# Patient Record
Sex: FEMALE | Race: WHITE | NOT HISPANIC OR LATINO | Employment: OTHER | URBAN - METROPOLITAN AREA
[De-identification: names, ages, dates, MRNs, and addresses within clinical notes are randomized per-mention and may not be internally consistent; named-entity substitution may affect disease eponyms.]

---

## 2017-01-25 ENCOUNTER — ALLSCRIPTS OFFICE VISIT (OUTPATIENT)
Dept: OTHER | Facility: OTHER | Age: 62
End: 2017-01-25

## 2017-01-25 DIAGNOSIS — R74.8 ABNORMAL LEVELS OF OTHER SERUM ENZYMES: ICD-10-CM

## 2017-01-25 DIAGNOSIS — Z12.31 ENCOUNTER FOR SCREENING MAMMOGRAM FOR MALIGNANT NEOPLASM OF BREAST: ICD-10-CM

## 2017-02-08 ENCOUNTER — HOSPITAL ENCOUNTER (OUTPATIENT)
Dept: ULTRASOUND IMAGING | Facility: CLINIC | Age: 62
Discharge: HOME | End: 2017-02-08
Payer: COMMERCIAL

## 2017-02-08 DIAGNOSIS — R74.8 ABNORMAL LEVELS OF OTHER SERUM ENZYMES: ICD-10-CM

## 2017-02-08 PROCEDURE — 76705 ECHO EXAM OF ABDOMEN: CPT

## 2017-02-09 ENCOUNTER — GENERIC CONVERSION - ENCOUNTER (OUTPATIENT)
Dept: OTHER | Facility: OTHER | Age: 62
End: 2017-02-09

## 2017-02-22 ENCOUNTER — ALLSCRIPTS OFFICE VISIT (OUTPATIENT)
Dept: OTHER | Facility: OTHER | Age: 62
End: 2017-02-22

## 2017-03-24 ENCOUNTER — HOSPITAL ENCOUNTER (EMERGENCY)
Facility: HOSPITAL | Age: 62
Discharge: HOME | End: 2017-03-24
Attending: EMERGENCY MEDICINE | Admitting: EMERGENCY MEDICINE
Payer: COMMERCIAL

## 2017-03-24 VITALS
HEART RATE: 99 BPM | SYSTOLIC BLOOD PRESSURE: 143 MMHG | OXYGEN SATURATION: 93 % | TEMPERATURE: 98.1 F | WEIGHT: 193 LBS | RESPIRATION RATE: 18 BRPM | DIASTOLIC BLOOD PRESSURE: 82 MMHG

## 2017-03-24 DIAGNOSIS — N39.0 URINARY TRACT INFECTION: Primary | ICD-10-CM

## 2017-03-24 LAB
BACTERIA UR QL AUTO: ABNORMAL /HPF
BILIRUB UR QL STRIP: ABNORMAL
CLARITY UR: ABNORMAL
COLOR UR: ABNORMAL
GLUCOSE UR STRIP-MCNC: ABNORMAL MG/DL
HGB UR QL STRIP.AUTO: NEGATIVE
KETONES UR STRIP-MCNC: ABNORMAL MG/DL
LEUKOCYTE ESTERASE UR QL STRIP: ABNORMAL
NITRITE UR QL STRIP: POSITIVE
NON-SQ EPI CELLS URNS QL MICRO: ABNORMAL /HPF
PH UR STRIP.AUTO: 5 [PH] (ref 4.5–8)
PROT UR STRIP-MCNC: ABNORMAL MG/DL
RBC #/AREA URNS AUTO: ABNORMAL /HPF
SP GR UR STRIP.AUTO: 1.02 (ref 1–1.03)
UROBILINOGEN UR QL STRIP.AUTO: >=8 E.U./DL
WBC #/AREA URNS AUTO: ABNORMAL /HPF

## 2017-03-24 PROCEDURE — 99283 EMERGENCY DEPT VISIT LOW MDM: CPT

## 2017-03-24 PROCEDURE — 87077 CULTURE AEROBIC IDENTIFY: CPT | Performed by: EMERGENCY MEDICINE

## 2017-03-24 PROCEDURE — 87186 SC STD MICRODIL/AGAR DIL: CPT | Performed by: EMERGENCY MEDICINE

## 2017-03-24 PROCEDURE — 81001 URINALYSIS AUTO W/SCOPE: CPT | Performed by: EMERGENCY MEDICINE

## 2017-03-24 PROCEDURE — 87086 URINE CULTURE/COLONY COUNT: CPT | Performed by: EMERGENCY MEDICINE

## 2017-03-24 PROCEDURE — A9270 NON-COVERED ITEM OR SERVICE: HCPCS | Performed by: EMERGENCY MEDICINE

## 2017-03-24 RX ORDER — SIMVASTATIN 5 MG
5 TABLET ORAL
COMMUNITY
End: 2017-07-10

## 2017-03-24 RX ORDER — SULFAMETHOXAZOLE AND TRIMETHOPRIM 800; 160 MG/1; MG/1
1 TABLET ORAL 2 TIMES DAILY
Qty: 14 TABLET | Refills: 0 | Status: SHIPPED | OUTPATIENT
Start: 2017-03-24 | End: 2017-03-31

## 2017-03-24 RX ORDER — OXYCODONE HYDROCHLORIDE 30 MG/1
30 TABLET, FILM COATED, EXTENDED RELEASE ORAL 3 TIMES DAILY PRN
COMMUNITY
End: 2018-09-11 | Stop reason: HOSPADM

## 2017-03-24 RX ORDER — BACLOFEN 10 MG/1
10 TABLET ORAL 3 TIMES DAILY
COMMUNITY
End: 2018-09-11 | Stop reason: HOSPADM

## 2017-03-24 RX ORDER — GABAPENTIN 800 MG/1
800 TABLET ORAL 3 TIMES DAILY
COMMUNITY

## 2017-03-24 RX ORDER — SULFAMETHOXAZOLE AND TRIMETHOPRIM 800; 160 MG/1; MG/1
1 TABLET ORAL ONCE
Status: COMPLETED | OUTPATIENT
Start: 2017-03-24 | End: 2017-03-24

## 2017-03-24 RX ADMIN — SULFAMETHOXAZOLE AND TRIMETHOPRIM 1 TABLET: 800; 160 TABLET ORAL at 18:24

## 2017-03-26 LAB — BACTERIA UR CULT: NORMAL

## 2017-04-25 ENCOUNTER — TRANSCRIBE ORDERS (OUTPATIENT)
Dept: MRI IMAGING | Facility: CLINIC | Age: 62
End: 2017-04-25

## 2017-04-25 ENCOUNTER — HOSPITAL ENCOUNTER (OUTPATIENT)
Dept: RADIOLOGY | Facility: CLINIC | Age: 62
Discharge: HOME | End: 2017-04-25
Payer: COMMERCIAL

## 2017-04-25 ENCOUNTER — TRANSCRIBE ORDERS (OUTPATIENT)
Dept: ADMINISTRATIVE | Facility: HOSPITAL | Age: 62
End: 2017-04-25

## 2017-04-25 DIAGNOSIS — I10 ESSENTIAL (PRIMARY) HYPERTENSION: ICD-10-CM

## 2017-04-25 DIAGNOSIS — M54.12 BRACHIAL NEURITIS OR RADICULITIS NOS: ICD-10-CM

## 2017-04-25 DIAGNOSIS — J45.20 MILD INTERMITTENT ASTHMA, UNCOMPLICATED: ICD-10-CM

## 2017-04-25 DIAGNOSIS — E78.1 PURE HYPERGLYCERIDEMIA: ICD-10-CM

## 2017-04-25 DIAGNOSIS — J45.909 INTRINSIC ASTHMA WITHOUT STATUS ASTHMATICUS, UNSPECIFIED ASTHMA SEVERITY, UNCOMPLICATED: ICD-10-CM

## 2017-04-25 DIAGNOSIS — M54.12 BRACHIAL NEURITIS OR RADICULITIS NOS: Primary | ICD-10-CM

## 2017-04-25 DIAGNOSIS — Z12.31 SCREENING MAMMOGRAM, ENCOUNTER FOR: Primary | ICD-10-CM

## 2017-04-25 PROCEDURE — 72050 X-RAY EXAM NECK SPINE 4/5VWS: CPT

## 2017-05-19 ENCOUNTER — HOSPITAL ENCOUNTER (OUTPATIENT)
Dept: MAMMOGRAPHY | Facility: CLINIC | Age: 62
Discharge: HOME | End: 2017-05-19
Payer: COMMERCIAL

## 2017-05-19 DIAGNOSIS — Z12.31 ENCOUNTER FOR SCREENING MAMMOGRAM FOR MALIGNANT NEOPLASM OF BREAST: ICD-10-CM

## 2017-05-19 DIAGNOSIS — Z12.31 SCREENING MAMMOGRAM, ENCOUNTER FOR: ICD-10-CM

## 2017-05-19 PROCEDURE — 77063 BREAST TOMOSYNTHESIS BI: CPT

## 2017-05-19 PROCEDURE — G0202 SCR MAMMO BI INCL CAD: HCPCS

## 2017-05-26 ENCOUNTER — GENERIC CONVERSION - ENCOUNTER (OUTPATIENT)
Dept: OTHER | Facility: OTHER | Age: 62
End: 2017-05-26

## 2017-05-26 ENCOUNTER — HOSPITAL ENCOUNTER (OUTPATIENT)
Dept: PULMONOLOGY | Facility: HOSPITAL | Age: 62
Discharge: HOME | End: 2017-05-26
Payer: COMMERCIAL

## 2017-05-26 DIAGNOSIS — J45.909 INTRINSIC ASTHMA WITHOUT STATUS ASTHMATICUS, UNSPECIFIED ASTHMA SEVERITY, UNCOMPLICATED: ICD-10-CM

## 2017-05-26 PROCEDURE — 94760 N-INVAS EAR/PLS OXIMETRY 1: CPT

## 2017-05-26 PROCEDURE — 94729 DIFFUSING CAPACITY: CPT

## 2017-05-26 PROCEDURE — 94060 EVALUATION OF WHEEZING: CPT

## 2017-05-26 PROCEDURE — 94727 GAS DIL/WSHOT DETER LNG VOL: CPT

## 2017-05-26 RX ORDER — ALBUTEROL SULFATE 2.5 MG/3ML
2.5 SOLUTION RESPIRATORY (INHALATION) ONCE
Status: DISCONTINUED | OUTPATIENT
Start: 2017-05-26 | End: 2017-05-30 | Stop reason: HOSPADM

## 2017-05-27 ENCOUNTER — APPOINTMENT (OUTPATIENT)
Dept: LAB | Facility: CLINIC | Age: 62
End: 2017-05-27
Payer: COMMERCIAL

## 2017-05-27 DIAGNOSIS — I10 ESSENTIAL (PRIMARY) HYPERTENSION: ICD-10-CM

## 2017-05-27 DIAGNOSIS — J45.20 MILD INTERMITTENT ASTHMA, UNCOMPLICATED: ICD-10-CM

## 2017-05-27 DIAGNOSIS — E78.1 PURE HYPERGLYCERIDEMIA: ICD-10-CM

## 2017-05-27 LAB
ALBUMIN SERPL BCP-MCNC: 3.5 G/DL (ref 3.5–5)
ALP SERPL-CCNC: 62 U/L (ref 46–116)
ALT SERPL W P-5'-P-CCNC: 104 U/L (ref 12–78)
ANION GAP SERPL CALCULATED.3IONS-SCNC: 4 MMOL/L (ref 4–13)
AST SERPL W P-5'-P-CCNC: 92 U/L (ref 5–45)
BASOPHILS # BLD AUTO: 0.03 THOUSANDS/ΜL (ref 0–0.1)
BASOPHILS NFR BLD AUTO: 1 % (ref 0–1)
BILIRUB SERPL-MCNC: 0.45 MG/DL (ref 0.2–1)
BUN SERPL-MCNC: 14 MG/DL (ref 5–25)
CALCIUM SERPL-MCNC: 9.1 MG/DL (ref 8.3–10.1)
CHLORIDE SERPL-SCNC: 106 MMOL/L (ref 100–108)
CHOLEST SERPL-MCNC: 214 MG/DL (ref 50–200)
CO2 SERPL-SCNC: 31 MMOL/L (ref 21–32)
CREAT SERPL-MCNC: 0.74 MG/DL (ref 0.6–1.3)
EOSINOPHIL # BLD AUTO: 0.17 THOUSAND/ΜL (ref 0–0.61)
EOSINOPHIL NFR BLD AUTO: 3 % (ref 0–6)
ERYTHROCYTE [DISTWIDTH] IN BLOOD BY AUTOMATED COUNT: 14 % (ref 11.6–15.1)
GFR SERPL CREATININE-BSD FRML MDRD: >60 ML/MIN/1.73SQ M
GLUCOSE P FAST SERPL-MCNC: 91 MG/DL (ref 65–99)
HCT VFR BLD AUTO: 43 % (ref 34.8–46.1)
HDLC SERPL-MCNC: 48 MG/DL (ref 40–60)
HGB BLD-MCNC: 13.7 G/DL (ref 11.5–15.4)
LDLC SERPL CALC-MCNC: 104 MG/DL (ref 0–100)
LYMPHOCYTES # BLD AUTO: 2.11 THOUSANDS/ΜL (ref 0.6–4.47)
LYMPHOCYTES NFR BLD AUTO: 41 % (ref 14–44)
MCH RBC QN AUTO: 29.7 PG (ref 26.8–34.3)
MCHC RBC AUTO-ENTMCNC: 31.9 G/DL (ref 31.4–37.4)
MCV RBC AUTO: 93 FL (ref 82–98)
MONOCYTES # BLD AUTO: 0.4 THOUSAND/ΜL (ref 0.17–1.22)
MONOCYTES NFR BLD AUTO: 8 % (ref 4–12)
NEUTROPHILS # BLD AUTO: 2.38 THOUSANDS/ΜL (ref 1.85–7.62)
NEUTS SEG NFR BLD AUTO: 47 % (ref 43–75)
NRBC BLD AUTO-RTO: 0 /100 WBCS
PLATELET # BLD AUTO: 293 THOUSANDS/UL (ref 149–390)
PMV BLD AUTO: 11 FL (ref 8.9–12.7)
POTASSIUM SERPL-SCNC: 4.4 MMOL/L (ref 3.5–5.3)
PROT SERPL-MCNC: 7.2 G/DL (ref 6.4–8.2)
RBC # BLD AUTO: 4.61 MILLION/UL (ref 3.81–5.12)
SODIUM SERPL-SCNC: 141 MMOL/L (ref 136–145)
T4 FREE SERPL-MCNC: 0.97 NG/DL (ref 0.76–1.46)
TRIGL SERPL-MCNC: 311 MG/DL
TSH SERPL DL<=0.05 MIU/L-ACNC: 2.13 UIU/ML (ref 0.36–3.74)
WBC # BLD AUTO: 5.11 THOUSAND/UL (ref 4.31–10.16)

## 2017-05-27 PROCEDURE — 80061 LIPID PANEL: CPT

## 2017-05-27 PROCEDURE — 84443 ASSAY THYROID STIM HORMONE: CPT

## 2017-05-27 PROCEDURE — 85025 COMPLETE CBC W/AUTO DIFF WBC: CPT

## 2017-05-27 PROCEDURE — 84439 ASSAY OF FREE THYROXINE: CPT

## 2017-05-27 PROCEDURE — 80053 COMPREHEN METABOLIC PANEL: CPT

## 2017-05-27 PROCEDURE — 36415 COLL VENOUS BLD VENIPUNCTURE: CPT

## 2017-05-29 ENCOUNTER — GENERIC CONVERSION - ENCOUNTER (OUTPATIENT)
Dept: OTHER | Facility: OTHER | Age: 62
End: 2017-05-29

## 2017-05-30 ENCOUNTER — ALLSCRIPTS OFFICE VISIT (OUTPATIENT)
Dept: OTHER | Facility: OTHER | Age: 62
End: 2017-05-30

## 2017-06-27 ENCOUNTER — ALLSCRIPTS OFFICE VISIT (OUTPATIENT)
Dept: OTHER | Facility: OTHER | Age: 62
End: 2017-06-27

## 2017-07-03 ENCOUNTER — ALLSCRIPTS OFFICE VISIT (OUTPATIENT)
Dept: OTHER | Facility: OTHER | Age: 62
End: 2017-07-03

## 2017-07-10 ENCOUNTER — APPOINTMENT (EMERGENCY)
Dept: RADIOLOGY | Facility: HOSPITAL | Age: 62
End: 2017-07-10
Payer: COMMERCIAL

## 2017-07-10 ENCOUNTER — HOSPITAL ENCOUNTER (OUTPATIENT)
Facility: HOSPITAL | Age: 62
Setting detail: OBSERVATION
Discharge: HOME | End: 2017-07-11
Attending: EMERGENCY MEDICINE | Admitting: FAMILY MEDICINE
Payer: COMMERCIAL

## 2017-07-10 DIAGNOSIS — R07.9 CHEST PAIN: Primary | ICD-10-CM

## 2017-07-10 PROBLEM — M54.9 CHRONIC BACK PAIN: Status: ACTIVE | Noted: 2017-07-10

## 2017-07-10 PROBLEM — R74.01 TRANSAMINITIS: Status: ACTIVE | Noted: 2017-07-10

## 2017-07-10 PROBLEM — F11.20 CONTINUOUS OPIOID DEPENDENCE (HCC): Status: ACTIVE | Noted: 2017-07-10

## 2017-07-10 PROBLEM — G89.29 CHRONIC BACK PAIN: Status: ACTIVE | Noted: 2017-07-10

## 2017-07-10 LAB
ALBUMIN SERPL BCP-MCNC: 3.7 G/DL (ref 3.5–5)
ALP SERPL-CCNC: 68 U/L (ref 46–116)
ALT SERPL W P-5'-P-CCNC: 157 U/L (ref 12–78)
ANION GAP SERPL CALCULATED.3IONS-SCNC: 8 MMOL/L (ref 4–13)
AST SERPL W P-5'-P-CCNC: 125 U/L (ref 5–45)
ATRIAL RATE: 85 BPM
ATRIAL RATE: 93 BPM
BASOPHILS # BLD AUTO: 0.04 THOUSANDS/ΜL (ref 0–0.1)
BASOPHILS NFR BLD AUTO: 1 % (ref 0–1)
BILIRUB SERPL-MCNC: 0.2 MG/DL (ref 0.2–1)
BUN SERPL-MCNC: 15 MG/DL (ref 5–25)
CALCIUM SERPL-MCNC: 9.2 MG/DL (ref 8.3–10.1)
CHLORIDE SERPL-SCNC: 104 MMOL/L (ref 100–108)
CO2 SERPL-SCNC: 32 MMOL/L (ref 21–32)
CREAT SERPL-MCNC: 0.84 MG/DL (ref 0.6–1.3)
EOSINOPHIL # BLD AUTO: 0.04 THOUSAND/ΜL (ref 0–0.61)
EOSINOPHIL NFR BLD AUTO: 1 % (ref 0–6)
ERYTHROCYTE [DISTWIDTH] IN BLOOD BY AUTOMATED COUNT: 13.2 % (ref 11.6–15.1)
GFR SERPL CREATININE-BSD FRML MDRD: >60 ML/MIN/1.73SQ M
GLUCOSE SERPL-MCNC: 91 MG/DL (ref 65–140)
HCT VFR BLD AUTO: 44.4 % (ref 34.8–46.1)
HGB BLD-MCNC: 14.2 G/DL (ref 11.5–15.4)
LYMPHOCYTES # BLD AUTO: 2.14 THOUSANDS/ΜL (ref 0.6–4.47)
LYMPHOCYTES NFR BLD AUTO: 38 % (ref 14–44)
MAGNESIUM SERPL-MCNC: 2.1 MG/DL (ref 1.6–2.6)
MCH RBC QN AUTO: 29.5 PG (ref 26.8–34.3)
MCHC RBC AUTO-ENTMCNC: 32 G/DL (ref 31.4–37.4)
MCV RBC AUTO: 92 FL (ref 82–98)
MONOCYTES # BLD AUTO: 0.45 THOUSAND/ΜL (ref 0.17–1.22)
MONOCYTES NFR BLD AUTO: 8 % (ref 4–12)
NEUTROPHILS # BLD AUTO: 2.93 THOUSANDS/ΜL (ref 1.85–7.62)
NEUTS SEG NFR BLD AUTO: 52 % (ref 43–75)
NRBC BLD AUTO-RTO: 0 /100 WBCS
P AXIS: 41 DEGREES
P AXIS: 46 DEGREES
PLATELET # BLD AUTO: 292 THOUSANDS/UL (ref 149–390)
PMV BLD AUTO: 10.7 FL (ref 8.9–12.7)
POTASSIUM SERPL-SCNC: 3.9 MMOL/L (ref 3.5–5.3)
PR INTERVAL: 164 MS
PR INTERVAL: 168 MS
PROT SERPL-MCNC: 7.9 G/DL (ref 6.4–8.2)
QRS AXIS: 1 DEGREES
QRS AXIS: 5 DEGREES
QRSD INTERVAL: 84 MS
QRSD INTERVAL: 86 MS
QT INTERVAL: 346 MS
QT INTERVAL: 372 MS
QTC INTERVAL: 430 MS
QTC INTERVAL: 442 MS
RBC # BLD AUTO: 4.81 MILLION/UL (ref 3.81–5.12)
SODIUM SERPL-SCNC: 144 MMOL/L (ref 136–145)
SPECIMEN SOURCE: NORMAL
SPECIMEN SOURCE: NORMAL
T WAVE AXIS: 14 DEGREES
T WAVE AXIS: 26 DEGREES
TROPONIN I BLD-MCNC: 0 NG/ML (ref 0–0.08)
TROPONIN I BLD-MCNC: 0.03 NG/ML (ref 0–0.08)
TROPONIN I SERPL-MCNC: <0.02 NG/ML
TROPONIN I SERPL-MCNC: <0.02 NG/ML
VENTRICULAR RATE: 85 BPM
VENTRICULAR RATE: 93 BPM
WBC # BLD AUTO: 5.62 THOUSAND/UL (ref 4.31–10.16)

## 2017-07-10 PROCEDURE — 84484 ASSAY OF TROPONIN QUANT: CPT

## 2017-07-10 PROCEDURE — 84484 ASSAY OF TROPONIN QUANT: CPT | Performed by: FAMILY MEDICINE

## 2017-07-10 PROCEDURE — 85025 COMPLETE CBC W/AUTO DIFF WBC: CPT | Performed by: EMERGENCY MEDICINE

## 2017-07-10 PROCEDURE — 71020 HB CHEST X-RAY 2VW FRONTAL&LATL: CPT

## 2017-07-10 PROCEDURE — 83735 ASSAY OF MAGNESIUM: CPT | Performed by: EMERGENCY MEDICINE

## 2017-07-10 PROCEDURE — 99285 EMERGENCY DEPT VISIT HI MDM: CPT

## 2017-07-10 PROCEDURE — 96361 HYDRATE IV INFUSION ADD-ON: CPT

## 2017-07-10 PROCEDURE — 93005 ELECTROCARDIOGRAM TRACING: CPT | Performed by: EMERGENCY MEDICINE

## 2017-07-10 PROCEDURE — 96374 THER/PROPH/DIAG INJ IV PUSH: CPT

## 2017-07-10 PROCEDURE — 36415 COLL VENOUS BLD VENIPUNCTURE: CPT | Performed by: EMERGENCY MEDICINE

## 2017-07-10 PROCEDURE — 80053 COMPREHEN METABOLIC PANEL: CPT | Performed by: EMERGENCY MEDICINE

## 2017-07-10 RX ORDER — ASPIRIN 81 MG/1
324 TABLET, CHEWABLE ORAL ONCE
Status: COMPLETED | OUTPATIENT
Start: 2017-07-10 | End: 2017-07-10

## 2017-07-10 RX ORDER — BACLOFEN 10 MG/1
10 TABLET ORAL 3 TIMES DAILY
Status: DISCONTINUED | OUTPATIENT
Start: 2017-07-10 | End: 2017-07-11 | Stop reason: HOSPADM

## 2017-07-10 RX ORDER — ONDANSETRON 2 MG/ML
4 INJECTION INTRAMUSCULAR; INTRAVENOUS ONCE
Status: COMPLETED | OUTPATIENT
Start: 2017-07-10 | End: 2017-07-10

## 2017-07-10 RX ORDER — ZOLPIDEM TARTRATE 5 MG/1
5 TABLET ORAL
COMMUNITY
End: 2019-12-18 | Stop reason: ALTCHOICE

## 2017-07-10 RX ORDER — PANTOPRAZOLE SODIUM 20 MG/1
20 TABLET, DELAYED RELEASE ORAL
Status: DISCONTINUED | OUTPATIENT
Start: 2017-07-11 | End: 2017-07-11 | Stop reason: HOSPADM

## 2017-07-10 RX ORDER — ZOLPIDEM TARTRATE 5 MG/1
5 TABLET ORAL
Status: DISCONTINUED | OUTPATIENT
Start: 2017-07-10 | End: 2017-07-11 | Stop reason: HOSPADM

## 2017-07-10 RX ORDER — VENLAFAXINE HYDROCHLORIDE 37.5 MG/1
75 CAPSULE, EXTENDED RELEASE ORAL DAILY
Status: DISCONTINUED | OUTPATIENT
Start: 2017-07-11 | End: 2017-07-11 | Stop reason: HOSPADM

## 2017-07-10 RX ORDER — OMEPRAZOLE 20 MG/1
20 CAPSULE, DELAYED RELEASE ORAL 2 TIMES DAILY
COMMUNITY
End: 2018-03-15 | Stop reason: ALTCHOICE

## 2017-07-10 RX ORDER — ONDANSETRON 2 MG/ML
4 INJECTION INTRAMUSCULAR; INTRAVENOUS EVERY 6 HOURS PRN
Status: DISCONTINUED | OUTPATIENT
Start: 2017-07-10 | End: 2017-07-11 | Stop reason: HOSPADM

## 2017-07-10 RX ORDER — NITROGLYCERIN 0.4 MG/1
0.4 TABLET SUBLINGUAL
Status: DISCONTINUED | OUTPATIENT
Start: 2017-07-10 | End: 2017-07-11 | Stop reason: HOSPADM

## 2017-07-10 RX ORDER — GABAPENTIN 400 MG/1
800 CAPSULE ORAL 3 TIMES DAILY
Status: DISCONTINUED | OUTPATIENT
Start: 2017-07-10 | End: 2017-07-11 | Stop reason: HOSPADM

## 2017-07-10 RX ORDER — ACETAMINOPHEN 325 MG/1
650 TABLET ORAL EVERY 4 HOURS PRN
Status: DISCONTINUED | OUTPATIENT
Start: 2017-07-10 | End: 2017-07-11 | Stop reason: HOSPADM

## 2017-07-10 RX ORDER — ALBUTEROL SULFATE 90 UG/1
2 AEROSOL, METERED RESPIRATORY (INHALATION) 2 TIMES DAILY PRN
COMMUNITY
End: 2018-03-15 | Stop reason: SDUPTHER

## 2017-07-10 RX ORDER — ASPIRIN 81 MG/1
81 TABLET, CHEWABLE ORAL DAILY
Status: DISCONTINUED | OUTPATIENT
Start: 2017-07-11 | End: 2017-07-11 | Stop reason: HOSPADM

## 2017-07-10 RX ORDER — VENLAFAXINE 75 MG/1
37.5 TABLET ORAL 2 TIMES DAILY
COMMUNITY
End: 2018-03-15 | Stop reason: CLARIF

## 2017-07-10 RX ORDER — ALBUTEROL SULFATE 90 UG/1
2 AEROSOL, METERED RESPIRATORY (INHALATION) EVERY 6 HOURS PRN
Status: DISCONTINUED | OUTPATIENT
Start: 2017-07-10 | End: 2017-07-11 | Stop reason: HOSPADM

## 2017-07-10 RX ADMIN — ZOLPIDEM TARTRATE 5 MG: 5 TABLET, COATED ORAL at 22:18

## 2017-07-10 RX ADMIN — GABAPENTIN 800 MG: 400 CAPSULE ORAL at 21:44

## 2017-07-10 RX ADMIN — OXYCODONE HYDROCHLORIDE 30 MG: 20 TABLET, FILM COATED, EXTENDED RELEASE ORAL at 21:45

## 2017-07-10 RX ADMIN — SODIUM CHLORIDE 1000 ML: 0.9 INJECTION, SOLUTION INTRAVENOUS at 10:35

## 2017-07-10 RX ADMIN — ENOXAPARIN SODIUM 40 MG: 40 INJECTION SUBCUTANEOUS at 16:03

## 2017-07-10 RX ADMIN — OXYCODONE HYDROCHLORIDE 30 MG: 20 TABLET, FILM COATED, EXTENDED RELEASE ORAL at 16:03

## 2017-07-10 RX ADMIN — BACLOFEN 10 MG: 10 TABLET ORAL at 16:56

## 2017-07-10 RX ADMIN — ASPIRIN 81 MG 324 MG: 81 TABLET ORAL at 10:18

## 2017-07-10 RX ADMIN — BACLOFEN 10 MG: 10 TABLET ORAL at 21:44

## 2017-07-10 RX ADMIN — ONDANSETRON 4 MG: 2 INJECTION INTRAMUSCULAR; INTRAVENOUS at 10:34

## 2017-07-10 RX ADMIN — GABAPENTIN 800 MG: 400 CAPSULE ORAL at 16:56

## 2017-07-11 ENCOUNTER — APPOINTMENT (OUTPATIENT)
Dept: NUCLEAR MEDICINE | Facility: HOSPITAL | Age: 62
End: 2017-07-11
Payer: COMMERCIAL

## 2017-07-11 ENCOUNTER — APPOINTMENT (OUTPATIENT)
Dept: NON INVASIVE DIAGNOSTICS | Facility: HOSPITAL | Age: 62
End: 2017-07-11
Payer: COMMERCIAL

## 2017-07-11 VITALS
HEIGHT: 61 IN | WEIGHT: 205.69 LBS | TEMPERATURE: 98.2 F | HEART RATE: 89 BPM | SYSTOLIC BLOOD PRESSURE: 168 MMHG | RESPIRATION RATE: 18 BRPM | BODY MASS INDEX: 38.83 KG/M2 | OXYGEN SATURATION: 92 % | DIASTOLIC BLOOD PRESSURE: 93 MMHG

## 2017-07-11 PROBLEM — R07.9 CHEST PAIN: Status: RESOLVED | Noted: 2017-07-10 | Resolved: 2017-07-11

## 2017-07-11 LAB
ALBUMIN SERPL BCP-MCNC: 3.2 G/DL (ref 3.5–5)
ALP SERPL-CCNC: 50 U/L (ref 46–116)
ALT SERPL W P-5'-P-CCNC: 117 U/L (ref 12–78)
ANION GAP SERPL CALCULATED.3IONS-SCNC: 8 MMOL/L (ref 4–13)
AST SERPL W P-5'-P-CCNC: 92 U/L (ref 5–45)
BILIRUB SERPL-MCNC: 0.4 MG/DL (ref 0.2–1)
BUN SERPL-MCNC: 16 MG/DL (ref 5–25)
CALCIUM SERPL-MCNC: 9 MG/DL (ref 8.3–10.1)
CHLORIDE SERPL-SCNC: 104 MMOL/L (ref 100–108)
CHOLEST SERPL-MCNC: 197 MG/DL (ref 50–200)
CO2 SERPL-SCNC: 25 MMOL/L (ref 21–32)
CREAT SERPL-MCNC: 0.63 MG/DL (ref 0.6–1.3)
GFR SERPL CREATININE-BSD FRML MDRD: >60 ML/MIN/1.73SQ M
GLUCOSE P FAST SERPL-MCNC: 99 MG/DL (ref 65–99)
GLUCOSE SERPL-MCNC: 99 MG/DL (ref 65–140)
HDLC SERPL-MCNC: 66 MG/DL (ref 40–60)
LDLC SERPL CALC-MCNC: 89 MG/DL (ref 0–100)
MAX DIASTOLIC BP: 84 MMHG
MAX HEART RATE: 113 BPM
MAX PREDICTED HEART RATE: 156 BPM
MAX. SYSTOLIC BP: 168 MMHG
PLATELET # BLD AUTO: 270 THOUSANDS/UL (ref 149–390)
PMV BLD AUTO: 11 FL (ref 8.9–12.7)
POTASSIUM SERPL-SCNC: 4.3 MMOL/L (ref 3.5–5.3)
PROT SERPL-MCNC: 7.1 G/DL (ref 6.4–8.2)
PROTOCOL NAME: NORMAL
REASON FOR TERMINATION: NORMAL
SODIUM SERPL-SCNC: 137 MMOL/L (ref 136–145)
TARGET HR FORMULA: NORMAL
TEST INDICATION: NORMAL
TIME IN EXERCISE PHASE: 180 S
TRIGL SERPL-MCNC: 208 MG/DL
TROPONIN I SERPL-MCNC: <0.02 NG/ML

## 2017-07-11 PROCEDURE — A9502 TC99M TETROFOSMIN: HCPCS

## 2017-07-11 PROCEDURE — 85049 AUTOMATED PLATELET COUNT: CPT | Performed by: FAMILY MEDICINE

## 2017-07-11 PROCEDURE — 78452 HT MUSCLE IMAGE SPECT MULT: CPT

## 2017-07-11 PROCEDURE — 80053 COMPREHEN METABOLIC PANEL: CPT | Performed by: FAMILY MEDICINE

## 2017-07-11 PROCEDURE — 80061 LIPID PANEL: CPT | Performed by: FAMILY MEDICINE

## 2017-07-11 PROCEDURE — 84484 ASSAY OF TROPONIN QUANT: CPT | Performed by: FAMILY MEDICINE

## 2017-07-11 PROCEDURE — 93017 CV STRESS TEST TRACING ONLY: CPT

## 2017-07-11 RX ORDER — ASPIRIN 81 MG/1
81 TABLET, CHEWABLE ORAL DAILY
Qty: 30 TABLET | Refills: 0
Start: 2017-07-11 | End: 2018-03-15 | Stop reason: ALTCHOICE

## 2017-07-11 RX ADMIN — ACETAMINOPHEN 650 MG: 325 TABLET ORAL at 05:32

## 2017-07-11 RX ADMIN — OXYCODONE HYDROCHLORIDE 30 MG: 20 TABLET, FILM COATED, EXTENDED RELEASE ORAL at 08:16

## 2017-07-11 RX ADMIN — PANTOPRAZOLE SODIUM 20 MG: 20 TABLET, DELAYED RELEASE ORAL at 05:30

## 2017-07-11 RX ADMIN — REGADENOSON 0.4 MG: 0.08 INJECTION, SOLUTION INTRAVENOUS at 10:38

## 2017-07-11 RX ADMIN — BACLOFEN 10 MG: 10 TABLET ORAL at 08:12

## 2017-07-11 RX ADMIN — ASPIRIN 81 MG 81 MG: 81 TABLET ORAL at 08:12

## 2017-07-11 RX ADMIN — VENLAFAXINE HYDROCHLORIDE 75 MG: 75 CAPSULE, EXTENDED RELEASE ORAL at 08:13

## 2017-07-11 RX ADMIN — GABAPENTIN 800 MG: 400 CAPSULE ORAL at 08:12

## 2017-07-11 RX ADMIN — ENOXAPARIN SODIUM 40 MG: 40 INJECTION SUBCUTANEOUS at 08:12

## 2017-07-31 ENCOUNTER — ALLSCRIPTS OFFICE VISIT (OUTPATIENT)
Dept: OTHER | Facility: OTHER | Age: 62
End: 2017-07-31

## 2017-07-31 DIAGNOSIS — R25.2 CRAMP AND SPASM: ICD-10-CM

## 2017-08-08 ENCOUNTER — APPOINTMENT (EMERGENCY)
Dept: RADIOLOGY | Facility: HOSPITAL | Age: 62
End: 2017-08-08
Payer: COMMERCIAL

## 2017-08-08 ENCOUNTER — HOSPITAL ENCOUNTER (EMERGENCY)
Facility: HOSPITAL | Age: 62
Discharge: HOME | End: 2017-08-08
Attending: EMERGENCY MEDICINE | Admitting: EMERGENCY MEDICINE
Payer: COMMERCIAL

## 2017-08-08 ENCOUNTER — APPOINTMENT (EMERGENCY)
Dept: CT IMAGING | Facility: HOSPITAL | Age: 62
End: 2017-08-08
Payer: COMMERCIAL

## 2017-08-08 VITALS
OXYGEN SATURATION: 94 % | HEART RATE: 91 BPM | DIASTOLIC BLOOD PRESSURE: 85 MMHG | RESPIRATION RATE: 18 BRPM | TEMPERATURE: 97.5 F | WEIGHT: 206.79 LBS | SYSTOLIC BLOOD PRESSURE: 185 MMHG | BODY MASS INDEX: 39.07 KG/M2

## 2017-08-08 DIAGNOSIS — T50.905A MEDICATION REACTION, INITIAL ENCOUNTER: Primary | ICD-10-CM

## 2017-08-08 LAB
ALBUMIN SERPL BCP-MCNC: 4.1 G/DL (ref 3.5–5)
ALP SERPL-CCNC: 64 U/L (ref 46–116)
ALT SERPL W P-5'-P-CCNC: 173 U/L (ref 12–78)
AMPHETAMINES SERPL QL SCN: NEGATIVE
ANION GAP SERPL CALCULATED.3IONS-SCNC: 7 MMOL/L (ref 4–13)
APAP SERPL-MCNC: <2 UG/ML (ref 10–30)
AST SERPL W P-5'-P-CCNC: 148 U/L (ref 5–45)
BARBITURATES UR QL: NEGATIVE
BASOPHILS # BLD AUTO: 0.04 THOUSANDS/ΜL (ref 0–0.1)
BASOPHILS NFR BLD AUTO: 1 % (ref 0–1)
BENZODIAZ UR QL: NEGATIVE
BILIRUB DIRECT SERPL-MCNC: 0.12 MG/DL (ref 0–0.2)
BILIRUB SERPL-MCNC: 0.3 MG/DL (ref 0.2–1)
BUN SERPL-MCNC: 17 MG/DL (ref 5–25)
CALCIUM SERPL-MCNC: 10 MG/DL (ref 8.3–10.1)
CHLORIDE SERPL-SCNC: 104 MMOL/L (ref 100–108)
CO2 SERPL-SCNC: 31 MMOL/L (ref 21–32)
COCAINE UR QL: NEGATIVE
CREAT SERPL-MCNC: 0.93 MG/DL (ref 0.6–1.3)
EOSINOPHIL # BLD AUTO: 0.07 THOUSAND/ΜL (ref 0–0.61)
EOSINOPHIL NFR BLD AUTO: 1 % (ref 0–6)
ERYTHROCYTE [DISTWIDTH] IN BLOOD BY AUTOMATED COUNT: 13.1 % (ref 11.6–15.1)
ETHANOL SERPL-MCNC: <3 MG/DL (ref 0–3)
GFR SERPL CREATININE-BSD FRML MDRD: 65 ML/MIN/1.73SQ M
GLUCOSE SERPL-MCNC: 117 MG/DL (ref 65–140)
HCT VFR BLD AUTO: 44.9 % (ref 34.8–46.1)
HGB BLD-MCNC: 14.6 G/DL (ref 11.5–15.4)
INR PPP: 0.95 (ref 0.86–1.16)
LYMPHOCYTES # BLD AUTO: 1.85 THOUSANDS/ΜL (ref 0.6–4.47)
LYMPHOCYTES NFR BLD AUTO: 23 % (ref 14–44)
MCH RBC QN AUTO: 29.9 PG (ref 26.8–34.3)
MCHC RBC AUTO-ENTMCNC: 32.5 G/DL (ref 31.4–37.4)
MCV RBC AUTO: 92 FL (ref 82–98)
METHADONE UR QL: NEGATIVE
MONOCYTES # BLD AUTO: 0.46 THOUSAND/ΜL (ref 0.17–1.22)
MONOCYTES NFR BLD AUTO: 6 % (ref 4–12)
NEUTROPHILS # BLD AUTO: 5.72 THOUSANDS/ΜL (ref 1.85–7.62)
NEUTS SEG NFR BLD AUTO: 70 % (ref 43–75)
NRBC BLD AUTO-RTO: 0 /100 WBCS
OPIATES UR QL SCN: POSITIVE
PCP UR QL: NEGATIVE
PLATELET # BLD AUTO: 287 THOUSANDS/UL (ref 149–390)
PMV BLD AUTO: 10.6 FL (ref 8.9–12.7)
POTASSIUM SERPL-SCNC: 4.1 MMOL/L (ref 3.5–5.3)
PROT SERPL-MCNC: 7.9 G/DL (ref 6.4–8.2)
PROTHROMBIN TIME: 12.9 SECONDS (ref 12.1–14.4)
RBC # BLD AUTO: 4.88 MILLION/UL (ref 3.81–5.12)
SALICYLATES SERPL-MCNC: <3 MG/DL (ref 3–20)
SODIUM SERPL-SCNC: 142 MMOL/L (ref 136–145)
THC UR QL: NEGATIVE
TROPONIN I SERPL-MCNC: <0.02 NG/ML
WBC # BLD AUTO: 8.18 THOUSAND/UL (ref 4.31–10.16)

## 2017-08-08 PROCEDURE — 93005 ELECTROCARDIOGRAM TRACING: CPT | Performed by: EMERGENCY MEDICINE

## 2017-08-08 PROCEDURE — 85610 PROTHROMBIN TIME: CPT | Performed by: EMERGENCY MEDICINE

## 2017-08-08 PROCEDURE — 99285 EMERGENCY DEPT VISIT HI MDM: CPT

## 2017-08-08 PROCEDURE — 80307 DRUG TEST PRSMV CHEM ANLYZR: CPT | Performed by: EMERGENCY MEDICINE

## 2017-08-08 PROCEDURE — 70450 CT HEAD/BRAIN W/O DYE: CPT

## 2017-08-08 PROCEDURE — G0480 DRUG TEST DEF 1-7 CLASSES: HCPCS | Performed by: EMERGENCY MEDICINE

## 2017-08-08 PROCEDURE — 96374 THER/PROPH/DIAG INJ IV PUSH: CPT

## 2017-08-08 PROCEDURE — 80320 DRUG SCREEN QUANTALCOHOLS: CPT | Performed by: EMERGENCY MEDICINE

## 2017-08-08 PROCEDURE — 36415 COLL VENOUS BLD VENIPUNCTURE: CPT | Performed by: EMERGENCY MEDICINE

## 2017-08-08 PROCEDURE — 71020 HB CHEST X-RAY 2VW FRONTAL&LATL: CPT

## 2017-08-08 PROCEDURE — 80048 BASIC METABOLIC PNL TOTAL CA: CPT | Performed by: EMERGENCY MEDICINE

## 2017-08-08 PROCEDURE — 80329 ANALGESICS NON-OPIOID 1 OR 2: CPT | Performed by: EMERGENCY MEDICINE

## 2017-08-08 PROCEDURE — 85025 COMPLETE CBC W/AUTO DIFF WBC: CPT | Performed by: EMERGENCY MEDICINE

## 2017-08-08 PROCEDURE — 84484 ASSAY OF TROPONIN QUANT: CPT | Performed by: EMERGENCY MEDICINE

## 2017-08-08 PROCEDURE — 80076 HEPATIC FUNCTION PANEL: CPT | Performed by: EMERGENCY MEDICINE

## 2017-08-08 RX ORDER — NALOXONE HYDROCHLORIDE 0.4 MG/ML
INJECTION, SOLUTION INTRAMUSCULAR; INTRAVENOUS; SUBCUTANEOUS
Status: COMPLETED
Start: 2017-08-08 | End: 2017-08-08

## 2017-08-08 RX ORDER — ONDANSETRON 2 MG/ML
4 INJECTION INTRAMUSCULAR; INTRAVENOUS ONCE
Status: COMPLETED | OUTPATIENT
Start: 2017-08-08 | End: 2017-08-08

## 2017-08-08 RX ADMIN — ONDANSETRON 4 MG: 2 INJECTION INTRAMUSCULAR; INTRAVENOUS at 22:43

## 2017-08-08 RX ADMIN — NALOXONE HYDROCHLORIDE 0.4 MG: 0.4 INJECTION, SOLUTION INTRAMUSCULAR; INTRAVENOUS; SUBCUTANEOUS at 20:57

## 2017-08-09 LAB
ATRIAL RATE: 82 BPM
P AXIS: 36 DEGREES
PR INTERVAL: 166 MS
QRS AXIS: -5 DEGREES
QRSD INTERVAL: 94 MS
QT INTERVAL: 394 MS
QTC INTERVAL: 460 MS
T WAVE AXIS: 27 DEGREES
VENTRICULAR RATE: 82 BPM

## 2017-08-10 ENCOUNTER — ALLSCRIPTS OFFICE VISIT (OUTPATIENT)
Dept: OTHER | Facility: OTHER | Age: 62
End: 2017-08-10

## 2017-08-21 RX ORDER — OMEGA-3-ACID ETHYL ESTERS 1 G/1
1 CAPSULE, LIQUID FILLED ORAL 2 TIMES DAILY
COMMUNITY
End: 2018-04-05 | Stop reason: SDUPTHER

## 2017-08-21 RX ORDER — FENOFIBRATE 160 MG/1
160 TABLET ORAL DAILY
COMMUNITY
End: 2018-03-07 | Stop reason: SDUPTHER

## 2017-08-21 RX ORDER — ONDANSETRON 4 MG/1
4 TABLET, FILM COATED ORAL
COMMUNITY
End: 2018-03-15 | Stop reason: ALTCHOICE

## 2017-08-21 RX ORDER — FUROSEMIDE 40 MG/1
40 TABLET ORAL DAILY
COMMUNITY
End: 2018-07-10

## 2017-08-21 RX ORDER — PROCHLORPERAZINE MALEATE 10 MG
10 TABLET ORAL 2 TIMES DAILY
COMMUNITY
End: 2018-03-15 | Stop reason: SDUPTHER

## 2017-08-21 RX ORDER — PRAVASTATIN SODIUM 20 MG
20 TABLET ORAL DAILY
COMMUNITY
End: 2018-06-02 | Stop reason: SDUPTHER

## 2017-08-21 RX ORDER — VENLAFAXINE 75 MG/1
75 TABLET ORAL 2 TIMES DAILY
COMMUNITY
End: 2018-08-01 | Stop reason: SDUPTHER

## 2017-08-21 RX ORDER — PANTOPRAZOLE SODIUM 40 MG/1
40 TABLET, DELAYED RELEASE ORAL DAILY
COMMUNITY
End: 2018-05-02 | Stop reason: SDUPTHER

## 2017-08-21 RX ORDER — ERGOCALCIFEROL 1.25 MG/1
50000 CAPSULE ORAL WEEKLY
COMMUNITY
End: 2018-03-04 | Stop reason: SDUPTHER

## 2017-08-22 ENCOUNTER — ANESTHESIA EVENT (OUTPATIENT)
Dept: PERIOP | Facility: HOSPITAL | Age: 62
End: 2017-08-22
Payer: COMMERCIAL

## 2017-08-23 ENCOUNTER — HOSPITAL ENCOUNTER (OUTPATIENT)
Facility: HOSPITAL | Age: 62
Setting detail: OUTPATIENT SURGERY
Discharge: HOME | End: 2017-08-23
Attending: INTERNAL MEDICINE | Admitting: INTERNAL MEDICINE
Payer: COMMERCIAL

## 2017-08-23 ENCOUNTER — ANESTHESIA (OUTPATIENT)
Dept: PERIOP | Facility: HOSPITAL | Age: 62
End: 2017-08-23
Payer: COMMERCIAL

## 2017-08-23 ENCOUNTER — GENERIC CONVERSION - ENCOUNTER (OUTPATIENT)
Dept: OTHER | Facility: OTHER | Age: 62
End: 2017-08-23

## 2017-08-23 VITALS
DIASTOLIC BLOOD PRESSURE: 81 MMHG | OXYGEN SATURATION: 94 % | HEIGHT: 62 IN | WEIGHT: 194.89 LBS | HEART RATE: 72 BPM | RESPIRATION RATE: 14 BRPM | BODY MASS INDEX: 35.86 KG/M2 | SYSTOLIC BLOOD PRESSURE: 150 MMHG | TEMPERATURE: 98 F

## 2017-08-23 DIAGNOSIS — K21.9 GASTRO-ESOPHAGEAL REFLUX DISEASE WITHOUT ESOPHAGITIS: ICD-10-CM

## 2017-08-23 DIAGNOSIS — R10.9 ABDOMINAL PAIN: ICD-10-CM

## 2017-08-23 DIAGNOSIS — K76.0 NAFL (NONALCOHOLIC FATTY LIVER): ICD-10-CM

## 2017-08-23 PROCEDURE — 88342 IMHCHEM/IMCYTCHM 1ST ANTB: CPT | Performed by: INTERNAL MEDICINE

## 2017-08-23 PROCEDURE — 88305 TISSUE EXAM BY PATHOLOGIST: CPT | Performed by: INTERNAL MEDICINE

## 2017-08-23 RX ORDER — ONDANSETRON 2 MG/ML
INJECTION INTRAMUSCULAR; INTRAVENOUS AS NEEDED
Status: DISCONTINUED | OUTPATIENT
Start: 2017-08-23 | End: 2017-08-23 | Stop reason: SURG

## 2017-08-23 RX ORDER — SODIUM CHLORIDE, SODIUM LACTATE, POTASSIUM CHLORIDE, CALCIUM CHLORIDE 600; 310; 30; 20 MG/100ML; MG/100ML; MG/100ML; MG/100ML
125 INJECTION, SOLUTION INTRAVENOUS CONTINUOUS
Status: DISCONTINUED | OUTPATIENT
Start: 2017-08-23 | End: 2017-08-28 | Stop reason: HOSPADM

## 2017-08-23 RX ORDER — LABETALOL HYDROCHLORIDE 5 MG/ML
INJECTION, SOLUTION INTRAVENOUS AS NEEDED
Status: DISCONTINUED | OUTPATIENT
Start: 2017-08-23 | End: 2017-08-23 | Stop reason: SURG

## 2017-08-23 RX ORDER — PROPOFOL 10 MG/ML
INJECTION, EMULSION INTRAVENOUS AS NEEDED
Status: DISCONTINUED | OUTPATIENT
Start: 2017-08-23 | End: 2017-08-23 | Stop reason: SURG

## 2017-08-23 RX ADMIN — LABETALOL HYDROCHLORIDE 5 MG: 5 INJECTION, SOLUTION INTRAVENOUS at 09:08

## 2017-08-23 RX ADMIN — LABETALOL HYDROCHLORIDE 5 MG: 5 INJECTION, SOLUTION INTRAVENOUS at 09:18

## 2017-08-23 RX ADMIN — PROPOFOL 50 MG: 10 INJECTION, EMULSION INTRAVENOUS at 09:13

## 2017-08-23 RX ADMIN — ONDANSETRON 4 MG: 2 INJECTION INTRAMUSCULAR; INTRAVENOUS at 09:13

## 2017-08-23 RX ADMIN — PROPOFOL 50 MG: 10 INJECTION, EMULSION INTRAVENOUS at 09:14

## 2017-08-23 RX ADMIN — SODIUM CHLORIDE, SODIUM LACTATE, POTASSIUM CHLORIDE, AND CALCIUM CHLORIDE: .6; .31; .03; .02 INJECTION, SOLUTION INTRAVENOUS at 08:50

## 2017-08-23 RX ADMIN — PROPOFOL 50 MG: 10 INJECTION, EMULSION INTRAVENOUS at 09:16

## 2017-08-23 RX ADMIN — LIDOCAINE HYDROCHLORIDE 50 MG: 20 INJECTION, SOLUTION INTRAVENOUS at 09:14

## 2017-09-29 ENCOUNTER — HOSPITAL ENCOUNTER (EMERGENCY)
Facility: HOSPITAL | Age: 62
Discharge: HOME | End: 2017-09-29
Attending: EMERGENCY MEDICINE | Admitting: EMERGENCY MEDICINE
Payer: COMMERCIAL

## 2017-09-29 VITALS
RESPIRATION RATE: 18 BRPM | WEIGHT: 190 LBS | DIASTOLIC BLOOD PRESSURE: 83 MMHG | BODY MASS INDEX: 35.87 KG/M2 | HEART RATE: 103 BPM | OXYGEN SATURATION: 95 % | SYSTOLIC BLOOD PRESSURE: 130 MMHG | TEMPERATURE: 98 F | HEIGHT: 61 IN

## 2017-09-29 DIAGNOSIS — J02.9 SORE THROAT: ICD-10-CM

## 2017-09-29 DIAGNOSIS — J06.9 VIRAL URI: Primary | ICD-10-CM

## 2017-09-29 DIAGNOSIS — R09.81 HEAD CONGESTION: ICD-10-CM

## 2017-09-29 PROCEDURE — 99282 EMERGENCY DEPT VISIT SF MDM: CPT

## 2017-09-29 RX ORDER — ACETAMINOPHEN 325 MG/1
650 TABLET ORAL ONCE
Status: COMPLETED | OUTPATIENT
Start: 2017-09-29 | End: 2017-09-29

## 2017-09-29 RX ORDER — IBUPROFEN 600 MG/1
600 TABLET ORAL ONCE
Status: DISCONTINUED | OUTPATIENT
Start: 2017-09-29 | End: 2017-09-29

## 2017-09-29 RX ORDER — FLUTICASONE PROPIONATE 50 MCG
2 SPRAY, SUSPENSION (ML) NASAL DAILY
Qty: 16 G | Refills: 0 | Status: SHIPPED | OUTPATIENT
Start: 2017-09-29 | End: 2018-11-29 | Stop reason: SDUPTHER

## 2017-09-29 RX ADMIN — ACETAMINOPHEN 650 MG: 325 TABLET ORAL at 10:01

## 2017-09-29 RX ADMIN — DEXAMETHASONE SODIUM PHOSPHATE 10 MG: 10 INJECTION, SOLUTION INTRAMUSCULAR; INTRAVENOUS at 09:57

## 2017-09-29 NOTE — DISCHARGE INSTRUCTIONS
Upper Respiratory Infection   WHAT YOU NEED TO KNOW:   An upper respiratory infection is also called the common cold  It is an infection that can affect your nose, throat, ears, and sinuses  For healthy people, the common cold is usually not serious and does not need special treatment  Cold symptoms are usually worst for the first 3 to 5 days  Most people get better in 7 to 14 days  You may continue to cough for 2 to 3 weeks  Colds are caused by viruses and do not get better with antibiotics  DISCHARGE INSTRUCTIONS:   Seek care immediately if:   · You have chest pain or trouble breathing  Contact your healthcare provider if:   · You have a fever over 102ºF (39°C)  · Your sore throat gets worse or you see white or yellow spots in your throat  · Your symptoms get worse after 3 to 5 days or your cold is not better in 14 days  · You have a rash anywhere on your skin  · You have large, tender lumps in your neck  · You have thick, green, or yellow drainage from your nose  · You cough up thick yellow, green, or bloody mucus  · You are vomiting for more than 24 hours and cannot keep fluids down  · You have a bad earache  · You have questions or concerns about your condition or care  Medicines: You may need any of the following:  · Decongestants  help reduce nasal congestion and help you breathe more easily  If you take decongestant pills, they may make you feel restless or cause problems with your sleep  Do not use decongestant sprays for more than a few days  · Cough suppressants  help reduce coughing  Ask your healthcare provider which type of cough medicine is best for you  · NSAIDs , such as ibuprofen, help decrease swelling, pain, and fever  NSAIDs can cause stomach bleeding or kidney problems in certain people  If you take blood thinner medicine, always ask your healthcare provider if NSAIDs are safe for you   Always read the medicine label and follow directions  · Acetaminophen  decreases pain and fever  It is available without a doctor's order  Ask how much to take and how often to take it  Follow directions  Read the labels of all other medicines you are using to see if they also contain acetaminophen, or ask your doctor or pharmacist  Acetaminophen can cause liver damage if not taken correctly  Do not use more than 4 grams (4,000 milligrams) total of acetaminophen in one day  · Take your medicine as directed  Contact your healthcare provider if you think your medicine is not helping or if you have side effects  Tell him or her if you are allergic to any medicine  Keep a list of the medicines, vitamins, and herbs you take  Include the amounts, and when and why you take them  Bring the list or the pill bottles to follow-up visits  Carry your medicine list with you in case of an emergency  Follow up with your healthcare provider as directed:  Write down your questions so you remember to ask them during your visits  Self-care:   · Rest as much as possible  Slowly start to do more each day  · Drink more liquids as directed  Liquids will help thin and loosen mucus so you can cough it up  Liquids will also help prevent dehydration  Liquids that help prevent dehydration include water, fruit juice, and broth  Do not drink liquids that contain caffeine  Caffeine can increase your risk for dehydration  Ask your healthcare provider how much liquid to drink each day  · Soothe a sore throat  Gargle with warm salt water  This helps your sore throat feel better  Make salt water by dissolving ¼ teaspoon salt in 1 cup warm water  You may also suck on hard candy or throat lozenges  You may use a sore throat spray  · Use a humidifier or vaporizer  Use a cool mist humidifier or a vaporizer to increase air moisture in your home  This may make it easier for you to breathe and help decrease your cough  · Use saline nasal drops as directed    These help relieve congestion  · Apply petroleum-based jelly around the outside of your nostrils  This can decrease irritation from blowing your nose  · Do not smoke  Nicotine and other chemicals in cigarettes and cigars can make your symptoms worse  They can also cause infections such as bronchitis or pneumonia  Ask your healthcare provider for information if you currently smoke and need help to quit  E-cigarettes or smokeless tobacco still contain nicotine  Talk to your healthcare provider before you use these products  Prevent spreading your cold to others:   · Try to stay away from other people during the first 2 to 3 days of your cold when it is more easily spread  · Do not share food or drinks  · Do not share hand towels with household members  · Wash your hands often, especially after you blow your nose  Turn away from other people and cover your mouth and nose with a tissue when you sneeze or cough  © 2017 [facility][note 3] Information is for End User's use only and may not be sold, redistributed or otherwise used for commercial purposes  All illustrations and images included in CareNotes® are the copyrighted property of A D [employer]  or Parish Wong  The above information is an  only  It is not intended as medical advice for individual conditions or treatments  Talk to your doctor, nurse or pharmacist before following any medical regimen to see if it is safe and effective for you  Pharyngitis   WHAT YOU NEED TO KNOW:   Pharyngitis, or sore throat, is inflammation of the tissues and structures in your pharynx (throat)  Pharyngitis is most often caused by bacteria  It may also be caused by a cold or flu virus  Other causes include smoking, allergies, or acid reflux  DISCHARGE INSTRUCTIONS:   Call 911 for any of the following:   · You have trouble breathing or swallowing because your throat is swollen or sore      Return to the emergency department if:   · You are drooling because it hurts too much to swallow  · Your fever is higher than 102? F (39?C) or lasts longer than 3 days  · You are confused  · You taste blood in your throat  Contact your healthcare provider if:   · Your throat pain gets worse  · You have a painful lump in your throat that does not go away after 5 days  · Your symptoms do not improve after 5 days  · You have questions or concerns about your condition or care  Medicines:  Viral pharyngitis will go away on its own without treatment  Your sore throat should start to feel better in 3 to 5 days for both viral and bacterial infections  You may need any of the following:  · Antibiotics  treat a bacterial infection  · NSAIDs , such as ibuprofen, help decrease swelling, pain, and fever  NSAIDs can cause stomach bleeding or kidney problems in certain people  If you take blood thinner medicine, always ask your healthcare provider if NSAIDs are safe for you  Always read the medicine label and follow directions  · Acetaminophen  decreases pain and fever  It is available without a doctor's order  Ask how much to take and how often to take it  Follow directions  Acetaminophen can cause liver damage if not taken correctly  · Take your medicine as directed  Contact your healthcare provider if you think your medicine is not helping or if you have side effects  Tell him or her if you are allergic to any medicine  Keep a list of the medicines, vitamins, and herbs you take  Include the amounts, and when and why you take them  Bring the list or the pill bottles to follow-up visits  Carry your medicine list with you in case of an emergency  Manage your symptoms:   · Gargle salt water  Mix ¼ teaspoon salt in an 8 ounce glass of warm water and gargle  This may help decrease swelling in your throat  · Drink liquids as directed    You may need to drink more liquids than usual  Liquids may help soothe your throat and prevent dehydration  Ask how much liquid to drink each day and which liquids are best for you  · Use a cool-steam humidifier  to help moisten the air in your room and calm your cough  · Soothe your throat  with cough drops, ice, soft foods, or popsicles  Prevent the spread of pharyngitis:  Cover your mouth and nose when you cough or sneeze  Do not share food or drinks  Wash your hands often  Use soap and water  If soap and water are unavailable, use an alcohol based hand   Follow up with your healthcare provider as directed:  Write down your questions so you remember to ask them during your visits  © 2017 [facility] Information is for End User's use only and may not be sold, redistributed or otherwise used for commercial purposes  All illustrations and images included in CareNotes® are the copyrighted property of A D [employer]  or Parish Wong  The above information is an  only  It is not intended as medical advice for individual conditions or treatments  Talk to your doctor, nurse or pharmacist before following any medical regimen to see if it is safe and effective for you

## 2017-09-30 DIAGNOSIS — E78.1 PURE HYPERGLYCERIDEMIA: ICD-10-CM

## 2017-09-30 DIAGNOSIS — E55.9 VITAMIN D DEFICIENCY: ICD-10-CM

## 2017-10-03 ENCOUNTER — ALLSCRIPTS OFFICE VISIT (OUTPATIENT)
Dept: OTHER | Facility: OTHER | Age: 62
End: 2017-10-03

## 2017-10-03 LAB — S PYO AG THROAT QL: POSITIVE

## 2017-10-04 NOTE — PROGRESS NOTES
Assessment  1  Strep throat (034 0) (J02 0)    Plan  Muscle cramps    · (1) COMPREHENSIVE METABOLIC PANEL; Status:Canceled;    · (1) MAGNESIUM; Status:Canceled;   Strep throat    · Sulfamethoxazole-Trimethoprim 800-160 MG Oral Tablet; TAKE 1 TABLET TWICE DAILY UNTIL  FINISHED    Discussion/Summary  Discussion Summary:   Start and finish the antibiotic  Salt water gargles 3-4 times a day  Tylenol for fever  Follow up if not improving  Counseling Documentation With Imm: The patient, patient's family was counseled regarding diagnostic results,-instructions for management,-risk factor reductions,-impressions,-risks and benefits of treatment options,-importance of compliance with treatment  Medication SE Review and Pt Understands Tx: Possible side effects of new medications were reviewed with the patient/guardian today  The treatment plan was reviewed with the patient/guardian  The patient/guardian understands and agrees with the treatment plan   Self Referrals:   Self Referrals: No      Chief Complaint  Chief Complaint Free Text Note Form: Follow up to the ER  Patient did not get the blood work done      History of Present Illness  HPI: Acute visitsister and sore throat  Patient had been seen in the emergency room on September 29 due to the sore throat  Note has been reviewed  No culture or swabs were done  She was treated with a dose of Decadron  Despite this patient is not feeling better since throat is still very sore, she feels feverish and it hurts to swallow  She did remark to me that she had dental work done the day before the sore throat started  Patient also complains that her glands in her neck are swollen   now also has sore throat  did not have lab work done  She will do so and then we will review  Review of Systems  Complete-Female:   Constitutional: feeling poorly,-chills-and-feeling tired, but-no fever  ENT: sore throat, but-no earache-and-no nasal discharge     Cardiovascular: No complaints of slow heart rate, no fast heart rate, no chest pain, no palpitations, no leg claudication, no lower extremity edema  Respiratory: No complaints of shortness of breath, no wheezing, no cough, no SOB on exertion, no orthopnea, no PND  Gastrointestinal: no abdominal pain,-no nausea,-no vomiting-and-no diarrhea  Genitourinary: no dysuria  Musculoskeletal: No complaints of arthralgias, no myalgias, no joint swelling or stiffness, no limb pain or swelling  Integumentary: no rashes  Neurological: headache, but-no dizziness-and-no fainting  Hematologic/Lymphatic: swollen glands in the neck  Active Problems  1  Abdominal pain (789 00) (R10 9)   2  Atrophic vulvovaginitis (627 3) (N95 2)   3  Cataract, bilateral (366 9) (H26 9)   4  Cervical radiculopathy, chronic (723 4) (M54 12)   5  Delayed gastric emptying (536 8) (K30)   6  Dense breasts (793 82) (R92 2)   7  Elevated liver enzymes (790 5) (R74 8)   8  Encounter for gynecological examination with abnormal finding (V72 31) (Z01 411)   9  Encounter for screening mammogram for breast cancer (V76 12) (Z12 31)   10  Essential (primary) hypertension (401 9) (I10)   11  Facet arthropathy, cervical (721 0) (M12 88)   12  Fatty liver disease, nonalcoholic (065 5) (S47 5)   13  GERD (gastroesophageal reflux disease) (530 81) (K21 9)   14  Hot flashes, menopausal (627 2) (N95 1)   15  Hypertriglyceridemia (272 1) (E78 1)   16  Interstitial cystitis (595 1) (N30 10)   17  Lumbar post-laminectomy syndrome (722 83) (M96 1)   18  Lumbosacral spondylosis without myelopathy (721 3) (M47 817)   19  Mild intermittent asthma without complication (888 47) (S62 96)   20  Muscle cramps (729 82) (R25 2)   21  Obesity due to excess calories, unspecified obesity severity (278 00) (E66 09)   22  Radiculopathy, lumbar region (724 4) (M54 16)   23  Spondylosis of lumbar region without myelopathy or radiculopathy (721 3) (I25 173)   24   Thoracic and lumbosacral neuritis (724 4) (M54 14,M54 17)   25  Vitamin D deficiency (268 9) (E55 9)    Past Medical History  1  History of pneumonia (V12 61) (Z87 01)   2  History of Muscle cramps (729 82) (R25 2)   3  Nausea (787 02) (R11 0)  Active Problems And Past Medical History Reviewed: The active problems and past medical history were reviewed and updated today  Surgical History  1  History of Appendectomy   2  History of Arthrodesis Lumbar   3  History of  Section   4  History of Cholecystectomy   5  History of Electronic Bladder Stimulator Implantation   6  History of Extensor Tendon Repair (Mallet Finger)   7  History of Hernia Repair   8  History of Hysterectomy   9  History of Intestinal Stricturoplasty Open   10  History of Salpingo-oophorectomy Bilateral   11  History of Spinal Stereotaxis Stimulation Of Cord  Surgical History Reviewed: The surgical history was reviewed and updated today  Family History  Mother    1  Family history of diabetes mellitus (V18 0) (Z83 3)   2  Family history of Throat cancer  Father    3  Family history of chronic obstructive pulmonary disease (V17 6) (Z82 5)  Sister    4  Family history of Bipolar disorder   5  Family history of Drug abuse   6  Family history of alcoholism (V17 0) (Z81 1)  Brother    7  Family history of coronary artery disease (V17 3) (Z82 49)  Family History Reviewed: The family history was reviewed and updated today  Social History   · Brushes teeth twice a day   · Dental care, regularly   · Exercise: Walking   ·    · Never smoker   · No alcohol use   · No illicit drug use   · Three children  Social History Reviewed: The social history was reviewed and updated today  The social history was reviewed and is unchanged  Current Meds   1  Albuterol Sulfate 2 MG/5ML Oral Syrup; TAKE 1 TEASPOONFUL BY MOUTH EVERY 4 HOURS AS   NEEDED AS DIRECTED;    Therapy: 2017 to ([facility])  Requested for: 67Nxv2346; Last Rx:91Hrw9680 Ordered   2  Baclofen 10 MG Oral Tablet; TAKE 1 TABLET 3 TIMES DAILY; Therapy: (Melva Dixon) to Recorded   3  Elmiron 100 MG Oral Capsule; TAKE 2 CAPSULES BY MOUTH 3 TIMES A DAY; Therapy: 87YWI2746 to (21 )  Requested for: 87Iyj3785; Last Rx:00Vqv0243   Ordered   4  Fenofibrate 160 MG Oral Tablet; TAKE 1 TABLET BY MOUTH EVERY DAY WITH A MEAL; Therapy: 64DGS3065 to (Evaluate:2017)  Requested for: 78ZRV6577; Last SD:15NVP4514   Ordered   5  Furosemide 40 MG Oral Tablet; TAKE 1 TABLET (40 MG) BY MOUTH DAILY; Therapy: 05YQC1222 to (Evaluate:2017)  Requested for: 16GFD9651; Last Rx:66Ajb6140   Ordered   6  Gabapentin 800 MG Oral Tablet; TAKE 1 TABLET 3 TIMES DAILY; Therapy: (Melva Dixon) to Recorded   7  Hydrocodone-Acetaminophen  MG Oral Tablet; Therapy: (Melva Dixon) to Recorded   8  Metoclopramide HCl - 5 MG Oral Tablet; TAKE 1 TABLET 3 TIMES DAILY; Therapy: 28NGJ2861 to ([address])  Requested for: 2017; Last Rx:2017   Ordered   9  Omega 3 1000 MG Oral Capsule; TAKE 2 CAPSULE Twice daily  Requested for: 2017; Last   Rx:2017 Ordered   10  Omega-3-acid Ethyl Esters 1 GM Oral Capsule; TAKE 2 CAPSULE Twice daily; Therapy: 42Prf8365 to (Evaluate:49Bwv9593)  Requested for: 96Kvb9696; Last Rx:88Peq4382    Ordered   11  Ondansetron HCl - 4 MG Oral Tablet; TAKE 1 TABLET Every 8 hours PRN; Therapy: 29Sfm8012 to (Evaluate:49Wpg0416)  Requested for: 60Utb0416; Last Rx:49Qqt0233    Ordered   12  Pantoprazole Sodium 40 MG Oral Tablet Delayed Release; Therapy: (Melva Dixon) to Recorded   13  Pravastatin Sodium 20 MG Oral Tablet; take 1 tablet by mouth every day; Therapy: 64PDB9587 to (Evaluate:77Gak6725)  Requested for: 98BPD8926; Last Q49SMA4026    Ordered   14  Prochlorperazine Maleate 10 MG Oral Tablet; TAKE 1 TABLET TWICE DAILY; Therapy: (Melva Dixon) to Recorded   15   Venlafaxine HCl - 37 5 MG Oral Tablet; TAKE 1 TABLET TWICE DAILY  Requested for: 20Mar2017;    Last Rx:20Mar2017 Ordered   16  Venlafaxine HCl - 75 MG Oral Tablet; TAKE 1 TABLET TWICE DAILY WITH MEALS; Therapy: 96PNF3009 to (Evaluate:94Zaf7808)  Requested for: 14JAC2836; Last Rx:13Eqk9686    Ordered   17  Ventolin 90 MCG/ACT AERS; INHALE 1 TO 2 PUFFS EVERY 4 TO 6 HOURS AS NEEDED; Therapy: (Bobby Cano) to Recorded   18  Ventolin  (90 Base) MCG/ACT Inhalation Aerosol Solution; INHALE 2 PUFFS 3 TIMES A DAY    AS NEEDED; Therapy: 07NOG1709 to (Evaluate:52Dkv0219)  Requested for: 11JLO1293; Last Rx:87Mtl3678    Ordered   19  Vitamin D (Ergocalciferol) 38215 UNIT Oral Capsule; take 1 capsule weekly; Therapy: 46ZRW5807 to (96 258451)  Requested for: 25EVY7787; Last Rx:02Jun2017    Ordered   20  Zolpidem Tartrate 5 MG Oral Tablet; Therapy: (Recorded:25Jan2017) to Recorded  Medication List Reviewed: The medication list was reviewed and updated today  Allergies  1  Penicillin V Potassium TABS   2  Adhesive Bandages MISC   3  Betadine Skin Cleanser SOLN   4  Clindamycin HCl CAPS   5  erythromycin   6  Morphine Sulfate (PF) SOLN  7  Latex    Vitals  Vital Signs    Recorded: 94LLZ8134 02:45PM   Heart Rate 76   Systolic 172   Diastolic 80   Height 5 ft 1 in   Weight 187 lb    BMI Calculated 35 33   BSA Calculated 1 84   O2 Saturation 99     Physical Exam    Constitutional   General appearance: No acute distress, well appearing and well nourished  Eyes   Conjunctiva and lids: No swelling, erythema or discharge  Pupils and irises: Equal, round and reactive to light  Ears, Nose, Mouth, and Throat   External inspection of ears and nose: Normal     Otoscopic examination: Tympanic membranes translucent with normal light reflex  Canals patent without erythema  Nasal mucosa, septum, and turbinates: Normal without edema or erythema      Oropharynx: Abnormal  -Marked erythema of the tonsils and posterior pharynx with enlargement of right tonsils, no definite exudate seen, but heavy mucoid  Pulmonary   Respiratory effort: No increased work of breathing or signs of respiratory distress  Auscultation of lungs: Clear to auscultation  Cardiovascular   Auscultation of heart: Normal rate and rhythm, normal S1 and S2, without murmurs  Examination of extremities for edema and/or varicosities: Normal     Carotid pulses: Normal     Abdomen   Abdomen: Non-tender, no masses  Liver and spleen: No hepatomegaly or splenomegaly  Lymphatic   Palpation of lymph nodes in neck: Abnormal  -Enlarged tender anterior cervical nodes bilaterally left greater than right  Musculoskeletal   Gait and station: Normal     Skin   Skin and subcutaneous tissue: Normal without rashes or lesions      Psychiatric   Mood and affect: Normal          Signatures   Electronically signed by : Raf Whipple, [facility]; Oct  3 2017  3:15PM EST                       (Author)    Electronically signed by : Delbert Marin MD; Oct  3 2017  3:34PM EST

## 2017-10-18 ENCOUNTER — GENERIC CONVERSION - ENCOUNTER (OUTPATIENT)
Dept: OTHER | Facility: OTHER | Age: 62
End: 2017-10-18

## 2018-01-12 VITALS
SYSTOLIC BLOOD PRESSURE: 124 MMHG | BODY MASS INDEX: 36.49 KG/M2 | HEART RATE: 72 BPM | WEIGHT: 193.25 LBS | DIASTOLIC BLOOD PRESSURE: 86 MMHG | HEIGHT: 61 IN

## 2018-01-12 VITALS
SYSTOLIC BLOOD PRESSURE: 132 MMHG | WEIGHT: 197 LBS | BODY MASS INDEX: 37.19 KG/M2 | HEART RATE: 80 BPM | DIASTOLIC BLOOD PRESSURE: 90 MMHG | HEIGHT: 61 IN

## 2018-01-12 VITALS
HEIGHT: 61 IN | OXYGEN SATURATION: 96 % | SYSTOLIC BLOOD PRESSURE: 114 MMHG | BODY MASS INDEX: 36.82 KG/M2 | HEART RATE: 104 BPM | WEIGHT: 195 LBS | DIASTOLIC BLOOD PRESSURE: 80 MMHG

## 2018-01-13 VITALS
HEIGHT: 61 IN | WEIGHT: 197 LBS | HEART RATE: 70 BPM | BODY MASS INDEX: 37.19 KG/M2 | DIASTOLIC BLOOD PRESSURE: 88 MMHG | OXYGEN SATURATION: 99 % | SYSTOLIC BLOOD PRESSURE: 140 MMHG

## 2018-01-14 VITALS
HEIGHT: 61 IN | HEART RATE: 104 BPM | DIASTOLIC BLOOD PRESSURE: 94 MMHG | SYSTOLIC BLOOD PRESSURE: 130 MMHG | OXYGEN SATURATION: 98 % | WEIGHT: 197.25 LBS | BODY MASS INDEX: 37.24 KG/M2

## 2018-01-14 VITALS
DIASTOLIC BLOOD PRESSURE: 76 MMHG | HEIGHT: 61 IN | WEIGHT: 196 LBS | BODY MASS INDEX: 37 KG/M2 | SYSTOLIC BLOOD PRESSURE: 124 MMHG

## 2018-01-14 VITALS
OXYGEN SATURATION: 96 % | BODY MASS INDEX: 38.14 KG/M2 | DIASTOLIC BLOOD PRESSURE: 78 MMHG | HEART RATE: 88 BPM | WEIGHT: 202 LBS | SYSTOLIC BLOOD PRESSURE: 130 MMHG | HEIGHT: 61 IN

## 2018-01-14 VITALS
OXYGEN SATURATION: 99 % | BODY MASS INDEX: 35.3 KG/M2 | DIASTOLIC BLOOD PRESSURE: 80 MMHG | SYSTOLIC BLOOD PRESSURE: 154 MMHG | HEIGHT: 61 IN | WEIGHT: 187 LBS | HEART RATE: 76 BPM

## 2018-01-15 NOTE — RESULT NOTES
Verified Results  (1) COMPREHENSIVE METABOLIC PANEL [id number] 85:94UI Becky Washington Order Number: [id number]     Test Name Result Flag Reference   SODIUM 141 mmol/L  136-145   POTASSIUM 4 4 mmol/L  3 5-5 3   CHLORIDE 106 mmol/L  100-108   CARBON DIOXIDE 31 mmol/L  21-32   ANION GAP (CALC) 4 mmol/L  4-13   BLOOD UREA NITROGEN 14 mg/dL  5-25   CREATININE 0 74 mg/dL  0 60-1 30   Standardized to IDMS reference method   CALCIUM 9 1 mg/dL  8 3-10 1   BILI, TOTAL 0 45 mg/dL  0 20-1 00   ALK PHOSPHATAS 62 U/L     ALT (SGPT) 104 U/L H 12-78   AST(SGOT) 92 U/L H 5-45   ALBUMIN 3 5 g/dL  3 5-5 0   TOTAL PROTEIN 7 2 g/dL  6 4-8 2   eGFR Non-African American      >60 0 ml/min/1 73sq [facility] Disease Education Program recommendations are as follows:  GFR calculation is accurate only with a steady state creatinine  Chronic Kidney disease less than 60 ml/min/1 73 sq  meters  Kidney failure less than 15 ml/min/1 73 sq  meters  GLUCOSE FASTING 91 mg/dL  65-99     (1) LIPID PANEL, FASTING [id number] 07:18AM Kiara Grande    Order Number: [id number]     Test Name Result Flag Reference   CHOLESTEROL 214 mg/dL H    HDL,DIRECT 48 mg/dL  40-60   Specimen collection should occur prior to Metamizole administration due to the potential for falsely depressed results  LDL CHOLESTEROL CALCULATED 104 mg/dL H 0-100   - Patient Instructions:  This is a fasting blood test  Water,black tea or black  coffee only after 9:00pm the night before test   Drink 2 glasses of water the morning of test       Triglyceride:         Normal              <150 mg/dl       Borderline High    150-199 mg/dl       High               200-499 mg/dl       Very High          >499 mg/dl  Cholesterol:         Desirable        <200 mg/dl      Borderline High  200-239 mg/dl      High             >239 mg/dl  HDL Cholesterol:        High    >59 mg/dL      Low     <41 mg/dL  LDL CALCULATED:    This screening LDL is a calculated result  It does not have the accuracy of the Direct Measured LDL in the monitoring of patients with hyperlipidemia and/or statin therapy  Direct Measure LDL (BXR573) must be ordered separately in these patients  TRIGLYCERIDES 311 mg/dL H <=150   Specimen collection should occur prior to N-Acetylcysteine or Metamizole administration due to the potential for falsely depressed results  (1) TSH 53UYL2000 07:18AM [facility] Order Number: [id number]     Test Name Result Flag Reference   TSH 2 130 uIU/mL  0 358-3 740   - Patient Instructions: This bloodwork is non-fasting  Please drink two glasses of water morning of bloodwork  Patients undergoing fluorescein dye angiography may retain small amounts of fluorescein in the body for 48-72 hours post procedure  Samples containing fluorescein can produce falsely depressed TSH values  If the patient had this procedure,a specimen should be resubmitted post fluorescein clearance  The recommended reference ranges for TSH during pregnancy are as follows:  First trimester 0 1 to 2 5 uIU/mL  Second trimester  0 2 to 3 0 uIU/mL  Third trimester 0 3 to 3 0 uIU/m     (1) T4, FREE [id number] 07:18AM [facility] Order Number: [id number]     Test Name Result Flag Reference   T4,FREE 0 97 ng/dL  0 76-1 46     (1) CBC/PLT/DIFF 32TKL2807 07:18AM Allison Torres Order Number: [id number]     Test Name Result Flag Reference   WBC COUNT 5 11 Thousand/uL  4 31-10 16   RBC COUNT 4 61 Million/uL  3 81-5 12   HEMOGLOBIN 13 7 g/dL  11 5-15 4   HEMATOCRIT 43 0 %  34 8-46  1   MCV 93 fL  82-98   MCH 29 7 pg  26 8-34 3   MCHC 31 9 g/dL  31 4-37 4   RDW 14 0 %  11 6-15 1   MPV 11 0 fL  8 9-12 7   PLATELET COUNT 089 Thousands/uL  149-390   nRBC AUTOMATED 0 /100 WBCs     NEUTROPHILS RELATIVE PERCENT 47 %  43-75   LYMPHOCYTES RELATIVE PERCENT 41 %  14-44   MONOCYTES RELATIVE PERCENT 8 %  4-12   EOSINOPHILS RELATIVE PERCENT 3 %  0-6 BASOPHILS RELATIVE PERCENT 1 %  0-1   NEUTROPHILS ABSOLUTE COUNT 2 38 Thousands/? ??L  1 85-7 62   LYMPHOCYTES ABSOLUTE COUNT 2 11 Thousands/? ??L  0 60-4 47   MONOCYTES ABSOLUTE COUNT 0 40 Thousand/? ??L  0 17-1 22   EOSINOPHILS ABSOLUTE COUNT 0 17 Thousand/? ??L  0 00-0 61   BASOPHILS ABSOLUTE COUNT 0 03 Thousands/? ??L  0 00-0 10   - Patient Instructions: This bloodwork is non-fasting  Please drink two glasses of water morning of bloodwork

## 2018-01-15 NOTE — RESULT NOTES
Message   Liver ultrasound shows some mild fatty infiltration which is probably the cause of elevated liver enzymes, otherwise it was normal      Verified Results  US LIVER [id number] 01:24PM Zack Aguilar Order Number: [id number]    - Patient Instructions: To schedule this appointment, please contact Central Scheduling at [phone]  Test Name Result Flag Reference   US LIVER (Report)     RIGHT UPPER QUADRANT ULTRASOUND     INDICATION: Elevated liver enzymes     COMPARISON: None  TECHNIQUE:  Real-time ultrasound of the right upper quadrant was performed with a curvilinear transducer with both volumetric sweeps and still imaging techniques  FINDINGS:     PANCREAS: Visualized portions of the pancreas are within normal limits  AORTA AND IVC: Visualized portions are normal for patient age  LIVER:   Size: Enlarged  The liver measures 21 9 cm in the midclavicular line  Contour: Surface contour is smooth  Parenchyma: Increased echotexture is nonspecific but may be associated with fat infiltration  No evidence of suspicious mass  Limited imaging of the main portal vein shows it to be patent and hepatopedal       BILIARY:   Cholecystectomy  No intrahepatic biliary dilatation  CBD measures 10 mm  CBD appears prominent, likely related to prior cholecystectomy  No choledocholithiasis  KIDNEY:    Right kidney measures 10 3 x 4 2 cm  Within normal limits  ASCITES:  None  IMPRESSION:   1  Enlarged echogenic liver is nonspecific but likely from fat infiltration  2  Otherwise no acute findings         Workstation performed: [id number]     Signed by:   Manuela Vera MD   2/9/17

## 2018-01-16 NOTE — MISCELLANEOUS
Message   Recorded as Task   Date: 10/19/2017 11:05 AM, Created By: Ashlyn Bui   Task Name: Med Renewal Request   Assigned To: Drake Constantino   Regarding Patient: Manpreet Wright, Status: Active   CommentLissa Adrienne - 19 Oct 2017 11:05 AM     TASK CREATED  Caller: Self; [phone] (Home)  pt would like to continue with estrace told pt will send rx on friday        Active Problems   1  Abdominal pain (789 00) (R10 9)  2  Atrophic vulvovaginitis (627 3) (N95 2)  3  Cataract, bilateral (366 9) (H26 9)  4  Cervical radiculopathy, chronic (723 4) (M54 12)  5  Delayed gastric emptying (536 8) (K30)  6  Dense breasts (793 82) (R92 2)  7  Elevated liver enzymes (790 5) (R74 8)  8  Encounter for gynecological examination with abnormal finding (V72 31) (Z01 411)  9  Encounter for screening mammogram for breast cancer (V76 12) (Z12 31)  10  Essential (primary) hypertension (401 9) (I10)  11  Facet arthropathy, cervical (721 0) (M12 88)  12  Fatty liver disease, nonalcoholic (280 6) (G37 0)  13  GERD (gastroesophageal reflux disease) (530 81) (K21 9)  14  Hot flashes, menopausal (627 2) (N95 1)  15  Hypertriglyceridemia (272 1) (E78 1)  16  Interstitial cystitis (595 1) (N30 10)  17  Lumbar post-laminectomy syndrome (722 83) (M96 1)  18  Lumbosacral spondylosis without myelopathy (721 3) (M47 817)  19  Mild intermittent asthma without complication (547 20) (P25 40)  20  Muscle cramps (729 82) (R25 2)  21  Obesity due to excess calories, unspecified obesity severity (278 00) (E66 09)  22  Radiculopathy, lumbar region (724 4) (M54 16)  23  Spondylosis of lumbar region without myelopathy or radiculopathy (721 3) (M47 816)  24  Thoracic and lumbosacral neuritis (724 4) (M54 14,M54 17)  25  Vitamin D deficiency (268 9) (E55 9)    Current Meds  1  Albuterol Sulfate 2 MG/5ML Oral Syrup; TAKE 1 TEASPOONFUL BY MOUTH EVERY 4   HOURS AS NEEDED AS DIRECTED;    Therapy: 24Apr2017 to (Arnaldo Garrett)  Requested for: 81WGI8878; Last   Rx:40Gga8851 Ordered  2  Baclofen 10 MG Oral Tablet; TAKE 1 TABLET 3 TIMES DAILY; Therapy: (Khushbu López) to Recorded  3  Elmiron 100 MG Oral Capsule; TAKE 2 CAPSULES BY MOUTH 3 TIMES A DAY; Therapy: 51RPT4437 to ([phone] [phone])  Requested for: 31Lxu9318; Last   Rx:61Cou4082 Ordered  4  Fenofibrate 160 MG Oral Tablet; TAKE 1 TABLET BY MOUTH EVERY DAY WITH A MEAL; Therapy: 73MKZ3423 to (Evaluate:17Mar2018)  Requested for: 55TDR8412; Last   Rx:18Oct2017 Ordered  5  Furosemide 40 MG Oral Tablet; TAKE 1 TABLET (40 MG) BY MOUTH DAILY; Therapy: 60XIE9069 to (Evaluate:04Oct2018)  Requested for: 11TCQ4523; Last   Rx:09Oct2017 Ordered  6  Gabapentin 800 MG Oral Tablet; TAKE 1 TABLET 3 TIMES DAILY; Therapy: (Khushbu López) to Recorded  7  Hydrocodone-Acetaminophen  MG Oral Tablet; Therapy: (Khushbu López) to Recorded  8  Omega 3 1000 MG Oral Capsule; TAKE 2 CAPSULE Twice daily  Requested for:   20Mar2017; Last Rx:20Mar2017 Ordered  9  Omega-3-acid Ethyl Esters 1 GM Oral Capsule; TAKE 2 CAPSULE Twice daily; Therapy: 86Lne6182 to (Evaluate:84Ats3720)  Requested for: 99Fti2061; Last   Rx:19Lkv9681 Ordered  10  Ondansetron HCl - 4 MG Oral Tablet; TAKE 1 TABLET Every 8 hours PRN; Therapy: 28Iuo9671 to (Evaluate:16Pgt2963)  Requested for: 99Ysw4586; Last    Rx:85Tto5049 Ordered  11  Pantoprazole Sodium 40 MG Oral Tablet Delayed Release; Therapy: (Khushbu Hivuna) to Recorded  12  Pravastatin Sodium 20 MG Oral Tablet; take 1 tablet by mouth every day; Therapy: 24CZH4516 to (Evaluate:16Vmd0252)  Requested for: 72MVH6785; Last    CS:07VMP6771 Ordered  13  Prochlorperazine Maleate 10 MG Oral Tablet; TAKE 1 TABLET TWICE DAILY; Therapy: (Khushbu Hives) to Recorded  14  Venlafaxine HCl - 37 5 MG Oral Tablet; TAKE 1 TABLET TWICE DAILY  Requested for:    62JVK0018; Last Rx:20Mar2017 Ordered  15   Venlafaxine HCl - 75 MG Oral Tablet; TAKE 1 TABLET TWICE DAILY WITH MEALS; Therapy: 96MGK8384 to (Evaluate:10Pys1717)  Requested for: 04JDQ1001; Last    Rx:71Eyi1626 Ordered  16  Ventolin 90 MCG/ACT AERS (Albuterol); INHALE 1 TO 2 PUFFS EVERY 4 TO 6 HOURS    AS NEEDED; Therapy: (Reinhold Kanner) to Recorded  17  Ventolin  (90 Base) MCG/ACT Inhalation Aerosol Solution; INHALE 2 PUFFS 3    TIMES A DAY AS NEEDED; Therapy: 30LLO1598 to (Evaluate:96Bnh7894)  Requested for: 52QJP2998; Last    Rx:68Jjr9631 Ordered  18  Vitamin D (Ergocalciferol) 29599 UNIT Oral Capsule; take 1 capsule weekly; Therapy: 93FME9207 to (Miri Long)  Requested for: 68HLA2100; Last    Rx:02Jun2017 Ordered  19  Zolpidem Tartrate 5 MG Oral Tablet; Therapy: (Recorded:25Jan2017) to Recorded    Allergies   1  Penicillin V Potassium TABS  2  Adhesive Bandages MISC  3  Betadine Skin Cleanser SOLN  4  Clindamycin HCl CAPS  5  erythromycin  6  Morphine Sulfate (PF) SOLN   7   Latex    Signatures   Electronically signed by : Inder Pandya, ; Oct 19 2017 11:07AM EST                       (Author)

## 2018-01-25 ENCOUNTER — TRANSCRIBE ORDERS (OUTPATIENT)
Dept: RADIOLOGY | Facility: CLINIC | Age: 63
End: 2018-01-25

## 2018-01-25 ENCOUNTER — APPOINTMENT (OUTPATIENT)
Dept: RADIOLOGY | Facility: CLINIC | Age: 63
End: 2018-01-25
Payer: COMMERCIAL

## 2018-01-25 DIAGNOSIS — M46.1 SACROILIITIS, NOT ELSEWHERE CLASSIFIED (HCC): Primary | ICD-10-CM

## 2018-01-25 DIAGNOSIS — M46.1 SACROILIITIS, NOT ELSEWHERE CLASSIFIED (HCC): ICD-10-CM

## 2018-01-25 PROCEDURE — 73502 X-RAY EXAM HIP UNI 2-3 VIEWS: CPT

## 2018-02-21 ENCOUNTER — TRANSCRIBE ORDERS (OUTPATIENT)
Dept: LAB | Facility: CLINIC | Age: 63
End: 2018-02-21

## 2018-02-21 ENCOUNTER — APPOINTMENT (OUTPATIENT)
Dept: LAB | Facility: CLINIC | Age: 63
End: 2018-02-21
Payer: MEDICARE

## 2018-02-21 DIAGNOSIS — R25.2 CRAMP OF LIMB: ICD-10-CM

## 2018-02-21 DIAGNOSIS — E78.1 PURE HYPERGLYCERIDEMIA: ICD-10-CM

## 2018-02-21 DIAGNOSIS — R25.2 CRAMP OF LIMB: Primary | ICD-10-CM

## 2018-02-21 DIAGNOSIS — E55.9 VITAMIN D DEFICIENCY: ICD-10-CM

## 2018-02-21 LAB
25(OH)D3 SERPL-MCNC: 28.2 NG/ML (ref 30–100)
ALBUMIN SERPL BCP-MCNC: 3.8 G/DL (ref 3.5–5)
ALP SERPL-CCNC: 44 U/L (ref 46–116)
ALT SERPL W P-5'-P-CCNC: 66 U/L (ref 12–78)
ANION GAP SERPL CALCULATED.3IONS-SCNC: 5 MMOL/L (ref 4–13)
AST SERPL W P-5'-P-CCNC: 50 U/L (ref 5–45)
BILIRUB SERPL-MCNC: 0.39 MG/DL (ref 0.2–1)
BUN SERPL-MCNC: 16 MG/DL (ref 5–25)
CALCIUM SERPL-MCNC: 9.1 MG/DL (ref 8.3–10.1)
CHLORIDE SERPL-SCNC: 103 MMOL/L (ref 100–108)
CHOLEST SERPL-MCNC: 217 MG/DL (ref 50–200)
CO2 SERPL-SCNC: 33 MMOL/L (ref 21–32)
CREAT SERPL-MCNC: 0.85 MG/DL (ref 0.6–1.3)
GFR SERPL CREATININE-BSD FRML MDRD: 72 ML/MIN/1.73SQ M
GLUCOSE P FAST SERPL-MCNC: 92 MG/DL (ref 65–99)
HDLC SERPL-MCNC: 68 MG/DL (ref 40–60)
LDLC SERPL CALC-MCNC: 109 MG/DL (ref 0–100)
MAGNESIUM SERPL-MCNC: 2.1 MG/DL (ref 1.6–2.6)
POTASSIUM SERPL-SCNC: 4.5 MMOL/L (ref 3.5–5.3)
PROT SERPL-MCNC: 7.8 G/DL (ref 6.4–8.2)
SODIUM SERPL-SCNC: 141 MMOL/L (ref 136–145)
TRIGL SERPL-MCNC: 200 MG/DL

## 2018-02-21 PROCEDURE — 82306 VITAMIN D 25 HYDROXY: CPT

## 2018-02-21 PROCEDURE — 80061 LIPID PANEL: CPT

## 2018-02-21 PROCEDURE — 36415 COLL VENOUS BLD VENIPUNCTURE: CPT

## 2018-02-21 PROCEDURE — 80053 COMPREHEN METABOLIC PANEL: CPT

## 2018-02-21 PROCEDURE — 83735 ASSAY OF MAGNESIUM: CPT

## 2018-03-04 DIAGNOSIS — E55.9 VITAMIN D DEFICIENCY: Primary | ICD-10-CM

## 2018-03-05 PROBLEM — E55.9 VITAMIN D DEFICIENCY: Status: ACTIVE | Noted: 2017-01-25

## 2018-03-05 RX ORDER — ERGOCALCIFEROL 1.25 MG/1
CAPSULE ORAL
Qty: 4 CAPSULE | Refills: 0 | Status: SHIPPED | OUTPATIENT
Start: 2018-03-05 | End: 2018-04-04 | Stop reason: SDUPTHER

## 2018-03-07 DIAGNOSIS — E78.1 HYPERTRIGLYCERIDEMIA: Primary | ICD-10-CM

## 2018-03-07 RX ORDER — FENOFIBRATE 160 MG/1
TABLET ORAL
Qty: 30 TABLET | Refills: 4 | Status: SHIPPED | OUTPATIENT
Start: 2018-03-07 | End: 2018-08-25 | Stop reason: SDUPTHER

## 2018-03-15 ENCOUNTER — OFFICE VISIT (OUTPATIENT)
Dept: INTERNAL MEDICINE CLINIC | Facility: CLINIC | Age: 63
End: 2018-03-15
Payer: COMMERCIAL

## 2018-03-15 VITALS
HEIGHT: 61 IN | BODY MASS INDEX: 34.36 KG/M2 | HEART RATE: 82 BPM | SYSTOLIC BLOOD PRESSURE: 140 MMHG | OXYGEN SATURATION: 95 % | TEMPERATURE: 98.6 F | WEIGHT: 182 LBS | DIASTOLIC BLOOD PRESSURE: 78 MMHG

## 2018-03-15 DIAGNOSIS — J45.20 MILD INTERMITTENT ASTHMA WITHOUT COMPLICATION: Primary | ICD-10-CM

## 2018-03-15 DIAGNOSIS — K30 DELAYED GASTRIC EMPTYING: ICD-10-CM

## 2018-03-15 DIAGNOSIS — E55.9 VITAMIN D DEFICIENCY: ICD-10-CM

## 2018-03-15 DIAGNOSIS — E78.5 HYPERLIPIDEMIA, UNSPECIFIED HYPERLIPIDEMIA TYPE: ICD-10-CM

## 2018-03-15 DIAGNOSIS — I10 ESSENTIAL (PRIMARY) HYPERTENSION: Primary | ICD-10-CM

## 2018-03-15 DIAGNOSIS — K21.9 GASTROESOPHAGEAL REFLUX DISEASE, ESOPHAGITIS PRESENCE NOT SPECIFIED: ICD-10-CM

## 2018-03-15 DIAGNOSIS — R11.0 NAUSEA: ICD-10-CM

## 2018-03-15 PROBLEM — H26.9 CATARACT, BILATERAL: Status: ACTIVE | Noted: 2017-01-25

## 2018-03-15 PROBLEM — N95.1 HOT FLASHES, MENOPAUSAL: Status: ACTIVE | Noted: 2017-07-03

## 2018-03-15 PROBLEM — M96.1 LUMBAR POST-LAMINECTOMY SYNDROME: Status: ACTIVE | Noted: 2017-01-25

## 2018-03-15 PROBLEM — M54.16 RADICULOPATHY, LUMBAR REGION: Status: ACTIVE | Noted: 2017-01-25

## 2018-03-15 PROBLEM — M54.14 THORACIC AND LUMBOSACRAL NEURITIS: Status: ACTIVE | Noted: 2017-01-25

## 2018-03-15 PROBLEM — N30.10 INTERSTITIAL CYSTITIS: Status: ACTIVE | Noted: 2017-01-25

## 2018-03-15 PROBLEM — M47.817 LUMBOSACRAL SPONDYLOSIS WITHOUT MYELOPATHY: Status: ACTIVE | Noted: 2017-01-25

## 2018-03-15 PROBLEM — R25.2 MUSCLE CRAMPS: Status: ACTIVE | Noted: 2017-07-31

## 2018-03-15 PROBLEM — K76.0 FATTY LIVER DISEASE, NONALCOHOLIC: Status: ACTIVE | Noted: 2017-02-22

## 2018-03-15 PROBLEM — M54.17 THORACIC AND LUMBOSACRAL NEURITIS: Status: ACTIVE | Noted: 2017-01-25

## 2018-03-15 PROBLEM — M54.12 CERVICAL RADICULOPATHY, CHRONIC: Status: ACTIVE | Noted: 2017-01-25

## 2018-03-15 PROCEDURE — 99214 OFFICE O/P EST MOD 30 MIN: CPT | Performed by: PHYSICIAN ASSISTANT

## 2018-03-15 RX ORDER — METOCLOPRAMIDE HYDROCHLORIDE 5 MG/5ML
5 SOLUTION ORAL
COMMUNITY
End: 2018-10-04

## 2018-03-15 RX ORDER — PROCHLORPERAZINE MALEATE 10 MG
10 TABLET ORAL 2 TIMES DAILY
Qty: 30 TABLET | Refills: 3 | Status: SHIPPED | OUTPATIENT
Start: 2018-03-15 | End: 2018-11-08 | Stop reason: SDUPTHER

## 2018-03-15 RX ORDER — FUROSEMIDE 40 MG/1
40 TABLET ORAL AS NEEDED
COMMUNITY
Start: 2017-06-06 | End: 2018-10-25 | Stop reason: SDUPTHER

## 2018-03-15 NOTE — PATIENT INSTRUCTIONS
Continue current medical managament  Reduce intake of ice cream   Follow up in 6 months with repeat labs

## 2018-03-15 NOTE — PROGRESS NOTES
Assessment/Plan:  Patient Instructions   Continue current medical managament  Reduce intake of ice cream   Follow up in 6 months with repeat labs  Encourage exercise, and weight reduction  Followup scheduled per orders  Diagnoses and all orders for this visit:    Essential (primary) hypertension  -     Comprehensive metabolic panel; Future    Nausea  -     prochlorperazine (COMPAZINE) 10 mg tablet; Take 1 tablet (10 mg total) by mouth 2 (two) times a day    Vitamin D deficiency    Gastroesophageal reflux disease, esophagitis presence not specified  -     CBC and differential; Future    Delayed gastric emptying    Hyperlipidemia, unspecified hyperlipidemia type  -     Lipid panel; Future    Other orders  -     metoclopramide (REGLAN) 5 mg/5 mL oral solution; Take 5 mg by mouth 4 (four) times a day (before meals and at bedtime)  -     pentosan polysulfate (ELMIRON) 100 mg capsule; Take by mouth every 8 (eight) hours  -     furosemide (LASIX) 40 mg tablet; Take by mouth          Subjective:      Patient ID: Kwaku Clay is a 70 y o  female  Follow-up    Hypertension:  Patient reports she has not been taking her furosemide for few days  Her blood pressure is elevated at 140/80  Lakesha cp, palp, sob, edema, HA, dizziness, diaphoresis, syncope, visual disturbance  Labs reviewed with patient  Vitamin-D level improving and now almost near therapeutic level  Muscle cramping has improved  Liver enzymes also have improved since last time this is with the pravastatin being held  Lipids have gone up slightly but not significantly  Delayed gastric emptying with intermittent nausea  Patient in need of refill of her prochlorperazine  Patient did report that with this condition she has been eating significant amount of ice cream due to the fact it it seems to digest well and go through without any difficulty  This is probably the reason for the elevation of the lipids      Reflux stable on her current medications  ALLERGIES:  Allergies   Allergen Reactions    Clindamycin Hives    Erythromycin Hives    Latex Hives    Penicillins Hives and Shortness Of Breath    Morphine GI Intolerance and Vomiting     Category: Adverse Reaction;        CURRENT MEDICATIONS:    Current Outpatient Prescriptions:     albuterol 2 mg/5 mL syrup, Take 2 mg by mouth 4 (four) times a day 1 teaspoon po every 4 hours as needed  , Disp: , Rfl:     baclofen 10 mg tablet, Take 10 mg by mouth 3 (three) times a day, Disp: , Rfl:     ergocalciferol (VITAMIN D2) 50,000 units, TAKE ONE CAPSULE BY MOUTH WEEKLY, Disp: 4 capsule, Rfl: 0    fenofibrate (TRIGLIDE) 160 MG tablet, TAKE 1 TABLET BY MOUTH EVERY DAY WITH A MEAL, Disp: 30 tablet, Rfl: 4    fluticasone (FLONASE) 50 mcg/act nasal spray, 2 sprays into each nostril daily, Disp: 16 g, Rfl: 0    furosemide (LASIX) 40 mg tablet, Take 40 mg by mouth daily, Disp: , Rfl:     furosemide (LASIX) 40 mg tablet, Take by mouth, Disp: , Rfl:     gabapentin (NEURONTIN) 800 mg tablet, Take 800 mg by mouth 3 (three) times a day, Disp: , Rfl:     HYDROcodone-acetaminophen (VICODIN) 7 5-500 MG per tablet, Take 1 tablet by mouth every 6 (six) hours as needed for moderate pain, Disp: , Rfl:     metoclopramide (REGLAN) 5 mg/5 mL oral solution, Take 5 mg by mouth 4 (four) times a day (before meals and at bedtime), Disp: , Rfl:     omega-3-acid ethyl esters (LOVAZA) 1 g capsule, Take 1 g by mouth 2 (two) times a day, Disp: , Rfl:     OxyCODONE HCl ER (OxyCONTIN) 30 MG T12A, Take 30 mg by mouth 3 (three) times a day as needed for moderate pain, Disp: , Rfl:     pantoprazole (PROTONIX) 40 mg tablet, Take 40 mg by mouth daily, Disp: , Rfl:     pentosan polysulfate (ELMIRON) 100 mg capsule, Take 100 mg by mouth 3 (three) times a day before meals 2 capsules po  3 time a day, Disp: , Rfl:     pentosan polysulfate (ELMIRON) 100 mg capsule, Take by mouth every 8 (eight) hours, Disp: , Rfl:   prochlorperazine (COMPAZINE) 10 mg tablet, Take 1 tablet (10 mg total) by mouth 2 (two) times a day, Disp: 30 tablet, Rfl: 3    venlafaxine (EFFEXOR) 75 mg tablet, Take 75 mg by mouth 2 (two) times a day, Disp: , Rfl:     VENTOLIN  (90 Base) MCG/ACT inhaler, INHALE 2 PUFFS 3 TIMES A DAY AS NEEDED, Disp: 18 Inhaler, Rfl: 3    zolpidem (AMBIEN) 5 mg tablet, Take 5 mg by mouth daily at bedtime as needed for sleep, Disp: , Rfl:     pravastatin (PRAVACHOL) 20 mg tablet, Take 20 mg by mouth daily, Disp: , Rfl:     ACTIVE PROBLEM LIST:  Patient Active Problem List   Diagnosis    Transaminitis    Chronic back pain    Continuous opioid dependence (HCC)    Vitamin D deficiency    Cataract, bilateral    Cervical radiculopathy, chronic    Delayed gastric emptying    Essential (primary) hypertension    Fatty liver disease, nonalcoholic    GERD (gastroesophageal reflux disease)    Hot flashes, menopausal    Interstitial cystitis    Lumbar post-laminectomy syndrome    Mild intermittent asthma without complication    Lumbosacral spondylosis without myelopathy    Muscle cramps    Radiculopathy, lumbar region    Thoracic and lumbosacral neuritis    Nausea    Hyperlipidemia       PAST MEDICAL HISTORY:  Past Medical History:   Diagnosis Date    Asthma     Cataract     Chronic back pain     Chronic pain disorder     Back    GERD (gastroesophageal reflux disease)     HTN (hypertension)     Hyperlipidemia     Interstitial cystitis     Liver disease     Muscle cramps     Obesity     Pneumonia     Radiculopathy, lumbar region     Spondylosis, cervical, with myelopathy     Vulvovaginitis        PAST SURGICAL HISTORY:  Past Surgical History:   Procedure Laterality Date    APPENDECTOMY      BACK SURGERY      Arthrodeiss lumbar    BACK SURGERY      Spinal stereotaxis of cord    BLADDER SURGERY      Electronic bladder stimulator implantation     SECTION      x3    CHOLECYSTECTOMY  COLON SURGERY      Intestinal stricturoplasty     FINGER SURGERY      Extensor tendon repair (mallet finger)    HERNIA REPAIR      x3    HYSTERECTOMY      NH ESOPHAGOGASTRODUODENOSCOPY TRANSORAL DIAGNOSTIC N/A 8/23/2017    Procedure: ESOPHAGOGASTRODUODENOSCOPY (EGD); Surgeon: Radha Garcia MD;  Location: [facility]; Service: Gastroenterology    SALPINGOOPHORECTOMY      B/L       FAMILY HISTORY:  Family History   Problem Relation Age of Onset    Coronary artery disease Brother      Patient has 3 brothers with coronary artery disease    Diabetes Mother     Throat cancer Mother     COPD Father     Bipolar disorder Sister     Drug abuse Sister     Alcohol abuse Sister        SOCIAL HISTORY:  Social History     Social History    Marital status: /Civil Union     Spouse name: N/A    Number of children: 3    Years of education: N/A     Occupational History    Retired      Social History Main Topics    Smoking status: Never Smoker    Smokeless tobacco: Never Used    Alcohol use No    Drug use: No    Sexual activity: Not on file     Other Topics Concern    Not on file     Social History Narrative    Brushes teeth 2x day    Regular dental care    Exercise: walking       Review of Systems   Constitutional: Negative for activity change, chills, fatigue and fever  HENT: Negative for congestion  Eyes: Negative for discharge  Respiratory: Negative for cough, chest tightness and shortness of breath  Cardiovascular: Negative for chest pain, palpitations and leg swelling  Gastrointestinal: Positive for nausea  Negative for abdominal pain  Genitourinary: Negative for difficulty urinating  Musculoskeletal: Negative for arthralgias and myalgias  Skin: Negative for rash  Allergic/Immunologic: Negative for immunocompromised state  Neurological: Negative for dizziness, syncope, weakness, light-headedness and headaches  Hematological: Negative for adenopathy   Does not bruise/bleed easily  Psychiatric/Behavioral: Negative for dysphoric mood  The patient is not nervous/anxious  Objective:  Vitals:    03/15/18 1426 03/15/18 1459   BP: 158/100 140/78   BP Location:  Right arm   Patient Position:  Sitting   Pulse: 82    Temp: 98 6 °F (37 °C)    SpO2: 95%    Weight: 82 6 kg (182 lb)    Height: 5' 1" (1 549 m)         Physical Exam   Constitutional: She is oriented to person, place, and time  She appears well-developed and well-nourished  No distress  Obese   Cardiovascular: Normal rate, regular rhythm and normal heart sounds  Pulmonary/Chest: Effort normal and breath sounds normal    Abdominal: Soft  There is no tenderness  Musculoskeletal: She exhibits no edema  Neurological: She is alert and oriented to person, place, and time  Skin: Skin is warm and dry  No rash noted  Psychiatric: She has a normal mood and affect  Nursing note and vitals reviewed          RESULTS:    Recent Results (from the past 1008 hour(s))   Lipid panel    Collection Time: 02/21/18  6:42 AM   Result Value Ref Range    Cholesterol 217 (H) 50 - 200 mg/dL    Triglycerides 200 (H) <=150 mg/dL    HDL, Direct 68 (H) 40 - 60 mg/dL    LDL Calculated 109 (H) 0 - 100 mg/dL   Comprehensive metabolic panel    Collection Time: 02/21/18  6:42 AM   Result Value Ref Range    Sodium 141 136 - 145 mmol/L    Potassium 4 5 3 5 - 5 3 mmol/L    Chloride 103 100 - 108 mmol/L    CO2 33 (H) 21 - 32 mmol/L    Anion Gap 5 4 - 13 mmol/L    BUN 16 5 - 25 mg/dL    Creatinine 0 85 0 60 - 1 30 mg/dL    Glucose, Fasting 92 65 - 99 mg/dL    Calcium 9 1 8 3 - 10 1 mg/dL    AST 50 (H) 5 - 45 U/L    ALT 66 12 - 78 U/L    Alkaline Phosphatase 44 (L) 46 - 116 U/L    Total Protein 7 8 6 4 - 8 2 g/dL    Albumin 3 8 3 5 - 5 0 g/dL    Total Bilirubin 0 39 0 20 - 1 00 mg/dL    eGFR 72 ml/min/1 73sq m   Vitamin D 25 hydroxy    Collection Time: 02/21/18  6:42 AM   Result Value Ref Range    Vit D, 25-Hydroxy 28 2 (L) 30 0 - 100 0 ng/mL   Magnesium    Collection Time: 02/21/18  6:42 AM   Result Value Ref Range    Magnesium 2 1 1 6 - 2 6 mg/dL

## 2018-03-28 DIAGNOSIS — J45.20 MILD INTERMITTENT ASTHMA WITHOUT COMPLICATION: Primary | ICD-10-CM

## 2018-03-28 DIAGNOSIS — N30.10 INTERSTITIAL CYSTITIS: Primary | ICD-10-CM

## 2018-03-29 RX ORDER — PENTOSAN POLYSULFATE SODIUM 100 MG/1
CAPSULE, GELATIN COATED ORAL
Qty: 180 CAPSULE | Refills: 5 | Status: SHIPPED | OUTPATIENT
Start: 2018-03-29 | End: 2018-08-24 | Stop reason: SDUPTHER

## 2018-03-30 ENCOUNTER — TELEPHONE (OUTPATIENT)
Dept: OBGYN CLINIC | Facility: CLINIC | Age: 63
End: 2018-03-30

## 2018-03-30 DIAGNOSIS — N95.2 ATROPHIC VULVOVAGINITIS: Primary | ICD-10-CM

## 2018-03-30 RX ORDER — ESTRADIOL 0.1 MG/G
1 CREAM VAGINAL 2 TIMES WEEKLY
Qty: 42.5 G | Refills: 3 | Status: SHIPPED | OUTPATIENT
Start: 2018-04-02

## 2018-03-30 RX ORDER — ESTRADIOL 0.1 MG/G
1 CREAM VAGINAL 2 TIMES WEEKLY
COMMUNITY
Start: 2017-10-19 | End: 2018-03-30 | Stop reason: SDUPTHER

## 2018-03-30 NOTE — TELEPHONE ENCOUNTER
Jennifer Larkin from [place] [address] called stating that the pt needs a rx refill of Estradiol sent over

## 2018-04-04 DIAGNOSIS — E55.9 VITAMIN D DEFICIENCY: ICD-10-CM

## 2018-04-04 RX ORDER — ERGOCALCIFEROL 1.25 MG/1
CAPSULE ORAL
Qty: 4 CAPSULE | Refills: 3 | Status: SHIPPED | OUTPATIENT
Start: 2018-04-04 | End: 2018-07-18 | Stop reason: SDUPTHER

## 2018-04-05 DIAGNOSIS — E78.1 HYPERTRIGLYCERIDEMIA: Primary | ICD-10-CM

## 2018-04-05 RX ORDER — OMEGA-3-ACID ETHYL ESTERS 1 G/1
CAPSULE, LIQUID FILLED ORAL
Qty: 120 CAPSULE | Refills: 3 | Status: SHIPPED | OUTPATIENT
Start: 2018-04-05 | End: 2018-07-29 | Stop reason: SDUPTHER

## 2018-05-02 DIAGNOSIS — K21.9 GASTROESOPHAGEAL REFLUX DISEASE, ESOPHAGITIS PRESENCE NOT SPECIFIED: Primary | ICD-10-CM

## 2018-05-02 RX ORDER — PANTOPRAZOLE SODIUM 40 MG/1
TABLET, DELAYED RELEASE ORAL
Qty: 180 TABLET | Refills: 1 | Status: SHIPPED | OUTPATIENT
Start: 2018-05-02 | End: 2018-10-24 | Stop reason: SDUPTHER

## 2018-05-19 DIAGNOSIS — Z12.31 ENCOUNTER FOR SCREENING MAMMOGRAM FOR MALIGNANT NEOPLASM OF BREAST: ICD-10-CM

## 2018-05-19 DIAGNOSIS — R92.2 INCONCLUSIVE MAMMOGRAM: ICD-10-CM

## 2018-06-02 DIAGNOSIS — E78.2 MIXED HYPERLIPIDEMIA: Primary | ICD-10-CM

## 2018-06-03 RX ORDER — PRAVASTATIN SODIUM 20 MG
TABLET ORAL
Qty: 30 TABLET | Refills: 5 | Status: SHIPPED | OUTPATIENT
Start: 2018-06-03 | End: 2018-10-04

## 2018-06-06 ENCOUNTER — TELEPHONE (OUTPATIENT)
Dept: GASTROENTEROLOGY | Facility: CLINIC | Age: 63
End: 2018-06-06

## 2018-06-06 DIAGNOSIS — I49.9 CARDIAC ARRHYTHMIA, UNSPECIFIED CARDIAC ARRHYTHMIA TYPE: Primary | ICD-10-CM

## 2018-06-06 NOTE — TELEPHONE ENCOUNTER
Yes- 10mg TID  Get EKG first  She has to be reminded this is not approved by FDA and we have to write paper script  And info is in my office- ask Barbie Jean

## 2018-06-06 NOTE — TELEPHONE ENCOUNTER
Spoke with patient  She is aware EKG needs to be done  EKG ordered  She will call us once EKG is done, and we can submit for this medication  Thank you

## 2018-06-06 NOTE — TELEPHONE ENCOUNTER
Spoke with patient  H/O GERD, abdominal pain, fatty liver    Patient c/o restless leg symptom previous discussed with you d/t her relgan  Patient states she can no longer deal with this symptom and would like something else  I see you spoke with her about domperidone trial     Is this something you would like patient to try?  Last OV 8/10/2017

## 2018-06-12 NOTE — TELEPHONE ENCOUNTER
Spoke with patients son  Ed he will talk to his mother, and see if she had her EKG done  He doesn't believe she has but he will follow-up and let us know

## 2018-06-16 DIAGNOSIS — J45.20 MILD INTERMITTENT ASTHMA WITHOUT COMPLICATION: ICD-10-CM

## 2018-06-18 DIAGNOSIS — M47.817 LUMBOSACRAL SPONDYLOSIS WITHOUT MYELOPATHY: ICD-10-CM

## 2018-06-18 DIAGNOSIS — M54.16 RADICULOPATHY, LUMBAR REGION: ICD-10-CM

## 2018-06-18 DIAGNOSIS — S39.012A STRAIN OF LUMBAR REGION, INITIAL ENCOUNTER: Primary | ICD-10-CM

## 2018-06-18 RX ORDER — METHYLPREDNISOLONE 4 MG/1
TABLET ORAL
Qty: 21 TABLET | Refills: 0 | Status: SHIPPED | OUTPATIENT
Start: 2018-06-18 | End: 2018-08-24 | Stop reason: ALTCHOICE

## 2018-06-18 NOTE — TELEPHONE ENCOUNTER
VELIAI: Spoke with patient  wenceslao  He says patient hurt her back and is unable to get EKG done  He will call use back once she is able to have this done  We need EKG before we send for domperidone

## 2018-07-10 ENCOUNTER — HOSPITAL ENCOUNTER (EMERGENCY)
Facility: HOSPITAL | Age: 63
Discharge: HOME | End: 2018-07-10
Attending: EMERGENCY MEDICINE | Admitting: EMERGENCY MEDICINE
Payer: MEDICARE

## 2018-07-10 ENCOUNTER — APPOINTMENT (EMERGENCY)
Dept: CT IMAGING | Facility: HOSPITAL | Age: 63
End: 2018-07-10
Payer: MEDICARE

## 2018-07-10 VITALS
HEIGHT: 62 IN | TEMPERATURE: 99.1 F | WEIGHT: 183 LBS | SYSTOLIC BLOOD PRESSURE: 186 MMHG | OXYGEN SATURATION: 97 % | RESPIRATION RATE: 16 BRPM | DIASTOLIC BLOOD PRESSURE: 86 MMHG | HEART RATE: 70 BPM | BODY MASS INDEX: 33.68 KG/M2

## 2018-07-10 DIAGNOSIS — R10.31 RIGHT GROIN PAIN: Primary | ICD-10-CM

## 2018-07-10 DIAGNOSIS — G57.11 MERALGIA PARAESTHETICA, RIGHT: ICD-10-CM

## 2018-07-10 LAB
ANION GAP SERPL CALCULATED.3IONS-SCNC: 9 MMOL/L (ref 4–13)
BACTERIA UR QL AUTO: ABNORMAL /HPF
BASOPHILS # BLD AUTO: 0.06 THOUSANDS/ΜL (ref 0–0.1)
BASOPHILS NFR BLD AUTO: 1 % (ref 0–1)
BILIRUB UR QL STRIP: NEGATIVE
BUN SERPL-MCNC: 18 MG/DL (ref 5–25)
CALCIUM SERPL-MCNC: 9.4 MG/DL (ref 8.3–10.1)
CHLORIDE SERPL-SCNC: 101 MMOL/L (ref 100–108)
CLARITY UR: ABNORMAL
CO2 SERPL-SCNC: 30 MMOL/L (ref 21–32)
COLOR UR: YELLOW
CREAT SERPL-MCNC: 0.83 MG/DL (ref 0.6–1.3)
EOSINOPHIL # BLD AUTO: 0.23 THOUSAND/ΜL (ref 0–0.61)
EOSINOPHIL NFR BLD AUTO: 3 % (ref 0–6)
ERYTHROCYTE [DISTWIDTH] IN BLOOD BY AUTOMATED COUNT: 13.2 % (ref 11.6–15.1)
GFR SERPL CREATININE-BSD FRML MDRD: 74 ML/MIN/1.73SQ M
GLUCOSE SERPL-MCNC: 92 MG/DL (ref 65–140)
GLUCOSE UR STRIP-MCNC: NEGATIVE MG/DL
HCT VFR BLD AUTO: 44.4 % (ref 34.8–46.1)
HGB BLD-MCNC: 14.6 G/DL (ref 11.5–15.4)
HGB UR QL STRIP.AUTO: NEGATIVE
IMM GRANULOCYTES # BLD AUTO: 0.05 THOUSAND/UL (ref 0–0.2)
IMM GRANULOCYTES NFR BLD AUTO: 1 % (ref 0–2)
KETONES UR STRIP-MCNC: NEGATIVE MG/DL
LEUKOCYTE ESTERASE UR QL STRIP: ABNORMAL
LYMPHOCYTES # BLD AUTO: 3.46 THOUSANDS/ΜL (ref 0.6–4.47)
LYMPHOCYTES NFR BLD AUTO: 39 % (ref 14–44)
MCH RBC QN AUTO: 29.8 PG (ref 26.8–34.3)
MCHC RBC AUTO-ENTMCNC: 32.9 G/DL (ref 31.4–37.4)
MCV RBC AUTO: 91 FL (ref 82–98)
MONOCYTES # BLD AUTO: 0.57 THOUSAND/ΜL (ref 0.17–1.22)
MONOCYTES NFR BLD AUTO: 6 % (ref 4–12)
NEUTROPHILS # BLD AUTO: 4.54 THOUSANDS/ΜL (ref 1.85–7.62)
NEUTS SEG NFR BLD AUTO: 50 % (ref 43–75)
NITRITE UR QL STRIP: NEGATIVE
NON-SQ EPI CELLS URNS QL MICRO: ABNORMAL /HPF
NRBC BLD AUTO-RTO: 0 /100 WBCS
PH UR STRIP.AUTO: 7.5 [PH] (ref 4.5–8)
PLATELET # BLD AUTO: 390 THOUSANDS/UL (ref 149–390)
PMV BLD AUTO: 10.2 FL (ref 8.9–12.7)
POTASSIUM SERPL-SCNC: 4.3 MMOL/L (ref 3.5–5.3)
PROT UR STRIP-MCNC: NEGATIVE MG/DL
RBC # BLD AUTO: 4.9 MILLION/UL (ref 3.81–5.12)
RBC #/AREA URNS AUTO: ABNORMAL /HPF
SODIUM SERPL-SCNC: 140 MMOL/L (ref 136–145)
SP GR UR STRIP.AUTO: 1.02 (ref 1–1.03)
UROBILINOGEN UR QL STRIP.AUTO: 0.2 E.U./DL
WBC # BLD AUTO: 8.91 THOUSAND/UL (ref 4.31–10.16)
WBC #/AREA URNS AUTO: ABNORMAL /HPF

## 2018-07-10 PROCEDURE — 81001 URINALYSIS AUTO W/SCOPE: CPT | Performed by: EMERGENCY MEDICINE

## 2018-07-10 PROCEDURE — 99284 EMERGENCY DEPT VISIT MOD MDM: CPT

## 2018-07-10 PROCEDURE — 85025 COMPLETE CBC W/AUTO DIFF WBC: CPT | Performed by: EMERGENCY MEDICINE

## 2018-07-10 PROCEDURE — 80048 BASIC METABOLIC PNL TOTAL CA: CPT | Performed by: EMERGENCY MEDICINE

## 2018-07-10 PROCEDURE — 96374 THER/PROPH/DIAG INJ IV PUSH: CPT

## 2018-07-10 PROCEDURE — 36415 COLL VENOUS BLD VENIPUNCTURE: CPT | Performed by: EMERGENCY MEDICINE

## 2018-07-10 PROCEDURE — 96375 TX/PRO/DX INJ NEW DRUG ADDON: CPT

## 2018-07-10 PROCEDURE — 74177 CT ABD & PELVIS W/CONTRAST: CPT

## 2018-07-10 RX ORDER — FENTANYL CITRATE 50 UG/ML
50 INJECTION, SOLUTION INTRAMUSCULAR; INTRAVENOUS ONCE
Status: COMPLETED | OUTPATIENT
Start: 2018-07-10 | End: 2018-07-10

## 2018-07-10 RX ORDER — LABETALOL HYDROCHLORIDE 5 MG/ML
10 INJECTION, SOLUTION INTRAVENOUS ONCE
Status: COMPLETED | OUTPATIENT
Start: 2018-07-10 | End: 2018-07-10

## 2018-07-10 RX ADMIN — IOHEXOL 100 ML: 350 INJECTION, SOLUTION INTRAVENOUS at 17:41

## 2018-07-10 RX ADMIN — LABETALOL 20 MG/4 ML (5 MG/ML) INTRAVENOUS SYRINGE 10 MG: at 18:31

## 2018-07-10 RX ADMIN — FENTANYL CITRATE 50 MCG: 50 INJECTION, SOLUTION INTRAMUSCULAR; INTRAVENOUS at 18:31

## 2018-07-10 NOTE — ED PROVIDER NOTES
History  Chief Complaint   Patient presents with    Groin Pain     right side radiates down leg     HPI  77-year-old pleasant female with history of asthma, chronic back pain with lumbar radiculopathy, GERD, hypertension, hyperlipidemia, interstitial cystitis, obesity, restless leg syndrome, and status post multiple surgeries presents to the emergency department with right groin and thigh pain  Patient states that over the past month, she has had pain in her right groin that has felt like a pressure with radiation down the anterior aspect of her thigh and associated paresthesias  Initially intermittent, While pain and paresthesias for  While pain and paresthesias were initially intermittent and resolved without intervention, over the past week or so, symptoms have been more constant  Pain has been worse with walking and without any known alleviating factors  Patient was seen outpatient with this complaint and given both a course of steroids and a lumbar injection, though she has not noted any improvement  She denies having had similar symptoms prior to this past month  Review of systems, she denies any recent fevers, chills, chest pain, shortness of breath, abdominal pain, nausea, vomiting, change in urinary/bowel function, weakness, numbness, paresthesias, or change in chronic back pain  Prior to Admission Medications   Prescriptions Last Dose Informant Patient Reported? Taking?    ELMIRON 100 MG capsule   No No   Sig: TAKE 2 CAPSULES BY MOUTH 3 TIMES A DAY   HYDROcodone-acetaminophen (VICODIN) 7 5-500 MG per tablet   Yes No   Sig: Take 1 tablet by mouth every 6 (six) hours as needed for moderate pain   Methylprednisolone 4 MG TBPK   No No   Sig: Use as directed on package   OxyCODONE HCl ER (OxyCONTIN) 30 MG T12A   Yes No   Sig: Take 30 mg by mouth 3 (three) times a day as needed for moderate pain   VENTOLIN  (90 Base) MCG/ACT inhaler   No No   Sig: INHALE 2 PUFFS 3 TIMES A DAY AS NEEDED   albuterol 2 mg/5 mL syrup   No No   Sig: TAKE 1 TEASPOONFUL BY MOUTH EVERY 4 HOURS AS NEEDED AS DIRECTED   baclofen 10 mg tablet   Yes No   Sig: Take 10 mg by mouth 3 (three) times a day   ergocalciferol (VITAMIN D2) 50,000 units   No No   Sig: TAKE ONE CAPSULE BY MOUTH WEEKLY   estradiol (ESTRACE) 0 1 mg/g vaginal cream   No No   Sig: Insert 1 g into the vagina 2 (two) times a week   fenofibrate (TRIGLIDE) 160 MG tablet   No No   Sig: TAKE 1 TABLET BY MOUTH EVERY DAY WITH A MEAL   fluticasone (FLONASE) 50 mcg/act nasal spray   No No   Si sprays into each nostril daily   furosemide (LASIX) 40 mg tablet   Yes No   Sig: Take by mouth   gabapentin (NEURONTIN) 800 mg tablet   Yes No   Sig: Take 800 mg by mouth 3 (three) times a day   metoclopramide (REGLAN) 5 mg/5 mL oral solution  Self Yes No   Sig: Take 5 mg by mouth 4 (four) times a day (before meals and at bedtime)   omega-3-acid ethyl esters (LOVAZA) 1 g capsule   No No   Sig: TAKE 2 CAPSULE TWICE DAILY   pantoprazole (PROTONIX) 40 mg tablet   No No   Sig: TAKE 1 TABLET BY MOUTH TWICE A DAY 30 MINUTES BEFORE BREAKFAST AND DINNER   pentosan polysulfate (ELMIRON) 100 mg capsule   Yes No   Sig: Take 100 mg by mouth 3 (three) times a day before meals 2 capsules po  3 time a day   pentosan polysulfate (ELMIRON) 100 mg capsule   Yes No   Sig: Take by mouth every 8 (eight) hours   pravastatin (PRAVACHOL) 20 mg tablet   No No   Sig: TAKE 1 TABLET BY MOUTH EVERY DAY   prochlorperazine (COMPAZINE) 10 mg tablet   No No   Sig: Take 1 tablet (10 mg total) by mouth 2 (two) times a day   venlafaxine (EFFEXOR) 75 mg tablet   Yes No   Sig: Take 75 mg by mouth 2 (two) times a day   zolpidem (AMBIEN) 5 mg tablet   Yes No   Sig: Take 5 mg by mouth daily at bedtime as needed for sleep      Facility-Administered Medications: None       Past Medical History:   Diagnosis Date    Asthma     Cataract     Chronic back pain     Chronic pain disorder     Back    GERD (gastroesophageal reflux disease)     HTN (hypertension)     Hyperlipidemia     Interstitial cystitis     Liver disease     Muscle cramps     Obesity     Pneumonia     Radiculopathy, lumbar region     Spondylosis, cervical, with myelopathy     Vulvovaginitis        Past Surgical History:   Procedure Laterality Date    APPENDECTOMY      BACK SURGERY      Arthrodeiss lumbar    BACK SURGERY      Spinal stereotaxis of cord    BLADDER SURGERY      Electronic bladder stimulator implantation     SECTION      x3    CHOLECYSTECTOMY      COLON SURGERY      Intestinal stricturoplasty     FINGER SURGERY      Extensor tendon repair (mallet finger)    HERNIA REPAIR      x3    HYSTERECTOMY      NC ESOPHAGOGASTRODUODENOSCOPY TRANSORAL DIAGNOSTIC N/A 2017    Procedure: ESOPHAGOGASTRODUODENOSCOPY (EGD); Surgeon: James Spear MD;  Location: [facility]; Service: Gastroenterology    SALPINGOOPHORECTOMY      B/L       Family History   Problem Relation Age of Onset    Coronary artery disease Brother         Patient has 3 brothers with coronary artery disease    Diabetes Mother     Throat cancer Mother     COPD Father     Bipolar disorder Sister     Drug abuse Sister     Alcohol abuse Sister      I have reviewed and agree with the history as documented  Social History   Substance Use Topics    Smoking status: Never Smoker    Smokeless tobacco: Never Used    Alcohol use No        Review of Systems   Constitutional: Negative for chills and fever  Respiratory: Negative for shortness of breath  Gastrointestinal: Negative for abdominal pain, nausea and vomiting  Genitourinary: Negative for difficulty urinating  Musculoskeletal: Negative for arthralgias and joint swelling  Skin: Negative for rash and wound  Allergic/Immunologic: Negative for immunocompromised state  Neurological: Negative for headaches  Psychiatric/Behavioral: The patient is not nervous/anxious          Physical Exam  Physical Exam   Constitutional: She is oriented to person, place, and time  She appears well-nourished  No distress  HENT:   Head: Normocephalic and atraumatic  Eyes: EOM are normal    Neck: Normal range of motion  Neck supple  Cardiovascular: Normal rate and regular rhythm  Pulmonary/Chest: Effort normal and breath sounds normal  No respiratory distress  Abdominal: Soft  She exhibits no distension  There is no tenderness  Obese abdomen  Mild tenderness in right groin/right lower pannus  No obvious skin changes, no obvious hernia  Musculoskeletal: Normal range of motion  Neurological: She is alert and oriented to person, place, and time  Skin: Skin is warm and dry  She is not diaphoretic  Psychiatric: She has a normal mood and affect  Her behavior is normal    Nursing note and vitals reviewed        Vital Signs  ED Triage Vitals   Temperature Pulse Respirations Blood Pressure SpO2   07/10/18 1611 07/10/18 1611 07/10/18 1611 07/10/18 1614 07/10/18 1611   99 1 °F (37 3 °C) 89 18 (!) 221/108 97 %      Temp src Heart Rate Source Patient Position - Orthostatic VS BP Location FiO2 (%)   -- -- 07/10/18 1702 07/10/18 1702 --     Lying Right arm       Pain Score       07/10/18 1611       8           Vitals:    07/10/18 1630 07/10/18 1702 07/10/18 1845 07/10/18 1902   BP: (!) 215/102 (!) 203/96 (!) 210/93 (!) 186/86   Pulse: 86 71 73 70   Patient Position - Orthostatic VS:  Lying  Lying       Visual Acuity      ED Medications  Medications   iohexol (OMNIPAQUE) 350 MG/ML injection (MULTI-DOSE) 100 mL (100 mL Intravenous Given 7/10/18 1741)   fentanyl citrate (PF) 100 MCG/2ML 50 mcg (50 mcg Intravenous Given 7/10/18 1831)   labetalol (NORMODYNE) injection 10 mg (10 mg Intravenous Given 7/10/18 1831)       Diagnostic Studies  Results Reviewed     Procedure Component Value Units Date/Time    Urinalysis with microscopic [[id number]]  (Abnormal) Collected:  07/10/18 1647    Lab Status:  Final result Specimen:  Urine from Urine, Clean Catch Updated:  07/10/18 1725     Clarity, UA Slightly Cloudy     Color, UA Yellow     Specific Gravity, UA 1 020     pH, UA 7 5     Glucose, UA Negative mg/dl      Ketones, UA Negative mg/dl      Blood, UA Negative     Protein, UA Negative mg/dl      Nitrite, UA Negative     Bilirubin, UA Negative     Urobilinogen, UA 0 2 E U /dl      Leukocytes, UA Moderate (A)     WBC, UA 4-10 (A) /hpf      RBC, UA 0-1 (A) /hpf      Bacteria, UA Occasional /hpf      Epithelial Cells Occasional /hpf     Narrative: Moderate starch granules observed    Basic metabolic panel [[id number]] Collected:  07/10/18 1700    Lab Status:  Final result Specimen:  Blood from Arm, Left Updated:  07/10/18 1717     Sodium 140 mmol/L      Potassium 4 3 mmol/L      Chloride 101 mmol/L      CO2 30 mmol/L      Anion Gap 9 mmol/L      BUN 18 mg/dL      Creatinine 0 83 mg/dL      Glucose 92 mg/dL      Calcium 9 4 mg/dL      eGFR 74 ml/min/1 73sq m     Narrative:         National Kidney Disease Education Program recommendations are as follows:  GFR calculation is accurate only with a steady state creatinine  Chronic Kidney disease less than 60 ml/min/1 73 sq  meters  Kidney failure less than 15 ml/min/1 73 sq  meters      CBC and differential [[id number]] Collected:  07/10/18 1700    Lab Status:  Final result Specimen:  Blood from Arm, Left Updated:  07/10/18 1710     WBC 8 91 Thousand/uL      RBC 4 90 Million/uL      Hemoglobin 14 6 g/dL      Hematocrit 44 4 %      MCV 91 fL      MCH 29 8 pg      MCHC 32 9 g/dL      RDW 13 2 %      MPV 10 2 fL      Platelets 595 Thousands/uL      nRBC 0 /100 WBCs      Neutrophils Relative 50 %      Immat GRANS % 1 %      Lymphocytes Relative 39 %      Monocytes Relative 6 %      Eosinophils Relative 3 %      Basophils Relative 1 %      Neutrophils Absolute 4 54 Thousands/µL      Immature Grans Absolute 0 05 Thousand/uL      Lymphocytes Absolute 3 46 Thousands/µL      Monocytes Absolute 0 57 Thousand/µL Eosinophils Absolute 0 23 Thousand/µL      Basophils Absolute 0 06 Thousands/µL                  CT abdomen pelvis with contrast   Final Result by Bravo Ruiz MD (07/10 1808)         1  Diverticulosis without diverticulitis or colitis  No evidence of bowel obstruction or hernia  2   No pyelonephritis or obstructive uropathy  Workstation performed: [id number]                    Procedures  Procedures       Phone Contacts  ED Phone Contact    ED Course  ED Course as of Jul 12 1914   Tue Jul 10, 2018   1730 Leukocytes, UA: (!) Moderate   1912 Patient feeling better, aware of results  Will follow up with PCP/pain management as needed  MDM  Number of Diagnoses or Management Options  Meralgia paraesthetica, right:   Right groin pain:   Diagnosis management comments: 41-year-old female with right groin/thigh pain and paresthesias x 1 month  Symptoms likely due to obesity, meralgia paresthetica, but will obtain belly labs, UA, CT abdomen pelvis to rule out other pathology  Will provide analgesia and reassess  Disposition pending  CritCare Time    Disposition  Final diagnoses:   Right groin pain   Meralgia paraesthetica, right     Time reflects when diagnosis was documented in both MDM as applicable and the Disposition within this note     Time User Action Codes Description Comment    7/10/2018  7:13 PM Princess Rowe Add [R10 31] Right groin pain     7/10/2018  7:13 PM Noemi Omer Add [G57 11] Meralgia paraesthetica, right       ED Disposition     ED Disposition Condition Comment    [facility]      Condition at discharge: Good        Follow-up Information     Follow up With Specialties Details Why Contact Info Additional Information    Jose Luis Muñoz MD Internal Medicine  As needed [address]  [address] [facility]  [address] Emergency Department Emergency Medicine  If symptoms worsen [address] [facility] [place] [address]  [phone] [place] [facility], [address] [facility], [place], [place], [address]          Discharge Medication List as of 7/10/2018  7:13 PM      CONTINUE these medications which have NOT CHANGED    Details   albuterol 2 mg/5 mL syrup TAKE 1 TEASPOONFUL BY MOUTH EVERY 4 HOURS AS NEEDED AS DIRECTED, Normal      baclofen 10 mg tablet Take 10 mg by mouth 3 (three) times a day, Until Discontinued, Historical Med      !! ELMIRON 100 MG capsule TAKE 2 CAPSULES BY MOUTH 3 TIMES A DAY, Normal      ergocalciferol (VITAMIN D2) 50,000 units TAKE ONE CAPSULE BY MOUTH WEEKLY, Normal      estradiol (ESTRACE) 0 1 mg/g vaginal cream Insert 1 g into the vagina 2 (two) times a week, Starting Mon 4/2/2018, Normal      fenofibrate (TRIGLIDE) 160 MG tablet TAKE 1 TABLET BY MOUTH EVERY DAY WITH A MEAL, Normal      fluticasone (FLONASE) 50 mcg/act nasal spray 2 sprays into each nostril daily, Starting Fri 9/29/2017, Print      furosemide (LASIX) 40 mg tablet Take by mouth, Starting Tue 6/6/2017, Historical Med      gabapentin (NEURONTIN) 800 mg tablet Take 800 mg by mouth 3 (three) times a day, Until Discontinued, Historical Med      HYDROcodone-acetaminophen (VICODIN) 7 5-500 MG per tablet Take 1 tablet by mouth every 6 (six) hours as needed for moderate pain, Until Discontinued, Historical Med      Methylprednisolone 4 MG TBPK Use as directed on package, Normal      metoclopramide (REGLAN) 5 mg/5 mL oral solution Take 5 mg by mouth 4 (four) times a day (before meals and at bedtime), Historical Med      omega-3-acid ethyl esters (LOVAZA) 1 g capsule TAKE 2 CAPSULE TWICE DAILY, Normal      OxyCODONE HCl ER (OxyCONTIN) 30 MG T12A Take 30 mg by mouth 3 (three) times a day as needed for moderate pain, Until Discontinued, Historical Med      pantoprazole (PROTONIX) 40 mg tablet TAKE 1 TABLET BY MOUTH TWICE A DAY 30 MINUTES BEFORE BREAKFAST AND DINNER, Normal      !! pentosan polysulfate (ELMIRON) 100 mg capsule Take 100 mg by mouth 3 (three) times a day before meals 2 capsules po  3 time a day, Historical Med      !! pentosan polysulfate (ELMIRON) 100 mg capsule Take by mouth every 8 (eight) hours, Starting Mon 4/3/2017, Historical Med      pravastatin (PRAVACHOL) 20 mg tablet TAKE 1 TABLET BY MOUTH EVERY DAY, Normal      prochlorperazine (COMPAZINE) 10 mg tablet Take 1 tablet (10 mg total) by mouth 2 (two) times a day, Starting Thu 3/15/2018, Normal      venlafaxine (EFFEXOR) 75 mg tablet Take 75 mg by mouth 2 (two) times a day, Historical Med      VENTOLIN  (90 Base) MCG/ACT inhaler INHALE 2 PUFFS 3 TIMES A DAY AS NEEDED, Normal      zolpidem (AMBIEN) 5 mg tablet Take 5 mg by mouth daily at bedtime as needed for sleep, Historical Med       !! - Potential duplicate medications found  Please discuss with provider  No discharge procedures on file      ED Provider  Electronically Signed by           Grazyna Montoya MD  07/12/18 6726

## 2018-07-10 NOTE — DISCHARGE INSTRUCTIONS
Meralgia Paresthetica   WHAT YOU NEED TO KNOW:   Meralgia paresthetica (MP) is a condition that causes numbness, tingling, and burning pain in your outer thigh  MP occurs when the nerve that provides feeling to the area is pinched  DISCHARGE INSTRUCTIONS:   Medicines:   · Medicines  may be given to relieve pain or decrease inflammation  · Take your medicine as directed  Contact your healthcare provider if you think your medicine is not helping or if you have side effects  Tell him of her if you are allergic to any medicine  Keep a list of the medicines, vitamins, and herbs you take  Include the amounts, and when and why you take them  Bring the list or the pill bottles to follow-up visits  Carry your medicine list with you in case of an emergency  Follow up with your healthcare provider as directed:  Write down your questions so you remember to ask them during your visits  Self-care:   · Maintain a healthy weight  This will decrease pressure on your nerve  Ask your healthcare provider how much you should weigh  Ask him to help you create a weight loss plan if you are overweight  · Decrease pressure on your nerve  Wear loose clothing  Do not wear tight pants, belts, or other tight clothes  Do not walk or stand for long periods of time  · Go to physical therapy  A physical therapist teaches you exercises to help improve movement and strength, and to decrease pain  Contact your healthcare provider if:   · Your symptoms do not improve with treatment  · You have questions or concerns about your condition or care  Return to the emergency department if:   · You have severe leg pain  · You cannot feel or move your legs  © 2017 [facility] Information is for End User's use only and may not be sold, redistributed or otherwise used for commercial purposes   All illustrations and images included in CareNotes® are the copyrighted property of A D [employer] M [employer]  or [facility] [employer]  The above information is an  only  It is not intended as medical advice for individual conditions or treatments  Talk to your doctor, nurse or pharmacist before following any medical regimen to see if it is safe and effective for you

## 2018-07-18 DIAGNOSIS — E55.9 VITAMIN D DEFICIENCY: ICD-10-CM

## 2018-07-18 RX ORDER — ERGOCALCIFEROL 1.25 MG/1
CAPSULE ORAL
Qty: 4 CAPSULE | Refills: 3 | Status: SHIPPED | OUTPATIENT
Start: 2018-07-18 | End: 2018-11-03 | Stop reason: SDUPTHER

## 2018-07-26 ENCOUNTER — APPOINTMENT (EMERGENCY)
Dept: CT IMAGING | Facility: HOSPITAL | Age: 63
End: 2018-07-26
Payer: MEDICARE

## 2018-07-26 ENCOUNTER — APPOINTMENT (EMERGENCY)
Dept: RADIOLOGY | Facility: HOSPITAL | Age: 63
End: 2018-07-26
Payer: MEDICARE

## 2018-07-26 ENCOUNTER — HOSPITAL ENCOUNTER (EMERGENCY)
Facility: HOSPITAL | Age: 63
Discharge: HOME | End: 2018-07-26
Attending: EMERGENCY MEDICINE
Payer: MEDICARE

## 2018-07-26 VITALS
WEIGHT: 182.98 LBS | HEIGHT: 61 IN | TEMPERATURE: 97.5 F | SYSTOLIC BLOOD PRESSURE: 187 MMHG | HEART RATE: 90 BPM | OXYGEN SATURATION: 96 % | RESPIRATION RATE: 20 BRPM | BODY MASS INDEX: 34.55 KG/M2 | DIASTOLIC BLOOD PRESSURE: 98 MMHG
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
## 2018-07-26 DIAGNOSIS — R51.9 FACIAL PAIN: ICD-10-CM

## 2018-07-26 DIAGNOSIS — M25.562 LEFT KNEE PAIN: ICD-10-CM

## 2018-07-26 LAB
ANION GAP SERPL CALCULATED.3IONS-SCNC: 5 MMOL/L (ref 4–13)
BASOPHILS # BLD AUTO: 0.03 THOUSANDS/ΜL (ref 0–0.1)
BASOPHILS NFR BLD AUTO: 1 % (ref 0–1)
BUN SERPL-MCNC: 16 MG/DL (ref 5–25)
CALCIUM SERPL-MCNC: 9.2 MG/DL (ref 8.3–10.1)
CHLORIDE SERPL-SCNC: 104 MMOL/L (ref 100–108)
CO2 SERPL-SCNC: 32 MMOL/L (ref 21–32)
CREAT SERPL-MCNC: 0.9 MG/DL (ref 0.6–1.3)
EOSINOPHIL # BLD AUTO: 0.08 THOUSAND/ΜL (ref 0–0.61)
EOSINOPHIL NFR BLD AUTO: 2 % (ref 0–6)
ERYTHROCYTE [DISTWIDTH] IN BLOOD BY AUTOMATED COUNT: 13.2 % (ref 11.6–15.1)
GFR SERPL CREATININE-BSD FRML MDRD: 67 ML/MIN/1.73SQ M
GLUCOSE SERPL-MCNC: 99 MG/DL (ref 65–140)
HCT VFR BLD AUTO: 43.5 % (ref 34.8–46.1)
HGB BLD-MCNC: 13.9 G/DL (ref 11.5–15.4)
IMM GRANULOCYTES # BLD AUTO: 0.02 THOUSAND/UL (ref 0–0.2)
IMM GRANULOCYTES NFR BLD AUTO: 0 % (ref 0–2)
LYMPHOCYTES # BLD AUTO: 1.94 THOUSANDS/ΜL (ref 0.6–4.47)
LYMPHOCYTES NFR BLD AUTO: 38 % (ref 14–44)
MCH RBC QN AUTO: 29.6 PG (ref 26.8–34.3)
MCHC RBC AUTO-ENTMCNC: 32 G/DL (ref 31.4–37.4)
MCV RBC AUTO: 93 FL (ref 82–98)
MONOCYTES # BLD AUTO: 0.4 THOUSAND/ΜL (ref 0.17–1.22)
MONOCYTES NFR BLD AUTO: 8 % (ref 4–12)
NEUTROPHILS # BLD AUTO: 2.66 THOUSANDS/ΜL (ref 1.85–7.62)
NEUTS SEG NFR BLD AUTO: 51 % (ref 43–75)
NRBC BLD AUTO-RTO: 0 /100 WBCS
PLATELET # BLD AUTO: 337 THOUSANDS/UL (ref 149–390)
PMV BLD AUTO: 10.4 FL (ref 8.9–12.7)
POTASSIUM SERPL-SCNC: 4.6 MMOL/L (ref 3.5–5.3)
RBC # BLD AUTO: 4.7 MILLION/UL (ref 3.81–5.12)
SODIUM SERPL-SCNC: 141 MMOL/L (ref 136–145)
WBC # BLD AUTO: 5.13 THOUSAND/UL (ref 4.31–10.16)

## 2018-07-26 PROCEDURE — 85025 COMPLETE CBC W/AUTO DIFF WBC: CPT | Performed by: EMERGENCY MEDICINE

## 2018-07-26 PROCEDURE — 70486 CT MAXILLOFACIAL W/O DYE: CPT

## 2018-07-26 PROCEDURE — 96361 HYDRATE IV INFUSION ADD-ON: CPT

## 2018-07-26 PROCEDURE — 99284 EMERGENCY DEPT VISIT MOD MDM: CPT

## 2018-07-26 PROCEDURE — 93005 ELECTROCARDIOGRAM TRACING: CPT

## 2018-07-26 PROCEDURE — 36415 COLL VENOUS BLD VENIPUNCTURE: CPT | Performed by: EMERGENCY MEDICINE

## 2018-07-26 PROCEDURE — 70450 CT HEAD/BRAIN W/O DYE: CPT

## 2018-07-26 PROCEDURE — 96374 THER/PROPH/DIAG INJ IV PUSH: CPT

## 2018-07-26 PROCEDURE — 73562 X-RAY EXAM OF KNEE 3: CPT

## 2018-07-26 PROCEDURE — 80048 BASIC METABOLIC PNL TOTAL CA: CPT | Performed by: EMERGENCY MEDICINE

## 2018-07-26 PROCEDURE — 72125 CT NECK SPINE W/O DYE: CPT

## 2018-07-26 RX ORDER — ACETAMINOPHEN 325 MG/1
650 TABLET ORAL ONCE
Status: DISCONTINUED | OUTPATIENT
Start: 2018-07-26 | End: 2018-07-26

## 2018-07-26 RX ORDER — OXYCODONE HYDROCHLORIDE 5 MG/1
5 TABLET ORAL ONCE
Status: COMPLETED | OUTPATIENT
Start: 2018-07-26 | End: 2018-07-26

## 2018-07-26 RX ORDER — KETOROLAC TROMETHAMINE 30 MG/ML
15 INJECTION, SOLUTION INTRAMUSCULAR; INTRAVENOUS ONCE
Status: COMPLETED | OUTPATIENT
Start: 2018-07-26 | End: 2018-07-26

## 2018-07-26 RX ADMIN — OXYCODONE HYDROCHLORIDE 5 MG: 5 TABLET ORAL at 16:55

## 2018-07-26 RX ADMIN — KETOROLAC TROMETHAMINE 15 MG: 30 INJECTION, SOLUTION INTRAMUSCULAR at 17:34

## 2018-07-26 RX ADMIN — SODIUM CHLORIDE 1000 ML: 0.9 INJECTION, SOLUTION INTRAVENOUS at 16:54

## 2018-07-26 NOTE — ED PROVIDER NOTES
History  Chief Complaint   Patient presents with    Fall     Patient states she fell at home around 3pm today  Patient denies any LOC  Patient states she hit her head and her left knee  Patient states she does take bloodthinning medication  HPI  58-year-old female past history chronic pain, continuous opioid dependence, interstitial cystitis, chronic back pain status post spinal stimulator placement presents after a fall with complaints of diffuse headache, facial pain, left knee pain  Patient says she believes her right leg gave out and she fell forward onto her face  Patient says she was at home and fell on her floor at home  Per daughter at bedside witness the fall, patient was unresponsive for several seconds and then came to  Patient initially had epistaxis in both her nares without has since resolved  Patient has a past history of interstitial cystitis and is on pentosan, which upon review says that it does have low molecular weight heparin like activity, otherwise no other blood thinners  Patient denies any weakness, numbness, tingling  She said she took her prescribed OxyContin as well as Vicodin shortly after the fall with no relief of pain  Patient says after falling on her face her left knee impacted the floor and she is having pain there as well localized does not radiate down her foot  Twelve systems reviewed otherwise negative except as stated in HPI  Pt does not use cane/walker at baseline  Impression and plan 58-year-old female presents status post fall  Says she believes her right leg gave out however she was unresponsive for several seconds concern for syncope  She does have ecchymoses, abrasions across the bridge of her nose otherwise no signs of trauma  Obtain CT head, C-spine, facial bones get plain films of left knee, check ecg, basic labs, reassess  Prior to Admission Medications   Prescriptions Last Dose Informant Patient Reported? Taking?    ELMIRON 100 MG capsule No No   Sig: TAKE 2 CAPSULES BY MOUTH 3 TIMES A DAY   HYDROcodone-acetaminophen (VICODIN) 7 5-500 MG per tablet   Yes No   Sig: Take 1 tablet by mouth every 6 (six) hours as needed for moderate pain   Methylprednisolone 4 MG TBPK   No No   Sig: Use as directed on package   OxyCODONE HCl ER (OxyCONTIN) 30 MG T12A   Yes No   Sig: Take 30 mg by mouth 3 (three) times a day as needed for moderate pain   VENTOLIN  (90 Base) MCG/ACT inhaler   No No   Sig: INHALE 2 PUFFS 3 TIMES A DAY AS NEEDED   albuterol 2 mg/5 mL syrup   No No   Sig: TAKE 1 TEASPOONFUL BY MOUTH EVERY 4 HOURS AS NEEDED AS DIRECTED   baclofen 10 mg tablet   Yes No   Sig: Take 10 mg by mouth 3 (three) times a day   ergocalciferol (VITAMIN D2) 50,000 units   No No   Sig: TAKE ONE CAPSULE BY MOUTH WEEKLY   estradiol (ESTRACE) 0 1 mg/g vaginal cream   No No   Sig: Insert 1 g into the vagina 2 (two) times a week   fenofibrate (TRIGLIDE) 160 MG tablet   No No   Sig: TAKE 1 TABLET BY MOUTH EVERY DAY WITH A MEAL   fluticasone (FLONASE) 50 mcg/act nasal spray   No No   Si sprays into each nostril daily   furosemide (LASIX) 40 mg tablet   Yes No   Sig: Take by mouth   gabapentin (NEURONTIN) 800 mg tablet   Yes No   Sig: Take 800 mg by mouth 3 (three) times a day   metoclopramide (REGLAN) 5 mg/5 mL oral solution  Self Yes No   Sig: Take 5 mg by mouth 4 (four) times a day (before meals and at bedtime)   omega-3-acid ethyl esters (LOVAZA) 1 g capsule   No No   Sig: TAKE 2 CAPSULE TWICE DAILY   pantoprazole (PROTONIX) 40 mg tablet   No No   Sig: TAKE 1 TABLET BY MOUTH TWICE A DAY 30 MINUTES BEFORE BREAKFAST AND DINNER   pentosan polysulfate (ELMIRON) 100 mg capsule   Yes No   Sig: Take 100 mg by mouth 3 (three) times a day before meals 2 capsules po  3 time a day   pentosan polysulfate (ELMIRON) 100 mg capsule   Yes No   Sig: Take by mouth every 8 (eight) hours   pravastatin (PRAVACHOL) 20 mg tablet   No No   Sig: TAKE 1 TABLET BY MOUTH EVERY DAY prochlorperazine (COMPAZINE) 10 mg tablet   No No   Sig: Take 1 tablet (10 mg total) by mouth 2 (two) times a day   venlafaxine (EFFEXOR) 75 mg tablet   Yes No   Sig: Take 75 mg by mouth 2 (two) times a day   zolpidem (AMBIEN) 5 mg tablet   Yes No   Sig: Take 5 mg by mouth daily at bedtime as needed for sleep      Facility-Administered Medications: None       Past Medical History:   Diagnosis Date    Asthma     Cataract     Chronic back pain     Chronic pain disorder     Back    GERD (gastroesophageal reflux disease)     HTN (hypertension)     Hyperlipidemia     Interstitial cystitis     Liver disease     Muscle cramps     Obesity     Pneumonia     Radiculopathy, lumbar region     Spondylosis, cervical, with myelopathy     Vulvovaginitis        Past Surgical History:   Procedure Laterality Date    APPENDECTOMY      BACK SURGERY      Arthrodeiss lumbar    BACK SURGERY      Spinal stereotaxis of cord    BLADDER SURGERY      Electronic bladder stimulator implantation     SECTION      x3    CHOLECYSTECTOMY      COLON SURGERY      Intestinal stricturoplasty     FINGER SURGERY      Extensor tendon repair (mallet finger)    HERNIA REPAIR      x3    HYSTERECTOMY      NM ESOPHAGOGASTRODUODENOSCOPY TRANSORAL DIAGNOSTIC N/A 2017    Procedure: ESOPHAGOGASTRODUODENOSCOPY (EGD); Surgeon: Lamberto Burns MD;  Location: [facility]; Service: Gastroenterology    SALPINGOOPHORECTOMY      B/L       Family History   Problem Relation Age of Onset    Coronary artery disease Brother         Patient has 3 brothers with coronary artery disease    Diabetes Mother     Throat cancer Mother     COPD Father     Bipolar disorder Sister     Drug abuse Sister     Alcohol abuse Sister      I have reviewed and agree with the history as documented      Social History   Substance Use Topics    Smoking status: Never Smoker    Smokeless tobacco: Never Used    Alcohol use No        Review of Systems Constitutional: Negative for activity change, appetite change, chills and fever  HENT: Negative for sore throat, trouble swallowing and voice change  Eyes: Negative for photophobia and visual disturbance  Respiratory: Negative for cough, shortness of breath, wheezing and stridor  Cardiovascular: Negative for chest pain, palpitations and leg swelling  Gastrointestinal: Negative for abdominal distention, abdominal pain, diarrhea, nausea and vomiting  Endocrine: Negative for polyphagia and polyuria  Genitourinary: Negative for dysuria, vaginal discharge and vaginal pain  Musculoskeletal: Positive for gait problem  Negative for arthralgias, back pain, joint swelling, myalgias, neck pain and neck stiffness  Skin: Negative for rash and wound  Allergic/Immunologic: Negative  Neurological: Positive for headaches  Negative for dizziness, tremors, seizures, syncope, facial asymmetry, speech difficulty, weakness, light-headedness and numbness  Hematological: Negative for adenopathy  Does not bruise/bleed easily  Psychiatric/Behavioral: Negative for agitation  Physical Exam  Physical Exam   Constitutional: She is oriented to person, place, and time  She appears well-developed and well-nourished  No distress  HENT:   Head: Normocephalic and atraumatic  Right Ear: No hemotympanum  Left Ear: No hemotympanum  Nose: Nose normal  No nasal septal hematoma  Mouth/Throat: Uvula is midline, oropharynx is clear and moist and mucous membranes are normal  She does not have dentures  No oropharyngeal exudate  Nares patent  No signs of bleeding  Clear oropharynx  Normal phonation   Eyes: Conjunctivae and EOM are normal  Pupils are equal, round, and reactive to light  Right eye exhibits no discharge  Left eye exhibits no discharge  No scleral icterus  Right eye exhibits no nystagmus  Left eye exhibits no nystagmus     Neck: Trachea normal, normal range of motion, full passive range of motion without pain and phonation normal  Neck supple  No JVD present  No tracheal tenderness present  No tracheal deviation present  No thyromegaly present  No c-spine tenderness  Normal neck rom   Cardiovascular: Normal rate, regular rhythm, normal heart sounds and intact distal pulses  Exam reveals no gallop and no friction rub  No murmur heard  Pulmonary/Chest: Effort normal and breath sounds normal  No stridor  No respiratory distress  She has no wheezes  She has no rales  She exhibits no tenderness  Abdominal: Soft  Bowel sounds are normal  She exhibits no distension and no mass  There is no tenderness  There is no rebound and no guarding  No hernia  Musculoskeletal: Normal range of motion  She exhibits no edema, tenderness or deformity  No t, l spine tenderness  No stepoff/deformities   Lymphadenopathy:     She has no cervical adenopathy  Neurological: She is alert and oriented to person, place, and time  She has normal strength  No cranial nerve deficit or sensory deficit  GCS eye subscore is 4  GCS verbal subscore is 5  GCS motor subscore is 6  Reflex Scores:       Patellar reflexes are 2+ on the right side and 2+ on the left side  Achilles reflexes are 2+ on the right side and 2+ on the left side  nonfocal  Normal speech  Normal heel to shin, finger to nose   Skin: Skin is warm and dry  Capillary refill takes less than 2 seconds  No rash noted  She is not diaphoretic  No erythema  Psychiatric: She has a normal mood and affect  Nursing note and vitals reviewed        Vital Signs  ED Triage Vitals   Temperature Pulse Respirations Blood Pressure SpO2   07/26/18 1736 07/26/18 1602 07/26/18 1602 07/26/18 1602 07/26/18 1602   97 5 °F (36 4 °C) 94 20 (!) 207/110 96 %      Temp Source Heart Rate Source Patient Position - Orthostatic VS BP Location FiO2 (%)   07/26/18 1736 07/26/18 1602 07/26/18 1602 07/26/18 1602 --   Oral Monitor Sitting Left arm       Pain Score       07/26/18 1602 Worst Possible Pain           Vitals:    07/26/18 1602 07/26/18 1736   BP: (!) 207/110 (!) 187/98   Pulse: 94 90   Patient Position - Orthostatic VS: Sitting Sitting       Visual Acuity  Visual Acuity      Most Recent Value   L Pupil Size (mm)  3   R Pupil Size (mm)  3          ED Medications  Medications   oxyCODONE (ROXICODONE) IR tablet 5 mg (5 mg Oral Given 7/26/18 1655)   sodium chloride 0 9 % bolus 1,000 mL (0 mL Intravenous Stopped 7/26/18 1754)   ketorolac (TORADOL) injection 15 mg (15 mg Intravenous Given 7/26/18 1734)       Diagnostic Studies  Results Reviewed     Procedure Component Value Units Date/Time    Basic metabolic panel [[id number]] Collected:  07/26/18 1654    Lab Status:  Final result Specimen:  Blood from Arm, Left Updated:  07/26/18 1719     Sodium 141 mmol/L      Potassium 4 6 mmol/L      Chloride 104 mmol/L      CO2 32 mmol/L      Anion Gap 5 mmol/L      BUN 16 mg/dL      Creatinine 0 90 mg/dL      Glucose 99 mg/dL      Calcium 9 2 mg/dL      eGFR 67 ml/min/1 73sq m     Narrative:         National Kidney Disease Education Program recommendations are as follows:  GFR calculation is accurate only with a steady state creatinine  Chronic Kidney disease less than 60 ml/min/1 73 sq  meters  Kidney failure less than 15 ml/min/1 73 sq  meters      CBC and differential [[id number]] Collected:  07/26/18 1654    Lab Status:  Final result Specimen:  Blood from Arm, Left Updated:  07/26/18 1701     WBC 5 13 Thousand/uL      RBC 4 70 Million/uL      Hemoglobin 13 9 g/dL      Hematocrit 43 5 %      MCV 93 fL      MCH 29 6 pg      MCHC 32 0 g/dL      RDW 13 2 %      MPV 10 4 fL      Platelets 953 Thousands/uL      nRBC 0 /100 WBCs      Neutrophils Relative 51 %      Immat GRANS % 0 %      Lymphocytes Relative 38 %      Monocytes Relative 8 %      Eosinophils Relative 2 %      Basophils Relative 1 %      Neutrophils Absolute 2 66 Thousands/µL      Immature Grans Absolute 0 02 Thousand/uL      Lymphocytes Absolute 1 94 Thousands/µL      Monocytes Absolute 0 40 Thousand/µL      Eosinophils Absolute 0 08 Thousand/µL      Basophils Absolute 0 03 Thousands/µL                  XR knee 3 views left non injury   Final Result by Cirilo Zhu DO (07/26 1731)   No fracture or dislocation  Mild degenerative changes  Small joint effusion  Workstation performed: [id number]         CT cervical spine without contrast   Final Result by Pedrito Murdock MD (07/26 1710)      No cervical spine fracture or traumatic malalignment  Workstation performed: [id number]         CT facial bones without contrast   Final Result by Pedrito Murdock MD (07/26 1706)      Normal noncontrast CT of the facial bones  Workstation performed: [id number]         CT head without contrast   Final Result by Pedrito Murdock MD (07/26 1702)      No acute intracranial abnormality  Workstation performed: [id number]                    Procedures  ECG 12 Lead Documentation  Date/Time: 7/26/2018 4:51 PM  Performed by: Randeen Ahumada by: Keena Lawson     ECG reviewed by me, the ED Provider: yes    Patient location:  ED  Previous ECG:     Previous ECG:  Compared to current    Comparison ECG info:  August 2017    Similarity:  No change  Interpretation:     Interpretation: normal    Rate:     ECG rate:  86    ECG rate assessment: normal    Rhythm:     Rhythm: sinus rhythm    Ectopy:     Ectopy: none    QRS:     QRS axis:  Normal  Conduction:     Conduction: normal    ST segments:     ST segments:  Normal  T waves:     T waves: normal             Phone Contacts  ED Phone Contact    ED Course  ED Course as of Jul 26 1838   Thu Jul 26, 2018   1733 Pt able to ambulate with walker  I will refer to physical therapy  Strict return precautions discussed       1736 Pt instructed to f/u with pcp regarding elevated bp                                MDM  CritCare Time    Disposition  Final diagnoses:   Fall   Left knee pain   Facial pain     Time reflects when diagnosis was documented in both MDM as applicable and the Disposition within this note     Time User Action Codes Description Comment    7/26/2018  5:37 PM Matt Smith Add Michaelse Aquinoy  XXXA] Fall     7/26/2018  5:37 PM Matt Smith Add [U41 472] Left knee pain     7/26/2018  5:37 PM Jerral Mia T Add [R51] Facial pain     7/26/2018  5:38 PM Matt Smith Add [R26 2] Ambulatory dysfunction     7/26/2018  5:38 PM David Poster [R26 2] Ambulatory dysfunction       ED Disposition     ED Disposition Condition Comment    Discharge  [facility] discharge to home/self care      Condition at discharge: Good        Follow-up Information     Follow up With Specialties Details Why Contact Info Additional Information    Physical Therapy at [facility] [address] In 2 days  [facility] [address] [facility] [address]  [phone] AN  [facility] [address], [place], [address], MD Internal Medicine In 2 days  [facility] [address]       [address] [facility] Emergency Department Emergency Medicine  If symptoms worsen [address] [facility] [address]  [phone] [facility], [address] [place], [place], [address]          Discharge Medication List as of 7/26/2018  5:39 PM      CONTINUE these medications which have NOT CHANGED    Details   albuterol 2 mg/5 mL syrup TAKE 1 TEASPOONFUL BY MOUTH EVERY 4 HOURS AS NEEDED AS DIRECTED, Normal      baclofen 10 mg tablet Take 10 mg by mouth 3 (three) times a day, Until Discontinued, Historical Med      !! ELMIRON 100 MG capsule TAKE 2 CAPSULES BY MOUTH 3 TIMES A DAY, Normal      ergocalciferol (VITAMIN D2) 50,000 units TAKE ONE CAPSULE BY MOUTH WEEKLY, Normal      estradiol (ESTRACE) 0 1 mg/g vaginal cream Insert 1 g into the vagina 2 (two) times a week, Starting Mon 4/2/2018, Normal      fenofibrate (TRIGLIDE) 160 MG tablet TAKE 1 TABLET BY MOUTH EVERY DAY WITH A MEAL, Normal      fluticasone (FLONASE) 50 mcg/act nasal spray 2 sprays into each nostril daily, Starting Fri 9/29/2017, Print      furosemide (LASIX) 40 mg tablet Take by mouth, Starting Tue 6/6/2017, Historical Med      gabapentin (NEURONTIN) 800 mg tablet Take 800 mg by mouth 3 (three) times a day, Until Discontinued, Historical Med      HYDROcodone-acetaminophen (VICODIN) 7 5-500 MG per tablet Take 1 tablet by mouth every 6 (six) hours as needed for moderate pain, Until Discontinued, Historical Med      Methylprednisolone 4 MG TBPK Use as directed on package, Normal      metoclopramide (REGLAN) 5 mg/5 mL oral solution Take 5 mg by mouth 4 (four) times a day (before meals and at bedtime), Historical Med      omega-3-acid ethyl esters (LOVAZA) 1 g capsule TAKE 2 CAPSULE TWICE DAILY, Normal      OxyCODONE HCl ER (OxyCONTIN) 30 MG T12A Take 30 mg by mouth 3 (three) times a day as needed for moderate pain, Until Discontinued, Historical Med      pantoprazole (PROTONIX) 40 mg tablet TAKE 1 TABLET BY MOUTH TWICE A DAY 30 MINUTES BEFORE BREAKFAST AND DINNER, Normal      !!  pentosan polysulfate (ELMIRON) 100 mg capsule Take 100 mg by mouth 3 (three) times a day before meals 2 capsules po  3 time a day, Historical Med      !! pentosan polysulfate (ELMIRON) 100 mg capsule Take by mouth every 8 (eight) hours, Starting Mon 4/3/2017, Historical Med      pravastatin (PRAVACHOL) 20 mg tablet TAKE 1 TABLET BY MOUTH EVERY DAY, Normal      prochlorperazine (COMPAZINE) 10 mg tablet Take 1 tablet (10 mg total) by mouth 2 (two) times a day, Starting Thu 3/15/2018, Normal      venlafaxine (EFFEXOR) 75 mg tablet Take 75 mg by mouth 2 (two) times a day, Historical Med      VENTOLIN  (90 Base) MCG/ACT inhaler INHALE 2 PUFFS 3 TIMES A DAY AS NEEDED, Normal      zolpidem (AMBIEN) 5 mg tablet Take 5 mg by mouth daily at bedtime as needed for sleep, Historical Med       !! - Potential duplicate medications found  Please discuss with provider  Outpatient Discharge Orders  Ambulatory referral to Physical Therapy   Standing Status: Future  Standing Exp   Date: 01/26/19         ED Provider  Electronically Signed by           Tao Barrios MD  07/26/18 6489

## 2018-07-26 NOTE — DISCHARGE INSTRUCTIONS
Fall Prevention   WHAT YOU NEED TO KNOW:   Fall prevention includes ways to make your home and other areas safer  It also includes ways you can move more carefully to prevent a fall  Health conditions that cause changes in your blood pressure, vision, or muscle strength and coordination may increase your risk for falls  Medicines may also increase your risk for falls if they make you dizzy, weak, or sleepy  DISCHARGE INSTRUCTIONS:   Call 911 or have someone else call if:   · You have fallen and are unconscious  · You have fallen and cannot move part of your body  Contact your healthcare provider if:   · You have fallen and have pain or a headache  · You have questions or concerns about your condition or care  Fall prevention tips:   · Stand or sit up slowly  This may help you keep your balance and prevent falls  · Use assistive devices as directed  Your healthcare provider may suggest that you use a cane or walker to help you keep your balance  You may need to have grab bars put in your bathroom near the toilet or in the shower  · Wear shoes that fit well and have soles that   Wear shoes both inside and outside  Use slippers with good   Do not wear shoes with high heels  · Wear a personal alarm  This is a device that allows you to call 911 if you fall and need help  Ask your healthcare provider for more information  · Stay active  Exercise can help strengthen your muscles and improve your balance  Your healthcare provider may recommend water aerobics or walking  He or she may also recommend physical therapy to improve your coordination  Never start an exercise program without talking to your healthcare provider first      · Manage your medical conditions  Keep all appointments with your healthcare providers  Visit your eye doctor as directed  Home safety tips:   · Add items to prevent falls in the bathroom    Put nonslip strips on your bath or shower floor to prevent you from slipping  Use a bath mat if you do not have carpet in the bathroom  This will prevent you from falling when you step out of the bath or shower  Use a shower seat so you do not need to stand while you shower  Sit on the toilet or a chair in your bathroom to dry yourself and put on clothing  This will prevent you from losing your balance from drying or dressing yourself while you are standing  · Keep paths clear  Remove books, shoes, and other objects from walkways and stairs  Place cords for telephones and lamps out of the way so that you do not need to walk over them  Tape them down if you cannot move them  Remove small rugs  If you cannot remove a rug, secure it with double-sided tape  This will prevent you from tripping  · Install bright lights in your home  Use night lights to help light paths to the bathroom or kitchen  Always turn on the light before you start walking  · Keep items you use often on shelves within reach  Do not use a step stool to help you reach an item  · Paint or place reflective tape on the edges of your stairs  This will help you see the stairs better  Follow up with your healthcare provider as directed:  Write down your questions so you remember to ask them during your visits  © 2017 [address] Information is for End User's use only and may not be sold, redistributed or otherwise used for commercial purposes  All illustrations and images included in CareNotes® are the copyrighted property of A [employer]  or [employer]  The above information is an  only  It is not intended as medical advice for individual conditions or treatments  Talk to your doctor, nurse or pharmacist before following any medical regimen to see if it is safe and effective for you

## 2018-07-26 NOTE — ED NOTES
Pt was able to ambulate without assistance  However, she experiences pain when ambulating  She states that she has more pain if she try to walk upright so instead she hunches over when she ambulates  Despite hunching over, she still experiences pain but at a lower severity that radiates down her legs  Provider informed         Mango [place]  07/26/18 8786

## 2018-07-27 LAB
ATRIAL RATE: 86 BPM
P AXIS: 59 DEGREES
PR INTERVAL: 152 MS
QRS AXIS: 13 DEGREES
QRSD INTERVAL: 84 MS
QT INTERVAL: 366 MS
QTC INTERVAL: 437 MS
T WAVE AXIS: 31 DEGREES
VENTRICULAR RATE: 86 BPM

## 2018-07-27 PROCEDURE — 93010 ELECTROCARDIOGRAM REPORT: CPT | Performed by: INTERNAL MEDICINE

## 2018-07-29 DIAGNOSIS — E78.1 HYPERTRIGLYCERIDEMIA: ICD-10-CM

## 2018-07-29 RX ORDER — OMEGA-3-ACID ETHYL ESTERS 1 G/1
CAPSULE, LIQUID FILLED ORAL
Qty: 120 CAPSULE | Refills: 3 | Status: SHIPPED | OUTPATIENT
Start: 2018-07-29 | End: 2018-11-26 | Stop reason: SDUPTHER

## 2018-07-31 ENCOUNTER — OFFICE VISIT (OUTPATIENT)
Dept: INTERNAL MEDICINE CLINIC | Facility: CLINIC | Age: 63
End: 2018-07-31
Payer: MEDICARE

## 2018-07-31 VITALS
RESPIRATION RATE: 18 BRPM | HEIGHT: 61 IN | BODY MASS INDEX: 34.36 KG/M2 | DIASTOLIC BLOOD PRESSURE: 122 MMHG | SYSTOLIC BLOOD PRESSURE: 198 MMHG | WEIGHT: 182 LBS | HEART RATE: 88 BPM

## 2018-07-31 DIAGNOSIS — I10 ESSENTIAL (PRIMARY) HYPERTENSION: Primary | ICD-10-CM

## 2018-07-31 PROCEDURE — 99213 OFFICE O/P EST LOW 20 MIN: CPT | Performed by: PHYSICIAN ASSISTANT

## 2018-07-31 RX ORDER — AMLODIPINE BESYLATE 5 MG/1
5 TABLET ORAL DAILY
Qty: 30 TABLET | Refills: 5 | Status: SHIPPED | OUTPATIENT
Start: 2018-07-31 | End: 2018-08-08 | Stop reason: SINTOL

## 2018-07-31 NOTE — PROGRESS NOTES
Assessment/Plan:   Patient Instructions   Will add Amlodipine for bp control  Continue other medications for now  Follow up iin 2 weeks  Return in about 2 weeks (around 8/14/2018) for Recheck  Diagnoses and all orders for this visit:    Essential (primary) hypertension  -     amLODIPine (NORVASC) 5 mg tablet; Take 1 tablet (5 mg total) by mouth daily          Subjective:      Patient ID: David Purcell is a 70 y o  female  ER follow-up    Patient was seen at [facility] on 07/26/2018 after she sustained a fall  According the patient she fell because of involuntary muscle cramps of her right leg  According to the patient also she believes the muscle cramps are being caused by the battery of her bladder stimulator being too high  She believes this is causing stimulation of the pelvic girdle and the muscles of her thigh  This is causing pain which is very uncomfortable to her, and she focuses on this more than anything  However in the emergency room her blood pressure was significantly elevated  The ER providers did nothing about it other than refer her back to primary care  Basic lab an EKG were unremarkable  Patient does have records of recent nuclear stress test within the past year  This was reported as normal         ALLERGIES:  Allergies   Allergen Reactions    Clindamycin Hives     Category: Allergy;     Erythromycin Hives and Rash     Category: Allergy;     Latex Hives    Penicillins Hives and Shortness Of Breath    Morphine GI Intolerance and Vomiting     Category: Adverse Reaction;     Erythromycin Base Other (See Comments)     vomitting    Other     Penicillin V Seizures     Category: Allergy;     Povidone Iodine Rash     Category: Adverse Reaction;     Wound Dressings Rash     Category:  Adverse Reaction;        CURRENT MEDICATIONS:    Current Outpatient Prescriptions:     albuterol 2 mg/5 mL syrup, TAKE 1 TEASPOONFUL BY MOUTH EVERY 4 HOURS AS NEEDED AS DIRECTED, Disp: 473 mL, Rfl: 3    baclofen 10 mg tablet, Take 10 mg by mouth 3 (three) times a day, Disp: , Rfl:     ergocalciferol (VITAMIN D2) 50,000 units, TAKE ONE CAPSULE BY MOUTH WEEKLY, Disp: 4 capsule, Rfl: 3    estradiol (ESTRACE) 0 1 mg/g vaginal cream, Insert 1 g into the vagina 2 (two) times a week, Disp: 42 5 g, Rfl: 3    fenofibrate (TRIGLIDE) 160 MG tablet, TAKE 1 TABLET BY MOUTH EVERY DAY WITH A MEAL, Disp: 30 tablet, Rfl: 4    fluticasone (FLONASE) 50 mcg/act nasal spray, 2 sprays into each nostril daily, Disp: 16 g, Rfl: 0    furosemide (LASIX) 40 mg tablet, Take by mouth, Disp: , Rfl:     gabapentin (NEURONTIN) 800 mg tablet, Take 800 mg by mouth 3 (three) times a day, Disp: , Rfl:     HYDROcodone-acetaminophen (VICODIN) 7 5-500 MG per tablet, Take 1 tablet by mouth every 6 (six) hours as needed for moderate pain, Disp: , Rfl:     metoclopramide (REGLAN) 5 mg/5 mL oral solution, Take 5 mg by mouth 4 (four) times a day (before meals and at bedtime), Disp: , Rfl:     omega-3-acid ethyl esters (LOVAZA) 1 g capsule, TAKE 2 CAPSULE TWICE DAILY, Disp: 120 capsule, Rfl: 3    OxyCODONE HCl ER (OxyCONTIN) 30 MG T12A, Take 30 mg by mouth 3 (three) times a day as needed for moderate pain, Disp: , Rfl:     pantoprazole (PROTONIX) 40 mg tablet, TAKE 1 TABLET BY MOUTH TWICE A DAY 30 MINUTES BEFORE BREAKFAST AND DINNER, Disp: 180 tablet, Rfl: 1    pentosan polysulfate (ELMIRON) 100 mg capsule, Take 100 mg by mouth 3 (three) times a day before meals 2 capsules po  3 time a day, Disp: , Rfl:     pravastatin (PRAVACHOL) 20 mg tablet, TAKE 1 TABLET BY MOUTH EVERY DAY, Disp: 30 tablet, Rfl: 5    prochlorperazine (COMPAZINE) 10 mg tablet, Take 1 tablet (10 mg total) by mouth 2 (two) times a day, Disp: 30 tablet, Rfl: 3    VENTOLIN  (90 Base) MCG/ACT inhaler, INHALE 2 PUFFS 3 TIMES A DAY AS NEEDED, Disp: 18 Inhaler, Rfl: 3    zolpidem (AMBIEN) 5 mg tablet, Take 5 mg by mouth daily at bedtime as needed for sleep, Disp: , Rfl:     amLODIPine (NORVASC) 5 mg tablet, Take 1 tablet (5 mg total) by mouth daily, Disp: 30 tablet, Rfl: 5    ELMIRON 100 MG capsule, TAKE 2 CAPSULES BY MOUTH 3 TIMES A DAY, Disp: 180 capsule, Rfl: 5    Methylprednisolone 4 MG TBPK, Use as directed on package, Disp: 21 tablet, Rfl: 0    venlafaxine (EFFEXOR) 75 mg tablet, Take 75 mg by mouth 2 (two) times a day, Disp: , Rfl:     ACTIVE PROBLEM LIST:  Patient Active Problem List   Diagnosis    Transaminitis    Chronic back pain    Continuous opioid dependence ([facility] [address] )    Vitamin D deficiency    Cataract, bilateral    Cervical radiculopathy, chronic    Delayed gastric emptying    Essential (primary) hypertension    Fatty liver disease, nonalcoholic    GERD (gastroesophageal reflux disease)    Hot flashes, menopausal    Interstitial cystitis    Lumbar post-laminectomy syndrome    Mild intermittent asthma without complication    Lumbosacral spondylosis without myelopathy    Muscle cramps    Radiculopathy, lumbar region    Thoracic and lumbosacral neuritis    Nausea    Hyperlipidemia    Strain of lumbar region       PAST MEDICAL HISTORY:  Past Medical History:   Diagnosis Date    Asthma     Cataract     Chronic back pain     Chronic pain disorder     Back    GERD (gastroesophageal reflux disease)     HTN (hypertension)     Hyperlipidemia     Interstitial cystitis     Liver disease     Muscle cramps     Obesity     Pneumonia     Radiculopathy, lumbar region     Spondylosis, cervical, with myelopathy     Vulvovaginitis        PAST SURGICAL HISTORY:  Past Surgical History:   Procedure Laterality Date    APPENDECTOMY      BACK SURGERY      Arthrodeiss lumbar    BACK SURGERY      Spinal stereotaxis of cord    BLADDER SURGERY      Electronic bladder stimulator implantation     SECTION      x3    CHOLECYSTECTOMY      COLON SURGERY      Intestinal stricturoplasty     FINGER SURGERY      Extensor tendon repair (mallet finger)    HERNIA REPAIR      x3    HYSTERECTOMY      [place] ESOPHAGOGASTRODUODENOSCOPY TRANSORAL DIAGNOSTIC N/A 8/23/2017    Procedure: ESOPHAGOGASTRODUODENOSCOPY (EGD); Surgeon: Donna Galeana MD;  Location: [facility]; Service: Gastroenterology    SALPINGOOPHORECTOMY      B/L       FAMILY HISTORY:  Family History   Problem Relation Age of Onset    Coronary artery disease Brother         Patient has 3 brothers with coronary artery disease    Diabetes Mother     Throat cancer Mother     COPD Father     Bipolar disorder Sister     Drug abuse Sister     Alcohol abuse Sister        SOCIAL HISTORY:  Social History     Social History    Marital status: /Civil Union     Spouse name: N/A    Number of children: 3    Years of education: N/A     Occupational History    Retired      Social History Main Topics    Smoking status: Never Smoker    Smokeless tobacco: Never Used    Alcohol use No    Drug use: No    Sexual activity: Not on file     Other Topics Concern    Not on file     Social History Narrative    Brushes teeth 2x day    Regular dental care    Exercise: walking       Review of Systems   Constitutional: Negative for activity change, chills, fatigue and fever  HENT: Negative for congestion  Eyes: Negative for discharge  Respiratory: Negative for cough, chest tightness and shortness of breath  Cardiovascular: Negative for chest pain, palpitations and leg swelling  Gastrointestinal: Negative for abdominal pain  Genitourinary: Negative for difficulty urinating  Musculoskeletal: Negative for arthralgias and myalgias  Skin: Negative for rash  Allergic/Immunologic: Negative for immunocompromised state  Neurological: Negative for dizziness, syncope, weakness, light-headedness and headaches  Hematological: Negative for adenopathy  Does not bruise/bleed easily  Psychiatric/Behavioral: Negative for dysphoric mood   The patient is not nervous/anxious  Objective:  Vitals:    07/31/18 1517   BP: (!) 198/122   BP Location: Left arm   Patient Position: Sitting   Cuff Size: Large   Pulse: 88   Resp: 18   Weight: 82 6 kg (182 lb)   Height: 5' 1" (1 549 m)        Physical Exam   Constitutional: She is oriented to person, place, and time  She appears well-developed and well-nourished  No distress  Patient sitting in a wheelchair, unwilling to stand to get on to the examining table  HENT:   Contusion of nose present   Neck: Neck supple  Carotid bruit is not present  Cardiovascular: Normal rate, regular rhythm and normal heart sounds  Pulmonary/Chest: Effort normal and breath sounds normal    Abdominal: Soft  There is no tenderness  Musculoskeletal: She exhibits no edema  Ecchymosis of left thigh secondary to the fall   Lymphadenopathy:     She has no cervical adenopathy  Neurological: She is alert and oriented to person, place, and time  Skin: Skin is warm and dry  No rash noted  Psychiatric: She has a normal mood and affect  Her behavior is normal    Nursing note and vitals reviewed          RESULTS:    Recent Results (from the past 1008 hour(s))   Urinalysis with microscopic    Collection Time: 07/10/18  4:47 PM   Result Value Ref Range    Clarity, UA Slightly Cloudy     Color, UA Yellow     Specific [place], UA 1 020 1 003 - 1 030    pH, UA 7 5 4 5 - 8 0    Glucose, UA Negative Negative mg/dl    Ketones, UA Negative Negative mg/dl    Blood, UA Negative Negative    Protein, UA Negative Negative mg/dl    Nitrite, UA Negative Negative    Bilirubin, UA Negative Negative    Urobilinogen, UA 0 2 0 2, 1 0 E U /dl E U /dl    Leukocytes, UA Moderate (A) Negative    WBC, UA 4-10 (A) None Seen, 0-5, 5-55, 5-65 /hpf    RBC, UA 0-1 (A) None Seen, 0-5 /hpf    Bacteria, UA Occasional None Seen, Occasional /hpf    Epithelial Cells Occasional None Seen, Occasional /hpf   CBC and differential    Collection Time: 07/10/18  5:00 PM   Result Value Ref Range    WBC 8 91 4 31 - 10 16 Thousand/uL    RBC 4 90 3 81 - 5 12 Million/uL    Hemoglobin 14 6 11 5 - 15 4 g/dL    Hematocrit 44 4 34 8 - 46 1 %    MCV 91 82 - 98 fL    MCH 29 8 26 8 - 34 3 pg    MCHC 32 9 31 4 - 37 4 g/dL    RDW 13 2 11 6 - 15 1 %    MPV 10 2 8 9 - 12 7 fL    Platelets 750 054 - 966 Thousands/uL    nRBC 0 /100 WBCs    Neutrophils Relative 50 43 - 75 %    Immat GRANS % 1 0 - 2 %    Lymphocytes Relative 39 14 - 44 %    Monocytes Relative 6 4 - 12 %    Eosinophils Relative 3 0 - 6 %    Basophils Relative 1 0 - 1 %    Neutrophils Absolute 4 54 1 85 - 7 62 Thousands/µL    Immature Grans Absolute 0 05 0 00 - 0 20 Thousand/uL    Lymphocytes Absolute 3 46 0 60 - 4 47 Thousands/µL    Monocytes Absolute 0 57 0 17 - 1 22 Thousand/µL    Eosinophils Absolute 0 23 0 00 - 0 61 Thousand/µL    Basophils Absolute 0 06 0 00 - 0 10 Thousands/µL   Basic metabolic panel    Collection Time: 07/10/18  5:00 PM   Result Value Ref Range    Sodium 140 136 - 145 mmol/L    Potassium 4 3 3 5 - 5 3 mmol/L    Chloride 101 100 - 108 mmol/L    CO2 30 21 - 32 mmol/L    Anion Gap 9 4 - 13 mmol/L    BUN 18 5 - 25 mg/dL    Creatinine 0 83 0 60 - 1 30 mg/dL    Glucose 92 65 - 140 mg/dL    Calcium 9 4 8 3 - 10 1 mg/dL    eGFR 74 ml/min/1 73sq m   ECG 12 lead    Collection Time: 07/26/18  4:46 PM   Result Value Ref Range    Ventricular Rate 86 BPM    Atrial Rate 86 BPM    ID Interval 152 ms    QRSD Interval 84 ms    QT Interval 366 ms    QTC Interval 437 ms    P [place] 59 degrees    QRS Axis 13 degrees    T Wave Axis 31 degrees   CBC and differential    Collection Time: 07/26/18  4:54 PM   Result Value Ref Range    WBC 5 13 4 31 - 10 16 Thousand/uL    RBC 4 70 3 81 - 5 12 Million/uL    Hemoglobin 13 9 11 5 - 15 4 g/dL    Hematocrit 43 5 34 8 - 46 1 %    MCV 93 82 - 98 fL    MCH 29 6 26 8 - 34 3 pg    MCHC 32 0 31 4 - 37 4 g/dL    RDW 13 2 11 6 - 15 1 %    MPV 10 4 8 9 - 12 7 fL    Platelets 358 540 - 902 Thousands/uL    nRBC 0 /100 WBCs    Neutrophils Relative 51 43 - 75 %    Immat GRANS % 0 0 - 2 %    Lymphocytes Relative 38 14 - 44 %    Monocytes Relative 8 4 - 12 %    Eosinophils Relative 2 0 - 6 %    Basophils Relative 1 0 - 1 %    Neutrophils Absolute 2 66 1 85 - 7 62 Thousands/µL    Immature Grans Absolute 0 02 0 00 - 0 20 Thousand/uL    Lymphocytes Absolute 1 94 0 60 - 4 47 Thousands/µL    Monocytes Absolute 0 40 0 17 - 1 22 Thousand/µL    Eosinophils Absolute 0 08 0 00 - 0 61 Thousand/µL    Basophils Absolute 0 03 0 00 - 0 10 Thousands/µL   Basic metabolic panel    Collection Time: 07/26/18  4:54 PM   Result Value Ref Range    Sodium 141 136 - 145 mmol/L    Potassium 4 6 3 5 - 5 3 mmol/L    Chloride 104 100 - 108 mmol/L    CO2 32 21 - 32 mmol/L    Anion Gap 5 4 - 13 mmol/L    BUN 16 5 - 25 mg/dL    Creatinine 0 90 0 60 - 1 30 mg/dL    Glucose 99 65 - 140 mg/dL    Calcium 9 2 8 3 - 10 1 mg/dL    eGFR 67 ml/min/1 73sq m       This note was created with voice recognition software  Phonic, grammatical and spelling errors may be present within the note as a result

## 2018-08-01 DIAGNOSIS — N95.1 HOT FLASHES, MENOPAUSAL: Primary | ICD-10-CM

## 2018-08-01 RX ORDER — VENLAFAXINE 75 MG/1
75 TABLET ORAL 2 TIMES DAILY WITH MEALS
Qty: 180 TABLET | Refills: 1 | Status: SHIPPED | OUTPATIENT
Start: 2018-08-01 | End: 2018-10-04

## 2018-08-08 DIAGNOSIS — I10 ESSENTIAL (PRIMARY) HYPERTENSION: Primary | ICD-10-CM

## 2018-08-08 RX ORDER — LISINOPRIL 10 MG/1
10 TABLET ORAL DAILY
Qty: 30 TABLET | Refills: 5 | Status: SHIPPED | OUTPATIENT
Start: 2018-08-08 | End: 2018-11-26 | Stop reason: SDUPTHER

## 2018-08-08 NOTE — PROGRESS NOTES
Patient's  called, stating that his wife is intolerant to the amlodipine as it is causing headache  We will stop the amlodipine and try another antihypertensive medication

## 2018-08-24 ENCOUNTER — OFFICE VISIT (OUTPATIENT)
Dept: INTERNAL MEDICINE CLINIC | Facility: CLINIC | Age: 63
End: 2018-08-24
Payer: MEDICARE

## 2018-08-24 VITALS
OXYGEN SATURATION: 93 % | SYSTOLIC BLOOD PRESSURE: 120 MMHG | HEART RATE: 90 BPM | HEIGHT: 61 IN | DIASTOLIC BLOOD PRESSURE: 74 MMHG

## 2018-08-24 DIAGNOSIS — M54.16 RADICULOPATHY, LUMBAR REGION: ICD-10-CM

## 2018-08-24 DIAGNOSIS — G89.29 CHRONIC BILATERAL LOW BACK PAIN WITH BILATERAL SCIATICA: ICD-10-CM

## 2018-08-24 DIAGNOSIS — M54.42 CHRONIC BILATERAL LOW BACK PAIN WITH BILATERAL SCIATICA: ICD-10-CM

## 2018-08-24 DIAGNOSIS — M54.41 CHRONIC BILATERAL LOW BACK PAIN WITH BILATERAL SCIATICA: ICD-10-CM

## 2018-08-24 DIAGNOSIS — I10 ESSENTIAL (PRIMARY) HYPERTENSION: Primary | ICD-10-CM

## 2018-08-24 PROCEDURE — 99213 OFFICE O/P EST LOW 20 MIN: CPT | Performed by: PHYSICIAN ASSISTANT

## 2018-08-24 NOTE — PROGRESS NOTES
Assessment/Plan:      Patient Instructions   Will give a trial in tapering off the venlafaxine  Patient to start half tablet daily for 10 days, then 1/2 tablet every other day for 10 days then can stop  If symptoms return of crying and feeling depressed patient will restart medication  I have asked patient and her  to discuss with pain specialist about doing another CT of the lumbar spine due to ongoing symptoms  Otherwise follow up here 3 weeks  Patient is having a good response to the lisinopril for her blood pressure  Will also put in referral to Neurology to evaluate ongoing back issue and making recommendations  Return for Next scheduled follow up  Diagnoses and all orders for this visit:    Essential (primary) hypertension    Chronic bilateral low back pain with bilateral sciatica  -     Ambulatory referral to Neurology; Future    Radiculopathy, lumbar region  -     Ambulatory referral to Neurology; Future          Subjective:      Patient ID: Alex Mancera is a 70 y o  female  Follow-up    Patient was to return for follow-up of her hypertension  Blood pressure much better controlled on lisinopril 10 mg daily  She denies having any headaches, no dizziness, no complaint of cough at this time  Blood pressure is well controlled  Lakesha cp, palp, sob, edema, HA, dizziness, diaphoresis, syncope, visual disturbance  Patient having ongoing problems with her bladder  She has been seen on several occasions by the urogynecologist   Initially her back stimulator was turned off, however she developed urinary obstruction, had to go to the emergency room and have a Turcios catheter inserted  She then returned to Urogynecology, had the catheter removed and at this time is voiding without difficulty  She was also found have a UTI and was started on Bactrim, is to repeat a urine culture when finished the antibiotic      Ongoing low back pain with bilateral leg weakness and bilateral leg pain she states on any movement of the legs  She states this occurred approximately 3 weeks ago when she was lying in bed trying to do leg raising exercises and felt a sudden pop  Since then she has had pain in the low back into both legs and weakness of the legs with difficulty walking  Her  has to help her in and out of the shower, into and out of bed, and help her walk even with use of a walker  She has a long history of low back disease for which she sees pain management, she has ex stops inserted in her low back over 10 years ago, and since then has fallen several times  She states she has been told that she fractured wires of her stimulator  Patient and her  was present note they are very frustrated over care of the patient  Hypertension   This is a chronic problem  The current episode started more than 1 month ago  The problem has been gradually improving since onset  The problem is controlled  Associated symptoms include anxiety, malaise/fatigue, neck pain and peripheral edema  Pertinent negatives include no blurred vision, chest pain, headaches, orthopnea, palpitations, PND or shortness of breath  Agents associated with hypertension include estrogens  Risk factors for coronary artery disease include dyslipidemia, obesity, sedentary lifestyle and stress  Compliance problems include exercise  ALLERGIES:  Allergies   Allergen Reactions    Clindamycin Hives     Category: Allergy;     Erythromycin Hives and Rash     Category: Allergy;     Latex Hives    Penicillins Hives and Shortness Of Breath    Morphine GI Intolerance and Vomiting     Category: Adverse Reaction;     Erythromycin Base Other (See Comments)     vomitting    Other     Penicillin V Seizures     Category: Allergy;     Povidone Iodine Rash     Category: Adverse Reaction;     Wound Dressings Rash     Category:  Adverse Reaction;        CURRENT MEDICATIONS:    Current Outpatient Prescriptions:    albuterol 2 mg/5 mL syrup, TAKE 1 TEASPOONFUL BY MOUTH EVERY 4 HOURS AS NEEDED AS DIRECTED, Disp: 473 mL, Rfl: 3    baclofen 10 mg tablet, Take 10 mg by mouth 3 (three) times a day, Disp: , Rfl:     ergocalciferol (VITAMIN D2) 50,000 units, TAKE ONE CAPSULE BY MOUTH WEEKLY, Disp: 4 capsule, Rfl: 3    estradiol (ESTRACE) 0 1 mg/g vaginal cream, Insert 1 g into the vagina 2 (two) times a week, Disp: 42 5 g, Rfl: 3    fenofibrate (TRIGLIDE) 160 MG tablet, TAKE 1 TABLET BY MOUTH EVERY DAY WITH A MEAL, Disp: 30 tablet, Rfl: 4    fluticasone (FLONASE) 50 mcg/act nasal spray, 2 sprays into each nostril daily, Disp: 16 g, Rfl: 0    furosemide (LASIX) 40 mg tablet, Take by mouth, Disp: , Rfl:     gabapentin (NEURONTIN) 800 mg tablet, Take 800 mg by mouth 3 (three) times a day, Disp: , Rfl:     HYDROcodone-acetaminophen (VICODIN) 7 5-500 MG per tablet, Take 1 tablet by mouth every 6 (six) hours as needed for moderate pain, Disp: , Rfl:     lisinopril (ZESTRIL) 10 mg tablet, Take 1 tablet (10 mg total) by mouth daily, Disp: 30 tablet, Rfl: 5    metoclopramide (REGLAN) 5 mg/5 mL oral solution, Take 5 mg by mouth 4 (four) times a day (before meals and at bedtime), Disp: , Rfl:     omega-3-acid ethyl esters (LOVAZA) 1 g capsule, TAKE 2 CAPSULE TWICE DAILY, Disp: 120 capsule, Rfl: 3    OxyCODONE HCl ER (OxyCONTIN) 30 MG T12A, Take 30 mg by mouth 3 (three) times a day as needed for moderate pain, Disp: , Rfl:     pantoprazole (PROTONIX) 40 mg tablet, TAKE 1 TABLET BY MOUTH TWICE A DAY 30 MINUTES BEFORE BREAKFAST AND DINNER, Disp: 180 tablet, Rfl: 1    pentosan polysulfate (ELMIRON) 100 mg capsule, Take 100 mg by mouth 3 (three) times a day before meals 2 capsules po  3 time a day, Disp: , Rfl:     pravastatin (PRAVACHOL) 20 mg tablet, TAKE 1 TABLET BY MOUTH EVERY DAY, Disp: 30 tablet, Rfl: 5    prochlorperazine (COMPAZINE) 10 mg tablet, Take 1 tablet (10 mg total) by mouth 2 (two) times a day, Disp: 30 tablet, Rfl: 3    venlafaxine (EFFEXOR) 75 mg tablet, Take 1 tablet (75 mg total) by mouth 2 (two) times a day with meals for 180 days, Disp: 180 tablet, Rfl: 1    VENTOLIN  (90 Base) MCG/ACT inhaler, INHALE 2 PUFFS 3 TIMES A DAY AS NEEDED, Disp: 18 Inhaler, Rfl: 3    zolpidem (AMBIEN) 5 mg tablet, Take 5 mg by mouth daily at bedtime as needed for sleep, Disp: , Rfl:     ACTIVE PROBLEM LIST:  Patient Active Problem List   Diagnosis    Transaminitis    Chronic back pain    Continuous opioid dependence ([facility] [address] )    Vitamin D deficiency    Cataract, bilateral    Cervical radiculopathy, chronic    Delayed gastric emptying    Essential (primary) hypertension    Fatty liver disease, nonalcoholic    GERD (gastroesophageal reflux disease)    Hot flashes, menopausal    Interstitial cystitis    Lumbar post-laminectomy syndrome    Mild intermittent asthma without complication    Lumbosacral spondylosis without myelopathy    Muscle cramps    Radiculopathy, lumbar region    Thoracic and lumbosacral neuritis    Nausea    Hyperlipidemia    Strain of lumbar region       PAST MEDICAL HISTORY:  Past Medical History:   Diagnosis Date    Asthma     Cataract     Chronic back pain     Chronic pain disorder     Back    GERD (gastroesophageal reflux disease)     HTN (hypertension)     Hyperlipidemia     Interstitial cystitis     Liver disease     Muscle cramps     Obesity     Pneumonia     Radiculopathy, lumbar region     Spondylosis, cervical, with myelopathy     Vulvovaginitis        PAST SURGICAL HISTORY:  Past Surgical History:   Procedure Laterality Date    APPENDECTOMY      BACK SURGERY      Arthrodeiss lumbar    BACK SURGERY      Spinal stereotaxis of cord    BLADDER SURGERY      Electronic bladder stimulator implantation     SECTION      x3    CHOLECYSTECTOMY      COLON SURGERY      Intestinal stricturoplasty     FINGER SURGERY      Extensor tendon repair (mallet finger)    HERNIA REPAIR      x3    HYSTERECTOMY      WA ESOPHAGOGASTRODUODENOSCOPY TRANSORAL DIAGNOSTIC N/A 8/23/2017    Procedure: ESOPHAGOGASTRODUODENOSCOPY (EGD); Surgeon: Manan John MD;  Location: [facility]; Service: Gastroenterology    SALPINGOOPHORECTOMY      B/L       FAMILY HISTORY:  Family History   Problem Relation Age of Onset    Coronary artery disease Brother         Patient has 3 brothers with coronary artery disease    Diabetes Mother     Throat cancer Mother     COPD Father     Bipolar disorder Sister     Drug abuse Sister     Alcohol abuse Sister        SOCIAL HISTORY:  Social History     Social History    Marital status: /Civil Union     Spouse name: N/A    Number of children: 3    Years of education: N/A     Occupational History    Retired      Social History Main Topics    Smoking status: Never Smoker    Smokeless tobacco: Never Used    Alcohol use No    Drug use: No    Sexual activity: Not on file     Other Topics Concern    Not on file     Social History Narrative    Brushes teeth 2x day    Regular dental care    Exercise: walking       Review of Systems   Constitutional: Positive for malaise/fatigue  Negative for activity change, chills, fatigue and fever  HENT: Negative for congestion  Eyes: Negative for blurred vision and discharge  Respiratory: Negative for cough, chest tightness and shortness of breath  Cardiovascular: Negative for chest pain, palpitations, orthopnea, leg swelling and PND  Gastrointestinal: Negative for abdominal pain  Genitourinary: Positive for difficulty urinating  Negative for dysuria, frequency, hematuria and urgency  Musculoskeletal: Positive for arthralgias, back pain, gait problem, neck pain and neck stiffness  Negative for myalgias  Skin: Negative for rash  Allergic/Immunologic: Negative for immunocompromised state  Neurological: Negative for dizziness, syncope, weakness, light-headedness and headaches     Hematological: Negative for adenopathy  Does not bruise/bleed easily  Psychiatric/Behavioral: Negative for dysphoric mood  The patient is not nervous/anxious  Objective:  Vitals:    08/24/18 1422   BP: 120/74   BP Location: Left arm   Patient Position: Sitting   Pulse: 90   SpO2: 93%   Height: 5' 1" (1 549 m)        Physical Exam   Constitutional: She is oriented to person, place, and time  She appears well-developed and well-nourished  She appears distressed  Patient sitting in a wheelchair, needs the help of another to even stand  Neck: Neck supple  No JVD present  No thyromegaly present  Cardiovascular: Normal rate, regular rhythm and normal heart sounds  Pulmonary/Chest: Effort normal and breath sounds normal    Musculoskeletal: She exhibits edema (1+ edema of lower extremities)  Patient complained of pain on any movement of her legs  When attempting to test strength she complained of pain but the strength appeared equal at this time   Lymphadenopathy:     She has no cervical adenopathy  Neurological: She is alert and oriented to person, place, and time  She displays abnormal reflex (Decreased patella and Achilles reflexes)  No cranial nerve deficit  She exhibits normal muscle tone  Skin: Skin is warm and dry  No rash noted  Psychiatric: She has a normal mood and affect  Her behavior is normal    Nursing note and vitals reviewed          RESULTS:    Recent Results (from the past 1008 hour(s))   ECG 12 lead    Collection Time: 07/26/18  4:46 PM   Result Value Ref Range    Ventricular Rate 86 BPM    Atrial Rate 86 BPM    MN Interval 152 ms    QRSD Interval 84 ms    QT Interval 366 ms    QTC Interval 437 ms    P [place] 59 degrees    QRS Axis 13 degrees    T Wave Axis 31 degrees   CBC and differential    Collection Time: 07/26/18  4:54 PM   Result Value Ref Range    WBC 5 13 4 31 - 10 16 Thousand/uL    RBC 4 70 3 81 - 5 12 Million/uL    Hemoglobin 13 9 11 5 - 15 4 g/dL    Hematocrit 43 5 34 8 - 46 1 % MCV 93 82 - 98 fL    MCH 29 6 26 8 - 34 3 pg    MCHC 32 0 31 4 - 37 4 g/dL    RDW 13 2 11 6 - 15 1 %    MPV 10 4 8 9 - 12 7 fL    Platelets 084 242 - 685 Thousands/uL    nRBC 0 /100 WBCs    Neutrophils Relative 51 43 - 75 %    Immat GRANS % 0 0 - 2 %    Lymphocytes Relative 38 14 - 44 %    Monocytes Relative 8 4 - 12 %    Eosinophils Relative 2 0 - 6 %    Basophils Relative 1 0 - 1 %    Neutrophils Absolute 2 66 1 85 - 7 62 Thousands/µL    Immature Grans Absolute 0 02 0 00 - 0 20 Thousand/uL    Lymphocytes Absolute 1 94 0 60 - 4 47 Thousands/µL    Monocytes Absolute 0 40 0 17 - 1 22 Thousand/µL    Eosinophils Absolute 0 08 0 00 - 0 61 Thousand/µL    Basophils Absolute 0 03 0 00 - 0 10 Thousands/µL   Basic metabolic panel    Collection Time: 07/26/18  4:54 PM   Result Value Ref Range    Sodium 141 136 - 145 mmol/L    Potassium 4 6 3 5 - 5 3 mmol/L    Chloride 104 100 - 108 mmol/L    CO2 32 21 - 32 mmol/L    Anion Gap 5 4 - 13 mmol/L    BUN 16 5 - 25 mg/dL    Creatinine 0 90 0 60 - 1 30 mg/dL    Glucose 99 65 - 140 mg/dL    Calcium 9 2 8 3 - 10 1 mg/dL    eGFR 67 ml/min/1 73sq m       This note was created with voice recognition software  Phonic, grammatical and spelling errors may be present within the note as a result

## 2018-08-24 NOTE — PATIENT INSTRUCTIONS
Will give a trial in tapering off the venlafaxine  Patient to start half tablet daily for 10 days, then 1/2 tablet every other day for 10 days then can stop  If symptoms return of crying and feeling depressed patient will restart medication  I have asked patient and her  to discuss with pain specialist about doing another CT of the lumbar spine due to ongoing symptoms  Otherwise follow up here 3 weeks  Patient is having a good response to the lisinopril for her blood pressure  Will also put in referral to Neurology to evaluate ongoing back issue and making recommendations

## 2018-08-25 DIAGNOSIS — E78.1 HYPERTRIGLYCERIDEMIA: ICD-10-CM

## 2018-08-27 RX ORDER — FENOFIBRATE 160 MG/1
TABLET ORAL
Qty: 30 TABLET | Refills: 4 | Status: SHIPPED | OUTPATIENT
Start: 2018-08-27 | End: 2018-11-26 | Stop reason: SDUPTHER

## 2018-09-05 ENCOUNTER — APPOINTMENT (EMERGENCY)
Dept: CT IMAGING | Facility: HOSPITAL | Age: 63
End: 2018-09-05
Payer: MEDICARE

## 2018-09-05 ENCOUNTER — HOSPITAL ENCOUNTER (EMERGENCY)
Facility: HOSPITAL | Age: 63
End: 2018-09-06
Attending: EMERGENCY MEDICINE
Payer: MEDICARE

## 2018-09-05 DIAGNOSIS — M48.061 LUMBAR STENOSIS: Primary | ICD-10-CM

## 2018-09-05 DIAGNOSIS — M51.26 L4-L5 DISC BULGE: ICD-10-CM

## 2018-09-05 LAB
ALBUMIN SERPL BCP-MCNC: 4 G/DL (ref 3.5–5)
ALP SERPL-CCNC: 33 U/L (ref 46–116)
ALT SERPL W P-5'-P-CCNC: 22 U/L (ref 12–78)
ANION GAP SERPL CALCULATED.3IONS-SCNC: 8 MMOL/L (ref 4–13)
AST SERPL W P-5'-P-CCNC: 17 U/L (ref 5–45)
BASOPHILS # BLD AUTO: 0.02 THOUSANDS/ΜL (ref 0–0.1)
BASOPHILS NFR BLD AUTO: 0 % (ref 0–1)
BILIRUB SERPL-MCNC: 0.7 MG/DL (ref 0.2–1)
BUN SERPL-MCNC: 25 MG/DL (ref 5–25)
CALCIUM SERPL-MCNC: 9.5 MG/DL (ref 8.3–10.1)
CHLORIDE SERPL-SCNC: 103 MMOL/L (ref 100–108)
CO2 SERPL-SCNC: 27 MMOL/L (ref 21–32)
CREAT SERPL-MCNC: 1.17 MG/DL (ref 0.6–1.3)
EOSINOPHIL # BLD AUTO: 0 THOUSAND/ΜL (ref 0–0.61)
EOSINOPHIL NFR BLD AUTO: 0 % (ref 0–6)
ERYTHROCYTE [DISTWIDTH] IN BLOOD BY AUTOMATED COUNT: 14.3 % (ref 11.6–15.1)
GFR SERPL CREATININE-BSD FRML MDRD: 49 ML/MIN/1.73SQ M
GLUCOSE SERPL-MCNC: 104 MG/DL (ref 65–140)
HCT VFR BLD AUTO: 36.7 % (ref 34.8–46.1)
HGB BLD-MCNC: 11.9 G/DL (ref 11.5–15.4)
IMM GRANULOCYTES # BLD AUTO: 0.04 THOUSAND/UL (ref 0–0.2)
IMM GRANULOCYTES NFR BLD AUTO: 1 % (ref 0–2)
LYMPHOCYTES # BLD AUTO: 2.57 THOUSANDS/ΜL (ref 0.6–4.47)
LYMPHOCYTES NFR BLD AUTO: 33 % (ref 14–44)
MCH RBC QN AUTO: 30.6 PG (ref 26.8–34.3)
MCHC RBC AUTO-ENTMCNC: 32.4 G/DL (ref 31.4–37.4)
MCV RBC AUTO: 94 FL (ref 82–98)
MONOCYTES # BLD AUTO: 0.36 THOUSAND/ΜL (ref 0.17–1.22)
MONOCYTES NFR BLD AUTO: 5 % (ref 4–12)
NEUTROPHILS # BLD AUTO: 4.76 THOUSANDS/ΜL (ref 1.85–7.62)
NEUTS SEG NFR BLD AUTO: 61 % (ref 43–75)
NRBC BLD AUTO-RTO: 0 /100 WBCS
PLATELET # BLD AUTO: 445 THOUSANDS/UL (ref 149–390)
PMV BLD AUTO: 9.7 FL (ref 8.9–12.7)
POTASSIUM SERPL-SCNC: 4.4 MMOL/L (ref 3.5–5.3)
PROT SERPL-MCNC: 7.6 G/DL (ref 6.4–8.2)
RBC # BLD AUTO: 3.89 MILLION/UL (ref 3.81–5.12)
SODIUM SERPL-SCNC: 138 MMOL/L (ref 136–145)
WBC # BLD AUTO: 7.75 THOUSAND/UL (ref 4.31–10.16)

## 2018-09-05 PROCEDURE — 72131 CT LUMBAR SPINE W/O DYE: CPT

## 2018-09-05 PROCEDURE — 96375 TX/PRO/DX INJ NEW DRUG ADDON: CPT

## 2018-09-05 PROCEDURE — 96374 THER/PROPH/DIAG INJ IV PUSH: CPT

## 2018-09-05 PROCEDURE — 96372 THER/PROPH/DIAG INJ SC/IM: CPT

## 2018-09-05 PROCEDURE — 36415 COLL VENOUS BLD VENIPUNCTURE: CPT | Performed by: EMERGENCY MEDICINE

## 2018-09-05 PROCEDURE — 80053 COMPREHEN METABOLIC PANEL: CPT | Performed by: EMERGENCY MEDICINE

## 2018-09-05 PROCEDURE — 85025 COMPLETE CBC W/AUTO DIFF WBC: CPT | Performed by: EMERGENCY MEDICINE

## 2018-09-05 PROCEDURE — 96376 TX/PRO/DX INJ SAME DRUG ADON: CPT

## 2018-09-05 RX ORDER — ONDANSETRON 2 MG/ML
4 INJECTION INTRAMUSCULAR; INTRAVENOUS ONCE
Status: COMPLETED | OUTPATIENT
Start: 2018-09-05 | End: 2018-09-05

## 2018-09-05 RX ORDER — OXYCODONE HYDROCHLORIDE 10 MG/1
10 TABLET ORAL ONCE
Status: COMPLETED | OUTPATIENT
Start: 2018-09-05 | End: 2018-09-05

## 2018-09-05 RX ORDER — LABETALOL HYDROCHLORIDE 5 MG/ML
10 INJECTION, SOLUTION INTRAVENOUS ONCE
Status: COMPLETED | OUTPATIENT
Start: 2018-09-05 | End: 2018-09-05

## 2018-09-05 RX ORDER — HYDROMORPHONE HCL 110MG/55ML
2 PATIENT CONTROLLED ANALGESIA SYRINGE INTRAVENOUS ONCE
Status: COMPLETED | OUTPATIENT
Start: 2018-09-05 | End: 2018-09-05

## 2018-09-05 RX ORDER — HYDROCODONE BITARTRATE AND ACETAMINOPHEN 5; 325 MG/1; MG/1
2 TABLET ORAL ONCE
Status: COMPLETED | OUTPATIENT
Start: 2018-09-05 | End: 2018-09-05

## 2018-09-05 RX ORDER — LISINOPRIL 10 MG/1
10 TABLET ORAL ONCE
Status: COMPLETED | OUTPATIENT
Start: 2018-09-05 | End: 2018-09-05

## 2018-09-05 RX ADMIN — LISINOPRIL 10 MG: 10 TABLET ORAL at 16:26

## 2018-09-05 RX ADMIN — ONDANSETRON 4 MG: 2 INJECTION INTRAMUSCULAR; INTRAVENOUS at 22:46

## 2018-09-05 RX ADMIN — HYDROMORPHONE HYDROCHLORIDE 1 MG: 1 INJECTION, SOLUTION INTRAMUSCULAR; INTRAVENOUS; SUBCUTANEOUS at 19:37

## 2018-09-05 RX ADMIN — HYDROMORPHONE HYDROCHLORIDE 2 MG: 2 INJECTION INTRAMUSCULAR; INTRAVENOUS; SUBCUTANEOUS at 17:28

## 2018-09-05 RX ADMIN — LABETALOL 20 MG/4 ML (5 MG/ML) INTRAVENOUS SYRINGE 10 MG: at 20:54

## 2018-09-05 RX ADMIN — HYDROMORPHONE HYDROCHLORIDE 1 MG: 1 INJECTION, SOLUTION INTRAMUSCULAR; INTRAVENOUS; SUBCUTANEOUS at 16:03

## 2018-09-05 RX ADMIN — HYDROMORPHONE HYDROCHLORIDE 2 MG: 2 INJECTION, SOLUTION INTRAMUSCULAR; INTRAVENOUS; SUBCUTANEOUS at 20:54

## 2018-09-05 RX ADMIN — OXYCODONE HYDROCHLORIDE 10 MG: 10 TABLET ORAL at 21:24

## 2018-09-05 RX ADMIN — HYDROMORPHONE HYDROCHLORIDE 2 MG: 2 INJECTION INTRAMUSCULAR; INTRAVENOUS; SUBCUTANEOUS at 12:37

## 2018-09-05 RX ADMIN — HYDROCODONE BITARTRATE AND ACETAMINOPHEN 2 TABLET: 5; 325 TABLET ORAL at 22:45

## 2018-09-05 NOTE — ED PROVIDER NOTES
History  Chief Complaint   Patient presents with    Back Pain     Pt reports severe lower back pain, chronic, for which she sees pain managment for, but reports her medication is not helping and was instructed to come here for evaluation  C/o severe lower back pain , worsening over the past week or so  Her meds are not helping  No new injury  No weakness in legs  No loss of bowel or bladder function  Pt  States that she is unable to ambulate  Prior to Admission Medications   Prescriptions Last Dose Informant Patient Reported? Taking?    HYDROcodone-acetaminophen (VICODIN) 7 5-500 MG per tablet   Yes No   Sig: Take 1 tablet by mouth every 6 (six) hours as needed for moderate pain   VENTOLIN  (90 Base) MCG/ACT inhaler   No No   Sig: INHALE 2 PUFFS 3 TIMES A DAY AS NEEDED   albuterol 2 mg/5 mL syrup   No No   Sig: TAKE 1 TEASPOONFUL BY MOUTH EVERY 4 HOURS AS NEEDED AS DIRECTED   ergocalciferol (VITAMIN D2) 50,000 units   No No   Sig: TAKE ONE CAPSULE BY MOUTH WEEKLY   estradiol (ESTRACE) 0 1 mg/g vaginal cream   No No   Sig: Insert 1 g into the vagina 2 (two) times a week   fenofibrate (TRIGLIDE) 160 MG tablet   No No   Sig: TAKE 1 TABLET BY MOUTH EVERY DAY WITH A MEAL   fluticasone (FLONASE) 50 mcg/act nasal spray   No No   Si sprays into each nostril daily   furosemide (LASIX) 40 mg tablet   Yes No   Sig: Take by mouth   gabapentin (NEURONTIN) 800 mg tablet   Yes No   Sig: Take 800 mg by mouth 3 (three) times a day   lisinopril (ZESTRIL) 10 mg tablet   No No   Sig: Take 1 tablet (10 mg total) by mouth daily   metoclopramide (REGLAN) 5 mg/5 mL oral solution  Self Yes No   Sig: Take 5 mg by mouth 4 (four) times a day (before meals and at bedtime)   omega-3-acid ethyl esters (LOVAZA) 1 g capsule   No No   Sig: TAKE 2 CAPSULE TWICE DAILY   pantoprazole (PROTONIX) 40 mg tablet   No No   Sig: TAKE 1 TABLET BY MOUTH TWICE A DAY 30 MINUTES BEFORE BREAKFAST AND DINNER   pentosan polysulfate (ELMIRON) 100 mg capsule   Yes No   Sig: Take 100 mg by mouth 3 (three) times a day before meals 2 capsules po  3 time a day   pravastatin (PRAVACHOL) 20 mg tablet   No No   Sig: TAKE 1 TABLET BY MOUTH EVERY DAY   prochlorperazine (COMPAZINE) 10 mg tablet   No No   Sig: Take 1 tablet (10 mg total) by mouth 2 (two) times a day   venlafaxine (EFFEXOR) 75 mg tablet   No No   Sig: Take 1 tablet (75 mg total) by mouth 2 (two) times a day with meals for 180 days   zolpidem (AMBIEN) 5 mg tablet   Yes No   Sig: Take 5 mg by mouth daily at bedtime as needed for sleep      Facility-Administered Medications: None       Past Medical History:   Diagnosis Date    Asthma     Cataract     Chronic back pain     Chronic pain disorder     Back    GERD (gastroesophageal reflux disease)     HTN (hypertension)     Hyperlipidemia     Interstitial cystitis     Liver disease     Muscle cramps     Obesity     Pneumonia     Radiculopathy, lumbar region     Spondylosis, cervical, with myelopathy     Vulvovaginitis        Past Surgical History:   Procedure Laterality Date    APPENDECTOMY      BACK SURGERY      Arthrodeiss lumbar    BACK SURGERY      Spinal stereotaxis of cord    BLADDER SURGERY      Electronic bladder stimulator implantation     SECTION      x3    CHOLECYSTECTOMY      COLON SURGERY      Intestinal stricturoplasty     DECOMPRESSION SPINE LUMBAR POSTERIOR Bilateral 2018    Procedure: DECOMPRESSION SPINE LUMBAR POSTERIOR L1-4, L1-2 DISKECTOMY, POSTERIORLATERAL FUSION L3-4, REMOVAL OF SPINAL CORD STIMULATOR SYSTEM, REMOVAL OF INTERSPINOUS DEVICES;  Surgeon: Fara Silverman MD;  [facility]: [facility];  Service: Neurosurgery    FINGER SURGERY      Extensor tendon repair (mallet finger)    FL MYELOGRAM LUMBAR  2018    HERNIA REPAIR      x3    HYSTERECTOMY      NV ESOPHAGOGASTRODUODENOSCOPY TRANSORAL DIAGNOSTIC N/A 2017    Procedure: ESOPHAGOGASTRODUODENOSCOPY (EGD);   Surgeon: Branden Rapp Manuela Paul MD;  Location: [facility]; Service: Gastroenterology    SALPINGOOPHORECTOMY      B/L       Family History   Problem Relation Age of Onset    Coronary artery disease Brother         Patient has 3 brothers with coronary artery disease    Diabetes Mother     Throat cancer Mother     COPD Father     Cancer Father     Bipolar disorder Sister     Drug abuse Sister     Alcohol abuse Sister      I have reviewed and agree with the history as documented  Social History   Substance Use Topics    Smoking status: Never Smoker    Smokeless tobacco: Never Used    Alcohol use No        Review of Systems   Constitutional: Negative for appetite change, fatigue and fever  HENT: Negative for rhinorrhea and sore throat  Respiratory: Negative for cough, shortness of breath and wheezing  Cardiovascular: Negative for chest pain and leg swelling  Gastrointestinal: Negative for abdominal pain, diarrhea and vomiting  Genitourinary: Negative for dysuria and flank pain  Musculoskeletal: Positive for back pain  Negative for neck pain  Skin: Negative for rash  Neurological: Negative for syncope and headaches  Psychiatric/Behavioral:        Mood normal       Physical Exam  Physical Exam   Constitutional: She is oriented to person, place, and time  She appears well-developed and well-nourished  HENT:   Head: Normocephalic and atraumatic  Neck: Normal range of motion  Neck supple  Cardiovascular: Normal rate and regular rhythm  Pulmonary/Chest: Effort normal and breath sounds normal    Abdominal: Soft  There is no tenderness  Musculoskeletal:   Lower lumbar tenderness over L spine and paraspinal musculature, +n/v intact, sensation/motor intact   Neurological: She is alert and oriented to person, place, and time  Skin: Skin is warm and dry  Nursing note and vitals reviewed        Vital Signs  ED Triage Vitals [09/05/18 1045]   Temperature Pulse Respirations Blood Pressure SpO2   98 6 °F (37 °C) 96 19 (!) 204/94 94 %      Temp Source Heart Rate Source Patient Position - Orthostatic VS BP Location FiO2 (%)   Oral Monitor Sitting Left arm --      Pain Score       Worst Possible Pain           Vitals:    09/06/18 0230 09/06/18 0300 09/06/18 0551 09/06/18 0600   BP: (!) 196/86 (!) 228/97 (!) 179/89 (!) 179/89   Pulse: 78 77 87    Patient Position - Orthostatic VS:  Lying Lying        Visual Acuity      ED Medications  Medications   HYDROmorphone (DILAUDID) injection 2 mg (2 mg Intramuscular Given 9/5/18 1237)   HYDROmorphone (DILAUDID) injection 1 mg (1 mg Intravenous Given 9/5/18 1603)   lisinopril (ZESTRIL) tablet 10 mg (10 mg Oral Given 9/5/18 1626)   HYDROmorphone (DILAUDID) injection 2 mg (2 mg Intravenous Given 9/5/18 1728)   HYDROmorphone (DILAUDID) injection 1 mg (1 mg Intravenous Given 9/5/18 1937)   HYDROmorphone (DILAUDID) injection 2 mg (2 mg Intravenous Given 9/5/18 2054)   labetalol (NORMODYNE) injection 10 mg (10 mg Intravenous Given 9/5/18 2054)   oxyCODONE (ROXICODONE) immediate release tablet 10 mg (10 mg Oral Given 9/5/18 2124)   HYDROcodone-acetaminophen (NORCO) 5-325 mg per tablet 2 tablet (2 tablets Oral Given 9/5/18 2245)   ondansetron (ZOFRAN) injection 4 mg (4 mg Intravenous Given 9/5/18 2246)   HYDROmorphone (DILAUDID) injection 1 mg (1 mg Intravenous Given 9/6/18 0005)   oxyCODONE (OxyCONTIN) 12 hr tablet 30 mg (30 mg Oral Given 9/6/18 0007)   ondansetron (ZOFRAN) injection 4 mg (4 mg Intravenous Given 9/6/18 0626)   HYDROmorphone (DILAUDID) injection 2 mg (2 mg Intravenous Given 9/6/18 0725)       Diagnostic Studies  Results Reviewed     Procedure Component Value Units Date/Time    Comprehensive metabolic panel [[id number]]  (Abnormal) Collected:  09/05/18 1602    Lab Status:  Final result Specimen:  Blood from Arm, Left Updated:  09/05/18 1627     Sodium 138 mmol/L      Potassium 4 4 mmol/L      Chloride 103 mmol/L      CO2 27 mmol/L      ANION GAP 8 mmol/L      BUN 25 mg/dL Creatinine 1 17 mg/dL      Glucose 104 mg/dL      Calcium 9 5 mg/dL      AST 17 U/L      ALT 22 U/L      Alkaline Phosphatase 33 (L) U/L      Total Protein 7 6 g/dL      Albumin 4 0 g/dL      Total Bilirubin 0 70 mg/dL      eGFR 49 ml/min/1 73sq m     Narrative:         National Kidney Disease Education Program recommendations are as follows:  GFR calculation is accurate only with a steady state creatinine  Chronic Kidney disease less than 60 ml/min/1 73 sq  meters  Kidney failure less than 15 ml/min/1 73 sq  meters  CBC and differential [[id number]]  (Abnormal) Collected:  09/05/18 1602    Lab Status:  Final result Specimen:  Blood from Arm, Left Updated:  09/05/18 1612     WBC 7 75 Thousand/uL      RBC 3 89 Million/uL      Hemoglobin 11 9 g/dL      Hematocrit 36 7 %      MCV 94 fL      MCH 30 6 pg      MCHC 32 4 g/dL      RDW 14 3 %      MPV 9 7 fL      Platelets 769 (H) Thousands/uL      nRBC 0 /100 WBCs      Neutrophils Relative 61 %      Immat GRANS % 1 %      Lymphocytes Relative 33 %      Monocytes Relative 5 %      Eosinophils Relative 0 %      Basophils Relative 0 %      Neutrophils Absolute 4 76 Thousands/µL      Immature Grans Absolute 0 04 Thousand/uL      Lymphocytes Absolute 2 57 Thousands/µL      Monocytes Absolute 0 36 Thousand/µL      Eosinophils Absolute 0 00 Thousand/µL      Basophils Absolute 0 02 Thousands/µL                  CT lumbar spine without contrast   Final Result by Roxana Ornelas DO (09/05 0424)   Degenerative and postoperative changes of the lumbar spine as described above  Severe central stenosis L1-L2 secondary to large central disc herniation  Severe central stenosis L3-L4 secondary to disc bulge and facet hypertrophic changes               I personally discussed this study with CORAL Hammer on 9/5/2018 at 1:34 PM                Workstation performed: [id number]                    Procedures  Procedures       Phone Contacts  ED Phone Contact    ED Course MDM  Number of Diagnoses or Management Options  L4-L5 disc bulge:   Lumbar stenosis:      Amount and/or Complexity of Data Reviewed  Clinical lab tests: ordered and reviewed  Tests in the radiology section of CPT®: ordered and reviewed    Risk of Complications, Morbidity, and/or Mortality  Presenting problems: moderate  General comments: Myelogram was recommended which is only done in [facility]  Pt is unable to ambulate  Transfer to [facility] was arranged  Neurosurgery will be consulted         CritCare Time    Disposition  Final diagnoses:   Lumbar stenosis   L4-L5 disc bulge     Time reflects when diagnosis was documented in both MDM as applicable and the Disposition within this note     Time User Action Codes Description Comment    9/5/2018  4:28 PM Mary Ann Caicedo Add [B24 802] Lumbar stenosis     9/5/2018  4:28 PM Viviana Penny Add [M51 26] Bulge of lumbar disc without myelopathy     9/5/2018  4:28 PM Viviana Penny Remove [M51 26] Bulge of lumbar disc without myelopathy     9/5/2018  4:29 PM Viviana Penny Add [M51 26] L4-L5 disc bulge       ED Disposition     ED Disposition Condition Comment    Transfer to Another [address] [place] [address] Av should be transferred out to [facility]      MD Documentation      Most Recent Value   Patient Condition  The patient has been stabilized such that within reasonable medical probability, no material deterioration of the patient condition or the condition of the unborn child(douglas) is likely to result from the transfer   Reason for Transfer  Level of Care needed not available at this facility   Benefits of Transfer  Specialized equipment and/or services available at the receiving facility (Include comment)________________________   Risks of Transfer  Potential for delay in receiving treatment   Accepting Physician  -- [Dr Kathy Wade service and Dr Barbara García, neurosurgery]   Accepting Facility Name, [place] [address]   -- [[facility] [place]   Sending MD  -- [Dr Manning Severe   Provider Certification  General risk, such as traffic hazards, adverse weather conditions, rough terrain or turbulence, possible failure of equipment (including vehicle or aircraft), or consequences of actions of persons outside the control of the transport personnel      RN Documentation      [address] [facility]    -- [[facility] [place]]   Level of Care  Basic life support      Follow-up Information    None         Discharge Medication List as of 9/6/2018  7:35 AM      CONTINUE these medications which have NOT CHANGED    Details   albuterol 2 mg/5 mL syrup TAKE 1 TEASPOONFUL BY MOUTH EVERY 4 HOURS AS NEEDED AS DIRECTED, Normal      ergocalciferol (VITAMIN D2) 50,000 units TAKE ONE CAPSULE BY MOUTH WEEKLY, Normal      estradiol (ESTRACE) 0 1 mg/g vaginal cream Insert 1 g into the vagina 2 (two) times a week, Starting Mon 4/2/2018, Normal      fenofibrate (TRIGLIDE) 160 MG tablet TAKE 1 TABLET BY MOUTH EVERY DAY WITH A MEAL, Normal      fluticasone (FLONASE) 50 mcg/act nasal spray 2 sprays into each nostril daily, Starting Fri 9/29/2017, Print      furosemide (LASIX) 40 mg tablet Take by mouth, Starting Tue 6/6/2017, Historical Med      gabapentin (NEURONTIN) 800 mg tablet Take 800 mg by mouth 3 (three) times a day, Until Discontinued, Historical Med      HYDROcodone-acetaminophen (VICODIN) 7 5-500 MG per tablet Take 1 tablet by mouth every 6 (six) hours as needed for moderate pain, Until Discontinued, Historical Med      lisinopril (ZESTRIL) 10 mg tablet Take 1 tablet (10 mg total) by mouth daily, Starting Wed 8/8/2018, Normal      metoclopramide (REGLAN) 5 mg/5 mL oral solution Take 5 mg by mouth 4 (four) times a day (before meals and at bedtime), Historical Med      omega-3-acid ethyl esters (LOVAZA) 1 g capsule TAKE 2 CAPSULE TWICE DAILY, Normal      pantoprazole (PROTONIX) 40 mg tablet TAKE 1 TABLET BY MOUTH TWICE A DAY 30 MINUTES BEFORE BREAKFAST AND DINNER, Normal      pentosan polysulfate (ELMIRON) 100 mg capsule Take 100 mg by mouth 3 (three) times a day before meals 2 capsules po  3 time a day, Historical Med      pravastatin (PRAVACHOL) 20 mg tablet TAKE 1 TABLET BY MOUTH EVERY DAY, Normal      prochlorperazine (COMPAZINE) 10 mg tablet Take 1 tablet (10 mg total) by mouth 2 (two) times a day, Starting Thu 3/15/2018, Normal      venlafaxine (EFFEXOR) 75 mg tablet Take 1 tablet (75 mg total) by mouth 2 (two) times a day with meals for 180 days, Starting Wed 8/1/2018, Until Mon 1/28/2019, Normal      VENTOLIN  (90 Base) MCG/ACT inhaler INHALE 2 PUFFS 3 TIMES A DAY AS NEEDED, Normal      zolpidem (AMBIEN) 5 mg tablet Take 5 mg by mouth daily at bedtime as needed for sleep, Historical Med      baclofen 10 mg tablet Take 10 mg by mouth 3 (three) times a day, Until Discontinued, Historical Med      OxyCODONE HCl ER (OxyCONTIN) 30 MG T12A Take 30 mg by mouth 3 (three) times a day as needed for moderate pain, Until Discontinued, Historical Med           No discharge procedures on file      ED Provider  Electronically Signed by           Bernice Woodson MD  09/13/18 0342

## 2018-09-05 NOTE — EMTALA/ACUTE CARE TRANSFER
[address] [address]  Dept: [phone]      EMTALA TRANSFER CONSENT    NAME Penelope Sumner                                         1955                              MRN [id number]    I have been informed of my rights regarding examination, treatment, and transfer   by Dr Libertad Myers: Specialized equipment and/or services available at the receiving facility (Include comment)________________________    Risks: Potential for delay in receiving treatment      Consent for Transfer:  I acknowledge that my medical condition has been evaluated and explained to me by the emergency department physician or other qualified medical person and/or my attending physician, who has recommended that I be transferred to the service of  Accepting Physician:  (Dr Cayla Núñez, [address] [place] [address] service and Dr Thomas Hickey, neurosurgery) at [facility] Name, [address] :  ([facility])  The above potential benefits of such transfer, the potential risks associated with such transfer, and the probable risks of not being transferred have been explained to me, and I fully understand them  The doctor has explained that, in my case, the benefits of transfer outweigh the risks  I agree to be transferred  I authorize the performance of emergency medical procedures and treatments upon me in both transit and upon arrival at the receiving facility  Additionally, I authorize the release of any and all medical records to the receiving facility and request they be transported with me, if possible  I understand that the safest mode of transportation during a medical emergency is an ambulance and that the Hospital advocates the use of this mode of transport   Risks of traveling to the receiving facility by car, including absence of medical control, life sustaining equipment, such as oxygen, and medical personnel has been explained to me and I fully understand them  (TWILA CORRECT BOX BELOW)  [  ]  I consent to the stated transfer and to be transported by ambulance/helicopter  [  ]  I consent to the stated transfer, but refuse transportation by ambulance and accept full responsibility for my transportation by car  I understand the risks of non-ambulance transfers and I exonerate the Hospital and its staff from any deterioration in my condition that results from this refusal     X___________________________________________    DATE  18  TIME________  Signature of patient or legally responsible individual signing on patient behalf           RELATIONSHIP TO PATIENT_________________________          Provider Certification    NAME Rose Luna                                         1955                              MRN [id number]    A medical screening exam was performed on the above named patient  Based on the examination:    Condition Necessitating Transfer The primary encounter diagnosis was Lumbar stenosis  A diagnosis of L4-L5 disc bulge was also pertinent to this visit      Patient Condition: The patient has been stabilized such that within reasonable medical probability, no material deterioration of the patient condition or the condition of the unborn child(douglas) is likely to result from the transfer    Reason for Transfer: Level of Care needed not available at this facility    Transfer Requirements: Facility  ([facility])   · Space available and qualified personnel available for treatment as acknowledged by    · Agreed to accept transfer and to provide appropriate medical treatment as acknowledged by        (Dr May Bates, [place] service and Dr Hays, neurosurgery)  · Appropriate medical records of the examination and treatment of the patient are provided at the time of transfer   [address] _______  · Transfer will be performed by qualified personnel from    and appropriate transfer equipment as required, including the use of necessary and appropriate life support measures  Provider Certification: I have examined the patient and explained the following risks and benefits of being transferred/refusing transfer to the patient/family:  General risk, such as traffic hazards, adverse weather conditions, rough terrain or turbulence, possible failure of equipment (including vehicle or aircraft), or consequences of actions of persons outside the control of the transport personnel      Based on these reasonable risks and benefits to the patient and/or the unborn child(douglas), and based upon the information available at the time of the patients examination, I certify that the medical benefits reasonably to be expected from the provision of appropriate medical treatments at another medical facility outweigh the increasing risks, if any, to the individuals medical condition, and in the case of labor to the unborn child, from effecting the transfer      X____________________________________________ DATE 09/05/18        TIME_______      ORIGINAL - SEND TO MEDICAL RECORDS   COPY - SEND WITH PATIENT DURING TRANSFER

## 2018-09-06 ENCOUNTER — APPOINTMENT (INPATIENT)
Dept: RADIOLOGY | Facility: HOSPITAL | Age: 63
DRG: 460 | End: 2018-09-06
Payer: MEDICARE

## 2018-09-06 ENCOUNTER — HOSPITAL ENCOUNTER (INPATIENT)
Facility: HOSPITAL | Age: 63
LOS: 5 days | Discharge: HOME HEALTH | DRG: 460 | End: 2018-09-11
Attending: INTERNAL MEDICINE | Admitting: INTERNAL MEDICINE
Payer: MEDICARE

## 2018-09-06 VITALS
TEMPERATURE: 98.6 F | OXYGEN SATURATION: 98 % | WEIGHT: 190 LBS | RESPIRATION RATE: 16 BRPM | DIASTOLIC BLOOD PRESSURE: 89 MMHG | HEIGHT: 61 IN | BODY MASS INDEX: 35.87 KG/M2 | SYSTOLIC BLOOD PRESSURE: 179 MMHG | HEART RATE: 87 BPM

## 2018-09-06 DIAGNOSIS — R29.6 MULTIPLE FALLS: ICD-10-CM

## 2018-09-06 DIAGNOSIS — M51.26 LUMBAR DISC HERNIATION: Primary | ICD-10-CM

## 2018-09-06 DIAGNOSIS — Z98.890 POSTOPERATIVE STATE: ICD-10-CM

## 2018-09-06 DIAGNOSIS — M54.42 ACUTE BILATERAL LOW BACK PAIN WITH BILATERAL SCIATICA: ICD-10-CM

## 2018-09-06 DIAGNOSIS — M51.26 LUMBAR DISC HERNIATION: ICD-10-CM

## 2018-09-06 DIAGNOSIS — M54.41 ACUTE BILATERAL LOW BACK PAIN WITH BILATERAL SCIATICA: ICD-10-CM

## 2018-09-06 DIAGNOSIS — M48.062 SPINAL STENOSIS OF LUMBAR REGION WITH NEUROGENIC CLAUDICATION: ICD-10-CM

## 2018-09-06 PROBLEM — R74.01 TRANSAMINITIS: Status: RESOLVED | Noted: 2017-07-10 | Resolved: 2018-09-06

## 2018-09-06 PROBLEM — S39.012A STRAIN OF LUMBAR REGION: Status: RESOLVED | Noted: 2018-06-18 | Resolved: 2018-09-06

## 2018-09-06 PROBLEM — M48.061 LUMBAR CANAL STENOSIS: Status: ACTIVE | Noted: 2018-09-06

## 2018-09-06 PROBLEM — R25.2 MUSCLE CRAMPS: Status: RESOLVED | Noted: 2017-07-31 | Resolved: 2018-09-06

## 2018-09-06 PROBLEM — R11.0 NAUSEA: Status: RESOLVED | Noted: 2018-03-15 | Resolved: 2018-09-06

## 2018-09-06 PROBLEM — R52 INTRACTABLE PAIN: Status: ACTIVE | Noted: 2018-09-06

## 2018-09-06 LAB
BACTERIA UR QL AUTO: ABNORMAL /HPF
BILIRUB UR QL STRIP: NEGATIVE
CLARITY UR: CLEAR
COLOR UR: YELLOW
GLUCOSE UR STRIP-MCNC: NEGATIVE MG/DL
HGB UR QL STRIP.AUTO: NEGATIVE
HYALINE CASTS #/AREA URNS LPF: ABNORMAL /LPF
KETONES UR STRIP-MCNC: NEGATIVE MG/DL
LEUKOCYTE ESTERASE UR QL STRIP: ABNORMAL
NITRITE UR QL STRIP: NEGATIVE
NON-SQ EPI CELLS URNS QL MICRO: ABNORMAL /HPF
PH UR STRIP.AUTO: 6.5 [PH] (ref 4.5–8)
PROT UR STRIP-MCNC: NEGATIVE MG/DL
RBC #/AREA URNS AUTO: ABNORMAL /HPF
SP GR UR STRIP.AUTO: 1.01 (ref 1–1.03)
UROBILINOGEN UR QL STRIP.AUTO: 0.2 E.U./DL
WBC #/AREA URNS AUTO: ABNORMAL /HPF

## 2018-09-06 PROCEDURE — 99223 1ST HOSP IP/OBS HIGH 75: CPT | Performed by: INTERNAL MEDICINE

## 2018-09-06 PROCEDURE — 87081 CULTURE SCREEN ONLY: CPT | Performed by: PHYSICIAN ASSISTANT

## 2018-09-06 PROCEDURE — 81001 URINALYSIS AUTO W/SCOPE: CPT | Performed by: PHYSICIAN ASSISTANT

## 2018-09-06 PROCEDURE — 72100 X-RAY EXAM L-S SPINE 2/3 VWS: CPT

## 2018-09-06 PROCEDURE — 96376 TX/PRO/DX INJ SAME DRUG ADON: CPT

## 2018-09-06 PROCEDURE — 99285 EMERGENCY DEPT VISIT HI MDM: CPT

## 2018-09-06 PROCEDURE — 99223 1ST HOSP IP/OBS HIGH 75: CPT | Performed by: PHYSICIAN ASSISTANT

## 2018-09-06 RX ORDER — VENLAFAXINE 37.5 MG/1
75 TABLET ORAL 2 TIMES DAILY WITH MEALS
Status: DISCONTINUED | OUTPATIENT
Start: 2018-09-06 | End: 2018-09-11 | Stop reason: HOSPADM

## 2018-09-06 RX ORDER — FENOFIBRATE 48 MG/1
160 TABLET, COATED ORAL DAILY
Status: CANCELLED | OUTPATIENT
Start: 2018-09-06

## 2018-09-06 RX ORDER — ZOLPIDEM TARTRATE 5 MG/1
5 TABLET ORAL
Status: CANCELLED | OUTPATIENT
Start: 2018-09-06

## 2018-09-06 RX ORDER — HYDRALAZINE HYDROCHLORIDE 20 MG/ML
5 INJECTION INTRAMUSCULAR; INTRAVENOUS EVERY 6 HOURS PRN
Status: DISCONTINUED | OUTPATIENT
Start: 2018-09-06 | End: 2018-09-11 | Stop reason: HOSPADM

## 2018-09-06 RX ORDER — PANTOPRAZOLE SODIUM 40 MG/1
40 TABLET, DELAYED RELEASE ORAL
Status: CANCELLED | OUTPATIENT
Start: 2018-09-06

## 2018-09-06 RX ORDER — HYDROMORPHONE HCL 110MG/55ML
2 PATIENT CONTROLLED ANALGESIA SYRINGE INTRAVENOUS ONCE AS NEEDED
Status: COMPLETED | OUTPATIENT
Start: 2018-09-06 | End: 2018-09-06

## 2018-09-06 RX ORDER — ONDANSETRON 2 MG/ML
4 INJECTION INTRAMUSCULAR; INTRAVENOUS ONCE
Status: COMPLETED | OUTPATIENT
Start: 2018-09-06 | End: 2018-09-06

## 2018-09-06 RX ORDER — VENLAFAXINE 37.5 MG/1
75 TABLET ORAL 2 TIMES DAILY WITH MEALS
Status: CANCELLED | OUTPATIENT
Start: 2018-09-06

## 2018-09-06 RX ORDER — BACLOFEN 10 MG/1
10 TABLET ORAL 3 TIMES DAILY
Status: DISCONTINUED | OUTPATIENT
Start: 2018-09-06 | End: 2018-09-11 | Stop reason: HOSPADM

## 2018-09-06 RX ORDER — LISINOPRIL 10 MG/1
10 TABLET ORAL DAILY
Status: DISCONTINUED | OUTPATIENT
Start: 2018-09-06 | End: 2018-09-11 | Stop reason: HOSPADM

## 2018-09-06 RX ORDER — POLYETHYLENE GLYCOL 3350 17 G/17G
17 POWDER, FOR SOLUTION ORAL DAILY
Status: DISCONTINUED | OUTPATIENT
Start: 2018-09-07 | End: 2018-09-11 | Stop reason: HOSPADM

## 2018-09-06 RX ORDER — FLUTICASONE PROPIONATE 50 MCG
2 SPRAY, SUSPENSION (ML) NASAL DAILY
Status: CANCELLED | OUTPATIENT
Start: 2018-09-06

## 2018-09-06 RX ORDER — FUROSEMIDE 40 MG/1
40 TABLET ORAL ONCE
Status: CANCELLED | OUTPATIENT
Start: 2018-09-06

## 2018-09-06 RX ORDER — ZOLPIDEM TARTRATE 5 MG/1
5 TABLET ORAL
Status: DISCONTINUED | OUTPATIENT
Start: 2018-09-06 | End: 2018-09-11 | Stop reason: HOSPADM

## 2018-09-06 RX ORDER — ACETAMINOPHEN 325 MG/1
650 TABLET ORAL EVERY 6 HOURS PRN
Status: DISCONTINUED | OUTPATIENT
Start: 2018-09-06 | End: 2018-09-06

## 2018-09-06 RX ORDER — PROCHLORPERAZINE MALEATE 10 MG
10 TABLET ORAL 2 TIMES DAILY
Status: CANCELLED | OUTPATIENT
Start: 2018-09-06

## 2018-09-06 RX ORDER — GABAPENTIN 400 MG/1
800 CAPSULE ORAL 3 TIMES DAILY
Status: DISCONTINUED | OUTPATIENT
Start: 2018-09-06 | End: 2018-09-11 | Stop reason: HOSPADM

## 2018-09-06 RX ORDER — FLUTICASONE PROPIONATE 50 MCG
2 SPRAY, SUSPENSION (ML) NASAL DAILY
Status: DISCONTINUED | OUTPATIENT
Start: 2018-09-06 | End: 2018-09-11 | Stop reason: HOSPADM

## 2018-09-06 RX ORDER — PRAVASTATIN SODIUM 20 MG
20 TABLET ORAL DAILY
Status: CANCELLED | OUTPATIENT
Start: 2018-09-06

## 2018-09-06 RX ORDER — DOCUSATE SODIUM 100 MG/1
100 CAPSULE, LIQUID FILLED ORAL 2 TIMES DAILY
Status: DISCONTINUED | OUTPATIENT
Start: 2018-09-06 | End: 2018-09-11 | Stop reason: HOSPADM

## 2018-09-06 RX ORDER — LISINOPRIL 10 MG/1
10 TABLET ORAL DAILY
Status: CANCELLED | OUTPATIENT
Start: 2018-09-06

## 2018-09-06 RX ORDER — BACLOFEN 10 MG/1
10 TABLET ORAL 3 TIMES DAILY
Status: CANCELLED | OUTPATIENT
Start: 2018-09-06

## 2018-09-06 RX ORDER — FENOFIBRATE 48 MG/1
168 TABLET, COATED ORAL DAILY
Status: DISCONTINUED | OUTPATIENT
Start: 2018-09-06 | End: 2018-09-11 | Stop reason: HOSPADM

## 2018-09-06 RX ORDER — PANTOPRAZOLE SODIUM 40 MG/1
40 TABLET, DELAYED RELEASE ORAL
Status: DISCONTINUED | OUTPATIENT
Start: 2018-09-06 | End: 2018-09-11 | Stop reason: HOSPADM

## 2018-09-06 RX ORDER — CHLORAL HYDRATE 500 MG
1000 CAPSULE ORAL DAILY
Status: DISCONTINUED | OUTPATIENT
Start: 2018-09-06 | End: 2018-09-11 | Stop reason: HOSPADM

## 2018-09-06 RX ORDER — ONDANSETRON 2 MG/ML
4 INJECTION INTRAMUSCULAR; INTRAVENOUS EVERY 6 HOURS PRN
Status: DISCONTINUED | OUTPATIENT
Start: 2018-09-06 | End: 2018-09-11 | Stop reason: HOSPADM

## 2018-09-06 RX ORDER — ALBUTEROL SULFATE 90 UG/1
2 AEROSOL, METERED RESPIRATORY (INHALATION) EVERY 4 HOURS PRN
Status: DISCONTINUED | OUTPATIENT
Start: 2018-09-06 | End: 2018-09-11 | Stop reason: HOSPADM

## 2018-09-06 RX ORDER — FUROSEMIDE 40 MG/1
40 TABLET ORAL DAILY
Status: DISCONTINUED | OUTPATIENT
Start: 2018-09-06 | End: 2018-09-11 | Stop reason: HOSPADM

## 2018-09-06 RX ORDER — HYDROCODONE BITARTRATE AND ACETAMINOPHEN 5; 325 MG/1; MG/1
2 TABLET ORAL EVERY 4 HOURS PRN
Status: DISCONTINUED | OUTPATIENT
Start: 2018-09-06 | End: 2018-09-11 | Stop reason: HOSPADM

## 2018-09-06 RX ORDER — ALBUTEROL SULFATE 90 UG/1
2 AEROSOL, METERED RESPIRATORY (INHALATION) 3 TIMES DAILY PRN
Status: CANCELLED | OUTPATIENT
Start: 2018-09-06

## 2018-09-06 RX ORDER — LIDOCAINE 50 MG/G
1 PATCH TOPICAL DAILY
Status: DISCONTINUED | OUTPATIENT
Start: 2018-09-06 | End: 2018-09-11 | Stop reason: HOSPADM

## 2018-09-06 RX ORDER — HYDROCODONE BITARTRATE AND ACETAMINOPHEN 5; 325 MG/1; MG/1
1 TABLET ORAL EVERY 6 HOURS PRN
Status: DISCONTINUED | OUTPATIENT
Start: 2018-09-06 | End: 2018-09-06

## 2018-09-06 RX ORDER — GABAPENTIN 800 MG/1
800 TABLET ORAL 3 TIMES DAILY
Status: CANCELLED | OUTPATIENT
Start: 2018-09-06

## 2018-09-06 RX ORDER — PRAVASTATIN SODIUM 20 MG
20 TABLET ORAL
Status: DISCONTINUED | OUTPATIENT
Start: 2018-09-06 | End: 2018-09-11 | Stop reason: HOSPADM

## 2018-09-06 RX ADMIN — HYDROCODONE BITARTRATE AND ACETAMINOPHEN 2 TABLET: 5; 325 TABLET ORAL at 14:33

## 2018-09-06 RX ADMIN — VENLAFAXINE 75 MG: 37.5 TABLET ORAL at 16:09

## 2018-09-06 RX ADMIN — GABAPENTIN 800 MG: 400 CAPSULE ORAL at 16:08

## 2018-09-06 RX ADMIN — HYDRALAZINE HYDROCHLORIDE 5 MG: 20 INJECTION INTRAMUSCULAR; INTRAVENOUS at 16:12

## 2018-09-06 RX ADMIN — LISINOPRIL 10 MG: 10 TABLET ORAL at 13:06

## 2018-09-06 RX ADMIN — BACLOFEN 10 MG: 10 TABLET ORAL at 10:23

## 2018-09-06 RX ADMIN — OXYCODONE HYDROCHLORIDE 30 MG: 20 TABLET, FILM COATED, EXTENDED RELEASE ORAL at 21:32

## 2018-09-06 RX ADMIN — OXYCODONE HYDROCHLORIDE 30 MG: 20 TABLET, FILM COATED, EXTENDED RELEASE ORAL at 14:00

## 2018-09-06 RX ADMIN — HYDROCODONE BITARTRATE AND ACETAMINOPHEN 2 TABLET: 5; 325 TABLET ORAL at 20:41

## 2018-09-06 RX ADMIN — HYDROMORPHONE HYDROCHLORIDE 2 MG: 2 INJECTION INTRAMUSCULAR; INTRAVENOUS; SUBCUTANEOUS at 07:25

## 2018-09-06 RX ADMIN — HYDROMORPHONE HYDROCHLORIDE 1 MG: 1 INJECTION, SOLUTION INTRAMUSCULAR; INTRAVENOUS; SUBCUTANEOUS at 00:05

## 2018-09-06 RX ADMIN — HYDROMORPHONE HYDROCHLORIDE 1 MG: 1 INJECTION, SOLUTION INTRAMUSCULAR; INTRAVENOUS; SUBCUTANEOUS at 10:22

## 2018-09-06 RX ADMIN — Medication 1000 MG: at 13:06

## 2018-09-06 RX ADMIN — LIDOCAINE 1 PATCH: 50 PATCH CUTANEOUS at 13:59

## 2018-09-06 RX ADMIN — FENOFIBRATE 168 MG: 48 TABLET ORAL at 13:07

## 2018-09-06 RX ADMIN — HYDROMORPHONE HYDROCHLORIDE 0.5 MG: 1 INJECTION, SOLUTION INTRAMUSCULAR; INTRAVENOUS; SUBCUTANEOUS at 16:08

## 2018-09-06 RX ADMIN — ONDANSETRON 4 MG: 2 INJECTION INTRAMUSCULAR; INTRAVENOUS at 14:00

## 2018-09-06 RX ADMIN — BACLOFEN 10 MG: 10 TABLET ORAL at 16:08

## 2018-09-06 RX ADMIN — PANTOPRAZOLE SODIUM 40 MG: 40 TABLET, DELAYED RELEASE ORAL at 16:08

## 2018-09-06 RX ADMIN — FLUTICASONE PROPIONATE 2 SPRAY: 50 SPRAY, METERED NASAL at 13:10

## 2018-09-06 RX ADMIN — GABAPENTIN 800 MG: 400 CAPSULE ORAL at 10:23

## 2018-09-06 RX ADMIN — PENTOSAN POLYSULFATE SODIUM 100 MG: 100 CAPSULE, GELATIN COATED ORAL at 16:09

## 2018-09-06 RX ADMIN — OXYCODONE HYDROCHLORIDE 30 MG: 20 TABLET, FILM COATED, EXTENDED RELEASE ORAL at 00:07

## 2018-09-06 RX ADMIN — GABAPENTIN 800 MG: 400 CAPSULE ORAL at 21:32

## 2018-09-06 RX ADMIN — ZOLPIDEM TARTRATE 5 MG: 5 TABLET ORAL at 18:45

## 2018-09-06 RX ADMIN — ONDANSETRON 4 MG: 2 INJECTION INTRAMUSCULAR; INTRAVENOUS at 06:26

## 2018-09-06 RX ADMIN — ENOXAPARIN SODIUM 40 MG: 40 INJECTION SUBCUTANEOUS at 13:07

## 2018-09-06 RX ADMIN — HYDROMORPHONE HYDROCHLORIDE 1 MG: 1 INJECTION, SOLUTION INTRAMUSCULAR; INTRAVENOUS; SUBCUTANEOUS at 13:13

## 2018-09-06 RX ADMIN — PENTOSAN POLYSULFATE SODIUM 100 MG: 100 CAPSULE, GELATIN COATED ORAL at 13:07

## 2018-09-06 RX ADMIN — BACLOFEN 10 MG: 10 TABLET ORAL at 21:32

## 2018-09-06 RX ADMIN — PRAVASTATIN SODIUM 20 MG: 20 TABLET ORAL at 16:08

## 2018-09-06 NOTE — ED NOTES
Spoke with PACS  Will conference physicians to discuss transport and pts bed placement        Magdalena Ruiz RN  09/05/18 5702

## 2018-09-06 NOTE — SOCIAL WORK
Met with pt at bedside to explain CM role  Pt states she resides with her hsb, dtr, and 2 grandchildren at home age 13 and 15  Pt states she has 2 VERONICA and bed and bath on 2nd floor  Pt states she is able to walk as long as her pain is under control  Pt states she now has a roller walker at home but has not used yet  Pt states she never had home care and has never been to rehab  Pt states her hsb works in the back of their home as a   Pt also states her daughter and grandchildren can assist as needed  Pt offered choice and states she is agreeable to [facility] if needed  Pt denies mental health or substance abuse issues  Pt states her hsb Vic Hickman is her [phone]  Pt states her RX is [facility] in [place]  Pt states her PCP is LEATHA Villareal  Patient/caregiver received discharge checklist  Content reviewed  Patient/caregiver encouraged to participate in discharge plan of care prior to discharge home

## 2018-09-06 NOTE — MEDICAL STUDENT
History and Physical - [facility] Internal Medicine    Patient Information: Kevon uGzman 70 y o  female MRN: [id number]  Unit/Bed#: [facility] 929-01 Encounter: [id number]  Admitting Physician: Fran Gray RN  PCP: Melodie Wiseman MD  Date of Admission:  09/06/18    Assessment/Plan:    Hospital Problem List:     Principal Problem:    Lumbar canal stenosis with diffuse disc bulge  Active Problems:    Acute bilateral back pain, intractable    Multiple falls    Continuous opioid dependence ([address] )    Essential (primary) hypertension    Mild intermittent asthma without complication    Radiculopathy, lumbar region    Hyperlipidemia      Plan for the Primary Problem(s):  · Lumbar canal stenosis with diffuse disc bulge  · Consult pain management   · Consult neurosurgery  · Pain control regimen     Plan for Additional Problems:   · Multiple Falls  · PT/OT pending neurosurgical evaluation and recommendation  · Continuous opioid dependence  · Pain management consult and recommendations  · Asthma   · Continue home medications -stable   · Hyperlipidemia   · Continue home medications- stable     VTE Prophylaxis: Enoxaparin (Lovenox)  / sequential compression device   Code Status: full code  POLST: POLST form is not discussed and not completed at this time  Anticipated Length of Stay:  Patient will be admitted on an Inpatient basis with an anticipated length of stay of  Greater than 2 midnights  Justification for Hospital Stay: neurosurgery workup for intractable pain of L2-L5  Total Time for Visit, including Counseling / Coordination of Care: 30 minutes  Greater than 50% of this total time spent on direct patient counseling and coordination of care      Chief Complaint:   "My low back is killing me "    History of Present Illness:  Kevon Guzman is a 70 y o  female who presents to [facility] after being transferred from [facility] with intractable back pain in the lumbar region, patient states this has been an ongoing issue for the past three months  Dr Joyce Arreola, her pain management specialist has been prescribing her a variety of oxycodone, OxyContin, and Vicodin as PRN doses and she has been taking these around the clock to to manage her intractable pain  Dr Joyce Arreola has also been administering steroid injections into the lumbar region, which "worked for some time" but are no longer effective, according to the patient, which is what prompted the patient to come to the ER last night, 9/5/2018  The patient states that when her pain is not controlled she is unable to wipe herself, walk, or sometimes even eat as the pain is so bad  Patient states she has also been falling as of recently, as evidenced by a bruise on her left shin  The pain radiates b/l from her hips down to her feet  She has numbness, tingling, "feels like someone is stabbing her with nails" in her feet, radiculopathy, that comes and goes from knees to her feet, but it consistently from her hips to her knees  She has does not have loss of sensation when touching her legs, but the pain is severe enough that she did not allow her right thigh to be touched  Review of Systems:    Review of Systems   Constitutional: Positive for activity change  HENT: Negative  Eyes: Negative  Respiratory: Negative  Cardiovascular: Negative  Gastrointestinal: Positive for diarrhea  Endocrine: Negative  Genitourinary: Negative  Musculoskeletal: Positive for back pain and gait problem  Allergic/Immunologic: Negative  Neurological: Positive for numbness  B/l legs extending into feet occassionally   Hematological: Negative  Psychiatric/Behavioral: Negative          Past Medical and Surgical History:     Past Medical History:   Diagnosis Date    Asthma     Cataract     Chronic back pain     Chronic pain disorder     Back    GERD (gastroesophageal reflux disease)     HTN (hypertension)     Hyperlipidemia     Interstitial cystitis     Liver disease     Muscle cramps     Obesity     Pneumonia     Radiculopathy, lumbar region     Spondylosis, cervical, with myelopathy     Vulvovaginitis        Past Surgical History:   Procedure Laterality Date    APPENDECTOMY      BACK SURGERY      Arthrodeiss lumbar    BACK SURGERY      Spinal stereotaxis of cord    BLADDER SURGERY      Electronic bladder stimulator implantation     SECTION      x3    CHOLECYSTECTOMY      COLON SURGERY      Intestinal stricturoplasty     FINGER SURGERY      Extensor tendon repair (mallet finger)    HERNIA REPAIR      x3    HYSTERECTOMY      AL ESOPHAGOGASTRODUODENOSCOPY TRANSORAL DIAGNOSTIC N/A 2017    Procedure: ESOPHAGOGASTRODUODENOSCOPY (EGD); Surgeon: Jennifer Hillman MD;  Location: [facility]; Service: Gastroenterology    SALPINGOOPHORECTOMY      B/L       Meds/Allergies:    Prior to Admission medications    Medication Sig Start Date End Date Taking?  Authorizing Provider   albuterol 2 mg/5 mL syrup TAKE 1 TEASPOONFUL BY MOUTH EVERY 4 HOURS AS NEEDED AS DIRECTED 18  Yes Lisa Cowan PA-C   baclofen 10 mg tablet Take 10 mg by mouth 3 (three) times a day   Yes Historical Provider, MD   ergocalciferol (VITAMIN D2) 50,000 units TAKE ONE CAPSULE BY MOUTH WEEKLY 18  Yes Lisa Cowan PA-C   estradiol (ESTRACE) 0 1 mg/g vaginal cream Insert 1 g into the vagina 2 (two) times a week 18  Yes Keily Garcia MD   fenofibrate (TRIGLIDE) 160 MG tablet TAKE 1 TABLET BY MOUTH EVERY DAY WITH A MEAL 18  Yes Lisa Cowan PA-C   fluticasone [facility]) 50 mcg/act nasal spray 2 sprays into each nostril daily 17  Yes Akosua Wang DO   furosemide (LASIX) 40 mg tablet Take by mouth 17  Yes Historical Provider, MD   gabapentin (NEURONTIN) 800 mg tablet Take 800 mg by mouth 3 (three) times a day   Yes Historical Provider, MD   HYDROcodone-acetaminophen (VICODIN) 7 5-500 MG per tablet Take 1 tablet by mouth every 6 (six) hours as needed for moderate pain   Yes Historical Provider, MD   lisinopril (ZESTRIL) 10 mg tablet Take 1 tablet (10 mg total) by mouth daily 8/8/18  Yes Cass Sheppard PA-C   metoclopramide (REGLAN) 5 mg/5 mL oral solution Take 5 mg by mouth 4 (four) times a day (before meals and at bedtime)   Yes Historical Provider, MD   omega-3-acid ethyl esters (LOVAZA) 1 g capsule TAKE 2 CAPSULE TWICE DAILY 7/29/18  Yes Cass Sheppard PA-C   OxyCODONE HCl ER (OxyCONTIN) 30 MG T12A Take 30 mg by mouth 3 (three) times a day as needed for moderate pain   Yes Historical Provider, MD   pantoprazole (PROTONIX) 40 mg tablet TAKE 1 TABLET BY MOUTH TWICE A DAY 30 MINUTES BEFORE BREAKFAST AND DINNER 5/2/18  Yes Cass Sheppard PA-C   pentosan polysulfate (ELMIRON) 100 mg capsule Take 100 mg by mouth 3 (three) times a day before meals 2 capsules po  3 time a day   Yes Historical Provider, MD   pravastatin (PRAVACHOL) 20 mg tablet TAKE 1 TABLET BY MOUTH EVERY DAY 6/3/18  Yes Cass Sheppard PA-C   prochlorperazine (COMPAZINE) 10 mg tablet Take 1 tablet (10 mg total) by mouth 2 (two) times a day 3/15/18  Yes Cass Sheppard PA-C   venlafaxine [facility]) 75 mg tablet Take 1 tablet (75 mg total) by mouth 2 (two) times a day with meals for 180 days 8/1/18 1/28/19 Yes Miracle Vicente MD   VENTOLIN  (70 Base) MCG/ACT inhaler INHALE 2 PUFFS 3 TIMES A DAY AS NEEDED 3/15/18  Yes Cass Sheppard PA-C   zolpidem (AMBIEN) 5 mg tablet Take 5 mg by mouth daily at bedtime as needed for sleep   Yes Historical Provider, MD     I have reviewed home medications with patient personally  Allergies: Allergies   Allergen Reactions    Clindamycin Hives     Category: Allergy;     Erythromycin Hives and Rash     Category: Allergy;     Latex Hives    Penicillins Hives and Shortness Of Breath    Morphine GI Intolerance and Vomiting     Category: Adverse Reaction;     Erythromycin Base Other (See Comments)     vomitting    Other     Penicillin V Seizures     Category:  Allergy;  Povidone Iodine Rash     Category: Adverse Reaction;     Wound Dressings Rash     Category: Adverse Reaction;        Social History:     Marital Status: /Civil Union      Patient Pre-hospital Living Situation: lives at home with , daughter, 2 grandchild  Patient Pre-hospital Level of Mobility: able to walk if properly medicated  Patient Pre-hospital Diet Restrictions: none  Substance Use History:   History   Alcohol Use No     History   Smoking Status    Never Smoker   Smokeless Tobacco    Never Used     History   Drug Use No       Family History:    non-contributory    Physical Exam:     Vitals:   Blood Pressure: (!) 189/98 (09/06/18 1022)  Pulse: 89 (09/06/18 1022)  Temperature: 97 9 °F (36 6 °C) (09/06/18 1022)  Temp Source: Oral (09/06/18 1022)  Respirations: 18 (09/06/18 1022)  Height: 5' 1" (154 9 cm) (09/06/18 1022)  Weight - Scale: 86 2 kg (190 lb) (09/06/18 1022)  SpO2: 98 % (09/06/18 1022)    Physical Exam   Constitutional: She is oriented to person, place, and time  She appears well-developed and well-nourished  Slightly diaphoretic as taking history   HENT:   Head: Normocephalic  Eyes: Pupils are equal, round, and reactive to light  Neck: Normal range of motion  Neck supple  Cardiovascular: Normal rate and regular rhythm  Pulmonary/Chest: Effort normal and breath sounds normal    Abdominal: Soft  Bowel sounds are normal  She exhibits no distension  There is no tenderness  Musculoskeletal: She exhibits no edema  Neurological: She is alert and oriented to person, place, and time  Skin: Skin is warm  Bruising noted  She is diaphoretic  Psychiatric: Her mood appears anxious  Patient stated that she was very anxious about getting the pain medication situation taken care of and was discussing this with the nurse prior to H&P           Additional Data:     Lab Results: I have personally reviewed pertinent reports     and I have personally reviewed pertinent films in PACS      Results from last 7 days  Lab Units 09/05/18  1602   WBC Thousand/uL 7 75   HEMOGLOBIN g/dL 11 9   HEMATOCRIT % 36 7   PLATELETS Thousands/uL 445*   NEUTROS PCT % 61   LYMPHS PCT % 33   MONOS PCT % 5   EOS PCT % 0       Results from last 7 days  Lab Units 09/05/18  1602   SODIUM mmol/L 138   POTASSIUM mmol/L 4 4   CHLORIDE mmol/L 103   CO2 mmol/L 27   BUN mg/dL 25   CREATININE mg/dL 1 17   CALCIUM mg/dL 9 5   ALK PHOS U/L 33*   ALT U/L 22   AST U/L 17           Imaging: I have personally reviewed pertinent reports  Ct Lumbar Spine Without Contrast    Result Date: 9/5/2018  Narrative: CT LUMBAR SPINE INDICATION:   pain  Chronic low back pain  COMPARISON: CT lumbar myelogram March 13, 2008 TECHNIQUE:  Contiguous axial images through the lumbar spine were obtained  Sagittal and coronal reconstructions were performed  Radiation dose length product (DLP) for this visit:  934 mGy-cm   This examination, like all CT scans performed in the [facility], was performed utilizing techniques to minimize radiation dose exposure, including the use of iterative reconstruction and automated exposure control  IMAGE QUALITY:  Suboptimal due to beam hardening artifact from spinal stimulator leads, X-Stop spacers and body habitus  FINDINGS: ALIGNMENT:  Grade 1 anterolisthesis L3 on L4  VERTEBRAL BODIES:  Vertebral body heights maintained  No acute fracture or osseous destructive lesion  Transition vertebra at the lumbosacral junction with incomplete sacralization of L5  Postoperative changes F4-Z9 and O0-V3 metallic X-Stop spacer is present between the spinous processes of L2-L3 and L3-L4  This results in severe beam hardening artifact  Bilateral spinal stimulating leads posterior to the T11-L1 vertebral bodies  This results in severe beam hardening artifact  There is a stimulating lead in the left hemipelvis  DEGENERATIVE CHANGES: Lower Thoracic spine:  T11-T12: Disc space narrowing    Beam hardening artifact  T12-L1: Mild disc space narrowing  Mild disc space narrowing  Mild facet hypertrophic changes bilaterally  L1-2:  Moderate disc space narrowing  Vacuum disc  Moderate diffuse disc bulge with superimposed large central disc herniation  There is complete effacement of the thecal sac  Facet hypertrophic changes bilaterally  Severe central stenosis, severe bilateral neural foraminal narrowing and severe bilateral lateral recess narrowing  L2-3:  Mild disc space narrowing  Diffuse disc bulge  Facet hypertrophic changes bilaterally as well as ligamentum flavum hypertrophy  Mild bilateral neural foraminal narrowing  L3-4:  Mild disc space narrowing  Grade 1 anterolisthesis L4 on L5  Diffuse disc bulge  Facet hypertrophic changes bilaterally, right side greater than left  Ligamentum flavum hypertrophy and calcification  Severe central stenosis, severe bilateral neural foraminal narrowing, left side greater than right and severe bilateral lateral recess narrowing  L4-5:  Severe disc space narrowing  Vacuum disc  Diffuse disc bulge  Facet hypertrophic changes bilaterally as well as ligamentum flavum hypertrophy  Moderate central stenosis, moderate bilateral neural foraminal narrowing and moderate bilateral lateral recess narrowing  L5-S1:  Mild disc bulge  Facet hypertrophic changes bilaterally  PARASPINAL SOFT TISSUES:   Normal      Impression: Degenerative and postoperative changes of the lumbar spine as described above  Severe central stenosis L1-L2 secondary to large central disc herniation  Severe central stenosis L3-L4 secondary to disc bulge and facet hypertrophic changes    I personally discussed this study with CORAL Galan on 9/5/2018 at 1:34 PM  Workstation performed: [id number]       EKG, Pathology, and Other Studies Reviewed on Admission:   · none    Allscripts / Epic Records Reviewed: Yes     ** Please Note: This note has been constructed using a voice recognition system   **

## 2018-09-06 NOTE — CONSULTS
Consultation - Neurosurgery   Xiomy Rico 70 y o  female MRN: [id number]  Unit/Bed#: [facility] 929-01 Encounter: [id number]      Inpatient consult to Neurosurgery  Consult performed by: Ruddy Byrd ordered by: Phoenix Griggs          Assessment/Plan     Assessment:  1  Intractable bilateral lumbar radiculopathy   2  Acute on chronic back pain   3  Lumbar spinal stenosis [place] L1/2 and L3/4  4  History of spinal cord stimulator  5  Chronic opioid dependence  6  Obesity    Plan:  · Exam reveals diminished sensation left greater than right lateral thigh  Mild decreased hip flexor strength 4/5  · Images reviewed personally by attending  Final results below  · CT lumbar spine September 5, 2018:  Multilevel degenerative changes including ligamentum flavum hypertrophy and facet joint arthropathy  Grade 1 anterolisthesis L3 on L4 resulting in severe central canal stenosis  Assise will L1-2 disc resulting in severe central canal stenosis along with bilateral foraminal stenosis  X stop spacers noted at L L2-3 and L3-4  Spinal cord stimulator in place  · Ordered CT myelogram  · Lovenox discontinued  Repeat labs ordered for a nadya See Spoke with Brian Braxton radiology nurse who will review labs 1st thing in the morning  Anticipate testing to be done tomorrow morning  Definitive time to be determined  · Given failure of conservative management including medications and injections along with ongoing intractable pain, discussed multilevel decompression and fixation fusion  Surgery would also include the removal of X stop devices and spinal cord stimulator which the patient has not been using for some time now  · Tentative plan for surgery tomorrow afternoon if able to obtain CT myelo and OR availability  · Pain control  Appreciate acute pain service input  · Mobilize  Patient may mobilize with assistance as tolerated  · DVT prophylaxis:  Bilateral SCDs    Defer Lovenox for CT myelogram and likely surgical intervention tomorrow  · Preop labs ordered and pending  Patient made NPO after midnight  No sutures protocol ordered  · Enhanced recovery after anesthesia flag applied  · We will continue to follow  Call if any questions or concerns  History of Present Illness     HPI: Alex Mancera is a 70 y o   female with PMH including hypertension, hyperlipidemia, chronic pain syndrome with opioid dependence, and obesity who presents with complaint of severe bilateral leg pain  Patient admits to long standing history of back problems  She had a a spinal cord stimulator placed by Dr Kaiser Lewis approximately 10 years ago  Unfortunately following the procedure, patient sustained a fall breaking the wires require revision of surgery  She then head experiencing shocking feeling and has had stimulator off  She denies regularly use of for spinal cord stimulator  Patient is also had a history of Ex-stop procedure without improvement of lumbar radiculopathies  Of recent, patient states she was doing exercises in bed approximately three months ago when she awoke with significant exacerbation of her back pain  She also sustained a fall approximately two weeks ago which again exacerbated her back pain further  Patient complains of a electrical shocks going down her bilateral legs most significant in the lateral posterior region right greater than left  Patient complains of sharp shooting pain coming across her low back into her hips and bilateral groin, consistent with L1 radiculopathy  Overall, back and leg pain both worsen with increased activity  She does note some mild buttock numbness but denies any bowel/bladder dysfunction  Patient has been following with Pain Management, Dr Godfrey Walton, who has been adjusting her outpatient pain regimen  Regardless of this, she has been continued to have exacerbation of her pain and states that her legs have given out on her leading to two falls over the last month    She has had injections approximately 1-2 weeks ago without any improvement  Given her ongoing severe pain, the pain management physician directed her to the emergency department  CT imaging was completed revealing a large L1-2 disc herniation leading to severe canal stenosis and bilateral foraminal stenosis along with a grade 1 spondylolisthesis at L3-L4 with ligamentum hypertrophy and facet hypertrophy with central canal stenosis  For this reason, patient was transferred to [facility] neurosurgical evaluation was requested  Review of Systems   Constitutional: Positive for activity change  Negative for fatigue and fever  HENT: Negative for hearing loss, trouble swallowing and voice change  Eyes: Negative for photophobia and visual disturbance  Respiratory: Negative for cough and shortness of breath  Cardiovascular: Negative for chest pain and leg swelling  Gastrointestinal: Negative for abdominal pain, nausea and vomiting  Genitourinary: Negative for decreased urine volume and difficulty urinating  Musculoskeletal: Positive for back pain and gait problem  Negative for neck pain  Falls, Leg pain   Skin: Negative for pallor, rash and wound  Neurological: Positive for numbness  Negative for dizziness, tremors, seizures, speech difficulty, weakness, light-headedness and headaches  Psychiatric/Behavioral: Negative for agitation and confusion         Historical Information   Past Medical History:   Diagnosis Date    Asthma     Cataract     Chronic back pain     Chronic pain disorder     Back    GERD (gastroesophageal reflux disease)     HTN (hypertension)     Hyperlipidemia     Interstitial cystitis     Liver disease     Muscle cramps     Obesity     Pneumonia     Radiculopathy, lumbar region     Spondylosis, cervical, with myelopathy     Vulvovaginitis      Past Surgical History:   Procedure Laterality Date    APPENDECTOMY      BACK SURGERY      Arthrodeiss lumbar    BACK SURGERY      Spinal stereotaxis of cord    BLADDER SURGERY      Electronic bladder stimulator implantation     SECTION      x3    CHOLECYSTECTOMY      COLON SURGERY      Intestinal stricturoplasty     FINGER SURGERY      Extensor tendon repair (mallet finger)    HERNIA REPAIR      x3    HYSTERECTOMY      IL ESOPHAGOGASTRODUODENOSCOPY TRANSORAL DIAGNOSTIC N/A 2017    Procedure: ESOPHAGOGASTRODUODENOSCOPY (EGD); Surgeon: Augustin Cuello MD;  Location: [facility];   Service: Gastroenterology    SALPINGOOPHORECTOMY      B/L     History   Alcohol Use No     History   Drug Use No     History   Smoking Status    Never Smoker   Smokeless Tobacco    Never Used     Family History   Problem Relation Age of Onset    Coronary artery disease Brother         Patient has 3 brothers with coronary artery disease    Diabetes Mother     Throat cancer Mother     COPD Father     Cancer Father     Bipolar disorder Sister     Drug abuse Sister     Alcohol abuse Sister        Meds/Allergies   all current active meds have been reviewed, current meds:   Current Facility-Administered Medications   Medication Dose Route Frequency    albuterol (PROVENTIL HFA,VENTOLIN HFA) inhaler 2 puff  2 puff Inhalation Q4H PRN    baclofen tablet 10 mg  10 mg Oral TID    fenofibrate (TRICOR) tablet 168 mg  168 mg Oral Daily    fish oil capsule 1,000 mg  1,000 mg Oral Daily    fluticasone (FLONASE) 50 mcg/act nasal spray 2 spray  2 spray Nasal Daily    furosemide (LASIX) tablet 40 mg  40 mg Oral Daily    gabapentin (NEURONTIN) capsule 800 mg  800 mg Oral TID    hydrALAZINE (APRESOLINE) injection 5 mg  5 mg Intravenous Q6H PRN    HYDROcodone-acetaminophen (NORCO) 5-325 mg per tablet 2 tablet  2 tablet Oral Q4H PRN    HYDROmorphone (DILAUDID) injection 0 5 mg  0 5 mg Intravenous Q4H PRN    lidocaine (LIDODERM) 5 % patch 1 patch  1 patch Transdermal Daily    lisinopril (ZESTRIL) tablet 10 mg  10 mg Oral Daily    ondansetron (ZOFRAN) injection 4 mg  4 mg Intravenous Q6H PRN    oxyCODONE (OxyCONTIN) 12 hr tablet 30 mg  30 mg Oral Q8H [facility]    pantoprazole (PROTONIX) EC tablet 40 mg  40 mg Oral BID AC    pentosan polysulfate (ELMIRON) capsule 100 mg  100 mg Oral TID AC    pravastatin (PRAVACHOL) tablet 20 mg  20 mg Oral Daily With Dinner    venlafaxine [facility]) tablet 75 mg  75 mg Oral BID With Meals    zolpidem (AMBIEN) tablet 5 mg  5 mg Oral HS PRN    and PTA meds:   Prior to Admission Medications   Prescriptions Last Dose Informant Patient Reported? Taking?    HYDROcodone-acetaminophen (VICODIN) 7 5-500 MG per tablet 2018 at Unknown time  Yes Yes   Sig: Take 1 tablet by mouth every 6 (six) hours as needed for moderate pain   OxyCODONE HCl ER (OxyCONTIN) 30 MG T12A 2018  Yes Yes   Sig: Take 30 mg by mouth 3 (three) times a day as needed for moderate pain   VENTOLIN  (90 Base) MCG/ACT inhaler 2018  No Yes   Sig: INHALE 2 PUFFS 3 TIMES A DAY AS NEEDED   albuterol 2 mg/5 mL syrup 2018 at Unknown time  No Yes   Sig: TAKE 1 TEASPOONFUL BY MOUTH EVERY 4 HOURS AS NEEDED AS DIRECTED   baclofen 10 mg tablet 2018 at Unknown time  Yes Yes   Sig: Take 10 mg by mouth 3 (three) times a day   ergocalciferol (VITAMIN D2) 50,000 units 2018 at Unknown time  No Yes   Sig: TAKE ONE CAPSULE BY MOUTH WEEKLY   estradiol (ESTRACE) 0 1 mg/g vaginal cream 2018 at Unknown time  No Yes   Sig: Insert 1 g into the vagina 2 (two) times a week   fenofibrate (TRIGLIDE) 160 MG tablet 2018 at Unknown time  No Yes   Sig: TAKE 1 TABLET BY MOUTH EVERY DAY WITH A MEAL   fluticasone (FLONASE) 50 mcg/act nasal spray 2018 at Unknown time  No Yes   Si sprays into each nostril daily   furosemide (LASIX) 40 mg tablet 2018 at Unknown time  Yes Yes   Sig: Take by mouth   gabapentin (NEURONTIN) 800 mg tablet 2018 at Unknown time  Yes Yes   Sig: Take 800 mg by mouth 3 (three) times a day   lisinopril (ZESTRIL) 10 mg tablet 9/5/2018 at Unknown time  No Yes   Sig: Take 1 tablet (10 mg total) by mouth daily   metoclopramide (REGLAN) 5 mg/5 mL oral solution 9/5/2018 at Unknown time Self Yes Yes   Sig: Take 5 mg by mouth 4 (four) times a day (before meals and at bedtime)   omega-3-acid ethyl esters (LOVAZA) 1 g capsule 9/5/2018 at Unknown time  No Yes   Sig: TAKE 2 CAPSULE TWICE DAILY   pantoprazole (PROTONIX) 40 mg tablet 9/6/2018  No Yes   Sig: TAKE 1 TABLET BY MOUTH TWICE A DAY 30 MINUTES BEFORE BREAKFAST AND DINNER   pentosan polysulfate (ELMIRON) 100 mg capsule 9/6/2018  Yes Yes   Sig: Take 100 mg by mouth 3 (three) times a day before meals 2 capsules po  3 time a day   pravastatin (PRAVACHOL) 20 mg tablet 9/6/2018  No Yes   Sig: TAKE 1 TABLET BY MOUTH EVERY DAY   prochlorperazine (COMPAZINE) 10 mg tablet 9/6/2018  No Yes   Sig: Take 1 tablet (10 mg total) by mouth 2 (two) times a day   venlafaxine (EFFEXOR) 75 mg tablet 9/6/2018  No Yes   Sig: Take 1 tablet (75 mg total) by mouth 2 (two) times a day with meals for 180 days   zolpidem (AMBIEN) 5 mg tablet 9/6/2018  Yes Yes   Sig: Take 5 mg by mouth daily at bedtime as needed for sleep      Facility-Administered Medications: None     Allergies   Allergen Reactions    Clindamycin Hives     Category: Allergy;     Erythromycin Hives and Rash     Category: Allergy;     Latex Hives    Penicillins Hives and Shortness Of Breath    Morphine GI Intolerance and Vomiting     Category: Adverse Reaction;     Erythromycin Base Other (See Comments)     vomitting    Other     Penicillin V Seizures     Category: Allergy;     Povidone Iodine Rash     Category: Adverse Reaction;     Wound Dressings Rash     Category: Adverse Reaction;        Objective   I/O       09/04 0701 - 09/05 0700 09/05 0701 - 09/06 0700 09/06 0701 - 09/07 0700    P  O    240    Total Intake(mL/kg)   240 (2 8)    Urine (mL/kg/hr)   300    Total Output     300    Net     -60                 Physical Exam Constitutional: She is oriented to person, place, and time  She appears well-developed and well-nourished  No distress  HENT:   Head: Normocephalic and atraumatic  Eyes: Right eye exhibits no discharge  Left eye exhibits no discharge  No scleral icterus  Neck: Normal range of motion  Neck supple  Cardiovascular: Normal rate  Pulmonary/Chest: Effort normal  No respiratory distress  She has no wheezes  Abdominal: Soft  She exhibits no distension  There is no tenderness  Obese   Musculoskeletal: She exhibits tenderness (lumbar)  Neurological: She is oriented to person, place, and time  Reflex Scores:       Patellar reflexes are 1+ on the right side and 1+ on the left side  Skin: Skin is warm and dry  No rash noted  Ecchymosis left shin   Psychiatric: She has a normal mood and affect  Her speech is normal and behavior is normal  Judgment and thought content normal      Neurologic Exam     Mental Status   Oriented to person, place, and time  Follows 2 step commands  Attention: normal  Concentration: normal    Speech: speech is normal   Level of consciousness: alert  Knowledge: good  Normal comprehension  Cranial Nerves     CN III, IV, VI   Conjugate gaze: present    CN VII   Facial expression full, symmetric  CN VIII   Hearing: intact    CN XI   Right trapezius strength: normal  Left trapezius strength: normal    Motor Exam   Muscle bulk: normal  Overall muscle tone: normal    Strength   Strength 5/5 except as noted     Right iliopsoas: 4/5  Left iliopsoas: 4/5    Sensory Exam   Diminished bilateral lateral thigh     Gait, Coordination, and Reflexes     Tremor   Resting tremor: absent  Intention tremor: absent  Action tremor: absent    Reflexes   Right patellar: 1+  Left patellar: 1+  Right Mcleod: absent  Left Mcleod: absent  Right ankle clonus: absent  Left ankle clonus: absent      Vitals:Blood pressure (!) 184/84, pulse 84, temperature 98 1 °F (36 7 °C), temperature source Oral, resp  rate 18, height 5' 1" (1 549 m), weight 86 2 kg (190 lb), SpO2 97 %, not currently breastfeeding  ,Body mass index is 35 9 kg/m²  Lab Results:     Results from last 7 days  Lab Units 09/05/18  1602   WBC Thousand/uL 7 75   HEMOGLOBIN g/dL 11 9   HEMATOCRIT % 36 7   PLATELETS Thousands/uL 445*   NEUTROS PCT % 61   MONOS PCT % 5       Results from last 7 days  Lab Units 09/05/18  1602   SODIUM mmol/L 138   POTASSIUM mmol/L 4 4   CHLORIDE mmol/L 103   CO2 mmol/L 27   BUN mg/dL 25   CREATININE mg/dL 1 17   CALCIUM mg/dL 9 5   ALK PHOS U/L 33*   ALT U/L 22   AST U/L 17                 No results found for: TROPONINT  ABG:No results found for: PHART, YPV3SIZ, PO2ART, UHO6PNC, B2ZETNBJ, BEART, SOURCE    Imaging Studies: I have personally reviewed pertinent reports  and I have personally reviewed pertinent films in PACS    Ct Lumbar Spine Without Contrast    Result Date: 9/5/2018  Narrative: CT LUMBAR SPINE INDICATION:   pain  Chronic low back pain  COMPARISON: CT lumbar myelogram March 13, 2008 TECHNIQUE:  Contiguous axial images through the lumbar spine were obtained  Sagittal and coronal reconstructions were performed  Radiation dose length product (DLP) for this visit:  934 mGy-cm   This examination, like all CT scans performed in the [facility], was performed utilizing techniques to minimize radiation dose exposure, including the use of iterative reconstruction and automated exposure control  IMAGE QUALITY:  Suboptimal due to beam hardening artifact from spinal stimulator leads, X-Stop spacers and body habitus  FINDINGS: ALIGNMENT:  Grade 1 anterolisthesis L3 on L4  VERTEBRAL BODIES:  Vertebral body heights maintained  No acute fracture or osseous destructive lesion  Transition vertebra at the lumbosacral junction with incomplete sacralization of L5  Postoperative changes T7-E6 and U3-G2 metallic X-Stop spacer is present between the spinous processes of L2-L3 and L3-L4    This results in severe beam hardening artifact  Bilateral spinal stimulating leads posterior to the T11-L1 vertebral bodies  This results in severe beam hardening artifact  There is a stimulating lead in the left hemipelvis  DEGENERATIVE CHANGES: Lower Thoracic spine:  T11-T12: Disc space narrowing  Beam hardening artifact  T12-L1: Mild disc space narrowing  Mild disc space narrowing  Mild facet hypertrophic changes bilaterally  L1-2:  Moderate disc space narrowing  Vacuum disc  Moderate diffuse disc bulge with superimposed large central disc herniation  There is complete effacement of the thecal sac  Facet hypertrophic changes bilaterally  Severe central stenosis, severe bilateral neural foraminal narrowing and severe bilateral lateral recess narrowing  L2-3:  Mild disc space narrowing  Diffuse disc bulge  Facet hypertrophic changes bilaterally as well as ligamentum flavum hypertrophy  Mild bilateral neural foraminal narrowing  L3-4:  Mild disc space narrowing  Grade 1 anterolisthesis L4 on L5  Diffuse disc bulge  Facet hypertrophic changes bilaterally, right side greater than left  Ligamentum flavum hypertrophy and calcification  Severe central stenosis, severe bilateral neural foraminal narrowing, left side greater than right and severe bilateral lateral recess narrowing  L4-5:  Severe disc space narrowing  Vacuum disc  Diffuse disc bulge  Facet hypertrophic changes bilaterally as well as ligamentum flavum hypertrophy  Moderate central stenosis, moderate bilateral neural foraminal narrowing and moderate bilateral lateral recess narrowing  L5-S1:  Mild disc bulge  Facet hypertrophic changes bilaterally  PARASPINAL SOFT TISSUES:   Normal      Impression: Degenerative and postoperative changes of the lumbar spine as described above  Severe central stenosis L1-L2 secondary to large central disc herniation  Severe central stenosis L3-L4 secondary to disc bulge and facet hypertrophic changes    I personally discussed this study with CORAL Romero on 9/5/2018 at 1:34 PM  Workstation performed: [id number]     EKG, Pathology, and Other Studies: I have personally reviewed pertinent reports  VTE Prophylaxis: Sequential compression device (Venodyne)  and Enoxaparin (Lovenox)    Code Status: Level 1 - Full Code  Advance Directive and Living Will:      Power of :    POLST:      Counseling / Coordination of Care  I spent 1 hour with the patient

## 2018-09-06 NOTE — ASSESSMENT & PLAN NOTE
· Review of PMED, patient follows with Dr Aroldo Rosado  · APS following, currently on Ketamine infusion, scheduled oxycontin 30 mg 8 hours, with PRN meds for BTP  · Pain tolerable at rest, will need to reassess once patient is mobile

## 2018-09-06 NOTE — ASSESSMENT & PLAN NOTE
· Patient transferred from [facility] d/t intractable pain   · CT scan shows "Degenerative and postoperative changes of the lumbar spine as described above  Severe central stenosis L1-L2 secondary to large central disc herniation    Severe central stenosis L3-L4 secondary to disc bulge and facet hypertrophic changes "  · Consult neurosurgery  · Consult acute pain service  · Continue patients home doses of gabapentin and baclofen

## 2018-09-06 NOTE — ED NOTES
Report to be called to Anusha Zapata @ [phone]    Pt to go to [facility], [facility]  09/05/18 2044

## 2018-09-06 NOTE — CONSULTS
Inpatient consult to Acute Pain Service  Consult performed by: Liang Cage ordered by: Ralph Diana        Consultation - Anesthesia Acute Pain Management   Wendie Gibbsat 70 y o  female MRN: [id number]  [facility] Encounter: [id number]               Consult Time:  39 minutes    [address] [facility]; Patient aged 72 years & older:  [address] was not discussed  Assessment/Plan   Assessment:   70year old female having acute on chronic lower back pain, in the setting of opioid dependence  Plan: PDMP REVIEWED   1  Acute on chronic pain- patient has severe pain located to her lower back, above her buttocks  Onset of pain was approximately 2 weeks ago after falling at home  States she received an steroid injection from her outpatient pain specialist, but, has gotten no relief  Admits to using more than prescribed of her pain medications, states she told her pain specialist, and was instructed to come to the ER for further evaluation  Pain is described as stabbing and radiates down both of her legs  Right side greater than left  Walking aggravates the pain  Vicodin makes the pain better  Pain is constant  Rates 10/10  After taking Vicodin pain decreases to 8/10  States she was recently switched from hydrocodone to oxycodone, but, feels the hydrocodone works much better  Recommend a multimodal pain regimen see below    Start  · Lidoderm patch, 5%, topical daily  · OxyContin SR 30 mg tablet PO TID -home medication  · Hydrocodone-acetaminophen 5-325 mg one tablet p o  q 4 hours p r n  moderate pain  · Hydrocodone-acetaminophen 5-325 mg two tablets p o  q 4 hours p r n  severe pain  · DILAUDID 0 5 MG IV Q 4 HRS PRN BREAKTHROUGH PAIN     Discontinue  · Tylenol    CONTINUE   · baclofen 10 mg PO TID - home medication  · Gabapentin 800 mg PO TID- home medication     2  BOWEL REGIMEN - start Colace senna  3  Repeat CMP to evaluate CR level and eGFR  3   PLAN OF CARE- the patient primary team to discuss plan  Patient waiting neurosurgery consult    4  PDMP FINDINGS - last filled prescriptions  On 09/04  OXYCODONE HCL 15 MG TABLET  120 0  30    Patient states this switch was not ideal    On 08/15  HYDROCODONE-ACETAMIN  MG  120 0  30     On 08/15  OXYCONTIN 30 MG TABLET  90 0  30        History of Present Illness    Admit Date:  9/6/2018  Hospital Day:  0 days  Primary Service:  General Medicine  Attending Provider:  Devi Parker MD  Physician Requesting Consult: Devi Parker MD  Reason for Consult / Principal Problem: ACUTE ON CHRONIC BACK PAIN     HPI: Elizabeth Barksdale is a 70y o  year old female who presents with acute on chronic back pain  Patient states she has been dealing with chronic back pain for years  States she has a history of falling, however, has been falling more frequently at home  States my legs just give out"  The last fall was approximately 2 weeks ago which aggravated this lower back pain  Pain is located to her lower back, above her buttocks  Rates 10/10  Described as stabbing and electrical shocks that radiate down both of her legs  Right-side greater than left side  States the only thing that is helping is Vicodin  Admits to having to take extra dose of her Vicodin prescription at home  She called her outpatient pain specialist who gave her a steroid injection, approximately 2 weeks ago, however, patient has experienced no relief  Also she recently had her Vicodin switched to p o  Oxycodone  Patient denies pain relief, stating, the Vicodin works much better  I recommended starting her home medications  I recommended using a multimodal pain relief regimen  I reviewed the PDMP and her information correlates  Pain History:  Pain History:  CHRONIC BACK PAIN  Current pain location(s):  LOWER BACK PAIN  Pain Scale:   10/10  AFTER MEDICATIONS 8/10  Severity:  STABBING IN NATURE    COMPLAINS OF ELECTRICAL SHOCKS THAT RADIATE DOWN HER LEGS       Pain Relief Goal:  5  Treatment History:  HYDROCODONE , OXYCONTIN, AMBIEN     Historical Information   Past Medical History:   Diagnosis Date    Asthma     Cataract     Chronic back pain     Chronic pain disorder     Back    GERD (gastroesophageal reflux disease)     HTN (hypertension)     Hyperlipidemia     Interstitial cystitis     Liver disease     Muscle cramps     Obesity     Pneumonia     Radiculopathy, lumbar region     Spondylosis, cervical, with myelopathy     Vulvovaginitis      Past Surgical History:   Procedure Laterality Date    APPENDECTOMY      BACK SURGERY      Arthrodeiss lumbar    BACK SURGERY      Spinal stereotaxis of cord    BLADDER SURGERY      Electronic bladder stimulator implantation     SECTION      x3    CHOLECYSTECTOMY      COLON SURGERY      Intestinal stricturoplasty     FINGER SURGERY      Extensor tendon repair (mallet finger)    HERNIA REPAIR      x3    HYSTERECTOMY      CA ESOPHAGOGASTRODUODENOSCOPY TRANSORAL DIAGNOSTIC N/A 2017    Procedure: ESOPHAGOGASTRODUODENOSCOPY (EGD); Surgeon: Jacoby Trinidad MD;  Location: [facility];   Service: Gastroenterology    SALPINGOOPHORECTOMY      B/L     Social History   History   Alcohol Use No     History   Drug Use No     History   Smoking Status    Never Smoker   Smokeless Tobacco    Never Used     Family History: non-contributory    Meds/Allergies   Current Facility-Administered Medications   Medication Dose Route Frequency    acetaminophen (TYLENOL) tablet 650 mg  650 mg Oral Q6H PRN    albuterol (PROVENTIL HFA,VENTOLIN HFA) inhaler 2 puff  2 puff Inhalation Q4H PRN    baclofen tablet 10 mg  10 mg Oral TID    enoxaparin (LOVENOX) subcutaneous injection 40 mg  40 mg Subcutaneous Daily    fenofibrate (TRICOR) tablet 168 mg  168 mg Oral Daily    fish oil capsule 1,000 mg  1,000 mg Oral Daily    fluticasone (FLONASE) 50 mcg/act nasal spray 2 spray  2 spray Nasal Daily    furosemide (LASIX) tablet 40 mg  40 mg Oral Daily    gabapentin (NEURONTIN) capsule 800 mg  800 mg Oral TID    hydrALAZINE (APRESOLINE) injection 5 mg  5 mg Intravenous Q6H PRN    HYDROcodone-acetaminophen (NORCO) 5-325 mg per tablet 1 tablet  1 tablet Oral Q6H PRN    HYDROmorphone (DILAUDID) injection 1 mg  1 mg Intravenous Q3H PRN    lisinopril (ZESTRIL) tablet 10 mg  10 mg Oral Daily    ondansetron (ZOFRAN) injection 4 mg  4 mg Intravenous Q6H PRN    pantoprazole (PROTONIX) EC tablet 40 mg  40 mg Oral BID AC    pentosan polysulfate (ELMIRON) capsule 100 mg  100 mg Oral TID AC    pravastatin (PRAVACHOL) tablet 20 mg  20 mg Oral Daily With Dinner    venlafaxine [facility]) tablet 75 mg  75 mg Oral BID With Meals    zolpidem (AMBIEN) tablet 5 mg  5 mg Oral HS PRN     Prescriptions Prior to Admission   Medication    albuterol 2 mg/5 mL syrup    baclofen 10 mg tablet    ergocalciferol (VITAMIN D2) 50,000 units    estradiol (ESTRACE) 0 1 mg/g vaginal cream    fenofibrate (TRIGLIDE) 160 MG tablet    fluticasone (FLONASE) 50 mcg/act nasal spray    furosemide (LASIX) 40 mg tablet    gabapentin (NEURONTIN) 800 mg tablet    HYDROcodone-acetaminophen (VICODIN) 7 5-500 MG per tablet    lisinopril (ZESTRIL) 10 mg tablet    metoclopramide (REGLAN) 5 mg/5 mL oral solution    omega-3-acid ethyl esters (LOVAZA) 1 g capsule    OxyCODONE HCl ER (OxyCONTIN) 30 MG T12A    pantoprazole (PROTONIX) 40 mg tablet    pentosan polysulfate (ELMIRON) 100 mg capsule    pravastatin (PRAVACHOL) 20 mg tablet    prochlorperazine (COMPAZINE) 10 mg tablet    venlafaxine (EFFEXOR) 75 mg tablet    VENTOLIN  (90 Base) MCG/ACT inhaler    zolpidem (AMBIEN) 5 mg tablet         Allergies   Allergen Reactions    Clindamycin Hives     Category: Allergy;     Erythromycin Hives and Rash     Category:  Allergy;     Latex Hives    Penicillins Hives and Shortness Of Breath    Morphine GI Intolerance and Vomiting Category: Adverse Reaction;     Erythromycin Base Other (See Comments)     vomitting    Other     Penicillin V Seizures     Category: Allergy;     Povidone Iodine Rash     Category: Adverse Reaction;     Wound Dressings Rash     Category: Adverse Reaction;        Objective   HR:  [74-92] 87  Resp:  [15-22] 16  BP: (141-228)/() 179/89    Intake/Output Summary (Last 24 hours) at 09/06/18 1116  Last data filed at 09/06/18 1001   Gross per 24 hour   Intake              240 ml   Output              300 ml   Net              -60 ml        Results: I have personally reviewed pertinent labs  Imaging Studies: I have personally reviewed pertinent reports  Counseling / Coordination of Care  Total floor / unit time spent today 45 minutes minutes  Greater than 50% of total time was spent with the patient and / or family counseling and / or coordination of care  Please note that the APS provides consultative services regarding pain management only  With the exception of ketamine and epidural infusions and except when indicated, final decisions regarding starting or changing doses of analgesic medications are at the discretion of the consulting service  Off hours consultation and/or medication management is generally not available  PIA Sawant  September 6, 2018  11:16 AM            Review of Systems   Constitutional: Positive for activity change  Negative for chills and fever  Respiratory: Negative for cough, choking, chest tightness and shortness of breath  Gastrointestinal: Negative for abdominal distention  Genitourinary: Negative for difficulty urinating  Musculoskeletal: Positive for arthralgias, back pain, gait problem, joint swelling and myalgias  Neurological: Negative for dizziness, seizures, facial asymmetry, light-headedness, numbness and headaches  Psychiatric/Behavioral: Negative for agitation, behavioral problems, confusion, decreased concentration and dysphoric mood  Physical Exam   Constitutional: She is oriented to person, place, and time  She appears well-developed and well-nourished  No distress  HENT:   Head: Normocephalic  Eyes: Conjunctivae are normal  Pupils are equal, round, and reactive to light  Neck: Normal range of motion  Cardiovascular: Normal rate and regular rhythm  Pulmonary/Chest: Effort normal    Abdominal: She exhibits no distension  There is no tenderness  There is no rebound  Musculoskeletal: She exhibits edema, tenderness and deformity  Neurological: She is alert and oriented to person, place, and time  Skin: Skin is warm and dry  No rash noted  She is not diaphoretic  No erythema  Psychiatric: She has a normal mood and affect   Her behavior is normal  Judgment and thought content normal

## 2018-09-06 NOTE — ED NOTES
Received a call from Tj Whitman at [facility]   Patient transport time is now 7:30AM  Patient top be transported by [facility]  EMS     Marky Hoffman  09/06/18 [id number]

## 2018-09-06 NOTE — H&P
H&P- [facility], [place], operated by [facility] RickeyVirden Isac 1955, 70 y o  female MRN: [id number]    Unit/Bed#: [place] 929-01 Encounter: [id number]    Primary Care Provider: Gina Martinez MD   Date and time admitted to hospital: 9/6/2018  8:36 AM        * Lumbar canal stenosis with diffuse disc bulge   Assessment & Plan    · Patient transferred from [facility] d/t intractable pain   · CT scan shows "Degenerative and postoperative changes of the lumbar spine as described above  Severe central stenosis L1-L2 secondary to large central disc herniation    Severe central stenosis L3-L4 secondary to disc bulge and facet hypertrophic changes "  · Consult neurosurgery  · Consult acute pain service  · Continue patients home doses of gabapentin and baclofen         Multiple falls   Assessment & Plan    · Secondary to radiculopathy   · Await neurosurgery evaluation prior to PT/OT        Hyperlipidemia   Assessment & Plan    · Continue statin        Radiculopathy, lumbar region   Assessment & Plan    · Suspect secondary lumbar stenosis  · Continue gabapentin and baclofen  · Neurosurgery consulted          Mild intermittent asthma without complication   Assessment & Plan    · Without acute exacerbation   · Continue home meds   · Encourage incentive spirometry         Essential (primary) hypertension   Assessment & Plan    · BP elevated suspected secondary pain  · Continue home meds  · Add PRN hydralazine SBP > 180  · Monitor         Continuous opioid dependence (HCC)   Assessment & Plan    · Review of PMED, patient follows with Dr Stephon Ugarte  · Consult acute pain service for assistance with pain control         Acute bilateral back pain, intractable   Assessment & Plan    · Patient with history of severe lumbar stenosis and radiculopathy   · Consult neurosurgery   · Consult pain management  · Pain regimen             VTE Prophylaxis: Enoxaparin (Lovenox)  / sequential compression device   Code Status: full code  POLST: POLST form is not discussed and not completed at this time  Discussion with family:     Anticipated Length of Stay:  Patient will be admitted on an Inpatient basis with an anticipated length of stay of  > 2 midnights  Justification for Hospital Stay: intractable back need for further workup, unable to ambulate due to pain    Total Time for Visit, including Counseling / Coordination of Care: 45 minutes  Greater than 50% of this total time spent on direct patient counseling and coordination of care  Chief Complaint:   "My low back is killing me "    History of Present Illness:    David Purcell is a 70 y o  female who presents with intractable low back pain for the past three months  She has been seeing her pain management doctor, Dr Hiwot Aguirre, who has been prescribing her a cocktail of oxycodone, OxyContin, and vicodin  He has also been giving her steroid injections and she states that this has been working up until two weeks ago  It is until last night that her pain became unbearable, 10/10 pain that Dr Hiwot Aguirre advised her to come to the ER  Patient has been falling because of she has radiculopathy from her hips to her feet, numbness, tingling, and "stabbing like nails" in her feet at times  The radiculopathy is constant from her hips to her knees and is only occasional from her knees to her feet  Patient states that she is unable to walk unless her pain is adequately controlled  Sometimes the pain is so severe she has difficulty moving her upper extremities to eat as well  Review of Systems:    Review of Systems   Constitutional: Positive for activity change  HENT: Negative  Eyes: Negative  Respiratory: Negative  Cardiovascular: Negative  Gastrointestinal: Positive for diarrhea  Endocrine: Negative  Genitourinary: Negative  Musculoskeletal: Positive for back pain and gait problem  Unable to walk unless pain is adequately controlled    Neurological: Positive for weakness and numbness   Negative for dizziness and headaches  Past Medical and Surgical History:     Past Medical History:   Diagnosis Date    Asthma     Cataract     Chronic back pain     Chronic pain disorder     Back    GERD (gastroesophageal reflux disease)     HTN (hypertension)     Hyperlipidemia     Interstitial cystitis     Liver disease     Muscle cramps     Obesity     Pneumonia     Radiculopathy, lumbar region     Spondylosis, cervical, with myelopathy     Vulvovaginitis        Past Surgical History:   Procedure Laterality Date    APPENDECTOMY      BACK SURGERY      Arthrodeiss lumbar    BACK SURGERY      Spinal stereotaxis of cord    BLADDER SURGERY      Electronic bladder stimulator implantation     SECTION      x3    CHOLECYSTECTOMY      COLON SURGERY      Intestinal stricturoplasty     FINGER SURGERY      Extensor tendon repair (mallet finger)    HERNIA REPAIR      x3    HYSTERECTOMY      MO ESOPHAGOGASTRODUODENOSCOPY TRANSORAL DIAGNOSTIC N/A 2017    Procedure: ESOPHAGOGASTRODUODENOSCOPY (EGD); Surgeon: James Spear MD;  Location: [facility]; Service: Gastroenterology    SALPINGOOPHORECTOMY      B/L       Meds/Allergies:    Prior to Admission medications    Medication Sig Start Date End Date Taking?  Authorizing Provider   albuterol 2 mg/5 mL syrup TAKE 1 TEASPOONFUL BY MOUTH EVERY 4 HOURS AS NEEDED AS DIRECTED 18  Yes Gloria Edwards PA-C   baclofen 10 mg tablet Take 10 mg by mouth 3 (three) times a day   Yes Historical Provider, MD   ergocalciferol (VITAMIN D2) 50,000 units TAKE ONE CAPSULE BY MOUTH WEEKLY 18  Yes Gloria Edwards PA-C   estradiol (ESTRACE) 0 1 mg/g vaginal cream Insert 1 g into the vagina 2 (two) times a week 18  Yes Valeria Washington MD   fenofibrate (TRIGLIDE) 160 MG tablet TAKE 1 TABLET BY MOUTH EVERY DAY WITH A MEAL 18  Yes Gloria Edwards PA-C   fluticasone [facility]) 50 mcg/act nasal spray 2 sprays into each nostril daily 17  Yes Jah Evans DO furosemide (LASIX) 40 mg tablet Take by mouth 6/6/17  Yes Historical Provider, MD   gabapentin (NEURONTIN) 800 mg tablet Take 800 mg by mouth 3 (three) times a day   Yes Historical Provider, MD   HYDROcodone-acetaminophen (VICODIN) 7 5-500 MG per tablet Take 1 tablet by mouth every 6 (six) hours as needed for moderate pain   Yes Historical Provider, MD   lisinopril (ZESTRIL) 10 mg tablet Take 1 tablet (10 mg total) by mouth daily 8/8/18  Yes Cristal Roach PA-C   metoclopramide (REGLAN) 5 mg/5 mL oral solution Take 5 mg by mouth 4 (four) times a day (before meals and at bedtime)   Yes Historical Provider, MD   omega-3-acid ethyl esters (LOVAZA) 1 g capsule TAKE 2 CAPSULE TWICE DAILY 7/29/18  Yes Cristal Roach PA-C   OxyCODONE HCl ER (OxyCONTIN) 30 MG T12A Take 30 mg by mouth 3 (three) times a day as needed for moderate pain   Yes Historical Provider, MD   pantoprazole (PROTONIX) 40 mg tablet TAKE 1 TABLET BY MOUTH TWICE A DAY 30 MINUTES BEFORE BREAKFAST AND DINNER 5/2/18  Yes Cristal Roach PA-C   pentosan polysulfate (ELMIRON) 100 mg capsule Take 100 mg by mouth 3 (three) times a day before meals 2 capsules po  3 time a day   Yes Historical Provider, MD   pravastatin (PRAVACHOL) 20 mg tablet TAKE 1 TABLET BY MOUTH EVERY DAY 6/3/18  Yes Cristal Roach PA-C   prochlorperazine (COMPAZINE) 10 mg tablet Take 1 tablet (10 mg total) by mouth 2 (two) times a day 3/15/18  Yes Cristal Roach PA-C   venlafaxine [facility]) 75 mg tablet Take 1 tablet (75 mg total) by mouth 2 (two) times a day with meals for 180 days 8/1/18 1/28/19 Yes Leesa Young MD   VENTOLIN  (22 Base) MCG/ACT inhaler INHALE 2 PUFFS 3 TIMES A DAY AS NEEDED 3/15/18  Yes Cristal Roach PA-C   zolpidem (AMBIEN) 5 mg tablet Take 5 mg by mouth daily at bedtime as needed for sleep   Yes Historical Provider, MD     I have reviewed home medications with patient personally  Allergies: Allergies   Allergen Reactions    Clindamycin Hives     Category:  Allergy;  Erythromycin Hives and Rash     Category: Allergy;     Latex Hives    Penicillins Hives and Shortness Of Breath    Morphine GI Intolerance and Vomiting     Category: Adverse Reaction;     Erythromycin Base Other (See Comments)     vomitting    Other     Penicillin V Seizures     Category: Allergy;     Povidone Iodine Rash     Category: Adverse Reaction;     Wound Dressings Rash     Category: Adverse Reaction;        Social History:     Marital Status: /Civil Union     Patient Pre-hospital Living Situation: Patient lives at home with , daughter, and 2 grandchild  Patient Pre-hospital Level of Mobility: independent if able to be medicated properly, otherwise cannot walk  Patient Pre-hospital Diet Restrictions: none  Substance Use History:   History   Alcohol Use No     History   Smoking Status    Never Smoker   Smokeless Tobacco    Never Used     History   Drug Use No       Family History:    Family History   Problem Relation Age of Onset    Coronary artery disease Brother         Patient has 3 brothers with coronary artery disease    Diabetes Mother     Throat cancer Mother     COPD Father     Cancer Father     Bipolar disorder Sister     Drug abuse Sister     Alcohol abuse Sister        Physical Exam:     Vitals:   Blood Pressure: 150/90 (09/06/18 1644)  Pulse: 88 (09/06/18 1644)  Temperature: 98 1 °F (36 7 °C) (09/06/18 1515)  Temp Source: Oral (09/06/18 1515)  Respirations: 18 (09/06/18 1515)  Height: 5' 1" (154 9 cm) (09/06/18 1022)  Weight - Scale: 86 2 kg (190 lb) (09/06/18 1022)  SpO2: 97 % (09/06/18 1515)    Physical Exam   Constitutional: She is oriented to person, place, and time  She appears well-developed and well-nourished  HENT:   Head: Normocephalic  Eyes: Pupils are equal, round, and reactive to light  Neck: Normal range of motion  Neck supple  Cardiovascular: Normal rate and regular rhythm      Pulmonary/Chest: Effort normal and breath sounds normal  No respiratory distress  She has no wheezes  She has no rales  She exhibits tenderness  Abdominal: Soft  Musculoskeletal: She exhibits tenderness  She exhibits no edema  Would not allow for examination of R thigh, tender to touch, no visible bruises or abrasions   Neurological: She is alert and oriented to person, place, and time  Skin: Skin is warm  She is diaphoretic  There is erythema  Psychiatric: Her mood appears anxious  Anxious regardingt adequate pain management  Reassured about pain management consult       Additional Data:     Lab Results: I have personally reviewed pertinent reports  Results from last 7 days  Lab Units 09/05/18  1602   WBC Thousand/uL 7 75   HEMOGLOBIN g/dL 11 9   HEMATOCRIT % 36 7   PLATELETS Thousands/uL 445*   NEUTROS PCT % 61   LYMPHS PCT % 33   MONOS PCT % 5   EOS PCT % 0       Results from last 7 days  Lab Units 09/05/18  1602   SODIUM mmol/L 138   POTASSIUM mmol/L 4 4   CHLORIDE mmol/L 103   CO2 mmol/L 27   BUN mg/dL 25   CREATININE mg/dL 1 17   CALCIUM mg/dL 9 5   ALK PHOS U/L 33*   ALT U/L 22   AST U/L 17                   Imaging: I have personally reviewed pertinent reports  [place] myelogram lumbar    (Results Pending)       EKG, Pathology, and Other Studies Reviewed on Admission:   · none    Allscripts / Epic Records Reviewed: Yes     ** Please Note: This note has been constructed using a voice recognition system   **

## 2018-09-06 NOTE — ASSESSMENT & PLAN NOTE
· Secondary to severe canal stenosis at the L1-2 level secondary to a large central and right paracentral disc extrusion   · S/p POD#1 posterior lumbar decompression, fusion, removal spinal cord stimulator  · APS following, continue current pain regimen

## 2018-09-06 NOTE — ASSESSMENT & PLAN NOTE
· Patient transferred from [facility] for further workup of severe central canal stenosis L3-L4 secondary disc bulge and facet hypertrophic changes  · Neurosurgery following, consultation appreciated  · S/p lumbar myelogram: Severe canal stenosis at the L1-2 level secondary to a large central and right paracentral disc extrusion resulting in near complete block of contrast   On delayed imaging contrast does extend superiorly into the lower thoracic thecal sac  At L3-4 there is grade 1 anterior spondylolisthesis with diffuse annular bulging and a small left foraminal disc protrusion  Mild posterior element hypertrophic change with moderate canal stenosis and mild to moderate left foraminal narrowing  Mild noncompressive degenerative change at the L4-5 level  · POD#1 "DECOMPRESSION SPINE LUMBAR POSTERIOR L1-4, L1-2 DISKECTOMY, POSTERIORLATERAL FUSION L3-4, REMOVAL OF SPINAL CORD STIMULATOR SYSTEM, REMOVAL OF INTERSPINOUS DEVICES"  · hemovac drain intact  · Continue patients home doses of gabapentin and baclofen    · Pain control per APS service  · PT/OT  · Per NSX ok to start VTE tomorrow  · Remove tomas

## 2018-09-06 NOTE — ED NOTES
Pt is upset for pain not being controlled, states " I want to go home" Transport at bedside  Dr Jennifer Romero at bedside with patient  [facility] leaving        Dez Mcdaniel RN  09/05/18 2049

## 2018-09-06 NOTE — ED NOTES
Report called, Dulce Khan RN requests an update when pt is ready to leave  Priyank Cowart RN made aware and will call an update for the pt        Elizabeth Chung RN  09/05/18 [id number]

## 2018-09-06 NOTE — ASSESSMENT & PLAN NOTE
· Patient with history of severe lumbar stenosis and radiculopathy   · Consult neurosurgery   · Consult pain management  · Pain regimen

## 2018-09-06 NOTE — ASSESSMENT & PLAN NOTE
· BP elevated suspected secondary pain  · Continue home meds  · Add PRN hydralazine SBP > 180  · Monitor

## 2018-09-06 NOTE — ASSESSMENT & PLAN NOTE
· Review of PMED, patient follows with Dr Radha Coulter  · Consult acute pain service for assistance with pain control

## 2018-09-06 NOTE — ED NOTES
Patient requesting pain medications  Spoke with physician and was told will receive them when patient is transported  Explained to patient that she had received 12 hr extended release 3 hrs ago and immediate release pain medications and the physician did not feel it was safe due to the numerous doses of narcotics throughout the visit  Patient started crying and yelling, "if I am not getting any more medications, then I will go home" Physician notified        Maribell Gilman, VIDA  09/06/18 [address], RN  09/06/18 4744

## 2018-09-06 NOTE — ED CARE HANDOFF
Emergency Department Sign Out Note          38-year-old female here for back pain, acute on chronic,  CT scan performed, showed lumbar stenosis disc bulge supposed to go to [place] for myelogram, unable to have MRI, she is neurologically intact  Was asked to seen evaluate the patient now, EMS was approximately an hour and half late to pick the patient up, when they arrive she was hypertensive 214/109 screaming and very upset threatening to leave that we are not treating her pain adequately  She has thus far received 6 mg of Dilaudid she received last 1 mg approximately an hour ago she is extremely upset threatening to leave  Her daughter was able to calm her down she is agreeable to transfer at this time will re-dose with an additional 2 mg of Dilaudid she has a normal gait, she is neurologically intact  Will give 10 labetalol as the EMS crew was not willing to take the patient with elevated blood pressure and subsequently left  Will  Remedicate, patient agreeable to transfer at this time if we make her comfortable, daughter at bedside transfer pending  Concern about dropping blood pressure precipitously, will titrate medication accordingly  10:40 pm- patient is awake alert comfortable she has normal vital signs she appears in no acute distress, she is requesting additional pain medication her daughter has been at bedside the entire time she does not appear sleepy or drowsy or fatigued or somnolent, she is requesting 10 mg of Vicodin  Transportation apparently coming at 12:45 p m  this evening  Will continue to monitor she remains neurologically intact    1120pm- I believe the patient has acute on chronic pain, however I also believe her to exhibit drug-seeking behavior  She is being transferred for urgent evaluation although again she remains neurologically intact after multiple, multiple re-evaluations    When she asked for more pain medicine which she does repeatedly threatening  to leave and she is unhappy, and her pain is not being treated adequately, despite being called to the room approximately every 15 or 20 min  The patient has been accommodated, and reassessed carefully with multiple very large doses of both IV and oral narcotics  Despite this she is now requesting Ambien  I explained to her that while the emergency department where not to mix Ambien and the multiple doses of narcotics she has received  There is limited availability currently for her for urgent transfer down to [place] again it again she remains neurologically intact, she appears comfortable on interviewing and multiple re-evaluations she was informed that the next ride available down there was 3:00 am, she requested to leave at 7 o'clock in the morning because she prefers to rest throughout the night and does not wish to be disturbed, 7:00 a m  would be more convenient for     1155pm- the patient is awake alert, her daughter is remained at bedside this entire time  I reviewed the patient's medication list from her pain management doctor with her at bedside as well as the drug database, with her daughter at bedside, she takes 10 mg of Vicodin Q 6 hr, this was recently changed according to the list to 15 mg of oxycodone Q 6 hr  She received both 10 mg of Vicodin as well as 10 mg of oxycodone on top of this, despite this she is still asking for additional IV narcotic pain medicine she does not wish for any additional oral pain medicine she states that her oral regimen was changed recently and was not updated on this list, this appears was prescribed to her on the drug database though  I have spent approximately an hour and half at this point in the patient's room discussing with the patient and the escalating the patient in trying to make her comfortable with multiple large doses of narcotics    Every time the room is entered she threatens to leave, stating that she is just going to go home and take her home doses of pain medicine  Had a very lengthy discussion again with the patient and her daughter, I understand that she has acute on chronic pain, trying to accommodate make the patient comfortable with all reasonable means, concerned with patient's blood pressure went from 214-120 and her sats were in the low 90s and upper 80s with her last round of pain medicine in she appeared very comfortable  Again will remit acute with IV Dilaudid and reassess, disposition is pending        0200- patient signed out she was re-evaluated by myself immediately before sign out, she is resting comfortably her daughters been at bedside the entire time she has some mild discomfort in both of her legs although again at this time she is neurologically intact, they are requesting a pillow support her legs for comfort, she does not require additional narcotics at this time she has normal vital signs she is neurologically intact  Transportation pending tentatively at 3:00 a m  Sogn Farmer  [facility]  CritCare Time      Disposition  Final diagnoses:   Lumbar stenosis   L4-L5 disc bulge     Time reflects when diagnosis was documented in both MDM as applicable and the Disposition within this note     Time User Action Codes Description Comment    2018  4:28 PM Marah Morfin Add [R20 860] Lumbar stenosis     2018  4:28 PM Lopez-Rodriguez, Ramonia Apgar R Add [M51 26] Bulge of lumbar disc without myelopathy     2018  4:28 PM Lopez-Rodriguez, Ramonia Apgar R Remove [M51 26] Bulge of lumbar disc without myelopathy     2018  4:29 PM Lopez-Rodriguez, Ramonia Apgar R Add [M51 26] L4-L5 disc bulge       ED Disposition     ED Disposition Condition Comment    Transfer to Another [facility] should be transferred out to [facility]      MD Documentation      Most Recent Value   Patient Condition  The patient has been stabilized such that within reasonable medical probability, no material deterioration of the patient condition or the condition of the unborn child(douglas) is likely to result from the transfer   Reason for Transfer  Level of Care needed not available at this facility   Benefits of Transfer  Specialized equipment and/or services available at the receiving facility (Include comment)________________________   Risks of Transfer  Potential for delay in receiving treatment   Accepting Physician  -- [Dr Almaz Meadows service and Dr Harsha Vitale, neurosurgery]   [address] Name, Rene Argueta MD  -- [Dr Lilo Gooden   Provider Certification  General risk, such as traffic hazards, adverse weather conditions, rough terrain or turbulence, possible failure of equipment (including vehicle or aircraft), or consequences of actions of persons outside the control of the transport personnel      RN Documentation      [address] Name, [address]   -- [[facility] [place]]   Level of Care  Basic life support      Follow-up Information    None       Patient's Medications   Discharge Prescriptions    No medications on file     No discharge procedures on file         ED Provider  Electronically Signed by     Peter Timmons DO  09/06/18 0202

## 2018-09-07 ENCOUNTER — APPOINTMENT (INPATIENT)
Dept: RADIOLOGY | Facility: HOSPITAL | Age: 63
DRG: 460 | End: 2018-09-07
Payer: MEDICARE

## 2018-09-07 ENCOUNTER — ANESTHESIA (INPATIENT)
Dept: PERIOP | Facility: HOSPITAL | Age: 63
DRG: 460 | End: 2018-09-07
Payer: MEDICARE

## 2018-09-07 ENCOUNTER — ANESTHESIA EVENT (INPATIENT)
Dept: PERIOP | Facility: HOSPITAL | Age: 63
DRG: 460 | End: 2018-09-07
Payer: MEDICARE

## 2018-09-07 LAB
ABO GROUP BLD: NORMAL
APTT PPP: 29 SECONDS (ref 24–36)
BLD GP AB SCN SERPL QL: NEGATIVE
ERYTHROCYTE [DISTWIDTH] IN BLOOD BY AUTOMATED COUNT: 13.9 % (ref 11.6–15.1)
EST. AVERAGE GLUCOSE BLD GHB EST-MCNC: 108 MG/DL
GLUCOSE SERPL-MCNC: 104 MG/DL (ref 65–140)
HBA1C MFR BLD: 5.4 % (ref 4.2–6.3)
HCT VFR BLD AUTO: 36.9 % (ref 34.8–46.1)
HGB BLD-MCNC: 11.8 G/DL (ref 11.5–15.4)
INR PPP: 1.02 (ref 0.86–1.17)
MCH RBC QN AUTO: 29.7 PG (ref 26.8–34.3)
MCHC RBC AUTO-ENTMCNC: 32 G/DL (ref 31.4–37.4)
MCV RBC AUTO: 93 FL (ref 82–98)
MRSA NOSE QL CULT: NORMAL
PLATELET # BLD AUTO: 385 THOUSANDS/UL (ref 149–390)
PMV BLD AUTO: 9.9 FL (ref 8.9–12.7)
PROTHROMBIN TIME: 13.5 SECONDS (ref 11.8–14.2)
RBC # BLD AUTO: 3.97 MILLION/UL (ref 3.81–5.12)
RH BLD: POSITIVE
SPECIMEN EXPIRATION DATE: NORMAL
WBC # BLD AUTO: 6.15 THOUSAND/UL (ref 4.31–10.16)

## 2018-09-07 PROCEDURE — 20936 SP BONE AGRFT LOCAL ADD-ON: CPT | Performed by: NEUROLOGICAL SURGERY

## 2018-09-07 PROCEDURE — 20930 SP BONE ALGRFT MORSEL ADD-ON: CPT | Performed by: NEUROLOGICAL SURGERY

## 2018-09-07 PROCEDURE — 85027 COMPLETE CBC AUTOMATED: CPT | Performed by: PHYSICIAN ASSISTANT

## 2018-09-07 PROCEDURE — C1713 ANCHOR/SCREW BN/BN,TIS/BN: HCPCS | Performed by: NEUROLOGICAL SURGERY

## 2018-09-07 PROCEDURE — 86850 RBC ANTIBODY SCREEN: CPT | Performed by: PHYSICIAN ASSISTANT

## 2018-09-07 PROCEDURE — 85610 PROTHROMBIN TIME: CPT | Performed by: PHYSICIAN ASSISTANT

## 2018-09-07 PROCEDURE — 88305 TISSUE EXAM BY PATHOLOGIST: CPT | Performed by: PATHOLOGY

## 2018-09-07 PROCEDURE — 85730 THROMBOPLASTIN TIME PARTIAL: CPT | Performed by: PHYSICIAN ASSISTANT

## 2018-09-07 PROCEDURE — 63030 LAMOT DCMPRN NRV RT 1 LMBR: CPT | Performed by: NEUROLOGICAL SURGERY

## 2018-09-07 PROCEDURE — 22612 ARTHRD PST TQ 1NTRSPC LUMBAR: CPT | Performed by: NEUROLOGICAL SURGERY

## 2018-09-07 PROCEDURE — 86901 BLOOD TYPING SEROLOGIC RH(D): CPT | Performed by: PHYSICIAN ASSISTANT

## 2018-09-07 PROCEDURE — 63662 REMOVE SPINE ELTRD PLATE: CPT | Performed by: NEUROLOGICAL SURGERY

## 2018-09-07 PROCEDURE — 63688 REV/RMV IMP SP NPG/R DTCH CN: CPT | Performed by: NEUROLOGICAL SURGERY

## 2018-09-07 PROCEDURE — 00PV0MZ REMOVAL OF NEUROSTIMULATOR LEAD FROM SPINAL CORD, OPEN APPROACH: ICD-10-PCS | Performed by: NEUROLOGICAL SURGERY

## 2018-09-07 PROCEDURE — 22840 INSERT SPINE FIXATION DEVICE: CPT | Performed by: NEUROLOGICAL SURGERY

## 2018-09-07 PROCEDURE — 62304 MYELOGRAPHY LUMBAR INJECTION: CPT

## 2018-09-07 PROCEDURE — 63005 REMOVE SPINE LAMINA 1/2 LMBR: CPT | Performed by: NEUROLOGICAL SURGERY

## 2018-09-07 PROCEDURE — 0SB20ZZ EXCISION OF LUMBAR VERTEBRAL DISC, OPEN APPROACH: ICD-10-PCS | Performed by: NEUROLOGICAL SURGERY

## 2018-09-07 PROCEDURE — 86900 BLOOD TYPING SEROLOGIC ABO: CPT | Performed by: PHYSICIAN ASSISTANT

## 2018-09-07 PROCEDURE — 82948 REAGENT STRIP/BLOOD GLUCOSE: CPT

## 2018-09-07 PROCEDURE — 0SG0071 FUSION OF LUMBAR VERTEBRAL JOINT WITH AUTOLOGOUS TISSUE SUBSTITUTE, POSTERIOR APPROACH, POSTERIOR COLUMN, OPEN APPROACH: ICD-10-PCS | Performed by: NEUROLOGICAL SURGERY

## 2018-09-07 PROCEDURE — 83036 HEMOGLOBIN GLYCOSYLATED A1C: CPT | Performed by: PHYSICIAN ASSISTANT

## 2018-09-07 PROCEDURE — 72131 CT LUMBAR SPINE W/O DYE: CPT

## 2018-09-07 PROCEDURE — 99232 SBSQ HOSP IP/OBS MODERATE 35: CPT | Performed by: NURSE PRACTITIONER

## 2018-09-07 PROCEDURE — 72100 X-RAY EXAM L-S SPINE 2/3 VWS: CPT

## 2018-09-07 PROCEDURE — 00PU0YZ REMOVAL OF OTHER DEVICE FROM SPINAL CANAL, OPEN APPROACH: ICD-10-PCS | Performed by: NEUROLOGICAL SURGERY

## 2018-09-07 DEVICE — SET SCREW 5440030 4.75 TI NS BRK OFF
Type: IMPLANTABLE DEVICE | Site: SPINE LUMBAR | Status: FUNCTIONAL
Brand: CD HORIZON® SPINAL SYSTEM

## 2018-09-07 DEVICE — DBM 006005 PROGENIX PLUS 5CC
Type: IMPLANTABLE DEVICE | Site: SPINE LUMBAR | Status: FUNCTIONAL
Brand: PROGENIX® PUTTY AND PROGENIX® PLUS

## 2018-09-07 RX ORDER — BUPIVACAINE HYDROCHLORIDE 2.5 MG/ML
INJECTION, SOLUTION EPIDURAL; INFILTRATION; INTRACAUDAL AS NEEDED
Status: DISCONTINUED | OUTPATIENT
Start: 2018-09-07 | End: 2018-09-07 | Stop reason: HOSPADM

## 2018-09-07 RX ORDER — FENTANYL CITRATE 50 UG/ML
INJECTION, SOLUTION INTRAMUSCULAR; INTRAVENOUS AS NEEDED
Status: DISCONTINUED | OUTPATIENT
Start: 2018-09-07 | End: 2018-09-07 | Stop reason: SURG

## 2018-09-07 RX ORDER — LIDOCAINE HYDROCHLORIDE 10 MG/ML
INJECTION, SOLUTION INFILTRATION; PERINEURAL AS NEEDED
Status: DISCONTINUED | OUTPATIENT
Start: 2018-09-07 | End: 2018-09-07 | Stop reason: SURG

## 2018-09-07 RX ORDER — CHLORHEXIDINE GLUCONATE 0.12 MG/ML
15 RINSE ORAL
Status: DISCONTINUED | OUTPATIENT
Start: 2018-09-07 | End: 2018-09-11 | Stop reason: HOSPADM

## 2018-09-07 RX ORDER — LIDOCAINE HYDROCHLORIDE 10 MG/ML
10 INJECTION, SOLUTION INFILTRATION; PERINEURAL
Status: COMPLETED | OUTPATIENT
Start: 2018-09-07 | End: 2018-09-07

## 2018-09-07 RX ORDER — PROPOFOL 10 MG/ML
INJECTION, EMULSION INTRAVENOUS CONTINUOUS PRN
Status: DISCONTINUED | OUTPATIENT
Start: 2018-09-07 | End: 2018-09-07 | Stop reason: SURG

## 2018-09-07 RX ORDER — VANCOMYCIN HYDROCHLORIDE 1 G/200ML
1000 INJECTION, SOLUTION INTRAVENOUS ONCE
Status: DISCONTINUED | OUTPATIENT
Start: 2018-09-07 | End: 2018-09-07 | Stop reason: HOSPADM

## 2018-09-07 RX ORDER — ONDANSETRON 2 MG/ML
INJECTION INTRAMUSCULAR; INTRAVENOUS AS NEEDED
Status: DISCONTINUED | OUTPATIENT
Start: 2018-09-07 | End: 2018-09-07 | Stop reason: SURG

## 2018-09-07 RX ORDER — CHLORHEXIDINE GLUCONATE 0.12 MG/ML
15 RINSE ORAL ONCE
Status: DISCONTINUED | OUTPATIENT
Start: 2018-09-07 | End: 2018-09-11 | Stop reason: HOSPADM

## 2018-09-07 RX ORDER — SCOLOPAMINE TRANSDERMAL SYSTEM 1 MG/1
1 PATCH, EXTENDED RELEASE TRANSDERMAL ONCE
Status: DISCONTINUED | OUTPATIENT
Start: 2018-09-07 | End: 2018-09-10

## 2018-09-07 RX ORDER — FENTANYL CITRATE 50 UG/ML
50 INJECTION, SOLUTION INTRAMUSCULAR; INTRAVENOUS ONCE
Status: COMPLETED | OUTPATIENT
Start: 2018-09-07 | End: 2018-09-07

## 2018-09-07 RX ORDER — ROCURONIUM BROMIDE 10 MG/ML
INJECTION, SOLUTION INTRAVENOUS AS NEEDED
Status: DISCONTINUED | OUTPATIENT
Start: 2018-09-07 | End: 2018-09-07 | Stop reason: SURG

## 2018-09-07 RX ORDER — SODIUM CHLORIDE, SODIUM LACTATE, POTASSIUM CHLORIDE, CALCIUM CHLORIDE 600; 310; 30; 20 MG/100ML; MG/100ML; MG/100ML; MG/100ML
75 INJECTION, SOLUTION INTRAVENOUS CONTINUOUS
Status: DISCONTINUED | OUTPATIENT
Start: 2018-09-07 | End: 2018-09-08

## 2018-09-07 RX ORDER — FENTANYL CITRATE/PF 50 MCG/ML
50 SYRINGE (ML) INJECTION
Status: DISCONTINUED | OUTPATIENT
Start: 2018-09-07 | End: 2018-09-07 | Stop reason: HOSPADM

## 2018-09-07 RX ORDER — SODIUM CHLORIDE 9 MG/ML
INJECTION, SOLUTION INTRAVENOUS CONTINUOUS PRN
Status: DISCONTINUED | OUTPATIENT
Start: 2018-09-07 | End: 2018-09-07 | Stop reason: SURG

## 2018-09-07 RX ORDER — SODIUM CHLORIDE, SODIUM LACTATE, POTASSIUM CHLORIDE, CALCIUM CHLORIDE 600; 310; 30; 20 MG/100ML; MG/100ML; MG/100ML; MG/100ML
75 INJECTION, SOLUTION INTRAVENOUS CONTINUOUS
Status: DISCONTINUED | OUTPATIENT
Start: 2018-09-07 | End: 2018-09-11 | Stop reason: HOSPADM

## 2018-09-07 RX ORDER — GABAPENTIN 300 MG/1
300 CAPSULE ORAL ONCE
Status: COMPLETED | OUTPATIENT
Start: 2018-09-07 | End: 2018-09-07

## 2018-09-07 RX ORDER — ALBUMIN, HUMAN INJ 5% 5 %
SOLUTION INTRAVENOUS CONTINUOUS PRN
Status: DISCONTINUED | OUTPATIENT
Start: 2018-09-07 | End: 2018-09-07 | Stop reason: SURG

## 2018-09-07 RX ORDER — VANCOMYCIN HYDROCHLORIDE 1 G/200ML
1000 INJECTION, SOLUTION INTRAVENOUS
Status: DISCONTINUED | OUTPATIENT
Start: 2018-09-07 | End: 2018-09-07 | Stop reason: SDUPTHER

## 2018-09-07 RX ORDER — LORAZEPAM 2 MG/ML
1 INJECTION INTRAMUSCULAR EVERY 6 HOURS PRN
Status: DISCONTINUED | OUTPATIENT
Start: 2018-09-07 | End: 2018-09-10

## 2018-09-07 RX ORDER — SODIUM CHLORIDE, SODIUM LACTATE, POTASSIUM CHLORIDE, CALCIUM CHLORIDE 600; 310; 30; 20 MG/100ML; MG/100ML; MG/100ML; MG/100ML
INJECTION, SOLUTION INTRAVENOUS CONTINUOUS PRN
Status: DISCONTINUED | OUTPATIENT
Start: 2018-09-07 | End: 2018-09-07 | Stop reason: SURG

## 2018-09-07 RX ORDER — METOCLOPRAMIDE HYDROCHLORIDE 5 MG/ML
10 INJECTION INTRAMUSCULAR; INTRAVENOUS ONCE AS NEEDED
Status: DISCONTINUED | OUTPATIENT
Start: 2018-09-07 | End: 2018-09-07 | Stop reason: HOSPADM

## 2018-09-07 RX ORDER — OXYCODONE HYDROCHLORIDE 10 MG/1
10 TABLET ORAL ONCE
Status: COMPLETED | OUTPATIENT
Start: 2018-09-07 | End: 2018-09-07

## 2018-09-07 RX ORDER — METHYLPREDNISOLONE ACETATE 40 MG/ML
INJECTION, SUSPENSION INTRA-ARTICULAR; INTRALESIONAL; INTRAMUSCULAR; SOFT TISSUE AS NEEDED
Status: DISCONTINUED | OUTPATIENT
Start: 2018-09-07 | End: 2018-09-07 | Stop reason: HOSPADM

## 2018-09-07 RX ORDER — KETOROLAC TROMETHAMINE 30 MG/ML
INJECTION, SOLUTION INTRAMUSCULAR; INTRAVENOUS AS NEEDED
Status: DISCONTINUED | OUTPATIENT
Start: 2018-09-07 | End: 2018-09-07 | Stop reason: SURG

## 2018-09-07 RX ORDER — HALOPERIDOL 5 MG/ML
2 INJECTION INTRAMUSCULAR
Status: DISCONTINUED | OUTPATIENT
Start: 2018-09-07 | End: 2018-09-10

## 2018-09-07 RX ORDER — MIDAZOLAM HYDROCHLORIDE 1 MG/ML
INJECTION INTRAMUSCULAR; INTRAVENOUS AS NEEDED
Status: DISCONTINUED | OUTPATIENT
Start: 2018-09-07 | End: 2018-09-07 | Stop reason: SURG

## 2018-09-07 RX ORDER — ONDANSETRON 2 MG/ML
4 INJECTION INTRAMUSCULAR; INTRAVENOUS ONCE AS NEEDED
Status: DISCONTINUED | OUTPATIENT
Start: 2018-09-07 | End: 2018-09-07 | Stop reason: HOSPADM

## 2018-09-07 RX ORDER — DIPHENHYDRAMINE HYDROCHLORIDE 50 MG/ML
12.5 INJECTION INTRAMUSCULAR; INTRAVENOUS ONCE AS NEEDED
Status: DISCONTINUED | OUTPATIENT
Start: 2018-09-07 | End: 2018-09-07 | Stop reason: HOSPADM

## 2018-09-07 RX ORDER — OXYCODONE HCL 10 MG/1
30 TABLET, FILM COATED, EXTENDED RELEASE ORAL ONCE
Status: COMPLETED | OUTPATIENT
Start: 2018-09-07 | End: 2018-09-07

## 2018-09-07 RX ORDER — PROPOFOL 10 MG/ML
INJECTION, EMULSION INTRAVENOUS AS NEEDED
Status: DISCONTINUED | OUTPATIENT
Start: 2018-09-07 | End: 2018-09-07 | Stop reason: SURG

## 2018-09-07 RX ORDER — ACETAMINOPHEN 325 MG/1
975 TABLET ORAL ONCE
Status: COMPLETED | OUTPATIENT
Start: 2018-09-07 | End: 2018-09-07

## 2018-09-07 RX ORDER — KETAMINE HYDROCHLORIDE 50 MG/ML
INJECTION, SOLUTION, CONCENTRATE INTRAMUSCULAR; INTRAVENOUS AS NEEDED
Status: DISCONTINUED | OUTPATIENT
Start: 2018-09-07 | End: 2018-09-07 | Stop reason: SURG

## 2018-09-07 RX ADMIN — HYDROMORPHONE HYDROCHLORIDE 0.5 MG: 1 INJECTION, SOLUTION INTRAMUSCULAR; INTRAVENOUS; SUBCUTANEOUS at 16:55

## 2018-09-07 RX ADMIN — HYDROMORPHONE HYDROCHLORIDE 0.5 MG: 1 INJECTION, SOLUTION INTRAMUSCULAR; INTRAVENOUS; SUBCUTANEOUS at 23:42

## 2018-09-07 RX ADMIN — SODIUM CHLORIDE, SODIUM LACTATE, POTASSIUM CHLORIDE, AND CALCIUM CHLORIDE: .6; .31; .03; .02 INJECTION, SOLUTION INTRAVENOUS at 11:03

## 2018-09-07 RX ADMIN — ROCURONIUM BROMIDE 50 MG: 10 INJECTION INTRAVENOUS at 11:28

## 2018-09-07 RX ADMIN — HYDROMORPHONE HYDROCHLORIDE 0.2 MG: 1 INJECTION, SOLUTION INTRAMUSCULAR; INTRAVENOUS; SUBCUTANEOUS at 16:05

## 2018-09-07 RX ADMIN — ONDANSETRON 8 MG: 2 INJECTION INTRAMUSCULAR; INTRAVENOUS at 09:45

## 2018-09-07 RX ADMIN — SODIUM CHLORIDE: 0.9 INJECTION, SOLUTION INTRAVENOUS at 11:39

## 2018-09-07 RX ADMIN — KETAMINE HYDROCHLORIDE 0.1 MG/KG/HR: 50 INJECTION, SOLUTION INTRAMUSCULAR; INTRAVENOUS at 18:26

## 2018-09-07 RX ADMIN — GABAPENTIN 300 MG: 300 CAPSULE ORAL at 11:18

## 2018-09-07 RX ADMIN — LIDOCAINE HYDROCHLORIDE 50 MG: 10 INJECTION, SOLUTION INFILTRATION; PERINEURAL at 11:28

## 2018-09-07 RX ADMIN — VENLAFAXINE 75 MG: 37.5 TABLET ORAL at 07:40

## 2018-09-07 RX ADMIN — PENTOSAN POLYSULFATE SODIUM 100 MG: 100 CAPSULE, GELATIN COATED ORAL at 07:41

## 2018-09-07 RX ADMIN — FENTANYL CITRATE 50 MCG: 50 INJECTION, SOLUTION INTRAMUSCULAR; INTRAVENOUS at 15:55

## 2018-09-07 RX ADMIN — HYDROCODONE BITARTRATE AND ACETAMINOPHEN 2 TABLET: 5; 325 TABLET ORAL at 07:41

## 2018-09-07 RX ADMIN — HYDROMORPHONE HYDROCHLORIDE 1 MG: 1 INJECTION, SOLUTION INTRAMUSCULAR; INTRAVENOUS; SUBCUTANEOUS at 15:12

## 2018-09-07 RX ADMIN — HYDROMORPHONE HYDROCHLORIDE 0.5 MG: 1 INJECTION, SOLUTION INTRAMUSCULAR; INTRAVENOUS; SUBCUTANEOUS at 17:10

## 2018-09-07 RX ADMIN — ONDANSETRON 4 MG: 2 INJECTION INTRAMUSCULAR; INTRAVENOUS at 01:20

## 2018-09-07 RX ADMIN — HYDROMORPHONE HYDROCHLORIDE 0.5 MG: 1 INJECTION, SOLUTION INTRAMUSCULAR; INTRAVENOUS; SUBCUTANEOUS at 16:32

## 2018-09-07 RX ADMIN — SCOPOLAMINE 1 PATCH: 1 PATCH, EXTENDED RELEASE TRANSDERMAL at 11:16

## 2018-09-07 RX ADMIN — IOHEXOL 8 ML: 180 INJECTION INTRAVENOUS at 09:20

## 2018-09-07 RX ADMIN — ALBUMIN (HUMAN): 12.5 SOLUTION INTRAVENOUS at 14:05

## 2018-09-07 RX ADMIN — VANCOMYCIN HYDROCHLORIDE 1 G: 1 INJECTION, POWDER, LYOPHILIZED, FOR SOLUTION INTRAVENOUS at 12:00

## 2018-09-07 RX ADMIN — Medication 0.15 MCG/KG/HR: at 11:32

## 2018-09-07 RX ADMIN — HYDROCODONE BITARTRATE AND ACETAMINOPHEN 2 TABLET: 5; 325 TABLET ORAL at 00:55

## 2018-09-07 RX ADMIN — HYDROMORPHONE HYDROCHLORIDE 0.5 MG: 1 INJECTION, SOLUTION INTRAMUSCULAR; INTRAVENOUS; SUBCUTANEOUS at 16:40

## 2018-09-07 RX ADMIN — LIDOCAINE HYDROCHLORIDE 4 ML: 10 INJECTION, SOLUTION INFILTRATION; PERINEURAL at 09:30

## 2018-09-07 RX ADMIN — PROPOFOL 100 MCG/KG/MIN: 10 INJECTION, EMULSION INTRAVENOUS at 11:32

## 2018-09-07 RX ADMIN — HYDROMORPHONE HYDROCHLORIDE 1 MG: 1 INJECTION, SOLUTION INTRAMUSCULAR; INTRAVENOUS; SUBCUTANEOUS at 09:28

## 2018-09-07 RX ADMIN — ONDANSETRON 4 MG: 2 INJECTION INTRAMUSCULAR; INTRAVENOUS at 15:11

## 2018-09-07 RX ADMIN — ACETAMINOPHEN 975 MG: 325 TABLET, FILM COATED ORAL at 11:21

## 2018-09-07 RX ADMIN — OXYCODONE HYDROCHLORIDE 20 MG: 10 TABLET, FILM COATED, EXTENDED RELEASE ORAL at 11:19

## 2018-09-07 RX ADMIN — KETOROLAC TROMETHAMINE 30 MG: 30 INJECTION, SOLUTION INTRAMUSCULAR at 15:15

## 2018-09-07 RX ADMIN — HYDROCODONE BITARTRATE AND ACETAMINOPHEN 2 TABLET: 5; 325 TABLET ORAL at 20:35

## 2018-09-07 RX ADMIN — OXYCODONE HYDROCHLORIDE 30 MG: 20 TABLET, FILM COATED, EXTENDED RELEASE ORAL at 22:08

## 2018-09-07 RX ADMIN — FENTANYL CITRATE 50 MCG: 50 INJECTION, SOLUTION INTRAMUSCULAR; INTRAVENOUS at 10:00

## 2018-09-07 RX ADMIN — HYDROMORPHONE HYDROCHLORIDE 0.5 MG: 1 INJECTION, SOLUTION INTRAMUSCULAR; INTRAVENOUS; SUBCUTANEOUS at 17:40

## 2018-09-07 RX ADMIN — HYDROMORPHONE HYDROCHLORIDE 0.5 MG: 1 INJECTION, SOLUTION INTRAMUSCULAR; INTRAVENOUS; SUBCUTANEOUS at 17:29

## 2018-09-07 RX ADMIN — GABAPENTIN 500 MG: 100 CAPSULE ORAL at 11:17

## 2018-09-07 RX ADMIN — HYDROMORPHONE HYDROCHLORIDE 0.2 MG: 1 INJECTION, SOLUTION INTRAMUSCULAR; INTRAVENOUS; SUBCUTANEOUS at 16:10

## 2018-09-07 RX ADMIN — FENTANYL CITRATE 50 MCG: 50 INJECTION, SOLUTION INTRAMUSCULAR; INTRAVENOUS at 11:24

## 2018-09-07 RX ADMIN — ZOLPIDEM TARTRATE 5 MG: 5 TABLET ORAL at 22:09

## 2018-09-07 RX ADMIN — PROPOFOL 160 MCG/KG/MIN: 10 INJECTION, EMULSION INTRAVENOUS at 12:16

## 2018-09-07 RX ADMIN — KETAMINE HYDROCHLORIDE 10 MG: 50 INJECTION, SOLUTION INTRAMUSCULAR; INTRAVENOUS at 14:20

## 2018-09-07 RX ADMIN — OXYCODONE HYDROCHLORIDE 30 MG: 20 TABLET, FILM COATED, EXTENDED RELEASE ORAL at 05:14

## 2018-09-07 RX ADMIN — FENTANYL CITRATE 50 MCG: 50 INJECTION, SOLUTION INTRAMUSCULAR; INTRAVENOUS at 16:00

## 2018-09-07 RX ADMIN — HYDROMORPHONE HYDROCHLORIDE 0.5 MG: 1 INJECTION, SOLUTION INTRAMUSCULAR; INTRAVENOUS; SUBCUTANEOUS at 16:24

## 2018-09-07 RX ADMIN — HYDROMORPHONE HYDROCHLORIDE 0.5 MG: 1 INJECTION, SOLUTION INTRAMUSCULAR; INTRAVENOUS; SUBCUTANEOUS at 00:21

## 2018-09-07 RX ADMIN — KETAMINE HYDROCHLORIDE 50 MG: 50 INJECTION, SOLUTION INTRAMUSCULAR; INTRAVENOUS at 11:28

## 2018-09-07 RX ADMIN — HYDROMORPHONE HYDROCHLORIDE 0.2 MG: 1 INJECTION, SOLUTION INTRAMUSCULAR; INTRAVENOUS; SUBCUTANEOUS at 16:15

## 2018-09-07 RX ADMIN — Medication 0.2 MCG/KG/MIN: at 11:32

## 2018-09-07 RX ADMIN — MIDAZOLAM 2 MG: 1 INJECTION INTRAMUSCULAR; INTRAVENOUS at 11:24

## 2018-09-07 RX ADMIN — BACLOFEN 10 MG: 10 TABLET ORAL at 22:09

## 2018-09-07 RX ADMIN — PANTOPRAZOLE SODIUM 40 MG: 40 TABLET, DELAYED RELEASE ORAL at 07:41

## 2018-09-07 RX ADMIN — FENTANYL CITRATE 50 MCG: 50 INJECTION, SOLUTION INTRAMUSCULAR; INTRAVENOUS at 11:28

## 2018-09-07 RX ADMIN — PHENYLEPHRINE HYDROCHLORIDE 50 MCG/MIN: 10 INJECTION INTRAVENOUS at 12:12

## 2018-09-07 RX ADMIN — OXYCODONE HYDROCHLORIDE 10 MG: 10 TABLET ORAL at 11:16

## 2018-09-07 RX ADMIN — KETAMINE HYDROCHLORIDE 10 MG: 50 INJECTION, SOLUTION INTRAMUSCULAR; INTRAVENOUS at 15:03

## 2018-09-07 RX ADMIN — PROPOFOL 200 MG: 10 INJECTION, EMULSION INTRAVENOUS at 11:28

## 2018-09-07 RX ADMIN — KETAMINE HYDROCHLORIDE 10 MG: 50 INJECTION, SOLUTION INTRAMUSCULAR; INTRAVENOUS at 12:56

## 2018-09-07 RX ADMIN — SODIUM CHLORIDE, SODIUM LACTATE, POTASSIUM CHLORIDE, AND CALCIUM CHLORIDE: .6; .31; .03; .02 INJECTION, SOLUTION INTRAVENOUS at 15:32

## 2018-09-07 RX ADMIN — GABAPENTIN 800 MG: 400 CAPSULE ORAL at 22:09

## 2018-09-07 RX ADMIN — PHENYLEPHRINE HYDROCHLORIDE 20 MCG/MIN: 10 INJECTION INTRAVENOUS at 12:21

## 2018-09-07 NOTE — PERIOPERATIVE NURSING NOTE
Condition stable post op  VSS  Pt ready for D/C from the PACU  Ketamine drip infusing for pain control

## 2018-09-07 NOTE — CASE MANAGEMENT
Initial Clinical Review    Admission: Date/Time/Statement: 9/6/18 @ 0951    Orders Placed This Encounter   Procedures    Inpatient Admission     Standing Status:   Standing     Number of Occurrences:   1     Order Specific Question:   Admitting Physician     Answer:   Zachary Flood     Order Specific Question:   Level of Care     Answer:   Med Surg [16]     Order Specific Question:   Estimated length of stay     Answer:   More than 2 Midnights     Order Specific Question:   Certification     Answer:   I certify that inpatient services are medically necessary for this patient for a duration of greater than two midnights  See H&P and MD Progress Notes for additional information about the patient's course of treatment  Date/Time/Mode of Arrival: 9/6 Transfer from [address] [place] [address] ED  Pt at [facility] ED from 9/5 thru 9/6    Chief Complaint:     History of Illness: 70 y o  female who presents with intractable low back pain for the past three months  She has been seeing her pain management doctor, Dr Solo Gage, who has been prescribing her a cocktail of oxycodone, OxyContin, and vicodin  He has also been giving her steroid injections and she states that this has been working up until two weeks ago  It is until last night that her pain became unbearable, 10/10 pain that Dr Solo Gage advised her to come to the ER  Patient has been falling because of she has radiculopathy from her hips to her feet, numbness, tingling, and "stabbing like nails" in her feet at times  The radiculopathy is constant from her hips to her knees and is only occasional from her knees to her feet  Patient states that she is unable to walk unless her pain is adequately controlled  Sometimes the pain is so severe she has difficulty moving her upper extremities to eat as well       Vital Signs:   Vitals   Temperature Pulse Respirations Blood Pressure SpO2   09/06/18 1022 09/06/18 1022 09/06/18 1022 09/06/18 1022 09/06/18 1022   97 9 °F (36 6 °C) 89 18 (!) 189/98 98 %      Temp Source Heart Rate Source Patient Position - Orthostatic VS BP Location FiO2 (%)   09/06/18 1022 09/06/18 1644 09/06/18 1022 09/06/18 1022 --   Oral Monitor Lying Right arm       Pain Score       09/06/18 0920       Worst Possible Pain        Wt Readings from Last 1 Encounters:   09/06/18 86 2 kg (190 lb)       Vital Signs (abnormal): B/P = 184/84    Abnormal Labs: Glucose = 445    Diagnostic Test Results: 9/5 CT Lumbar Spine - Degenerative and postoperative changes of the lumbar spine as described above  Severe central stenosis L1-L2 secondary to large central disc herniation  Severe central stenosis L3-L4 secondary to disc bulge and facet hypertrophic changes  9/7 Repeat CT Lumbar Spine - Severe canal stenosis at the L1-2 level secondary to a large central and right paracentral disc extrusion resulting in near complete block of contrast   On delayed imaging contrast does extend superiorly into the lower thoracic thecal sac      At L3-4 there is grade 1 anterior spondylolisthesis with diffuse annular bulging and a small left foraminal disc protrusion  Mild posterior element hypertrophic change with moderate canal stenosis and mild to moderate left foraminal narrowing      Mild noncompressive degenerative change at the L4-5 level        Past Medical/Surgical History:    Active Ambulatory Problems     Diagnosis Date Noted    Acute bilateral back pain, intractable 07/10/2017    Continuous opioid dependence ([facility] [address] ) 07/10/2017    Vitamin D deficiency 01/25/2017    Cataract, bilateral 01/25/2017    Cervical radiculopathy, chronic 01/25/2017    Delayed gastric emptying 08/10/2017    Essential (primary) hypertension 01/25/2017    Fatty liver disease, nonalcoholic 85/39/8712    GERD (gastroesophageal reflux disease) 01/25/2017    Hot flashes, menopausal 07/03/2017    Interstitial cystitis 01/25/2017    Lumbar post-laminectomy syndrome 01/25/2017    Mild intermittent asthma without complication 51/81/0906    Lumbosacral spondylosis without myelopathy 01/25/2017    Radiculopathy, lumbar region 01/25/2017    Thoracic and lumbosacral neuritis 01/25/2017    Hyperlipidemia 03/15/2018     Resolved Ambulatory Problems     Diagnosis Date Noted    Chest pain 07/10/2017    Transaminitis 07/10/2017    Muscle cramps 07/31/2017    Nausea 03/15/2018    Strain of lumbar region 06/18/2018     Past Medical History:   Diagnosis Date    Asthma     Cataract     Chronic back pain     Chronic pain disorder     GERD (gastroesophageal reflux disease)     HTN (hypertension)     Hyperlipidemia     Interstitial cystitis     Liver disease     Muscle cramps     Obesity     Pneumonia     Radiculopathy, lumbar region     Spondylosis, cervical, with myelopathy     Vulvovaginitis        Admitting Diagnosis: Back pain [M54 9]    Age/Sex: 70 y o  female    Assessment/Plan:   68y Female transfer from [facility] ED with Lumbar Canal Stenosis with diffuse disc bulge/ Intractable Pain/ Multiple Falls  Neurosurgery cons   Acute Pain Service cons    9/7 Neurosurgery cons:  A/P:  Severe intractable pain due to severe L1-2 and L3-4 stenosis and L1-2 HNP  -OR for Lumbar decompression, SCS removal, lumbar fusion    9/7 OR Today - DECOMPRESSION SPINE LUMBAR POSTERIOR L1-4, L1-2 DISKECTOMY, POSTERIORLATERAL FUSION L3-4, REMOVAL OF SPINAL CORD STIMULATOR SYSTEM, REMOVAL OF INTERSPINOUS DEVICES (Bilateral Spine Lumbar)    Admission Orders:  NPO; Sips with meds  9/7 OR Today  Acute Pain Service    Scheduled Meds:   Current Facility-Administered Medications:  baclofen 10 mg Oral TID   chlorhexidine 15 mL Swish & Spit Once   chlorhexidine 15 mL Swish & Spit Early Morning    docusate sodium 100 mg Oral BID    fenofibrate 168 mg Oral Daily    fish oil 1,000 mg Oral Daily    fluticasone 2 spray Nasal Daily   furosemide 40 mg Oral Daily   gabapentin 800 mg Oral TID   lidocaine 1 patch Transdermal Daily    lisinopril 10 mg Oral Daily   oxyCODONE 30 mg Oral Q8H [facility]   pantoprazole 40 mg Oral BID AC   pentosan polysulfate 100 mg Oral TID AC   polyethylene glycol 17 g Oral Daily   pravastatin 20 mg Oral Daily With Dinner   scopolamine 1 patch Transdermal Once   vancomycin 1,000 mg Intravenous Once   [MAR Hold] venlafaxine 75 mg Oral BID With Meals         Continuous Infusions:    PRN Meds:   Hydrocodone-acetaminophen po x4  Hydromorphone Iv x4

## 2018-09-07 NOTE — OP NOTE
OPERATIVE REPORT  PATIENT NAME: Lalito Redding    :  1955  MRN: [id number]  Pt Location: BE OR ROOM 09    SURGERY DATE: 2018    Surgeon(s) and Role:     * Abbie Gracia MD - Primary    Preop Diagnosis:  Lumbar disc herniation [M51 26]  Spinal stenosis of lumbar region with neurogenic claudication [M48 062]  Multiple falls [R29 6]  Acute bilateral low back pain with bilateral sciatica [M54 42, M54 41]    Post-Op Diagnosis Codes:     * Lumbar disc herniation [M51 26]     * Spinal stenosis of lumbar region with neurogenic claudication [M48 062]     * Multiple falls [R29 6]     * Acute bilateral low back pain with bilateral sciatica [M54 42, M54 41]    Procedure(s) (LRB):  DECOMPRESSION SPINE LUMBAR POSTERIOR L1-4, L1-2 DISKECTOMY, POSTERIORLATERAL FUSION L3-4, REMOVAL OF SPINAL CORD STIMULATOR SYSTEM, REMOVAL OF INTERSPINOUS DEVICES (Bilateral)    Specimen(s):  ID Type Source Tests Collected by Time Destination   1 : SCS paddle electrode w extension and generator; Interspinous devices x2 Other Surgical Hardware/Implant TISSUE EXAM Abbie Gracia MD 2018 1258        Estimated Blood Loss:   275 mL    Drains:  Closed/Suction Drain Right Accordion (Active)   Site Description Unable to view 2018  3:45 PM   Dressing Status Clean;Dry; Intact 2018  3:45 PM   Drainage Appearance Bloody 2018  3:45 PM   Status Accordion suction 2018  3:45 PM   Number of days: 0       Urethral Catheter Non-latex 16 Fr   (Active)   Site Assessment Clean;Skin intact 2018  3:45 PM   Collection Container Standard drainage bag 2018  3:45 PM   Securement Method Securing device (Describe) 2018  3:45 PM   Number of days: 0       Anesthesia Type:   General    Operative Indications:  Lumbar disc herniation [M51 26]  Spinal stenosis of lumbar region with neurogenic claudication [M48 062]  Multiple falls [R29 6]  Acute bilateral low back pain with bilateral sciatica [M54 42, ]    40-year-old female with lumbar surgeries with interspinous device and a lumbar spinal cord stimulator  She has chronic opiate use as well as back pain  She fell approximately 2 weeks ago  He developed acute onset of bilateral leg pain worse on the right side than the left  Imaging showed a large L1-2 disc herniation  At that level there is myelographic bloc  She also had a L3-4 spondy with stenosis  She is admitted for intractable pain poorly managed with conservative injections  She required significant amounts of oral opiates and therefore was admitted for inpatient pain management  Implants  [employer] Solara 4 75  At L3 we implanted 6 5 by 50 mm screws bilaterally  At L4 we implanted 6 5 x 50 mm screws bilaterally  We used a 4 5 x 45 mm tiffanie bilaterally    Operative Findings:  Severe stenosis at L1-L2, large free fragment  Severe stenosis at T6-Q2    Complications:   None    Procedure and Technique:  Patient was taken to operative theater induced under general anesthesia and intubated  She was positioned prone on a Mansoor table all pressure points padded  A prior lumbar incision was marked she is prepped and draped in sterile fashion  A time-out was performed we made incision with a 10  Blade used electrocautery to dissect down to the periosteum  At that point I visualized the spinal cord stimulator electrodes  I then proceeded to the left spinal cord stimulator generator and removed it  The wires were cut  And removed the generator out of the field the wires were pulled from the flank incision  I was able to remove both X stop interspinous devices  Using a drill and a rongeur  I then amputated the electrode leads wires  At the level of the interspace  I then proceeded to perform a laminectomy at L2 and L1    Using a match stick drill I thinned out the lamina of L1 and L2 and used sequential rongeur is drills and Kerrison punches to remove the complete lamina of L1 and most of L2 except for the inferior portion between the L2-3 interspace which was not stenotic  I was able to dissect out the epidural space there was significant scar at the L1 interspace where the electrodes were coming in but was able to dissect the scar off the dura  And I was able to remove the spinal cord stimulator paddles intact  After significant laminectomy of L1 and L2 I was able to retract the thecal sac medially on the left side I then created left annuli to me and was able to remove a large 3 cm free fragment  I then proceeded to placing pedicle screws at L3 and L4 using a high-speed drill to create entry point and then using fluoro to guide a pedicle Finders and tapped and a screw down at both L3 and L4 bilaterally  Initially the L4 pedicle screws appeared slightly medial therefore I redirected the screws by creating a lateral entry point  In similar fashion sent down the L4 pedicle screws which then appeared in good position  After satisfied with the placement of the pedicle screws I completed my laminectomy of L3 and L4  I did leave the superior portion of L3 because there was in fact no stenosis at that level  There is no obvious dural tear during the procedure  I affixed to titanium rods to the head screw heads  I then left autograft and allograft fusion material posterior laterally  I then left Depo-Medrol within the epidural space  The left a 10 [place] Hemovac drain in the epidural space  Prior to the fusion graft material I did irrigate copiously with antibiotic coated saline  I left a g of vancomycin powder within the surgical bed and closed using 0 Vicryl in interrupted fashion for the fascia and 2 0 Vicryl for subcutaneous tissues and staples for the skin for both the lumbar incision and the pocket incision on the left the drain was fixed with a nylon suture and Steri-Strips    A sterile dressing was then placed and she was then repositioned supine on a hospital bed and extubated to room air and taken to the PACU for further recovery  All needle and sponge counts were correct at the end of the procedure       I was present for the entire procedure    Patient Disposition:  PACU     SIGNATURE: Nick Mei MD  DATE: September 7, 2018  TIME: 5:09 PM

## 2018-09-07 NOTE — PROGRESS NOTES
LUMBAR MYELOGRAM    INDICATION: Acute on chronic lumbar back pain with bilateral lower extremity radiculopathy status post spinal cord stimulator placement x2 (non- MRI compatible)  Myelogram for preoperative staging  COMPARISON: Lumbar x-ray on September 6, 2018  CT lumbar spine without contrast on September 5, 2018  FLUOROSCOPY TIME:  2 24 MFT    IMAGES: 2    TECHNIQUE:    After fully explaining the risks, benefits and alternatives of the procedure to the Patient , consent was obtained  Prior to the initiation of the procedure the patient stated she develops a rash with Povidone Iodine  She has tolerated IV iodinated contrast in the past  She was agreeable to be given Iodinated contrast today and verbalized her understanding of the potential allergic cross reactivity reactions  The skin overlying the lumbar spine was prepped and draped in the usual sterile fashion  1% lidocaine was infiltrated at the puncture site  Under fluoroscopic guidance a 22 gauge 3 5 inch spinal needle was inserted into the thecal sac at the left paravertebral L 5-S1 interlaminar space  Upon injecting approximately 1 ml of Omnipaque 180 mg the patient developed significant left lower extremity pain  Since the patient was unable to tolerate the administration of  additional contrast at this location, the right paravertebral L5-S1 interlaminar space was prepped  1% lidocaine was infiltrated at the site and under fluoroscopy guidance a 22-gauge 3 5 inch spinal needle was inserted into the thecal sac at the right paravertebral L5-S1 interlaminar space  8 cc of Omnipaque 180 mg contrast was injected into the thecal sac under fluoroscopy  The needle was removed  Post myelogram plain films and CT were obtained  Please see separate dictation for full CT results  No post-procedure complications  It is to be noted the patient did require additional narcotic pain medication during the procedure    She was given 1 mg of Dilaudid and 50 MCG's of fentanyl administered by the radiology nurse during the procedure  This procedure was performed by David Fuller PA-C under the direct supervision of Dr Michelle Pitts

## 2018-09-07 NOTE — PHYSICIAN ADVISOR
Current patient class: Inpatient  The patient is currently on Hospital Day: 2 at [address] [place] [address]      The patient was admitted to the hospital at [phone] on 9/6/18 for the following diagnosis:  Back pain [M54 9]       There is documentation in the medical record of an expected length of stay of at least 2 midnights  The patient is therefore expected to satisfy the 2 midnight benchmark and given the 2 midnight presumption is appropriate for INPATIENT ADMISSION  Given this expectation of a satisfying stay, CMS instructs us that the patient is most often appropriate for inpatient admission under part A provided medical necessity is documented in the chart  After review of the relevant documentation, labs, vital signs and test results, the patient is appropriate for INPATIENT ADMISSION  Admission to the hospital as an inpatient is a complex decision making process which requires the practitioner to consider the patients presenting complaint, history and physical examination and all relevant testing  With this in mind, in this case, the patient was deemed appropriate for INPATIENT ADMISSION  After review of the documentation and testing available at the time of the admission I concur with this clinical determination of medical necessity  Rationale is as follows: The patient is a 70 yrs old Female who presented to the ED at 9/6/2018  8:36 AM with a chief complaint of No chief complaint on file  Patient admitted with a complaint of intractable back pain which radiated to her feet  She no longer has relief with the pain regimen from her doctor and walking is difficult  A CT of the lumbar spine revealed severe central stenosis at L1-L2 secondary to a large central disc herniation  She was seen by Neurosurgery and they plan to take her to the OR for lumbar decompression    A two night admission status to the hospital would be considered appropriate for this patient  The patients vitals on arrival were ED Triage Vitals   Temperature Pulse Respirations Blood Pressure SpO2   18 1022 18 1022 18 1022 18 1022 18 1022   97 9 °F (36 6 °C) 89 18 (!) 189/98 98 %      Temp Source Heart Rate Source Patient Position - Orthostatic VS BP Location FiO2 (%)   18 1022 18 1644 18 1022 18 1022 --   Oral Monitor Lying Right arm       Pain Score       18 0920       Worst Possible Pain           Past Medical History:   Diagnosis Date    Asthma     Cataract     Chronic back pain     Chronic pain disorder     Back    GERD (gastroesophageal reflux disease)     HTN (hypertension)     Hyperlipidemia     Interstitial cystitis     Liver disease     Muscle cramps     Obesity     Pneumonia     Radiculopathy, lumbar region     Spondylosis, cervical, with myelopathy     Vulvovaginitis      Past Surgical History:   Procedure Laterality Date    APPENDECTOMY      BACK SURGERY      Arthrodeiss lumbar    BACK SURGERY      Spinal stereotaxis of cord    BLADDER SURGERY      Electronic bladder stimulator implantation     SECTION      x3    CHOLECYSTECTOMY      COLON SURGERY      Intestinal stricturoplasty     FINGER SURGERY      Extensor tendon repair (mallet finger)    [place] MYELOGRAM LUMBAR  2018    HERNIA REPAIR      x3    HYSTERECTOMY      [place] ESOPHAGOGASTRODUODENOSCOPY TRANSORAL DIAGNOSTIC N/A 2017    Procedure: ESOPHAGOGASTRODUODENOSCOPY (EGD); Surgeon: Christianne Guadarrama MD;  Location: [facility];   Service: Gastroenterology    SALPINGOOPHORECTOMY      B/L           Consults have been placed to:   IP CONSULT TO NEUROSURGERY  IP CONSULT TO CASE MANAGEMENT  IP CONSULT TO ACUTE PAIN SERVICE    Vitals:    18 1644 18 1939 18 2348 18 0733   BP: 150/90  146/78 137/68   BP Location: Left arm  Left arm Right arm   Pulse: 88  78 72   Resp:   18 18   Temp:   97 8 °F (36 6 °C) 97 5 °F (36 4 °C)   TempSrc:   Oral Oral   SpO2:  98% 98% 97%   Weight:       Height:           Most recent labs:    Recent Labs      09/05/18   1602  09/07/18   0518   WBC  7 75  6 15   HGB  11 9  11 8   HCT  36 7  36 9   PLT  445*  385   K  4 4   --    NA  138   --    CALCIUM  9 5   --    BUN  25   --    CREATININE  1 17   --    INR   --   1 02   AST  17   --    ALT  22   --    ALKPHOS  33*   --        Scheduled Meds:  Current Facility-Administered Medications:  [MAR Hold] albuterol 2 puff Inhalation Q4H PRN PIA Gutierrez   [MAR Hold] baclofen 10 mg Oral TID PIA Gutierrez   chlorhexidine 15 mL Swish & Spit Once NERY Alberts   chlorhexidine 15 mL Swish & Spit Early Morning Kg Loving MD   [facility] Hold] docusate sodium 100 mg Oral BID Rupesh Cedeno MD   [facility] Hold] fenofibrate 168 mg Oral Daily PIA Gutierrez   [MAR Hold] fish oil 1,000 mg Oral Daily PIA Gutierrez   [MAR Hold] fluticasone 2 spray Nasal Daily PIA Gutierrez   [facility] Hold] furosemide 40 mg Oral Daily PIA Gutierrez   [facility] Hold] gabapentin 800 mg Oral TID PIA Gutierrez   [facility] Hold] hydrALAZINE 5 mg Intravenous Q6H PRN PIA Gutierrez   [facility] Hold] HYDROcodone-acetaminophen 2 tablet Oral Q4H PRN PIA Gutierrez   [facility] Hold] HYDROmorphone 0 5 mg Intravenous Q4H PRN PIA Gutierrez   [facility] Hold] lidocaine 1 patch Transdermal Daily PIA Gutierrez   [MAR Hold] lisinopril 10 mg Oral Daily PIA Patel   neomycin-polymyxin B  1 mL in sodium chloride 0 9% 1000 mL irrigation bottle  Irrigation Once Kg Loving MD   [facility] Hold] ondansetron 4 mg Intravenous Q6H PRN PIA Gutierrez   [facility] Hold] oxyCODONE 30 mg Oral [facility] PIA Gutierrez   [MAR Hold] pantoprazole 40 mg Oral BID PIA Rosales   [MAR Hold] pentosan polysulfate 100 mg Oral TID PIA Rosales   [MAR Hold] polyethylene glycol 17 g Oral Daily Rupesh Cedeno MD   [facility] Hold] pravastatin 20 mg Oral Daily With PIA John   scopolamine 1 patch Transdermal Once Leonides Klinefelter, MD   vancomycin 1,000 mg Intravenous Once Yazmin Carranza MD   [facility] Hold] venlafaxine 75 mg Oral BID With Meals Rajiv Paul   [facility] Hold] zolpidem 5 mg Oral HS PRN PIA Rodriguez     Facility-Administered Medications Ordered in Other Encounters:  albumin human  Continuous PRN Singh Ulloa, CRNA Last Rate: Stopped (09/07/18 1438)   dexmedetomidine (PRECEDEX) 4 mcg/ml infusion (ANES) Intravenous Continuous PRN Singh Ulloa, CRNA Last Rate: Stopped (09/07/18 1511)   fentanyl citrate (PF) Intravenous PRN Singh Duffel, CRNA    HYDROmorphone  PRN Singh Duffel, CRNA    ketamine Intramuscular PRN Singh Wrightffel, CRNA    ketorolac Intravenous PRN Singh Duffel, CRNA    lactated ringers  Continuous PRN Singh Duffel, CRNA    lidocaine  PRN Singh Wrightffel, CRNA    midazolam Intravenous PRN Singh Duffel, CRNA    ondansetron  PRN Singh Wrightffel, CRNA    phenylephrine (SHIMA-SYNEPHRINE) 100 mg (DOUBLE CONCENTRATION) IV in sodium chloride 0 9% 250 mL  Continuous PRN Singh Ulloa, CRNA Last Rate: Stopped (09/07/18 1529)   propofol  Continuous PRN Singh Ulloa, CRNA Last Rate: Stopped (09/07/18 1525)   propofol Intravenous PRN Singh Zapatal, CRNA    remifentanil Allie Kuhn) standard infusion (ANES)  Continuous PRN Singh Ulloa, CRNA Last Rate: Stopped (09/07/18 1528)   rocuronium  PRN Singh Duffel, CRNA    sodium chloride  Continuous PRN Singh Zapatal, CRNA Last Rate: Stopped (09/07/18 1532)   vancomycin  PRN Singh Zapatal, CRNA      Continuous Infusions:   PRN Meds:   [MAR Hold] albuterol    [MAR Hold] hydrALAZINE    [MAR Hold] HYDROcodone-acetaminophen    [MAR Hold] HYDROmorphone    [MAR Hold] ondansetron    [MAR Hold] zolpidem    Surgical procedures (if appropriate):  Procedure(s):  DECOMPRESSION SPINE LUMBAR POSTERIOR L1-4, L1-2 DISKECTOMY, POSTERIORLATERAL FUSION L3-4, REMOVAL OF SPINAL CORD STIMULATOR SYSTEM, REMOVAL OF INTERSPINOUS DEVICES

## 2018-09-07 NOTE — ANESTHESIA POSTPROCEDURE EVALUATION
Post-Op Assessment Note      CV Status:  Stable    Mental Status:  Alert and awake    Hydration Status:  Euvolemic    PONV Controlled:  Controlled    Airway Patency:  Patent    Post Op Vitals Reviewed: Yes          Staff: CRNA, Anesthesiologist           /65 (09/07/18 1545)    Temp 99 2 °F (37 3 °C) (09/07/18 1545)    Pulse 87 (09/07/18 1545)   Resp 14 (09/07/18 1545)    SpO2 99 % (09/07/18 1545)

## 2018-09-07 NOTE — MEDICAL STUDENT
Progress note:   Vivian Del Valle   1955  MRN: [id number]         Lumbar canal stenosis with diffuse disc bulge   Assessment & Plan     · Patient transferred from [facility] d/t intractable pain   · CT scan shows "Degenerative and postoperative changes of the lumbar spine as described above   Severe central stenosis L1-L2 secondary to large central disc herniation   Severe central stenosis L3-L4 secondary to disc bulge and facet hypertrophic changes "  · Consult neurosurgery        - Patient to go to surgery today for OR for Lumbar decompression, SCS removal, lumbar fusion  · Consult acute pain service        - follow recommendations of acute pain service   · Continue patients home doses of gabapentin and baclofen           Multiple falls   Assessment & Plan     · Secondary to radiculopathy   · Await neurosurgery evaluation prior to PT/OT          Hyperlipidemia   Assessment & Plan     · Continue statin          Radiculopathy, lumbar region   Assessment & Plan     · Suspect secondary lumbar stenosis  · Continue gabapentin and baclofen  · Neurosurgery consulted         - patient surgery scheduled 2018, await results             Mild intermittent asthma without complication   Assessment & Plan     · Without acute exacerbation   · Continue home meds   · Encourage incentive spirometry           Essential (primary) hypertension   Assessment & Plan     · BP stabilized with better pain control since admission  · Continue home meds  · Add PRN hydralazine SBP > 180  · Continue to monitor post op          Continuous opioid dependence (HCC)   Assessment & Plan     · Review of PMED, patient follows with Dr Sugey Cardoza  · Consult acute pain service for assistance with pain control        - follow recommendations, may need added recommendations post op              VTE Pharmacologic Prophylaxis:   Pharmacologic: currently on hold d/t surgery today  Mechanical VTE Prophylaxis in Place: Yes    Patient Centered Rounds:  I have performed bedside rounds with nursing staff today  Discussions with Specialists or Other Care Team Provider: discussed care with neurology and radiology  Education and Discussions with Family / Patient: discussed care with patient  Time Spent for Care: 1 hour  More than 50% of total time spent on counseling and coordination of care as described above  Current Length of Stay: 1 day(s)    Current Patient Status: Inpatient   Certification Statement: The patient will continue to require additional inpatient hospital stay due to patient to have neurosurgery today    Discharge Plan: Patient to have extensive lumbar surgery today, will require admission through at least the weekend  Code Status: Level 1 - Full Code      Subjective:   Patient seen during myelogram today  Patient having extensive pain requiring additional pain medications to be given, and for coaxing from internal medicine and neurosurgery for the myelogram to be completed  Patient eventually finished the procedure successfully and was more comfortably after the addition of more pain medication  Patient went right from radiology to OR holding area for her extensive back surgery for which she consented too  Objective:     Vitals:   Temp (24hrs), Av 8 °F (36 6 °C), Min:97 5 °F (36 4 °C), Max:98 1 °F (36 7 °C)    HR:  [72-88] 72  Resp:  [18] 18  BP: (137-184)/(68-90) 137/68  SpO2:  [97 %-98 %] 97 %  Body mass index is 35 9 kg/m²  Input and Output Summary (last 24 hours): Intake/Output Summary (Last 24 hours) at 18 1146  Last data filed at 18 0900   Gross per 24 hour   Intake              800 ml   Output              850 ml   Net              -50 ml       Physical Exam:     Physical Exam   Constitutional: She is oriented to person, place, and time  Vital signs are normal  She appears well-developed and well-nourished  HENT:   Head: Normocephalic  Eyes: Pupils are equal, round, and reactive to light  Neck: Normal range of motion  Neck supple  Cardiovascular: Normal rate and regular rhythm  Pulmonary/Chest: Effort normal and breath sounds normal    Abdominal: Soft  Bowel sounds are normal  She exhibits no distension  There is no tenderness  Musculoskeletal: She exhibits tenderness  She exhibits no edema  Lumbar back: She exhibits pain  Neurological: She is alert and oriented to person, place, and time  Skin: Skin is warm  Ecchymosis noted  Additional Data:     Labs:      Results from last 7 days  Lab Units 09/07/18 0518 09/05/18  1602   WBC Thousand/uL 6 15 7 75   HEMOGLOBIN g/dL 11 8 11 9   HEMATOCRIT % 36 9 36 7   PLATELETS Thousands/uL 385 445*   NEUTROS PCT %  --  61   LYMPHS PCT %  --  33   MONOS PCT %  --  5   EOS PCT %  --  0       Results from last 7 days  Lab Units 09/05/18  1602   SODIUM mmol/L 138   POTASSIUM mmol/L 4 4   CHLORIDE mmol/L 103   CO2 mmol/L 27   BUN mg/dL 25   CREATININE mg/dL 1 17   CALCIUM mg/dL 9 5   ALK PHOS U/L 33*   ALT U/L 22   AST U/L 17       Results from last 7 days  Lab Units 09/07/18  0518   INR  1 02       * I Have Reviewed All Lab Data Listed Above  * Additional Pertinent Lab Tests Reviewed:  [address] Admission Reviewed    Imaging:    Imaging Reports Reviewed Today Include: myelogram, CT post myelogram  Imaging Personally Reviewed by Myself Includes:  See above    Recent Cultures (last 7 days):           Last 24 Hours Medication List:     Current Facility-Administered Medications:  [MAR Hold] albuterol 2 puff Inhalation Q4H PRN PIA Tomas   [MAR Hold] baclofen 10 mg Oral TID PIA Tomas   chlorhexidine 15 mL Swish & Spit Once NERY Alberts   chlorhexidine 15 mL Swish & Spit Early Morning MD Vicky Buchanan Hold] docusate sodium 100 mg Oral BID MD Vicky Silva Hold] fenofibrate 168 mg Oral Daily PIA Patel   fentanyl citrate (PF) 50 mcg Intravenous Once Randee Ibarra CRNP   [MAR Hold] fish oil 1,000 mg Oral Daily Melodye Malvin CRNP   [MAR Hold] fluticasone 2 spray Nasal Daily Jayy Coombs CRNP   [MAR Hold] furosemide 40 mg Oral Daily Sophiee Malvin CRCOREY   [facility] Hold] gabapentin 800 mg Oral TID Jayy Coombs CRNP   [facility] Hold] hydrALAZINE 5 mg Intravenous Q6H PRN PIA Wallace   [facility] Hold] HYDROcodone-acetaminophen 2 tablet Oral Q4H PRN Jayy Coombs, CRCOREY   [facility] Hold] HYDROmorphone 0 5 mg Intravenous Q4H PRN PIA Wallace   HYDROmorphone 1 mg Intravenous Once PIA Wallace   [facility] Hold] lidocaine 1 patch Transdermal Daily Jayy Coombs CRNP   [MAR Hold] lisinopril 10 mg Oral Daily PIA Wallace   [MAR Hold] ondansetron 4 mg Intravenous Q6H PRN Jayy Coombs CRCOREY   [MAR Hold] oxyCODONE 30 mg Oral Q8H [address] Jayy Coombs CRCOREY   [MAR Hold] pantoprazole 40 mg Oral BID AC PIA Patel   [MAR Hold] pentosan polysulfate 100 mg Oral TID AC Jayy Coombs CRCOREY   [MAR Hold] polyethylene glycol 17 g Oral Daily Elenita Escamilla MD   [facility] Hold] pravastatin 20 mg Oral Daily With [facility] Corporation[facility]   scopolamine 1 patch Transdermal Once Sandip Farr MD   vancomycin 1,000 mg Intravenous Once Blair Goldstein MD   [facility] Hold] venlafaxine 75 mg Oral BID With Meals [facility]   [MAR Hold] zolpidem 5 mg Oral HS PRN PIA Wallace        Today, Patient Was Seen By: Fran Gray RN    ** Please Note: Dictation voice to text software may have been used in the creation of this document   **

## 2018-09-07 NOTE — CONSULTS
S:  Patient in intractable BLE pain and LBP large herniated disc at L1-2 and L3-4 with stenosis patient still in severe pain    O:  Good symmetric strength pain limited  CTM shows myelographic block at L1-2 and stenosis at L3-4    A/P:  Severe intractable pain due to severe L1-2 and L3-4 stenosis and L1-2 HNP  -OR for Lumbar decompression, SCS removal, lumbar fusion    Marylee Amsterdam, MD

## 2018-09-07 NOTE — CONSULTS
S:  In PACU has pain but leg pain reportedly better    O:  Moving toes well sensation intact    A/P:  Post-op from lumbar fusion  -Keep tomas over night to facilitate pain, plan to remove tomas tomorrow  -Pain control  -Resume diet  -One extra dose of vanc  -Will follow drain  -Okay for DVT prophy tomorrow night  -Please feel free to call with any questions    Derrick Calderon MD

## 2018-09-07 NOTE — PROGRESS NOTES
Progress Note - Anesthesia Acute Pain Management    Clark Bob 70 y o  female MRN: [id number]  Unit/Bed#: [facility] 929-01 Encounter: [id number]    Assessment:   Principal Problem:    Lumbar canal stenosis with diffuse disc bulge  Active Problems:    Acute bilateral back pain, intractable    Continuous opioid dependence (HCC)    Essential (primary) hypertension    Mild intermittent asthma without complication    Radiculopathy, lumbar region    Hyperlipidemia    Multiple falls    Lumbar disc herniation    Spinal stenosis of lumbar region with neurogenic claudication    Acute bilateral low back pain with bilateral sciatica      Plan:   1  Acute on chronic pain - Patient has been off the floor for most of the day  In CT this AM and OR this afternoon  Currently estimated OR slot listed as [id number]  Prior to OR, patient had relatively good pain control  Report received from nursing and recorded documentation  Patient was able to acheive levels of no pain, to 5/10  After OR and patient's pain  reassessment completed, recommend the following multimodal pain regimen moving into the weekend    CONTINUE   · baclofen 10 mg PO TID - home medication  · Gabapentin 800 mg PO TID- home medication   · OxyContin SR 30 mg tablet PO TID -home medication  · Lidoderm patch, 5%, topical daily  · Hydrocodone-acetaminophen 5-325 mg one tablet p o  q 4 hours p r n  moderate pain, hold for sedation  · Hydrocodone-acetaminophen 5-325 mg two tablets p o  q 4 hours p r n  severe pain, hold for sedtion   · DILAUDID 0 5 MG IV Q 4 HRS PRN BREAKTHROUGH PAIN        2  Plan of care- CT this AM    OR [id number]  Continue multimodal pain regimen after surgery     [facility] will continue to follow; please call  / [phone] ( [facility] [phone]) with any questions      SIGNATURE: PIA Rivas  DATE: September 7, 2018  TIME: 9:12 AM    Please note that the APS provides consultative services regarding pain management only    With the exception of ketamine and epidural infusions and except when indicated, final decisions regarding starting or changing doses of analgesic medications are at the discretion of the consulting service  Off hours consultation and/or medication management is generally not available

## 2018-09-07 NOTE — ANESTHESIA PREPROCEDURE EVALUATION
Review of Systems/Medical History  Patient summary reviewed  Chart reviewed  History of anesthetic complications PONV    Cardiovascular  EKG reviewed, Hyperlipidemia, Hypertension controlled,    Pulmonary  Asthma , well controlled/ stable ,        GI/Hepatic    GERD , Liver disease ,   Comment: MA     Negative  ROS        Endo/Other    Obesity    GYN  Negative gynecology ROS          Hematology  Negative hematology ROS      Musculoskeletal  Back pain , cervical pain, lumbar pain and spinal stenosis, Sciatica,   Arthritis     Neurology      Comment: Cervical radiculopathy Psychology     Chronic opioid dependence Chronic pain,   Comment: Patient takes vicodin, oxycontin, neurontin         Physical Exam    Airway    Mallampati score: II  TM Distance: >3 FB  Neck ROM: full     Dental   No notable dental hx     Cardiovascular  Rhythm: regular, Rate: normal, Cardiovascular exam normal    Pulmonary  Pulmonary exam normal Breath sounds clear to auscultation,     Other Findings        Anesthesia Plan  ASA Score- 3     Anesthesia Type- general with ASA Monitors  Additional Monitors: arterial line  Airway Plan: ETT  Plan Factors-    Induction- intravenous  Postoperative Plan- Plan for postoperative opioid use  Informed Consent- Anesthetic plan and risks discussed with patient  I personally reviewed this patient with the CRNA  Discussed and agreed on the Anesthesia Plan with the CRNA  Jesus Mohan           Recent labs personally reviewed:  Lab Results   Component Value Date    WBC 6 15 09/07/2018    HGB 11 8 09/07/2018     09/07/2018     Lab Results   Component Value Date     09/05/2018    K 4 4 09/05/2018    BUN 25 09/05/2018    CREATININE 1 17 09/05/2018    GLUCOSE 96 12/09/2015     Lab Results   Component Value Date    PTT 29 09/07/2018      Lab Results   Component Value Date    INR 1 02 09/07/2018       Blood type O    Lab Results   Component Value Date    HGBA1C 5 4 09/07/2018       Ailyn BEATTY Vitaly Mina MD, have personally seen and evaluated the patient prior to anesthetic care  I have reviewed the pre-anesthetic record, and other medical records if appropriate to the anesthetic care  If a CRNA is involved in the case, I have reviewed the CRNA assessment, if present, and agree  Risks/benefits and alternatives discussed with patient including possible PONV, sore throat, and possibility of rare anesthetic and surgical emergencies

## 2018-09-08 ENCOUNTER — APPOINTMENT (INPATIENT)
Dept: RADIOLOGY | Facility: HOSPITAL | Age: 63
DRG: 460 | End: 2018-09-08
Payer: MEDICARE

## 2018-09-08 PROCEDURE — 99024 POSTOP FOLLOW-UP VISIT: CPT | Performed by: PHYSICIAN ASSISTANT

## 2018-09-08 PROCEDURE — 72100 X-RAY EXAM L-S SPINE 2/3 VWS: CPT

## 2018-09-08 PROCEDURE — 99232 SBSQ HOSP IP/OBS MODERATE 35: CPT | Performed by: NURSE PRACTITIONER

## 2018-09-08 RX ADMIN — OXYCODONE HYDROCHLORIDE 30 MG: 20 TABLET, FILM COATED, EXTENDED RELEASE ORAL at 20:33

## 2018-09-08 RX ADMIN — DOCUSATE SODIUM 100 MG: 100 CAPSULE, LIQUID FILLED ORAL at 18:27

## 2018-09-08 RX ADMIN — ZOLPIDEM TARTRATE 5 MG: 5 TABLET ORAL at 20:33

## 2018-09-08 RX ADMIN — HYDROCODONE BITARTRATE AND ACETAMINOPHEN 2 TABLET: 5; 325 TABLET ORAL at 10:15

## 2018-09-08 RX ADMIN — HYDROMORPHONE HYDROCHLORIDE 0.5 MG: 1 INJECTION, SOLUTION INTRAMUSCULAR; INTRAVENOUS; SUBCUTANEOUS at 20:33

## 2018-09-08 RX ADMIN — LISINOPRIL 10 MG: 10 TABLET ORAL at 10:16

## 2018-09-08 RX ADMIN — LIDOCAINE 1 PATCH: 50 PATCH CUTANEOUS at 10:09

## 2018-09-08 RX ADMIN — VENLAFAXINE 75 MG: 37.5 TABLET ORAL at 10:21

## 2018-09-08 RX ADMIN — OXYCODONE HYDROCHLORIDE 30 MG: 20 TABLET, FILM COATED, EXTENDED RELEASE ORAL at 16:42

## 2018-09-08 RX ADMIN — HYDROMORPHONE HYDROCHLORIDE 0.5 MG: 1 INJECTION, SOLUTION INTRAMUSCULAR; INTRAVENOUS; SUBCUTANEOUS at 04:37

## 2018-09-08 RX ADMIN — VENLAFAXINE 75 MG: 37.5 TABLET ORAL at 18:28

## 2018-09-08 RX ADMIN — VANCOMYCIN HYDROCHLORIDE 1250 MG: 5 INJECTION, POWDER, LYOPHILIZED, FOR SOLUTION INTRAVENOUS at 06:29

## 2018-09-08 RX ADMIN — KETAMINE HYDROCHLORIDE 0.1 MG/KG/HR: 50 INJECTION, SOLUTION INTRAMUSCULAR; INTRAVENOUS at 20:42

## 2018-09-08 RX ADMIN — GABAPENTIN 800 MG: 400 CAPSULE ORAL at 16:40

## 2018-09-08 RX ADMIN — PRAVASTATIN SODIUM 20 MG: 20 TABLET ORAL at 16:40

## 2018-09-08 RX ADMIN — ONDANSETRON 4 MG: 2 INJECTION INTRAMUSCULAR; INTRAVENOUS at 04:53

## 2018-09-08 RX ADMIN — BACLOFEN 10 MG: 10 TABLET ORAL at 20:32

## 2018-09-08 RX ADMIN — Medication 1000 MG: at 10:11

## 2018-09-08 RX ADMIN — HYDROCODONE BITARTRATE AND ACETAMINOPHEN 2 TABLET: 5; 325 TABLET ORAL at 18:25

## 2018-09-08 RX ADMIN — FENOFIBRATE 168 MG: 48 TABLET ORAL at 10:13

## 2018-09-08 RX ADMIN — HYDROCODONE BITARTRATE AND ACETAMINOPHEN 2 TABLET: 5; 325 TABLET ORAL at 22:40

## 2018-09-08 RX ADMIN — PANTOPRAZOLE SODIUM 40 MG: 40 TABLET, DELAYED RELEASE ORAL at 06:28

## 2018-09-08 RX ADMIN — BACLOFEN 10 MG: 10 TABLET ORAL at 16:38

## 2018-09-08 RX ADMIN — GABAPENTIN 800 MG: 400 CAPSULE ORAL at 10:20

## 2018-09-08 RX ADMIN — GABAPENTIN 800 MG: 400 CAPSULE ORAL at 20:32

## 2018-09-08 RX ADMIN — OXYCODONE HYDROCHLORIDE 30 MG: 20 TABLET, FILM COATED, EXTENDED RELEASE ORAL at 06:28

## 2018-09-08 RX ADMIN — BACLOFEN 10 MG: 10 TABLET ORAL at 10:10

## 2018-09-08 RX ADMIN — PANTOPRAZOLE SODIUM 40 MG: 40 TABLET, DELAYED RELEASE ORAL at 16:41

## 2018-09-08 RX ADMIN — FLUTICASONE PROPIONATE 2 SPRAY: 50 SPRAY, METERED NASAL at 10:12

## 2018-09-08 RX ADMIN — PENTOSAN POLYSULFATE SODIUM 100 MG: 100 CAPSULE, GELATIN COATED ORAL at 06:28

## 2018-09-08 RX ADMIN — HYDROCODONE BITARTRATE AND ACETAMINOPHEN 2 TABLET: 5; 325 TABLET ORAL at 02:09

## 2018-09-08 RX ADMIN — SODIUM CHLORIDE, SODIUM LACTATE, POTASSIUM CHLORIDE, AND CALCIUM CHLORIDE 75 ML/HR: .6; .31; .03; .02 INJECTION, SOLUTION INTRAVENOUS at 06:30

## 2018-09-08 RX ADMIN — PENTOSAN POLYSULFATE SODIUM 100 MG: 100 CAPSULE, GELATIN COATED ORAL at 16:44

## 2018-09-08 RX ADMIN — SODIUM CHLORIDE, SODIUM LACTATE, POTASSIUM CHLORIDE, AND CALCIUM CHLORIDE 75 ML/HR: .6; .31; .03; .02 INJECTION, SOLUTION INTRAVENOUS at 20:54

## 2018-09-08 NOTE — PROGRESS NOTES
[facility] Progress Note  Patient: Gisela Cohen 70 y o  female   MRN: [id number]  PCP: Marcella Santos MD  Unit/Bed#: [facility] 929-01 Encounter: [id number]  Date Of Visit: 18    Assessment:    Principal Problem:    Lumbar canal stenosis with diffuse disc bulge  Active Problems:    Acute bilateral back pain, intractable    Continuous opioid dependence ([address] )    Essential (primary) hypertension    Mild intermittent asthma without complication    Radiculopathy, lumbar region    Hyperlipidemia    Multiple falls    Lumbar disc herniation    Spinal stenosis of lumbar region with neurogenic claudication    Acute bilateral low back pain with bilateral sciatica      Plan:    · Plan for OR in the next few minutes  Procedure to be performed is a complete decompression lumbar spine, lumbar posterior diskectomy, fusion, removal of spinal cord stimulator  · Patient concerned about intra-op and post -op pain  Advised to discuss this further with anesthesia and APS and  Pain regimen  · Continue meds as per home for mgmt of asthma, hyperlipidemia, HTN, and pain conrol       VTE Pharmacologic Prophylaxis:   Pharmacologic: on hold for OR  Mechanical VTE Prophylaxis in Place: No    Patient Centered Rounds: seen while in holding area    Discussions with Specialists or Other Care Team Provider: neurosurgery    Education and Discussions with Family / Patient: patient    Time Spent for Care: 20 minutes  More than 50% of total time spent on counseling and coordination of care as described above  Current Length of Stay: 1 day(s)    Current Patient Status: Inpatient   Certification Statement: hunger    Discharge Plan / Estimated Discharge Date: 2 days    Code Status: Level 1 - Full Code      Subjective:   Patient denies CP or SOB  Arm not as uncomfortable anymore        Objective:     Vitals:   Temp (24hrs), Av 1 °F (36 7 °C), Min:97 5 °F (36 4 °C), Max:99 2 °F (37 3 °C)    HR:  [72-95] 88  Resp:  [14-22] 19  BP: (112-196)/(57-91) 174/68  SpO2:  [94 %-99 %] 96 %  Body mass index is 35 9 kg/m²  Input and Output Summary (last 24 hours): Intake/Output Summary (Last 24 hours) at 09/07/18 2348  Last data filed at 09/07/18 2101   Gross per 24 hour   Intake             2830 ml   Output             1325 ml   Net             1505 ml       Physical Exam:     Physical Exam    Deferred patient on stretcher getting ready to head into the OR    Additional Data:     Labs:      Results from last 7 days  Lab Units 09/07/18  0518 09/05/18  1602   WBC Thousand/uL 6 15 7 75   HEMOGLOBIN g/dL 11 8 11 9   HEMATOCRIT % 36 9 36 7   PLATELETS Thousands/uL 385 445*   NEUTROS PCT %  --  61   LYMPHS PCT %  --  33   MONOS PCT %  --  5   EOS PCT %  --  0       Results from last 7 days  Lab Units 09/05/18  1602   SODIUM mmol/L 138   POTASSIUM mmol/L 4 4   CHLORIDE mmol/L 103   CO2 mmol/L 27   BUN mg/dL 25   CREATININE mg/dL 1 17   CALCIUM mg/dL 9 5   ALK PHOS U/L 33*   ALT U/L 22   AST U/L 17       Results from last 7 days  Lab Units 09/07/18  0518   INR  1 02       * I Have Reviewed All Lab Data Listed Above  * Additional Pertinent Lab Tests Reviewed:  [address] Admission Reviewed    Imaging:    Imaging Reports Reviewed Today Include: none  Imaging Personally Reviewed by Myself Includes:  none    Recent Cultures (last 7 days):           Last 24 Hours Medication List:     Current Facility-Administered Medications:  albuterol 2 puff Inhalation Q4H PRN Ericaven DrowPIA archuleta    baclofen 10 mg Oral TID Irven DrownPIA    chlorhexidine 15 mL Swish & Spit Once [facility], PA-C    chlorhexidine 15 mL Swish & Spit Early Morning Crystal Edmond MD    docusate sodium 100 mg Oral BID Miles Christian MD    fenofibrate 168 mg Oral Daily PIA Boone    fish oil 1,000 mg Oral Daily PIA Patel    fluticasone 2 spray Nasal Daily PIA Patel    furosemide 40 mg Oral Daily PIA Boone gabapentin 800 mg Oral TID FuentesPIA Gomez    haloperidol lactate 2 mg Intramuscular Q30 Min PRN Lynn Argueta MD    hydrALAZINE 5 mg Intravenous Q6H PRN Fuentes PIA Durbin    HYDROcodone-acetaminophen 2 tablet Oral Q4H PRN Fuentes Gifty, CRCOREY    HYDROmorphone 0 5 mg Intravenous Q4H PRN Fuentes Gifty, PIA    ketamine 0 1 mg/kg/hr Intravenous Continuous Lynn Argueta MD Last Rate: 0 1 mg/kg/hr (09/07/18 1908)   lactated ringers 75 mL/hr Intravenous Continuous Jaxon Sin MD    lactated ringers 75 mL/hr Intravenous Continuous Derrick Calderon MD    lidocaine 1 patch Transdermal Daily FuentesPIA Gomez    lisinopril 10 mg Oral Daily PIA Plasencia    LORazepam 1 mg Intravenous Q6H PRN Lynn Argueta MD    neomycin-polymyxin B  1 mL in sodium chloride 0 9% 1000 mL irrigation bottle  Irrigation Once Derrick Calderon MD    ondansetron 4 mg Intravenous Q6H PRN PIA Plasencia    oxyCODONE 30 mg Oral Q8H [address] PIA Patel    pantoprazole 40 mg Oral BID AC PIA Patel    pentosan polysulfate 100 mg Oral TID AC PIA Patel    polyethylene glycol 17 g Oral Daily Mauri Li MD    pravastatin 20 mg Oral Daily With [facility] PIA Baez    [START ON 9/8/2018] vancomycin 15 mg/kg Intravenous Q12H Derrick Calderon MD    venlafaxine 75 mg Oral BID With Meals PIA Plasencia    zolpidem 5 mg Oral HS PRN PIA Plasencia         Today, Patient Was Seen By: PIA Plasencia    ** Please Note: This note has been constructed using a voice recognition system   **

## 2018-09-08 NOTE — PROGRESS NOTES
Progress Note - Dianna Riff 1955, 70 y o  female MRN: [id number]    Unit/Bed#: [facility] 929-01 Encounter: [id number]    Primary Care Provider: Ariadna Weinstein MD   Date and time admitted to hospital: 9/6/2018  8:36 AM        * Lumbar canal stenosis with diffuse disc bulge   Assessment & Plan    · Patient transferred from [facility] for further workup of severe central canal stenosis L3-L4 secondary disc bulge and facet hypertrophic changes  · Neurosurgery following, consultation appreciated  · S/p lumbar myelogram: Severe canal stenosis at the L1-2 level secondary to a large central and right paracentral disc extrusion resulting in near complete block of contrast   On delayed imaging contrast does extend superiorly into the lower thoracic thecal sac  At L3-4 there is grade 1 anterior spondylolisthesis with diffuse annular bulging and a small left foraminal disc protrusion  Mild posterior element hypertrophic change with moderate canal stenosis and mild to moderate left foraminal narrowing  Mild noncompressive degenerative change at the L4-5 level  · POD#1 "DECOMPRESSION SPINE LUMBAR POSTERIOR L1-4, L1-2 DISKECTOMY, POSTERIORLATERAL FUSION L3-4, REMOVAL OF SPINAL CORD STIMULATOR SYSTEM, REMOVAL OF INTERSPINOUS DEVICES"  · hemovac drain intact  · Continue patients home doses of gabapentin and baclofen  · Pain control per APS service  · PT/OT  · Per NSX ok to start VTE tomorrow  · Remove tomas         Multiple falls   Assessment & Plan    · Secondary to radiculopathy   · PT/OT eval        Hyperlipidemia   Assessment & Plan    · Continue fenofibrate, Pravachol  Radiculopathy, lumbar region   Assessment & Plan    Suspect secondary lumbar stenosis  Continue gabapentin and baclofen  Neurosurgery consulted          Mild intermittent asthma without complication   Assessment & Plan    · Without acute exacerbation     · Continue home meds   · Encourage incentive spirometry         Essential (primary) hypertension   Assessment & Plan    · BP acceptable  · Continue home meds  · Continue PRN hydralazine SBP > 180          Continuous opioid dependence (HCC)   Assessment & Plan    · Review of PMED, patient follows with Dr Aroldo Rosado  · APS following, currently on Ketamine infusion, scheduled oxycontin 30 mg 8 hours, with PRN meds for BTP  · Pain tolerable at rest, will need to reassess once patient is mobile         Acute bilateral back pain, intractable   Assessment & Plan    · Secondary to severe canal stenosis at the L1-2 level secondary to a large central and right paracentral disc extrusion   · S/p POD#1 posterior lumbar decompression, fusion, removal spinal cord stimulator  · APS following, continue current pain regimen          VTE Pharmacologic Prophylaxis:   Pharmacologic: start VTE in AM  Mechanical VTE Prophylaxis in Place: Yes    Patient Centered Rounds: I have performed bedside rounds with nursing staff today  Discussions with Specialists or Other Care Team Provider:     Education and Discussions with Family / Patient: patient    Time Spent for Care: 30 minutes  More than 50% of total time spent on counseling and coordination of care as described above  Current Length of Stay: 2 day(s)    Current Patient Status: Inpatient   Certification Statement: The patient will continue to require additional inpatient hospital stay due to uncontrolled pain and need for PT/OT evals and safe discharge plan     Discharge Plan: unclear at this time, await PT/OT evals, need neurosurgical clearance    Code Status: Level 1 - Full Code      Subjective:   Patient reports post-op pain in back and pain in thighs but denies numbness/tingling in BLE or pain in lower legs  Reports nausea but is dylon diet  No HA or dizziness   No CP or SOB    Objective:     Vitals:   Temp (24hrs), Av °F (36 7 °C), Min:97 5 °F (36 4 °C), Max:98 5 °F (36 9 °C)    HR:  [77-95] 85  Resp:  [15-22] 17  BP: (125-196)/(58-91) 131/63  SpO2:  [94 %-99 %] 98 %  Body mass index is 35 9 kg/m²  Input and Output Summary (last 24 hours): Intake/Output Summary (Last 24 hours) at 09/08/18 1701  Last data filed at 09/08/18 1433   Gross per 24 hour   Intake          1810 67 ml   Output             1760 ml   Net            50 67 ml       Physical Exam:     Physical Exam   Constitutional: She is oriented to person, place, and time  She appears well-developed and well-nourished  No distress  HENT:   Head: Normocephalic and atraumatic  Neck: Neck supple  Cardiovascular: Normal rate, regular rhythm and intact distal pulses  Pulmonary/Chest: Effort normal and breath sounds normal  No respiratory distress  Abdominal: Soft  Bowel sounds are normal  She exhibits no distension  There is no tenderness  Musculoskeletal: Normal range of motion  She exhibits no edema  Neurological: She is alert and oriented to person, place, and time  No cranial nerve deficit  Skin: Skin is warm and dry  Dressing intact to low back and hemovac in place   Psychiatric: She has a normal mood and affect  Her behavior is normal    Vitals reviewed  Additional Data:     Labs:      Results from last 7 days  Lab Units 09/07/18  0518 09/05/18  1602   WBC Thousand/uL 6 15 7 75   HEMOGLOBIN g/dL 11 8 11 9   HEMATOCRIT % 36 9 36 7   PLATELETS Thousands/uL 385 445*   NEUTROS PCT %  --  61   LYMPHS PCT %  --  33   MONOS PCT %  --  5   EOS PCT %  --  0       Results from last 7 days  Lab Units 09/05/18  1602   SODIUM mmol/L 138   POTASSIUM mmol/L 4 4   CHLORIDE mmol/L 103   CO2 mmol/L 27   BUN mg/dL 25   CREATININE mg/dL 1 17   CALCIUM mg/dL 9 5   ALK PHOS U/L 33*   ALT U/L 22   AST U/L 17       Results from last 7 days  Lab Units 09/07/18  0518   INR  1 02       * I Have Reviewed All Lab Data Listed Above  * Additional Pertinent Lab Tests Reviewed:  All Labs Within Last 24 Hours Reviewed    Imaging:    Imaging Reports Reviewed Today Include: myelogram  Imaging Personally Reviewed by Myself Includes:  none    Recent Cultures (last 7 days):           Last 24 Hours Medication List:     Current Facility-Administered Medications:  albuterol 2 puff Inhalation Q4H PRN Henry Kim, PIA    baclofen 10 mg Oral TID Henry Kim, PIA    chlorhexidine 15 mL [facility] Spit [facility]    chlorhexidine 15 mL Swish & Spit Early Morning Martina Cid MD    docusate sodium 100 mg Oral BID Khris Greene MD    [START ON 9/9/2018] enoxaparin 40 mg Subcutaneous Q24H [address] PIA Patel    fenofibrate 168 mg Oral Daily Henry Kim, PIA    fish oil 1,000 mg Oral Daily Henry Kim, PIA    fluticasone 2 spray Nasal Daily Henry Kim, PIA    furosemide 40 mg Oral Daily PIA Patel    gabapentin 800 mg Oral TID Henry Kim, PIA    haloperidol lactate 2 mg Intramuscular Q30 Min PRN Dewaine Goodell, MD    hydrALAZINE 5 mg Intravenous Q6H PRN Henry Kim, PIA    HYDROcodone-acetaminophen 2 tablet Oral Q4H PRN Henry Kim, PIA    HYDROmorphone 0 5 mg Intravenous Q4H PRN Henyr Kim, PIA    ketamine 0 1 mg/kg/hr Intravenous Continuous Dewaine Goodell, MD Last Rate: 0 1 mg/kg/hr (09/07/18 1908)   lactated ringers 75 mL/hr Intravenous Continuous Martina Cid MD    lidocaine 1 patch Transdermal Daily PIA Najera    lisinopril 10 mg Oral Daily PIA Patel    LORazepam 1 mg Intravenous Q6H PRN Dewaine Goodell, MD    neomycin-polymyxin B  1 mL in sodium chloride 0 9% 1000 mL irrigation bottle  Irrigation Once Martina Cid MD    ondansetron 4 mg Intravenous Q6H PRN PIA Najera    oxyCODONE 30 mg Oral Q8H [address] PIA Patel    pantoprazole 40 mg Oral BID AC PIA Paetl    pentosan polysulfate 100 mg Oral TID AC PIA Patel    polyethylene glycol 17 g Oral Daily Khris Greene MD    pravastatin 20 mg Oral Daily With [facility] PIA Baez    venlafaxine 75 mg Oral BID With Meals PIA Najera    zolpidenadya 5 mg Oral HS PRN PIA Fernandes         Today, Patient Was Seen By: PIA Fernandes    ** Please Note: Dictation voice to text software may have been used in the creation of this document   **

## 2018-09-08 NOTE — PROGRESS NOTES
Progress Note - Neurosurgery   [facility] Benigno Song 70 y o  female MRN: [id number]  Unit/Bed#: [facility] 929-01 Encounter: [id number]    Assessment:  1  POD # 1 posterior lumbar decompression/fusion  L1-4  2  removal of spinal cord stimulator system  3  Acute and chronic Intractable back pain  4  Chronic opioid dependence    Plan:  -seen with Dr Badillo Res  - ketamine drip per anesthesia  - mobilized with PT and OT  - maintain Hemovac drain  - disposition once mobilized    Subjective/Objective     Complaining of right leg pain which is unchanged      Intake/Output       09/08/18 0701 - 09/09/18 0700      [id number] 2622-5096 Total       Intake    P O   120  -- 120    Total Intake 120 -- 120       Output    Urine  400  -- 400    Output (mL) (Urethral Catheter Non-latex 16 Fr ) 400 -- 400    Drains  125  -- 125    Output (mL) (Closed/Suction Drain Right Accordion) 125 -- 125    Total Output 525 -- 525       Net I/O     -405 -- -405          Invasive Devices     Peripheral Intravenous Line            Peripheral IV 09/05/18 Left Antecubital 2 days          Drain            Closed/Suction Drain Right Accordion 1 day    Urethral Catheter Non-latex 16 Fr  1 day                Physical Exam:    Vitals: Blood pressure 143/63, pulse 83, temperature 98 5 °F (36 9 °C), temperature source Oral, resp  rate 18, height 5' 1" (1 549 m), weight 86 2 kg (190 lb), SpO2 99 %, not currently breastfeeding  ,Body mass index is 35 9 kg/m²  Awake and alert  Moves easily in bed  Incision dry and intact  Hemovac drain present on the right side  Moves all extremities  Good lower extremity strength  Lungs clear bilaterally  Heart regular rate  Abdomen soft      Lab Results: I have personally reviewed pertinent results  Imaging Studies: I have personally reviewed pertinent reports          VTE Pharmacologic Prophylaxis: Reason for no pharmacologic prophylaxis post op     VTE Mechanical Prophylaxis: sequential compression device

## 2018-09-08 NOTE — CONSULTS
Consultation - Acute Pain Service   [facility] Devang Bautista 70 y o  female MRN: [id number]  Unit/Bed#: [facility] 929-01 Encounter: [id number]               Assessment/Plan     Assessment:   I have reviewed the patient's controlled substance dispensing history in the Prescription Drug Monitoring Program before prescribing any controlled substances  PDMP shows patient has been taking Ocycontin 30mg, Oxycodone 15mg, Vicodin 10/325 and Ambien at home  Patient POD #1 lumbar decompression  Patient says her pain is 6/10, significantly better than the 10/10 she usually has  Plan:   Patient is satisfied with her pain control at this time  Continue same  Ketamine infusion 0 1mg/kg/hr  Oxycontin 30mg q8h    APS will continue to follow; please [facility]  [phone] with any questions    History of Present Illness    Admit Date:  9/6/2018  Hospital Day:  2 days  Primary Service:  General Medicine  Attending Provider:  Michael Barraza MD  Reason for Consult / Principal Problem: Acute on chronic pain control    HPI: Roland Arellano is a 70y o  year old female who presents acute on chronic pain S/P lumbar decompression    Inpatient consult to Acute Pain Service  Consult performed by: Marcie Gaona  Consult ordered by: Marcelina Parikh        Review of Systems    Historical Information   Past Medical History:   Diagnosis Date    Asthma     Cataract     Chronic back pain     Chronic pain disorder     Back    GERD (gastroesophageal reflux disease)     HTN (hypertension)     Hyperlipidemia     Interstitial cystitis     Liver disease     Muscle cramps     Obesity     Pneumonia     Radiculopathy, lumbar region     Spondylosis, cervical, with myelopathy     Vulvovaginitis      Past Surgical History:   Procedure Laterality Date    APPENDECTOMY      BACK SURGERY      Arthrodeiss lumbar    BACK SURGERY      Spinal stereotaxis of cord    BLADDER SURGERY      Electronic bladder stimulator implantation     SECTION      x3    CHOLECYSTECTOMY      COLON SURGERY      Intestinal stricturoplasty     FINGER SURGERY      Extensor tendon repair (mallet finger)    FL MYELOGRAM LUMBAR  2018    HERNIA REPAIR      x3    HYSTERECTOMY      SC ESOPHAGOGASTRODUODENOSCOPY TRANSORAL DIAGNOSTIC N/A 2017    Procedure: ESOPHAGOGASTRODUODENOSCOPY (EGD); Surgeon: Luiza Veloz MD;  Location: [facility];   Service: Gastroenterology    SALPINGOOPHORECTOMY      B/L     Social History   History   Alcohol Use No     History   Drug Use No     History   Smoking Status    Never Smoker   Smokeless Tobacco    Never Used       Meds/Allergies   current meds:   Current Facility-Administered Medications   Medication Dose Route Frequency    albuterol (PROVENTIL HFA,VENTOLIN HFA) inhaler 2 puff  2 puff Inhalation Q4H PRN    baclofen tablet 10 mg  10 mg Oral TID    chlorhexidine (PERIDEX) 0 12 % oral rinse 15 mL  15 mL Swish & Spit Once    chlorhexidine (PERIDEX) 0 12 % oral rinse 15 mL  15 mL Swish & Spit Early Morning    docusate sodium (COLACE) capsule 100 mg  100 mg Oral BID    fenofibrate (TRICOR) tablet 168 mg  168 mg Oral Daily    fish oil capsule 1,000 mg  1,000 mg Oral Daily    fluticasone (FLONASE) 50 mcg/act nasal spray 2 spray  2 spray Nasal Daily    furosemide (LASIX) tablet 40 mg  40 mg Oral Daily    gabapentin (NEURONTIN) capsule 800 mg  800 mg Oral TID    haloperidol lactate (HALDOL) injection 2 mg  2 mg Intramuscular Q30 Min PRN    hydrALAZINE (APRESOLINE) injection 5 mg  5 mg Intravenous Q6H PRN    HYDROcodone-acetaminophen (NORCO) 5-325 mg per tablet 2 tablet  2 tablet Oral Q4H PRN    HYDROmorphone (DILAUDID) injection 0 5 mg  0 5 mg Intravenous Q4H PRN    ketamine 250 mg (STANDARD CONCENTRATION) IV in sodium chloride 0 9% 250 mL  0 1 mg/kg/hr Intravenous Continuous    lactated ringers infusion  75 mL/hr Intravenous Continuous    lactated ringers infusion  75 mL/hr Intravenous Continuous  lidocaine (LIDODERM) 5 % patch 1 patch  1 patch Transdermal Daily    lisinopril (ZESTRIL) tablet 10 mg  10 mg Oral Daily    LORazepam (ATIVAN) 2 mg/mL injection 1 mg  1 mg Intravenous Q6H PRN    neomycin-polymyxin B (NEOSPORIN-) 1 mL in sodium chloride 0 9 % 1,000 mL irrigation bottle   Irrigation Once    ondansetron (ZOFRAN) injection 4 mg  4 mg Intravenous Q6H PRN    oxyCODONE (OxyCONTIN) 12 hr tablet 30 mg  30 mg Oral Q8H [facility]    pantoprazole (PROTONIX) EC tablet 40 mg  40 mg Oral BID AC    pentosan polysulfate (ELMIRON) capsule 100 mg  100 mg Oral TID AC    polyethylene glycol (MIRALAX) packet 17 g  17 g Oral Daily    pravastatin (PRAVACHOL) tablet 20 mg  20 mg Oral Daily With Dinner    vancomycin (VANCOCIN) 1,250 mg in sodium chloride 0 9 % 250 mL IVPB  15 mg/kg Intravenous Q12H    venlafaxine (EFFEXOR) tablet 75 mg  75 mg Oral BID With Meals    zolpidem (AMBIEN) tablet 5 mg  5 mg Oral HS PRN       Allergies   Allergen Reactions    Clindamycin Hives     Category: Allergy;     Erythromycin Hives and Rash     Category: Allergy;     Latex Hives    Penicillins Hives and Shortness Of Breath    Morphine GI Intolerance and Vomiting     Category: Adverse Reaction;     Erythromycin Base Other (See Comments)     vomitting    Other     Penicillin V Seizures     Category: Allergy;     Povidone Iodine Rash     Category: Adverse Reaction;     Wound Dressings Rash     Category: Adverse Reaction;        Objective   Temp:  [97 5 °F (36 4 °C)-99 2 °F (37 3 °C)] 97 7 °F (36 5 °C)  HR:  [72-95] 77  Resp:  [14-22] 18  BP: (112-196)/(57-91) 125/60    Intake/Output Summary (Last 24 hours) at 09/08/18 0726  Last data filed at 09/08/18 6879   Gross per 24 hour   Intake          3160 67 ml   Output             1825 ml   Net          1335 67 ml       Physical Exam    Counseling / Coordination of Care  Total floor / unit time spent today Level 2 = 40 minutes   Greater than 50% of total time was spent with the patient and / or family counseling and / or coordination of care

## 2018-09-09 ENCOUNTER — APPOINTMENT (INPATIENT)
Dept: RADIOLOGY | Facility: HOSPITAL | Age: 63
DRG: 460 | End: 2018-09-09
Payer: MEDICARE

## 2018-09-09 PROBLEM — D62 ACUTE BLOOD LOSS ANEMIA: Status: ACTIVE | Noted: 2018-09-09

## 2018-09-09 LAB
BASOPHILS # BLD AUTO: 0.04 THOUSANDS/ΜL (ref 0–0.1)
BASOPHILS NFR BLD AUTO: 1 % (ref 0–1)
EOSINOPHIL # BLD AUTO: 0.12 THOUSAND/ΜL (ref 0–0.61)
EOSINOPHIL NFR BLD AUTO: 2 % (ref 0–6)
ERYTHROCYTE [DISTWIDTH] IN BLOOD BY AUTOMATED COUNT: 13.9 % (ref 11.6–15.1)
HCT VFR BLD AUTO: 29.2 % (ref 34.8–46.1)
HGB BLD-MCNC: 8.9 G/DL (ref 11.5–15.4)
IMM GRANULOCYTES # BLD AUTO: 0.02 THOUSAND/UL (ref 0–0.2)
IMM GRANULOCYTES NFR BLD AUTO: 0 % (ref 0–2)
LYMPHOCYTES # BLD AUTO: 2.6 THOUSANDS/ΜL (ref 0.6–4.47)
LYMPHOCYTES NFR BLD AUTO: 37 % (ref 14–44)
MCH RBC QN AUTO: 30 PG (ref 26.8–34.3)
MCHC RBC AUTO-ENTMCNC: 30.5 G/DL (ref 31.4–37.4)
MCV RBC AUTO: 98 FL (ref 82–98)
MONOCYTES # BLD AUTO: 0.51 THOUSAND/ΜL (ref 0.17–1.22)
MONOCYTES NFR BLD AUTO: 7 % (ref 4–12)
NEUTROPHILS # BLD AUTO: 3.72 THOUSANDS/ΜL (ref 1.85–7.62)
NEUTS SEG NFR BLD AUTO: 53 % (ref 43–75)
NRBC BLD AUTO-RTO: 0 /100 WBCS
PLATELET # BLD AUTO: 242 THOUSANDS/UL (ref 149–390)
PMV BLD AUTO: 10.7 FL (ref 8.9–12.7)
RBC # BLD AUTO: 2.97 MILLION/UL (ref 3.81–5.12)
WBC # BLD AUTO: 7.01 THOUSAND/UL (ref 4.31–10.16)

## 2018-09-09 PROCEDURE — 85025 COMPLETE CBC W/AUTO DIFF WBC: CPT | Performed by: NURSE PRACTITIONER

## 2018-09-09 PROCEDURE — 72131 CT LUMBAR SPINE W/O DYE: CPT

## 2018-09-09 PROCEDURE — 99232 SBSQ HOSP IP/OBS MODERATE 35: CPT | Performed by: INTERNAL MEDICINE

## 2018-09-09 RX ADMIN — HYDROMORPHONE HYDROCHLORIDE 0.5 MG: 1 INJECTION, SOLUTION INTRAMUSCULAR; INTRAVENOUS; SUBCUTANEOUS at 16:14

## 2018-09-09 RX ADMIN — FENOFIBRATE 168 MG: 48 TABLET ORAL at 08:22

## 2018-09-09 RX ADMIN — PENTOSAN POLYSULFATE SODIUM 100 MG: 100 CAPSULE, GELATIN COATED ORAL at 12:35

## 2018-09-09 RX ADMIN — OXYCODONE HYDROCHLORIDE 30 MG: 20 TABLET, FILM COATED, EXTENDED RELEASE ORAL at 06:17

## 2018-09-09 RX ADMIN — GABAPENTIN 800 MG: 400 CAPSULE ORAL at 21:18

## 2018-09-09 RX ADMIN — VENLAFAXINE 75 MG: 37.5 TABLET ORAL at 10:08

## 2018-09-09 RX ADMIN — HYDROMORPHONE HYDROCHLORIDE 0.5 MG: 1 INJECTION, SOLUTION INTRAMUSCULAR; INTRAVENOUS; SUBCUTANEOUS at 21:18

## 2018-09-09 RX ADMIN — LIDOCAINE 1 PATCH: 50 PATCH CUTANEOUS at 08:19

## 2018-09-09 RX ADMIN — ZOLPIDEM TARTRATE 5 MG: 5 TABLET ORAL at 21:18

## 2018-09-09 RX ADMIN — CHLORHEXIDINE GLUCONATE 15 ML: 1.2 RINSE ORAL at 06:18

## 2018-09-09 RX ADMIN — FLUTICASONE PROPIONATE 2 SPRAY: 50 SPRAY, METERED NASAL at 08:22

## 2018-09-09 RX ADMIN — PRAVASTATIN SODIUM 20 MG: 20 TABLET ORAL at 17:39

## 2018-09-09 RX ADMIN — DOCUSATE SODIUM 100 MG: 100 CAPSULE, LIQUID FILLED ORAL at 08:17

## 2018-09-09 RX ADMIN — BACLOFEN 10 MG: 10 TABLET ORAL at 17:40

## 2018-09-09 RX ADMIN — HYDROCODONE BITARTRATE AND ACETAMINOPHEN 2 TABLET: 5; 325 TABLET ORAL at 12:35

## 2018-09-09 RX ADMIN — BACLOFEN 10 MG: 10 TABLET ORAL at 08:16

## 2018-09-09 RX ADMIN — OXYCODONE HYDROCHLORIDE 30 MG: 20 TABLET, FILM COATED, EXTENDED RELEASE ORAL at 21:18

## 2018-09-09 RX ADMIN — PANTOPRAZOLE SODIUM 40 MG: 40 TABLET, DELAYED RELEASE ORAL at 06:17

## 2018-09-09 RX ADMIN — Medication 1000 MG: at 08:17

## 2018-09-09 RX ADMIN — VENLAFAXINE 75 MG: 37.5 TABLET ORAL at 17:37

## 2018-09-09 RX ADMIN — PENTOSAN POLYSULFATE SODIUM 100 MG: 100 CAPSULE, GELATIN COATED ORAL at 17:34

## 2018-09-09 RX ADMIN — HYDROMORPHONE HYDROCHLORIDE 0.4 MG: 1 INJECTION, SOLUTION INTRAMUSCULAR; INTRAVENOUS; SUBCUTANEOUS at 10:02

## 2018-09-09 RX ADMIN — HYDROMORPHONE HYDROCHLORIDE 0.5 MG: 1 INJECTION, SOLUTION INTRAMUSCULAR; INTRAVENOUS; SUBCUTANEOUS at 01:26

## 2018-09-09 RX ADMIN — PENTOSAN POLYSULFATE SODIUM 100 MG: 100 CAPSULE, GELATIN COATED ORAL at 06:17

## 2018-09-09 RX ADMIN — PANTOPRAZOLE SODIUM 40 MG: 40 TABLET, DELAYED RELEASE ORAL at 17:35

## 2018-09-09 RX ADMIN — HYDROCODONE BITARTRATE AND ACETAMINOPHEN 2 TABLET: 5; 325 TABLET ORAL at 06:20

## 2018-09-09 RX ADMIN — GABAPENTIN 800 MG: 400 CAPSULE ORAL at 08:17

## 2018-09-09 RX ADMIN — HYDROMORPHONE HYDROCHLORIDE 0.5 MG: 1 INJECTION, SOLUTION INTRAMUSCULAR; INTRAVENOUS; SUBCUTANEOUS at 07:49

## 2018-09-09 RX ADMIN — GABAPENTIN 800 MG: 400 CAPSULE ORAL at 17:39

## 2018-09-09 RX ADMIN — HYDROCODONE BITARTRATE AND ACETAMINOPHEN 2 TABLET: 5; 325 TABLET ORAL at 02:41

## 2018-09-09 RX ADMIN — ENOXAPARIN SODIUM 40 MG: 40 INJECTION SUBCUTANEOUS at 08:18

## 2018-09-09 RX ADMIN — HYDROCODONE BITARTRATE AND ACETAMINOPHEN 2 TABLET: 5; 325 TABLET ORAL at 18:45

## 2018-09-09 RX ADMIN — OXYCODONE HYDROCHLORIDE 30 MG: 20 TABLET, FILM COATED, EXTENDED RELEASE ORAL at 14:21

## 2018-09-09 RX ADMIN — LISINOPRIL 10 MG: 10 TABLET ORAL at 08:21

## 2018-09-09 RX ADMIN — BACLOFEN 10 MG: 10 TABLET ORAL at 21:18

## 2018-09-09 NOTE — ASSESSMENT & PLAN NOTE
Suspect secondary lumbar stenosis  Continue gabapentin and baclofen  Neurosurgery on board  Refer to details above

## 2018-09-09 NOTE — ASSESSMENT & PLAN NOTE
Patient transferred from [facility] for further workup of severe central canal stenosis L3-L4 secondary disc bulge and facet hypertrophic changes  S/p lumbar myelogram: significant lumbar stenosis, please refer to report for detail  POD#1 "DECOMPRESSION SPINE LUMBAR POSTERIOR L1-4, L1-2 DISKECTOMY, POSTERIORLATERAL FUSION L3-4, REMOVAL OF SPINAL CORD STIMULATOR SYSTEM, REMOVAL OF INTERSPINOUS DEVICES"  hemovac drain intact  Continue patients home doses of gabapentin and baclofen    Pain control per APS service  PT/OT

## 2018-09-09 NOTE — ASSESSMENT & PLAN NOTE
Secondary to severe canal stenosis at the L1-2 level secondary to a large central and right paracentral disc extrusion   POD#2: Posterior lumbar decompression, fusion, removal spinal cord stimulator  APS following, continue current pain regimen

## 2018-09-09 NOTE — PROGRESS NOTES
Progress Note - Neurosurgery   [facility], operated by [facility] Verito Quintero 70 y o  female MRN: [id number]  Unit/Bed#: [facility] 929-01 Encounter: [id number]    Assessment:  1  POD # 2 posterior lumbar decompression/fusion  L1-4  2  removal of spinal cord stimulator system  3  Acute and chronic Intractable back pain  4  Chronic opioid dependence    Plan:      Subjective/Objective     -mobilize with PT and OT  -ask anesthesia to taper ketamine drip and start oral pain regime  - Hemovac is referred removed last night  - once ketamine is removed she will be ready for rehab from a neurosurgical standpoint  -DVT prophylaxis-Lovenox    Intake/Output       09/09/18 0701 - 09/10/18 0700      [id number] 0455-5167 Total       Intake    P O   360  -- 360    Total Intake 360 -- 360       Output    Urine  --  -- --    Unmeasured Urine Occurrence 1 x -- 1 x    Total Output -- -- --       Net I/O     360 -- 360          Invasive Devices     Peripheral Intravenous Line            Peripheral IV 09/05/18 Left Antecubital 3 days                Physical Exam:    Vitals: Blood pressure 128/60, pulse 85, temperature 98 °F (36 7 °C), temperature source Oral, resp  rate 18, height 5' 1" (1 549 m), weight 86 2 kg (190 lb), SpO2 97 %, not currently breastfeeding  ,Body mass index is 35 9 kg/m²  Awake and alert  out of bed in chair  Moves all extremities  Incision reinforced by Dr Jaimes Dense drain fell out last night  Lower extremity strength full  Lungs are clear bilaterally  Heart regular rate  Abdomen is soft      Lab Results: I have personally reviewed pertinent results  Imaging Studies: I have personally reviewed pertinent reports          VTE Pharmacologic Prophylaxis: Reason for no pharmacologic prophylaxis post op    VTE Mechanical Prophylaxis: sequential compression device

## 2018-09-09 NOTE — PROGRESS NOTES
Progress Note - Anesthesia Acute Pain Management    Danny Osborne 70 y o  female MRN: [id number]  Unit/Bed#: [facility] 929-01 Encounter: [id number]        Assessment:   Principal Problem:    Lumbar canal stenosis with diffuse disc bulge  Active Problems:    Acute bilateral back pain, intractable    Continuous opioid dependence (HCC)    Essential (primary) hypertension    Mild intermittent asthma without complication    Radiculopathy, lumbar region    Hyperlipidemia    Multiple falls    Lumbar disc herniation    Spinal stenosis of lumbar region with neurogenic claudication    Acute blood loss anemia    Patient sitting up, appears comfortable  Says her pain is 8/10, but she is satisfied with her pain control  Plan:  Continue same  Plan to D/C ketamine tomorrow        Pain History  Current pain location(s): back  Pain Scale:   8/10  Current Analgesic regimen:  See below      Meds/Allergies   current meds:   Current Facility-Administered Medications   Medication Dose Route Frequency    albuterol (PROVENTIL HFA,VENTOLIN HFA) inhaler 2 puff  2 puff Inhalation Q4H PRN    baclofen tablet 10 mg  10 mg Oral TID    chlorhexidine (PERIDEX) 0 12 % oral rinse 15 mL  15 mL Swish & Spit Once    chlorhexidine (PERIDEX) 0 12 % oral rinse 15 mL  15 mL Swish & Spit Early Morning    docusate sodium (COLACE) capsule 100 mg  100 mg Oral BID    enoxaparin (LOVENOX) subcutaneous injection 40 mg  40 mg Subcutaneous Q24H [place]    fenofibrate (TRICOR) tablet 168 mg  168 mg Oral Daily    fish oil capsule 1,000 mg  1,000 mg Oral Daily    fluticasone (FLONASE) 50 mcg/act nasal spray 2 spray  2 spray Nasal Daily    furosemide (LASIX) tablet 40 mg  40 mg Oral Daily    gabapentin (NEURONTIN) capsule 800 mg  800 mg Oral TID    haloperidol lactate (HALDOL) injection 2 mg  2 mg Intramuscular Q30 Min PRN    hydrALAZINE (APRESOLINE) injection 5 mg  5 mg Intravenous Q6H PRN    HYDROcodone-acetaminophen (NORCO) 5-325 mg per tablet 2 tablet 2 tablet Oral Q4H PRN    HYDROmorphone (DILAUDID) injection 0 5 mg  0 5 mg Intravenous Q4H PRN    ketamine 250 mg (STANDARD CONCENTRATION) IV in sodium chloride 0 9% 250 mL  0 1 mg/kg/hr Intravenous Continuous    lactated ringers infusion  75 mL/hr Intravenous Continuous    lidocaine (LIDODERM) 5 % patch 1 patch  1 patch Transdermal Daily    lisinopril (ZESTRIL) tablet 10 mg  10 mg Oral Daily    LORazepam (ATIVAN) 2 mg/mL injection 1 mg  1 mg Intravenous Q6H PRN    neomycin-polymyxin B (NEOSPORIN-) 1 mL in sodium chloride 0 9 % 1,000 mL irrigation bottle   Irrigation Once    ondansetron (ZOFRAN) injection 4 mg  4 mg Intravenous Q6H PRN    oxyCODONE (OxyCONTIN) 12 hr tablet 30 mg  30 mg Oral Q8H [facility]    pantoprazole (PROTONIX) EC tablet 40 mg  40 mg Oral BID AC    pentosan polysulfate (ELMIRON) capsule 100 mg  100 mg Oral TID AC    polyethylene glycol (MIRALAX) packet 17 g  17 g Oral Daily    pravastatin (PRAVACHOL) tablet 20 mg  20 mg Oral Daily With Dinner    [facility]) tablet 75 mg  75 mg Oral BID With Meals    zolpidem (AMBIEN) tablet 5 mg  5 mg Oral HS PRN         Allergies   Allergen Reactions    Clindamycin Hives     Category: Allergy;     Erythromycin Hives and Rash     Category: Allergy;     Latex Hives    Penicillins Hives and Shortness Of Breath    Morphine GI Intolerance and Vomiting     Category: Adverse Reaction;     Erythromycin Base Other (See Comments)     vomitting    Other     Penicillin V Seizures     Category: Allergy;     Povidone Iodine Rash     Category: Adverse Reaction;     Wound Dressings Rash     Category:  Adverse Reaction;        Objective     Temp:  [97 8 °F (36 6 °C)-98 3 °F (36 8 °C)] 98 °F (36 7 °C)  HR:  [78-89] 85  Resp:  [16-19] 18  BP: (120-174)/(58-90) 128/60      Intake/Output Summary (Last 24 hours) at 09/09/18 1349  Last data filed at 09/09/18 1300   Gross per 24 hour   Intake          2107 05 ml   Output              835 ml   Net 1272 05 ml           Counseling / Coordination of Care  Total floor / unit time spent today 15 minutes   Greater than 50% of total time was spent with the patient and / or family counseling and / or coordination of care      SIGNATURE: Rene Hunter MD  DATE: September 9, 2018  TIME: 1:49 PM

## 2018-09-09 NOTE — ASSESSMENT & PLAN NOTE
Review of PMED, patient follows with Dr Tamanna Flores  APS following, currently on Ketamine infusion, scheduled oxycontin 30 mg 8 hours, with PRN meds for BTP  Pain tolerable at rest, will need to reassess once patient is mobile

## 2018-09-09 NOTE — PROGRESS NOTES
Progress Note - Elijah Marshall 1955, 70 y o  female MRN: [id number]  Unit/Bed#: [facility] 929-01 Encounter: [id number]  Primary Care Provider: Jaylon Marrufo MD   Date and time admitted to hospital: 9/6/2018  8:36 AM        * Lumbar canal stenosis with diffuse disc bulge   Assessment & Plan    Patient transferred from [facility] for further workup of severe central canal stenosis L3-L4 secondary disc bulge and facet hypertrophic changes  S/p lumbar myelogram: significant lumbar stenosis, please refer to report for detail  POD#2  "DECOMPRESSION SPINE LUMBAR POSTERIOR L1-4, L1-2 DISKECTOMY, POSTERIORLATERAL FUSION L3-4, REMOVAL OF SPINAL CORD STIMULATOR SYSTEM, REMOVAL OF INTERSPINOUS DEVICES"  hemovac drain has been removed  Continue patients home doses of gabapentin and baclofen  Pain control per APS service  PT/OT          Acute bilateral back pain, intractable   Assessment & Plan    Secondary to severe canal stenosis at the L1-2 level secondary to a large central and right paracentral disc extrusion   POD#2: Posterior lumbar decompression, fusion, removal spinal cord stimulator  APS following, continue current pain regimen        Continuous opioid dependence (HCC)   Assessment & Plan    Review of PMED, patient follows with Dr Kem Aase  APS following, currently on Ketamine infusion, scheduled oxycontin 30 mg 8 hours, with PRN meds for BTP  Pain tolerable at rest, will need to reassess once patient is mobile         Essential (primary) hypertension   Assessment & Plan    BP acceptable  Continue home meds  Continue PRN hydralazine SBP > 180          Mild intermittent asthma without complication   Assessment & Plan    Without acute exacerbation  Continue home meds   Encourage incentive spirometry         Radiculopathy, lumbar region   Assessment & Plan    Suspect secondary lumbar stenosis  Continue gabapentin and baclofen  Neurosurgery on board  Refer to details above            Hyperlipidemia   Assessment & Plan    Continue fenofibrate, Pravachol  Multiple falls   Assessment & Plan    Secondary to radiculopathy  PT/OT to continue  Acute blood loss anemia  Assessment & Plan  Hb today = 8 6  Hb on presentation was 11 9  Most likely secondary to post operative phenomenon  Trend CBCD in AM, transfuse if less than 7    _____________________________________________________________________    SUBJECTIVE:   Patient seen and examined  She appears comfortable  Back pain much improved  She will need to be weaned off ketamine infusion over next 1-2 days, and pain meds needs to be adjusted  She was on multiple pain meds prior to this hospitalization  She states she has been having BMs  No acute events overnight  OBJECTIVE:     Vitals:   HR:  [78-89] 85  Resp:  [16-19] 18  BP: (120-174)/(58-90) 128/60  SpO2:  [97 %-99 %] 97 %  Temp (24hrs), Av 1 °F (36 7 °C), Min:97 8 °F (36 6 °C), Max:98 5 °F (36 9 °C)  Current: Temperature: 98 °F (36 7 °C)    Intake/Output Summary (Last 24 hours) at 18 0943  Last data filed at 18 0900   Gross per 24 hour   Intake          1987 05 ml   Output             1360 ml   Net           627 05 ml       Physical Exam:   GENERAL: AAO x 3, obese  HEENT: atraumatic, normocephalic  Oral mucosa moist, no icterus, pallor  PERRLA +  Neck supple, no JVD, no lymphadenopathy, no thryomegaly  CHEST: B/L breath sounds heard, occasional wheezing  CVS: S1, S2   ABDOMEN: Soft/obese/NT/BS heard  NEUROLOGICAL: CN II -XI grossly intact  No focal motor or sensory deficits  No signs of meningeal irritation or cerebellar dysfunction  EXTREMITIES: No cyanosis/clubbing or edema      LABS:   2018 15:49 2018 05:21   POC Glucose 104    WBC  7 01   RBC  2 97 (L)   Hemoglobin  8 9 (L)   HCT  29 2 (L)   MCV  98   MCH  30 0   MCHC  30 5 (L)   RDW  13 9   Platelets  415   MPV  10 7   nRBC  0   Neutrophils Relative  53   Immat GRANS %  0   Lymphocytes Relative  37   Monocytes Relative  7 Eosinophils Relative  2   Basophils Relative  1   Immature Grans Absolute  0 02   Neutrophils Absolute  3 72   Lymphocytes Absolute  2 60   Monocytes Absolute  0 51   Eosinophils Absolute  0 12   Basophils Absolute  0 04     Scheduled Meds:    Current Facility-Administered Medications:  albuterol 2 puff Inhalation Q4H PRN PIA Sewell    baclofen 10 mg Oral TID PIA Sewell    chlorhexidine 15 mL Swish & Spit Once [facility]-    chlorhexidine 15 mL Swish & Spit Early Morning Yara Salazar MD    docusate sodium 100 mg Oral BID Sarah Everett MD    enoxaparin 40 mg Subcutaneous Q24H [address] PIA Patel    fenofibrate 168 mg Oral Daily PIA Sewell    fish oil 1,000 mg Oral Daily PIA Patel    fluticasone 2 spray Nasal Daily PIA Patel    furosemide 40 mg Oral Daily PIA Patel    gabapentin 800 mg Oral TID PIA Sewell    haloperidol lactate 2 mg Intramuscular Q30 Min PRN Beth Larsen MD    hydrALAZINE 5 mg Intravenous Q6H PRN PIA Sewell    HYDROcodone-acetaminophen 2 tablet Oral Q4H PRN PIA Sewell    HYDROmorphone 0 5 mg Intravenous Q4H PRN PIA Sewell    ketamine 0 1 mg/kg/hr Intravenous Continuous Beth Larsen MD Last Rate: 0 1 mg/kg/hr (09/08/18 2042)   lactated ringers 75 mL/hr Intravenous Continuous Yara Salazar MD Last Rate: 75 mL/hr (09/08/18 2054)   lidocaine 1 patch Transdermal Daily PIA Patel    lisinopril 10 mg Oral Daily PIA Patel    LORazepam 1 mg Intravenous Q6H PRN Beth Larsen MD    neomycin-polymyxin B  1 mL in sodium chloride 0 9% 1000 mL irrigation bottle  Irrigation Once Yara Salazar MD    ondansetron 4 mg Intravenous Q6H PRN PIA Sewell    oxyCODONE 30 mg Oral Q8H [address] PIA Patel    pantoprazole 40 mg Oral BID AC PIA Patel    pentosan polysulfate 100 mg Oral TID AC PIA Patel    polyethylene glycol 17 g Oral Daily Ron Al MD    pravastatin 20 mg Oral Daily With [employer]    venlafaxine 75 mg Oral BID With Meals PIA Louis    zolpidem 5 mg Oral HS PRN PIA Louis        Continuous Infusions:    ketamine 0 1 mg/kg/hr Last Rate: 0 1 mg/kg/hr (09/08/18 2042)   lactated ringers 75 mL/hr Last Rate: 75 mL/hr (09/08/18 2054)       PRN Meds:    albuterol    haloperidol lactate    hydrALAZINE    HYDROcodone-acetaminophen    HYDROmorphone    LORazepam    ondansetron    zolpidem    VTE prophylaxis: Enoxaparin    Education and Discussions with Family / Patient: Patient  Current Length of Stay: Day 3     Current Patient Status: Inpatient     Certification Statement: The patient will continue to require additional inpatient hospital stay due to ongoing goals of care discussion      Discharge Plan / Estimated Discharge Date:  Plan for today discussed with case management and RN  Most likely rehab in next 2-3 days      Code Status: Level 1 - Full Code    Time Spent for Care: 20 minutes   More than 50% of total time spent on counseling and coordination of care as described above  Ene Rubio MD  HOSPITALIST SERVICES  9/9/2018    PLEASE NOTE:  This encounter was completed utilizing the [employer] Direct Speech Voice Recognition Software  Grammatical errors, random word insertions, pronoun errors and incomplete sentences are occasional consequences of the system due to software limitations, ambient noise and hardware issues  These may be missed by proof reading prior to affixing electronic signature  Any questions or concerns about the content, text or information contained within the body of this dictation should be directly addressed to the physician for clarification  Please do not hesitate to call me directly if you have any any questions or concerns

## 2018-09-10 DIAGNOSIS — N95.2 ATROPHIC VULVOVAGINITIS: Primary | ICD-10-CM

## 2018-09-10 LAB
ANION GAP SERPL CALCULATED.3IONS-SCNC: 5 MMOL/L (ref 4–13)
BASOPHILS # BLD AUTO: 0.03 THOUSANDS/ΜL (ref 0–0.1)
BASOPHILS NFR BLD AUTO: 1 % (ref 0–1)
BUN SERPL-MCNC: 6 MG/DL (ref 5–25)
CALCIUM SERPL-MCNC: 8.4 MG/DL (ref 8.3–10.1)
CHLORIDE SERPL-SCNC: 106 MMOL/L (ref 100–108)
CO2 SERPL-SCNC: 30 MMOL/L (ref 21–32)
CREAT SERPL-MCNC: 0.52 MG/DL (ref 0.6–1.3)
EOSINOPHIL # BLD AUTO: 0.16 THOUSAND/ΜL (ref 0–0.61)
EOSINOPHIL NFR BLD AUTO: 2 % (ref 0–6)
ERYTHROCYTE [DISTWIDTH] IN BLOOD BY AUTOMATED COUNT: 13.8 % (ref 11.6–15.1)
GFR SERPL CREATININE-BSD FRML MDRD: 101 ML/MIN/1.73SQ M
GLUCOSE SERPL-MCNC: 86 MG/DL (ref 65–140)
HCT VFR BLD AUTO: 27.3 % (ref 34.8–46.1)
HGB BLD-MCNC: 8.6 G/DL (ref 11.5–15.4)
IMM GRANULOCYTES # BLD AUTO: 0.02 THOUSAND/UL (ref 0–0.2)
IMM GRANULOCYTES NFR BLD AUTO: 0 % (ref 0–2)
LYMPHOCYTES # BLD AUTO: 2.28 THOUSANDS/ΜL (ref 0.6–4.47)
LYMPHOCYTES NFR BLD AUTO: 35 % (ref 14–44)
MCH RBC QN AUTO: 29.8 PG (ref 26.8–34.3)
MCHC RBC AUTO-ENTMCNC: 31.5 G/DL (ref 31.4–37.4)
MCV RBC AUTO: 95 FL (ref 82–98)
MONOCYTES # BLD AUTO: 0.49 THOUSAND/ΜL (ref 0.17–1.22)
MONOCYTES NFR BLD AUTO: 7 % (ref 4–12)
NEUTROPHILS # BLD AUTO: 3.6 THOUSANDS/ΜL (ref 1.85–7.62)
NEUTS SEG NFR BLD AUTO: 55 % (ref 43–75)
NRBC BLD AUTO-RTO: 0 /100 WBCS
PLATELET # BLD AUTO: 251 THOUSANDS/UL (ref 149–390)
PMV BLD AUTO: 10.8 FL (ref 8.9–12.7)
POTASSIUM SERPL-SCNC: 3.6 MMOL/L (ref 3.5–5.3)
RBC # BLD AUTO: 2.89 MILLION/UL (ref 3.81–5.12)
SODIUM SERPL-SCNC: 141 MMOL/L (ref 136–145)
WBC # BLD AUTO: 6.58 THOUSAND/UL (ref 4.31–10.16)

## 2018-09-10 PROCEDURE — G8978 MOBILITY CURRENT STATUS: HCPCS

## 2018-09-10 PROCEDURE — 80048 BASIC METABOLIC PNL TOTAL CA: CPT | Performed by: INTERNAL MEDICINE

## 2018-09-10 PROCEDURE — 97166 OT EVAL MOD COMPLEX 45 MIN: CPT

## 2018-09-10 PROCEDURE — G8989 SELF CARE D/C STATUS: HCPCS

## 2018-09-10 PROCEDURE — G8988 SELF CARE GOAL STATUS: HCPCS

## 2018-09-10 PROCEDURE — 85025 COMPLETE CBC W/AUTO DIFF WBC: CPT | Performed by: INTERNAL MEDICINE

## 2018-09-10 PROCEDURE — 99232 SBSQ HOSP IP/OBS MODERATE 35: CPT | Performed by: INTERNAL MEDICINE

## 2018-09-10 PROCEDURE — 99024 POSTOP FOLLOW-UP VISIT: CPT | Performed by: PHYSICIAN ASSISTANT

## 2018-09-10 PROCEDURE — G8987 SELF CARE CURRENT STATUS: HCPCS

## 2018-09-10 PROCEDURE — G8979 MOBILITY GOAL STATUS: HCPCS

## 2018-09-10 PROCEDURE — 97163 PT EVAL HIGH COMPLEX 45 MIN: CPT

## 2018-09-10 RX ORDER — ESTRADIOL 0.1 MG/G
CREAM VAGINAL
Qty: 42.5 G | Refills: 1 | OUTPATIENT
Start: 2018-09-10 | End: 2018-09-11 | Stop reason: HOSPADM

## 2018-09-10 RX ADMIN — FLUTICASONE PROPIONATE 2 SPRAY: 50 SPRAY, METERED NASAL at 08:23

## 2018-09-10 RX ADMIN — HYDROCODONE BITARTRATE AND ACETAMINOPHEN 2 TABLET: 5; 325 TABLET ORAL at 20:30

## 2018-09-10 RX ADMIN — SODIUM CHLORIDE, SODIUM LACTATE, POTASSIUM CHLORIDE, AND CALCIUM CHLORIDE 75 ML/HR: .6; .31; .03; .02 INJECTION, SOLUTION INTRAVENOUS at 02:06

## 2018-09-10 RX ADMIN — OXYCODONE HYDROCHLORIDE 30 MG: 20 TABLET, FILM COATED, EXTENDED RELEASE ORAL at 21:15

## 2018-09-10 RX ADMIN — LIDOCAINE 1 PATCH: 50 PATCH CUTANEOUS at 08:21

## 2018-09-10 RX ADMIN — PRAVASTATIN SODIUM 20 MG: 20 TABLET ORAL at 17:22

## 2018-09-10 RX ADMIN — ENOXAPARIN SODIUM 40 MG: 40 INJECTION SUBCUTANEOUS at 08:21

## 2018-09-10 RX ADMIN — Medication 1000 MG: at 08:22

## 2018-09-10 RX ADMIN — BACLOFEN 10 MG: 10 TABLET ORAL at 20:30

## 2018-09-10 RX ADMIN — GABAPENTIN 800 MG: 400 CAPSULE ORAL at 20:29

## 2018-09-10 RX ADMIN — ZOLPIDEM TARTRATE 5 MG: 5 TABLET ORAL at 21:15

## 2018-09-10 RX ADMIN — PENTOSAN POLYSULFATE SODIUM 100 MG: 100 CAPSULE, GELATIN COATED ORAL at 12:06

## 2018-09-10 RX ADMIN — KETAMINE HYDROCHLORIDE 0.1 MG/KG/HR: 50 INJECTION, SOLUTION INTRAMUSCULAR; INTRAVENOUS at 02:06

## 2018-09-10 RX ADMIN — OXYCODONE HYDROCHLORIDE 30 MG: 20 TABLET, FILM COATED, EXTENDED RELEASE ORAL at 14:20

## 2018-09-10 RX ADMIN — PENTOSAN POLYSULFATE SODIUM 100 MG: 100 CAPSULE, GELATIN COATED ORAL at 17:23

## 2018-09-10 RX ADMIN — BACLOFEN 10 MG: 10 TABLET ORAL at 08:22

## 2018-09-10 RX ADMIN — HYDROCODONE BITARTRATE AND ACETAMINOPHEN 2 TABLET: 5; 325 TABLET ORAL at 06:36

## 2018-09-10 RX ADMIN — HYDROMORPHONE HYDROCHLORIDE 0.5 MG: 1 INJECTION, SOLUTION INTRAMUSCULAR; INTRAVENOUS; SUBCUTANEOUS at 08:21

## 2018-09-10 RX ADMIN — VENLAFAXINE 75 MG: 37.5 TABLET ORAL at 08:25

## 2018-09-10 RX ADMIN — PENTOSAN POLYSULFATE SODIUM 100 MG: 100 CAPSULE, GELATIN COATED ORAL at 06:37

## 2018-09-10 RX ADMIN — FENOFIBRATE 168 MG: 48 TABLET ORAL at 08:23

## 2018-09-10 RX ADMIN — BACLOFEN 10 MG: 10 TABLET ORAL at 17:22

## 2018-09-10 RX ADMIN — PANTOPRAZOLE SODIUM 40 MG: 40 TABLET, DELAYED RELEASE ORAL at 06:37

## 2018-09-10 RX ADMIN — GABAPENTIN 800 MG: 400 CAPSULE ORAL at 17:22

## 2018-09-10 RX ADMIN — GABAPENTIN 800 MG: 400 CAPSULE ORAL at 08:22

## 2018-09-10 RX ADMIN — PANTOPRAZOLE SODIUM 40 MG: 40 TABLET, DELAYED RELEASE ORAL at 17:22

## 2018-09-10 RX ADMIN — DOCUSATE SODIUM 100 MG: 100 CAPSULE, LIQUID FILLED ORAL at 08:22

## 2018-09-10 RX ADMIN — CHLORHEXIDINE GLUCONATE 15 ML: 1.2 RINSE ORAL at 06:37

## 2018-09-10 RX ADMIN — HYDROCODONE BITARTRATE AND ACETAMINOPHEN 2 TABLET: 5; 325 TABLET ORAL at 02:05

## 2018-09-10 RX ADMIN — LISINOPRIL 10 MG: 10 TABLET ORAL at 08:22

## 2018-09-10 RX ADMIN — OXYCODONE HYDROCHLORIDE 30 MG: 20 TABLET, FILM COATED, EXTENDED RELEASE ORAL at 06:36

## 2018-09-10 RX ADMIN — VENLAFAXINE 75 MG: 37.5 TABLET ORAL at 17:23

## 2018-09-10 NOTE — PROGRESS NOTES
Progress Note - Neurosurgery   [facility] Yovany Rudd 70 y o  female MRN: [id number]  Unit/Bed#: [facility] 929-01 Encounter: [id number]    Assessment:  1  POD3 DECOMPRESSION SPINE LUMBAR POSTERIOR L1-4, L1-2 DISKECTOMY, POSTERIORLATERAL FUSION L3-4, REMOVAL OF SPINAL CORD STIMULATOR SYSTEM, REMOVAL OF INTERSPINOUS DEVICES (9/7/18)  2  Lumbar disc herniation  3  Spinal stenosis of the lumbar region with neurogenic claudication  4  Multiple falls  5  Chronic opioid dependence  6  Obesity    Plan:  · Exam exam reveals good strength throughout bilateral lower extremities  Slight diminished light touch bilateral lateral thighs  · Dressing with mild serosanguineous drainage centrally but no active drainage with applied pressure  New dressing placed  · Imaging reviewed personally and by attending  Final results as below  · CT lumbar spine September 5, 2018:  Multilevel degenerative changes including ligamentum flavum hypertrophy and facet joint arthropathy  Grade 1 anterolisthesis L3 on L4 resulting in severe central canal stenosis  L1-2 disc herniation resulting in severe central stenosis along with bilateral foraminal stenosis  Except noted at L2-3 and L3-4  Spinal cord stimulator placed  · Lumbar spine upright x-rays September 8, 2018:  Stable alignment satisfactory placement hardware  Spinal cord stimulator removed  · CT lumbar spine without contrast September 9, 2018: Final results as below  Expected postoperative changes  · Pain control - appreciate acute pain service recommendations  · Mobilize with physical and occupational therapy  · DVT PPX:  Lovenox and SCDs  · Patient clear discharge from neurosurgical standpoint  Will follow-up for two week postoperative visit  Subjective/Objective   Chief Complaint:  Postoperative follow-up    Subjective:  Patient is without complaints  She states her pain is well controlled    Does admit to some mild ongoing lower extremity radiculopathy but significantly improved from preoperative state  Patient was able to ambulate in the hallway with therapy and mobilized to the bathroom unassisted  Admits tolerating oral intake without nausea or vomiting  No chest pain or shortness of breath  Voiding well  +BM  Objective:  Sitting up in chair  NAD  I/O       09/08 0701 - 09/09 0700 09/09 0701 - 09/10 0700 09/10 0701 - 09/11 0700    P  O  1560 1820 360    I V  (mL/kg) 187 1 (2 2) 1190 (13 8)     Total Intake(mL/kg) 1747 1 (20 3) 3010 (34 9) 360 (4 2)    Urine (mL/kg/hr) 1225 (0 6) 1500 (0 7)     Drains 135      Total Output 1360 1500      Net +387 1 +1510 +360           Unmeasured Urine Occurrence 1 x 2 x     Unmeasured Stool Occurrence 1 x            Invasive Devices     Peripheral Intravenous Line            Peripheral IV 09/08/18 Left Antecubital 1 day          Arterial Line            Arterial Line 09/07/18 Right Radial 3 days                Physical Exam:  Vitals: Blood pressure 167/95, pulse 80, temperature 98 1 °F (36 7 °C), temperature source Oral, resp  rate 18, height 5' 1" (1 549 m), weight 86 2 kg (190 lb), SpO2 96 %, not currently breastfeeding  ,Body mass index is 35 9 kg/m²  General appearance: alert, appears stated age, cooperative and no distress  Head: Normocephalic, without obvious abnormality, atraumatic  Eyes: EOMI,   Neck: supple, symmetrical, trachea midline   Back:  Incision clean dry intact with staples  Mild drainage noted mid incision  No active drainage with applied pressure  Skin edges well aligned  No erythema or edema  Lungs: non labored breathing  Heart: regular heart rate  Neurologic:   Mental status: Alert, oriented, thought content appropriate  Sensory:  Diminished light touch bilateral thighs bilaterally    Pinprick intact throughout bilateral lower extremities  Motor: moving all extremities without focal weakness     Lab Results:    Results from last 7 days  Lab Units 09/10/18  0501 09/09/18  0521 09/07/18  0518 09/05/18  1602   WBC Thousand/uL 6 58 7 01 6 15 7 75   HEMOGLOBIN g/dL 8 6* 8 9* 11 8 11 9   HEMATOCRIT % 27 3* 29 2* 36 9 36 7   PLATELETS Thousands/uL 251 242 385 445*   NEUTROS PCT % 55 53  --  61   MONOS PCT % 7 7  --  5       Results from last 7 days  Lab Units 09/10/18  0501 09/05/18  1602   SODIUM mmol/L 141 138   POTASSIUM mmol/L 3 6 4 4   CHLORIDE mmol/L 106 103   CO2 mmol/L 30 27   BUN mg/dL 6 25   CREATININE mg/dL 0 52* 1 17   CALCIUM mg/dL 8 4 9 5   ALK PHOS U/L  --  33*   ALT U/L  --  22   AST U/L  --  17               Results from last 7 days  Lab Units 09/07/18  0518   INR  1 02   PTT seconds 29     No results found for: TROPONINT  ABG:No results found for: PHART, FEN1IUL, PO2ART, NDL4VTA, S3ZGELAR, BEART, SOURCE    Imaging Studies: I have personally reviewed pertinent reports  and I have personally reviewed pertinent films in PACS    CT spine lumbar wo contrast   Final Result by Yovanny Mcdaniel DO (09/09 1210)      Postoperative change described in detail above  There is residual soft tissue density within the anterior epidural space at the L1-L2 level status post posterior decompression  It is unclear if this represents residual/recurrent disc extrusion or    postoperative granulation tissue  The degree of canal stenosis is improved as a result of the posterior decompression  At the L3-4 level there is stable anterior spondylolisthesis  Artifact from internal hardware limits evaluation of the central canal   Persistent mild canal stenosis and left foraminal narrowing  Mild residual degenerative change at L2-3 and L4-5 with slightly improved canal stenosis due to posterior decompression  Expected postoperative change within the posterior paraspinal soft tissues with suspected postoperative seroma extending from the level of the L2 superior endplate inferiorly to the level of L3 pedicle  There is air present within the posterior epidural    space, posterior to the thecal sac        Workstation performed: [id number]         XR spine lumbar 2 or 3 views injury   Final Result by El Choudhury DO (09/08 1548)      Stable alignment of the lumbar spine status post posterior fusion and decompression described in detail above  Spinal cord stimulator and its leads have been removed  There is a bladder stimulator remaining  Workstation performed: [id number]         XR spine lumbar 2 or 3 views injury   Final Result by Tri Diop DO (09/08 2611)      Fluoroscopic guidance provided for surgical procedure  Please refer to the separate procedure notes for additional details  Workstation performed: [id number]         [facility] myelogram lumbar   Final Result by Tri Diop DO (09/07 1341)      CT spine lumbar wo contrast   Final Result by El Choudhury DO (09/07 1110)      1  Severe canal stenosis at the L1-2 level secondary to a large central and right paracentral disc extrusion resulting in near complete block of contrast   On delayed imaging contrast does extend superiorly into the lower thoracic thecal sac  At L3-4 there is grade 1 anterior spondylolisthesis with diffuse annular bulging and a small left foraminal disc protrusion  Mild posterior element hypertrophic change with moderate canal stenosis and mild to moderate left foraminal narrowing  Mild noncompressive degenerative change at the L4-5 level  Workstation performed: [id number]         XR spine lumbar 2 or 3 views injury   Final Result by Sarbjit Aldana DO (09/07 0802)   Degenerative and postoperative changes  No evidence of lumbar instability  Workstation performed: [id number]             EKG, Pathology, and Other Studies: I have personally reviewed pertinent reports        VTE Pharmacologic Prophylaxis: Enoxaparin (Lovenox)    VTE Mechanical Prophylaxis: sequential compression device

## 2018-09-10 NOTE — DISCHARGE INSTRUCTIONS
Neurosurgery discharge instructions  Posterior Lumbar Decompression and Fusion/Fixation Discharge instructions:     Please keep incision clean and dry  Avoid applying creams, lotions or antiseptics to incision area   Allow steri-strips to fall off  May remove them completely in 10 days   Check the wound daily  If the incision becomes red, swollen, tender, warm, or has increased drainage please notify MD immediately   May shower 5 days after surgery, but do not soak in a tub and no swimming   No bending or twisting at the waist  No heavy lifting greater than 5 - 10lbs   No prolonged sitting greater than 30 minutes, but may walk as tolerated   Take few short walks each day  Increase your walking time as you heal    Please wear brace when out of bed for 6 weeks or until cleared by Neurosurgery  May put it on while sitting at bedside   Please take pain medications to relieved incision pain, and muscle relaxants to prevent spasms as directed by MD or Extender  See Medication reconciliation completed at time of discharge   Please take over the counter stool softeners such as colace or senna-s to avoid constipation while on narcotics   No driving for two weeks or while on narcotics or muscle relaxants   Returning to work will be discussed at follow up appt   Do not take ibuprofen, Naproxen/Aleve or any NSAID: May take Tylenol instead   If taking Coumadin, Aspirin, or Plavix, you may resume these medications once cleared by Neurosurgery   Return for your follow up appointment in 2 weeks for an incision check and staple removal as scheduled  Follow-up 6 weeks after surgery with repeat upright x-rays to be completed prior to visit  **Please notify MD if you experience a fever 101  F or have increased back or leg pain, numbness and/or weakness in your legs**

## 2018-09-10 NOTE — PHYSICAL THERAPY NOTE
Physical Therapy Evaluation    Patient's Name:Mirna Centeno Gibson    Today's Date:09/10/18    Patient Active Problem List   Diagnosis    Acute bilateral back pain, intractable    Continuous opioid dependence ([address] )    Vitamin D deficiency    Cataract, bilateral    Cervical radiculopathy, chronic    Delayed gastric emptying    Essential (primary) hypertension    Fatty liver disease, nonalcoholic    GERD (gastroesophageal reflux disease)    Hot flashes, menopausal    Interstitial cystitis    Lumbar post-laminectomy syndrome    Mild intermittent asthma without complication    Lumbosacral spondylosis without myelopathy    Radiculopathy, lumbar region    Thoracic and lumbosacral neuritis    Hyperlipidemia    Lumbar canal stenosis with diffuse disc bulge    Multiple falls    Lumbar disc herniation    Spinal stenosis of lumbar region with neurogenic claudication    Acute bilateral low back pain with bilateral sciatica    Acute blood loss anemia       Past Medical History:   Diagnosis Date    Asthma     Cataract     Chronic back pain     Chronic pain disorder     Back    GERD (gastroesophageal reflux disease)     HTN (hypertension)     Hyperlipidemia     Interstitial cystitis     Liver disease     Muscle cramps     Obesity     Pneumonia     Radiculopathy, lumbar region     Spondylosis, cervical, with myelopathy     Vulvovaginitis        Past Surgical History:   Procedure Laterality Date    APPENDECTOMY      BACK SURGERY      Arthrodeiss lumbar    BACK SURGERY      Spinal stereotaxis of cord    BLADDER SURGERY      Electronic bladder stimulator implantation     SECTION      x3    CHOLECYSTECTOMY      COLON SURGERY      Intestinal stricturoplasty     DECOMPRESSION SPINE LUMBAR POSTERIOR Bilateral 2018    Procedure: DECOMPRESSION SPINE LUMBAR POSTERIOR L1-4, L1-2 DISKECTOMY, POSTERIORLATERAL FUSION L3-4, REMOVAL OF SPINAL CORD STIMULATOR SYSTEM, REMOVAL OF INTERSPINOUS DEVICES;  Surgeon: Elia Panda MD;  Location:  MAIN OR;  Service: Neurosurgery    FINGER SURGERY      Extensor tendon repair (mallet finger)    FL MYELOGRAM LUMBAR  9/7/2018    HERNIA REPAIR      x3    HYSTERECTOMY      [place] ESOPHAGOGASTRODUODENOSCOPY TRANSORAL DIAGNOSTIC N/A 8/23/2017    Procedure: ESOPHAGOGASTRODUODENOSCOPY (EGD); Surgeon: Shahana Minor MD;  [facility]; Service: Gastroenterology    SALPINGOOPHORECTOMY      B/L        09/10/18 1300   Pain Assessment   Pain Assessment 0-10   Pain Score 6   Pain Type Acute pain;Surgical pain   Pain Location Back   Hospital Pain Intervention(s) Ambulation/increased activity   Response to Interventions unchanged   Home Living   Type of [address] One level;Stairs to enter without rails   Prior Function   Level of [place] Independent with ADLs and functional mobility   Lives With [employer] Help From Family   Restrictions/Precautions   Other Precautions Telemetry;Spinal precautions;Pain; Fall Risk   General   Family/Caregiver Present Yes   Cognition   Arousal/Participation Alert   Orientation Level Oriented X4   Following Commands Follows all commands and directions without difficulty   RUE Assessment   RUE Assessment (funct  reach and grasp)   LUE Assessment   LUE Assessment (funct reach and grasp)   RLE Assessment   RLE Assessment X   Strength RLE   RLE Overall Strength 4-/5   LLE Assessment   LLE Assessment X   Strength LLE   LLE Overall Strength 4-/5   Coordination   Movements are Fluid and Coordinated 0   Coordination and Movement Description inconsistent zac   Bed Mobility   Additional Comments bed mob NT- pt in chair on PT arrival   Transfers   Sit to Stand 5  Supervision   Stand to Sit 5  Supervision   Stand pivot 5  Supervision   Ambulation/Elevation   Gait pattern Inconsistent zac   Gait Assistance 5  Supervision   Additional items Verbal cues   Assistive Device None   Distance 140 ft   Stair Management Assistance 4  Minimal assist   Additional items Verbal cues   Stair Management Technique Alternating pattern   Number of Stairs 10   Balance   Static Standing Fair   Dynamic Standing Fair -   Endurance Deficit   Endurance Deficit Yes   Endurance Deficit Description fatigue   Activity Tolerance   Activity Tolerance Patient limited by fatigue   Nurse Made Aware yes   Assessment   Prognosis Good   Problem List Decreased strength;Decreased range of motion;Decreased endurance; Impaired balance;Decreased mobility; Decreased coordination;Decreased cognition; Impaired judgement;Decreased safety awareness;Pain;Orthopedic restrictions;Decreased skin integrity   Assessment Pt is a 70 y o  female admitted to [facility] on 9/6/2018 w/ Lumbar canal stenosis; is s/p posterior lumbar decompression/fusion  L1-4  Pt exhibits significant impairments with weakness, decreased ROM, impaired balance, decreased endurance, impaired coordination, gait deviations, pain, decreased activity tolerance, decreased functional mobility tolerance, decreased safety awareness, impaired judgement, fall risk, orthopedic restrictions and decreased skin integrity; these impact independence with mobility, ADLs, and IADLs; requires supervision w transf and amb 140 ft - gait pattern w inconsistent zac, postural guarding; objective measures on the Barthel Index also reveal limitations;  therapy prognosis is impacted by relevant co morbidities as noted in evaluation; clinical presentation is currently unstable/unpredictable pt is on cont pule oximetry, neurovasc checks, presents w phys impairments as noted ; PTA, pt was Independent with mobility lives in single level setup w VERONICA skilled PT is indicated to to optimize functional independence and discharge planning; pending functional progress, PT recommendation at discharge is home w fam support   Goals   STG Expiration Date 09/20/18   Short Term Goal #1 1   Modified Independent with Bed Mobility Rolling Right and Left     2  Modified Independent with Bed Mobility Supine-Sit     3  Modified Independent with Transfer Bed-Chair     4  Increase Dynamic Sitting Balance at least 1 Grade for improved stability with functional reach activities     5  Increase Dynamic Standing Balance at least 1 Grade for improved ease with Activities of Daily Living     6  Increase Lower Extremity Strength at least 1 Grade for improved ease mobility tasks     7  Independent with  Ambulation 300 feet to facilitate home and community mobility 8  Modified Independent with Ascending/Descending 15 steps to facilitate home and community accessibility  9  3/3x execution of Lspine precautions w funct mob tasks   Plan   Treatment/Interventions Functional transfer training;LE strengthening/ROM; Elevations; Therapeutic exercise; Endurance training;Cognitive reorientation;Patient/family training;Equipment eval/education; Bed mobility;Gait training; Compensatory technique education;Continued evaluation;Spoke to nursing   PT Frequency 2-3x/wk   Recommendation   Recommendation Home with family support   Barthel Index   Feeding 10   Bathing 0   Grooming Score 0   Dressing Score 5   Bladder Score 10   Bowels Score 10   Toilet Use Score 5   Transfers (Bed/Chair) Score 10   Mobility (Level Surface) Score 10   Stairs Score 5   Barthel Index Score 65

## 2018-09-10 NOTE — PROGRESS NOTES
Rounded w/ Dr Zhang Son with [facility]  Plan to monitor pt off ketamine gtt, PT/OT eval, and possible discharge later today or tomorrow pending therapy recommendations

## 2018-09-10 NOTE — PROGRESS NOTES
Progress Note - Anesthesia Acute Pain Management    Clark Bob 70 y o  female MRN: [id number]  Unit/Bed#: [facility] 929-01 Encounter: [id number]      Assessment:   Principal Problem:    Lumbar canal stenosis with diffuse disc bulge  Active Problems:    Acute bilateral back pain, intractable    Continuous opioid dependence (HCC)    Essential (primary) hypertension    Mild intermittent asthma without complication    Radiculopathy, lumbar region    Hyperlipidemia    Multiple falls    Lumbar disc herniation    Spinal stenosis of lumbar region with neurogenic claudication    Acute blood loss anemia    Plan:   1  Acute on chronic pain, & acute post-op pain-  Patient has pain located to her mid-lower back and right leg  Rates back pain 8/10  Admits to pain relief with medication  Pain decreases to 6/10 after medications  States the pain has significantly improved after surgery  Describes as "stabbing and radiating to right leg"  Admits to ambulating and tolerating well  Patient states she would like to go home  I recommended discontinuing all IV pain control at this time to prepare for discharge needs  Patient agrees  Denies any negative side effects from the ketamine infusion  Discontinue  · Ketamine infusion- done by APS   · Dilaudid IV 0 5 mg IV Q 4 HRS PRN    CONTINUE   · baclofen 10 mg PO TID - home medication  · Gabapentin 800 mg PO TID- home medication   · OxyConTIN  SR 30 mg tablet PO TID -home medication  · Lidoderm patch, 5%, topical patch, one daily  · Hydrocodone-acetaminophen 5-325 mg one tablet p o  q 4 hours p r n  moderate pain, hold for sedation  · Hydrocodone-acetaminophen 5-325 mg two tablets p o  q 4 hours p r n  severe pain, hold for sedation     2   Discharge Needs- Continue home medications at discharge:  · Baclofen 10 mg PO TID - home medication  · Gabapentin 800 mg PO TID- home medication   · OxyConTIN  SR 30 mg tablet PO TID -home medication  · Hydrocodone-acetaminophen 5-325 mg two tablets p o  q 6 hours p r n  Pain - home medication      Patient does not need prescriptions at discharge  Last filled hydrocodone -acetaminophen  mg & oxyconTIN 30 mg on 08/15  She will eligible for refill on 09/15  3  PDMP FINDINGS - last filled prescriptions  On 09/04  OXYCODONE HCL 15 MG TABLET  120 0  30    Patient states this switch was not ideal, and does not want to continue take this    On 08/15  HYDROCODONE-ACETAMIN  MG  120 0  30    On 08/15  OXYCONTIN 30 MG TABLET  90 0  30       4  Plan of care- No further acute needs,  Please call with any new concerns  APS sign off  Thank you for the Consult  Please call  / [phone] ( [facility] [phone]) with any further questions      Pain Course:  Current pain location(s): mid back, right leg   Pain Scale:   8/10  After medications 6/10   24 hour history:  Patient states her pain is much better after surgery  Currently has pain located to her mid- lower back  Rates 8/10  Admits to relief with medications  Pain radiates to her right leg  Patient is able to walk and tolerate well  Patient states she is ready to go home  I discussed changes to her plan to prepare her for discharge  Patient denies questions  Meds/Allergies   all current active meds have been reviewed    Allergies   Allergen Reactions    Clindamycin Hives     Category: Allergy;     Erythromycin Hives and Rash     Category: Allergy;     Latex Hives    Penicillins Hives and Shortness Of Breath    Morphine GI Intolerance and Vomiting     Category: Adverse Reaction;     Erythromycin Base Other (See Comments)     vomitting    Other     Penicillin V Seizures     Category: Allergy;     Povidone Iodine Rash     Category: Adverse Reaction;     Wound Dressings Rash     Category:  Adverse Reaction;        Objective     Physical Exam: /95 (BP Location: Right arm)   Pulse 80   Temp 98 1 °F (36 7 °C) (Oral)   Resp 18   Ht 5' 1" (1 549 m)   Wt 86 2 kg (190 lb) LMP  (LMP Unknown)   SpO2 96%   BMI 35 90 kg/m²   Gen: Well appearing and well nourished, NAD  Skin: Warm, Dry   HEENT:  PERRLA, EOMI  Pul: non labored   Abd: large round  Ext:  No cyanosis or edema  Neuro: AAOx3; CN II-XII grossly intact; no gross focal neurologic deficits  Psych:  Pleasant     Labs:  CBC - WBC/Hgb/Hct/Plts: 6 58/8 6*/27 3*/251 (09/10 0501)  CHEM - BUN/Cr/glu/ALT/AST/amyl/lip:6/0 52*/--/--/--/--/-- (09/10 0501)     LYTES - Na/K/Cl/CO2: 141/3 6/106/30 (09/10 0501)      SIGNATURE: PIA Parsons  DATE: September 10, 2018  TIME: 9:50 AM    Please note that the APS provides consultative services regarding pain management only  With the exception of ketamine and epidural infusions and except when indicated, final decisions regarding starting or changing doses of analgesic medications are at the discretion of the consulting service  Off hours consultation and/or medication management is generally not available

## 2018-09-10 NOTE — PLAN OF CARE
Problem: PHYSICAL THERAPY ADULT  Goal: Performs mobility at highest level of function for planned discharge setting  See evaluation for individualized goals  Treatment/Interventions: Functional transfer training, LE strengthening/ROM, Elevations, Therapeutic exercise, Endurance training, Cognitive reorientation, Patient/family training, Equipment eval/education, Bed mobility, Gait training, Compensatory technique education, Continued evaluation, Spoke to nursing          See flowsheet documentation for full assessment, interventions and recommendations    Prognosis: Good  Problem List: Decreased strength, Decreased range of motion, Decreased endurance, Impaired balance, Decreased mobility, Decreased coordination, Decreased cognition, Impaired judgement, Decreased safety awareness, Pain, Orthopedic restrictions, Decreased skin integrity  Assessment: Pt is a 70 y o  female admitted to [facility] on 9/6/2018 w/ Lumbar canal stenosis; is s/p posterior lumbar decompression/fusion  L1-4  Pt exhibits significant impairments with weakness, decreased ROM, impaired balance, decreased endurance, impaired coordination, gait deviations, pain, decreased activity tolerance, decreased functional mobility tolerance, decreased safety awareness, impaired judgement, fall risk, orthopedic restrictions and decreased skin integrity; these impact independence with mobility, ADLs, and IADLs; requires supervision w transf and amb 140 ft - gait pattern w inconsistent zac, postural guarding; objective measures on the Barthel Index also reveal limitations;  therapy prognosis is impacted by relevant co morbidities as noted in evaluation; clinical presentation is currently unstable/unpredictable pt is on cont pule oximetry, neurovasc checks, presents w phys impairments as noted ; PTA, pt was Independent with mobility lives in single level setup w VERONICA skilled PT is indicated to to optimize functional independence and discharge planning; pending functional progress, PT recommendation at discharge is home w fam support        Recommendation: Home with family support          See flowsheet documentation for full assessment

## 2018-09-10 NOTE — OCCUPATIONAL THERAPY NOTE
[address]  Evaluation     Patient Name: Sheila ALEMAN Date: 9/10/2018  Problem List  Patient Active Problem List   Diagnosis    Acute bilateral back pain, intractable    Continuous opioid dependence ([address] )    Vitamin D deficiency    Cataract, bilateral    Cervical radiculopathy, chronic    Delayed gastric emptying    Essential (primary) hypertension    Fatty liver disease, nonalcoholic    GERD (gastroesophageal reflux disease)    Hot flashes, menopausal    Interstitial cystitis    Lumbar post-laminectomy syndrome    Mild intermittent asthma without complication    Lumbosacral spondylosis without myelopathy    Radiculopathy, lumbar region    Thoracic and lumbosacral neuritis    Hyperlipidemia    Lumbar canal stenosis with diffuse disc bulge    Multiple falls    Lumbar disc herniation    Spinal stenosis of lumbar region with neurogenic claudication    Acute bilateral low back pain with bilateral sciatica    Acute blood loss anemia     Past Medical History  Past Medical History:   Diagnosis Date    Asthma     Cataract     Chronic back pain     Chronic pain disorder     Back    GERD (gastroesophageal reflux disease)     HTN (hypertension)     Hyperlipidemia     Interstitial cystitis     Liver disease     Muscle cramps     Obesity     Pneumonia     Radiculopathy, lumbar region     Spondylosis, cervical, with myelopathy     Vulvovaginitis      Past Surgical History  Past Surgical History:   Procedure Laterality Date    APPENDECTOMY      BACK SURGERY      Arthrodeiss lumbar    BACK SURGERY      Spinal stereotaxis of cord    BLADDER SURGERY      Electronic bladder stimulator implantation     SECTION      x3    CHOLECYSTECTOMY      COLON SURGERY      Intestinal stricturoplasty     DECOMPRESSION SPINE LUMBAR POSTERIOR Bilateral 2018    Procedure: DECOMPRESSION SPINE LUMBAR POSTERIOR L1-4, L1-2 DISKECTOMY, POSTERIORLATERAL FUSION L3-4, REMOVAL OF SPINAL CORD STIMULATOR SYSTEM, REMOVAL OF INTERSPINOUS DEVICES;  Surgeon: Vanessa Morse MD;  [facility]: [facility];  Service: Neurosurgery    FINGER SURGERY      Extensor tendon repair (mallet finger)    FL MYELOGRAM LUMBAR  9/7/2018    HERNIA REPAIR      x3    HYSTERECTOMY      [place] ESOPHAGOGASTRODUODENOSCOPY TRANSORAL DIAGNOSTIC N/A 8/23/2017    Procedure: ESOPHAGOGASTRODUODENOSCOPY (EGD); Surgeon: Reza Whyte MD;  Location: [facility]; Service: Gastroenterology    SALPINGOOPHORECTOMY      B/L      09/10/18 1150   Note Type   Note type Eval only   Restrictions/Precautions   Braces or Orthoses (NO BRACE PER NEUROSX )   Other Precautions Fluid restriction;Pain   Pain Assessment   Pain Assessment 0-10   Pain Score 7   Pain Type Acute pain;Surgical pain   Pain Location Back   Patient's Stated Pain Goal No pain   Hospital Pain Intervention(s) Repositioned; Ambulation/increased activity; Distraction; Emotional support   Response to Interventions INCREASED WITH ACTIVITY    Home Living   Type of [address] [place] [address] Multi-level;1/2 bath on main level;Stairs to enter with rails  (2 VERONICA )   Bathroom Shower/Tub Tub/shower unit   Bathroom Toilet Standard   Bathroom Equipment Shower chair;Grab bars in shower;Grab bars around toilet   [facility]   Additional Comments PT REPORTS NO USE OF DME PTA    Prior Function   Level of [place] Independent with ADLs and functional mobility   Lives With Spouse; Family;Daughter   Receives Help From Family   ADL Assistance Independent   IADLs Independent   Falls in the last 6 months 1 to 4   Vocational Retired   Lifestyle   [employer] PT [place] [employer] ADLS/IADLS/DRIVING PTA  PT RPEORTS DIFFICULTY WITH STAIR MANAGEMENT PTA, STATING SHE "BUMPS UP ON MY BOTTOM"  Reciprocal Relationships LIVES WITH SUPPORTIVE SPOUSE, ADULT DAUGHTER AND GRANDCHILDREN   PT REPORTS DAUGHTER/SPOUSE ARE ABLE TO ASSIST AS NEEDED, Service to Others RETIRED; USED TO WORK FOR FAMILY'S [employer]    Intrinsic Gratification ENJOYS READING    Psychosocial   Psychosocial (WDL) WDL   ADL   Eating Assistance 7  Independent   Grooming Assistance 6  Modified Independent   UB Bathing Assistance 5  Supervision/Setup   LB Bathing Assistance 5  Supervision/Setup   UB Dressing Assistance 5  Supervision/Setup   LB Dressing Assistance 5  Supervision/Setup   Toileting Assistance  5  Supervision/Setup   Functional Assistance 5  Supervision/Setup   Bed Mobility   Supine to Sit 5  Supervision   Additional items Assist x 1; Increased time required;Verbal cues   Sit to Supine Unable to assess  (PT LEFT OOB WITH ALL NEEDS IN REACH )   Transfers   Sit to Stand 5  Supervision   Additional items Assist x 1; Increased time required;Verbal cues   Stand to Sit 5  Supervision   Additional items Assist x 1; Increased time required;Verbal cues   Functional Mobility   Functional Mobility 5  Supervision   Additional Comments WITHOUT AD   Balance   Static Sitting Good   Static Standing Fair +   Ambulatory Fair +   Activity Tolerance   Activity Tolerance Patient tolerated treatment well;Patient limited by pain   Medical Staff Made Aware SPOKE  Wg Micheal Tiwari FROM  REGARDING D/C PLAN   Nurse Made Aware APPROPRIATE TO SEE    RUE Assessment   RUE Assessment WFL   LUE Assessment   LUE Assessment WFL   Hand Function   Gross Motor Coordination Functional   Fine Motor Coordination Functional   Sensation   Light Touch No apparent deficits   Cognition   Overall Cognitive Status WFL   Arousal/Participation Alert; Cooperative   Attention Within functional limits   Orientation Level Oriented X4   Memory Within functional limits   Following Commands Follows all commands and directions without difficulty   Comments PT IS PLEASANT AND COOPERATIVE   EDUCATED ON SPINAL PRECAUTIONS AT BEGINNING OF SESSION, ABLE TO RECALL 3/3 AT END OF SESSION    Assessment   Assessment 64 YO FEMALE SEEN FOR INITIAL OT AB S/P POSTERIOR LUMBAR DECOMPRESSION/LUSION OF L1-4 AND REMOVAL OF SPINAL CORD STIMULATOR AFTER ~3 MONTH OF OF LOW BACK PAIN  NO BRACE REQUIRED AT THIS TIME PER NEUROSX, MAINTAINED SPINAL PRECAUTIONS T/O SESSION  PT WITH PROBLEMS LIST INCLUDING H/O FALLS, RADICULOPATHY, CATARACTS, LIVER DISEASE AND CHRONIC PAIN DISORDER  PT REPORTS BEING I WITH ADLS/IADL/DRIVING PTA WITH DIFFICULTY COMPLETING STAIR MANAGEMENT  PT CURRENTLY FUNCTIONS AT AN OVERALL SUPERVISION LEVEL FOR ADLS, TRANSFERS AND FUNCTIONAL MOBILITY WITHOUT AD  PT IS EXPERIENCING EXPECTED LIMITATIONS 2' PAIN, FATIGUE, SPINAL PRECAUTIONS, AND LIMITED ACTIVITY TOLERANCE  PT EDUCATED ON LOG ROLL TECHNIQUE, SPINAL PRECAUTIONS, ENERGY CONSERVATION TECHNIQUES FOR CARRY OVER UPON D/C AND INCREASED FAMILY SUPPORT  PT ENCOURAGED TO USE RW FOR LONGER DISTANCES FOR ENERGY CONSERVATION AND SC UPON D/C, PT AGREEABLE  ALL QUESTIONS/CONCERNS ADDRESSED  FROM AN OT PERSPECTIVE, PT CAN RETURN HOME WITH INCREASED FAMILY SUPPORT WHEN MEDICALLY CLEARED  NO ADDITIONAL ACUTE CARE OT NEEDS  D/C OT      Goals   Patient Goals TO RETURN HOME TODAY    Recommendation   OT Discharge Recommendation Home with family support   Equipment Recommended (REC USE OF SC AND RW FOR FURTHER DISTANCES)   OT - OK to Discharge Yes  (WHEN MEDICALLY CLEARED )   Barthel Index   Feeding 10   Bathing 5   Grooming Score 5   Dressing Score 5   Bladder Score 10   Bowels Score 10   Toilet Use Score 10   Transfers (Bed/Chair) Score 10   Mobility (Level Surface) Score 0   Stairs Score 0   Barthel Index Score 65   Modified [place] Scale   Modified [place] Scale 3       Documentation completed by Odette Vazquez, SAMANTHA, OTR/L

## 2018-09-10 NOTE — ANESTHESIA PROCEDURE NOTES
Arterial Line Insertion  Date/Time: 9/7/2018 11:52 AM  Performed by: Allison Bianchi  Authorized by: Allison Bianchi   Consent: Verbal consent obtained  Written consent obtained  Risks and benefits: risks, benefits and alternatives were discussed  Consent given by: patient  Patient understanding: patient states understanding of the procedure being performed  Patient consent: the patient's understanding of the procedure matches consent given  Procedure consent: procedure consent matches procedure scheduled  Relevant documents: relevant documents present and verified  Test results: test results available and properly labeled  Site marked: the operative site was marked  Radiology Images: Radiology Images displayed and confirmed  If images not available, report reviewed  Required items: required blood products, implants, devices, and special equipment available  Preparation: Patient was prepped and draped in the usual sterile fashion    Indications: multiple ABGs and hemodynamic monitoring  Orientation:  Right  Location: radial artery  Anesthesia: see MAR for details  Procedure Details:  Needle gauge: 20  Number of attempts: 2    Post-procedure:  Post-procedure: dressing applied  Waveform: good waveform  Patient tolerance: Patient tolerated the procedure well with no immediate complications

## 2018-09-11 ENCOUNTER — PATIENT OUTREACH (OUTPATIENT)
Dept: FAMILY MEDICINE CLINIC | Facility: CLINIC | Age: 63
End: 2018-09-11

## 2018-09-11 VITALS
OXYGEN SATURATION: 96 % | HEIGHT: 61 IN | DIASTOLIC BLOOD PRESSURE: 90 MMHG | BODY MASS INDEX: 35.87 KG/M2 | RESPIRATION RATE: 18 BRPM | SYSTOLIC BLOOD PRESSURE: 170 MMHG | HEART RATE: 84 BPM | TEMPERATURE: 97.7 F | WEIGHT: 190 LBS

## 2018-09-11 PROBLEM — D62 ACUTE BLOOD LOSS ANEMIA: Status: RESOLVED | Noted: 2018-09-09 | Resolved: 2018-09-11

## 2018-09-11 PROCEDURE — 99239 HOSP IP/OBS DSCHRG MGMT >30: CPT | Performed by: INTERNAL MEDICINE

## 2018-09-11 PROCEDURE — 99024 POSTOP FOLLOW-UP VISIT: CPT | Performed by: PHYSICIAN ASSISTANT

## 2018-09-11 RX ORDER — SULFAMETHOXAZOLE AND TRIMETHOPRIM 800; 160 MG/1; MG/1
1 TABLET ORAL EVERY 12 HOURS SCHEDULED
Qty: 26 TABLET | Refills: 0 | Status: SHIPPED | OUTPATIENT
Start: 2018-09-11 | End: 2018-09-25

## 2018-09-11 RX ORDER — OXYCODONE HYDROCHLORIDE 30 MG/1
30 TABLET, FILM COATED, EXTENDED RELEASE ORAL EVERY 8 HOURS SCHEDULED
Qty: 30 TABLET | Refills: 0 | Status: SHIPPED | OUTPATIENT
Start: 2018-09-11 | End: 2018-09-21

## 2018-09-11 RX ORDER — LIDOCAINE 50 MG/G
1 PATCH TOPICAL DAILY
Qty: 30 PATCH | Refills: 0 | Status: SHIPPED | OUTPATIENT
Start: 2018-09-12 | End: 2018-09-24

## 2018-09-11 RX ORDER — SULFAMETHOXAZOLE AND TRIMETHOPRIM 800; 160 MG/1; MG/1
1 TABLET ORAL EVERY 12 HOURS SCHEDULED
Status: DISCONTINUED | OUTPATIENT
Start: 2018-09-11 | End: 2018-09-11 | Stop reason: HOSPADM

## 2018-09-11 RX ORDER — DOCUSATE SODIUM 100 MG/1
100 CAPSULE, LIQUID FILLED ORAL 2 TIMES DAILY
Qty: 10 CAPSULE | Refills: 0 | Status: SHIPPED | OUTPATIENT
Start: 2018-09-11 | End: 2018-10-04

## 2018-09-11 RX ORDER — CHLORHEXIDINE GLUCONATE 0.12 MG/ML
15 RINSE ORAL ONCE
Qty: 120 ML | Refills: 0 | Status: SHIPPED | OUTPATIENT
Start: 2018-09-11 | End: 2018-09-11

## 2018-09-11 RX ORDER — SULFAMETHOXAZOLE AND TRIMETHOPRIM 800; 160 MG/1; MG/1
1 TABLET ORAL EVERY 12 HOURS SCHEDULED
Status: DISCONTINUED | OUTPATIENT
Start: 2018-09-11 | End: 2018-09-11

## 2018-09-11 RX ORDER — BACLOFEN 10 MG/1
10 TABLET ORAL 3 TIMES DAILY
Qty: 30 TABLET | Refills: 0 | Status: SHIPPED | OUTPATIENT
Start: 2018-09-11 | End: 2019-04-22 | Stop reason: ALTCHOICE

## 2018-09-11 RX ORDER — POLYETHYLENE GLYCOL 3350 17 G/17G
17 POWDER, FOR SOLUTION ORAL DAILY
Qty: 14 EACH | Refills: 0 | Status: SHIPPED | OUTPATIENT
Start: 2018-09-12 | End: 2018-09-24

## 2018-09-11 RX ORDER — LIDOCAINE HYDROCHLORIDE AND EPINEPHRINE 10; 10 MG/ML; UG/ML
10 INJECTION, SOLUTION INFILTRATION; PERINEURAL ONCE
Status: COMPLETED | OUTPATIENT
Start: 2018-09-11 | End: 2018-09-11

## 2018-09-11 RX ADMIN — BACLOFEN 10 MG: 10 TABLET ORAL at 08:07

## 2018-09-11 RX ADMIN — Medication 1000 MG: at 08:07

## 2018-09-11 RX ADMIN — FENOFIBRATE 168 MG: 48 TABLET ORAL at 08:08

## 2018-09-11 RX ADMIN — FLUTICASONE PROPIONATE 2 SPRAY: 50 SPRAY, METERED NASAL at 08:07

## 2018-09-11 RX ADMIN — HYDROCODONE BITARTRATE AND ACETAMINOPHEN 2 TABLET: 5; 325 TABLET ORAL at 02:55

## 2018-09-11 RX ADMIN — OXYCODONE HYDROCHLORIDE 30 MG: 20 TABLET, FILM COATED, EXTENDED RELEASE ORAL at 13:31

## 2018-09-11 RX ADMIN — VENLAFAXINE 75 MG: 37.5 TABLET ORAL at 08:08

## 2018-09-11 RX ADMIN — LISINOPRIL 10 MG: 10 TABLET ORAL at 08:07

## 2018-09-11 RX ADMIN — GABAPENTIN 800 MG: 400 CAPSULE ORAL at 08:07

## 2018-09-11 RX ADMIN — LIDOCAINE HYDROCHLORIDE AND EPINEPHRINE 10 ML: 10; 10 INJECTION, SOLUTION INFILTRATION; PERINEURAL at 13:32

## 2018-09-11 RX ADMIN — LIDOCAINE 1 PATCH: 50 PATCH CUTANEOUS at 08:07

## 2018-09-11 RX ADMIN — PENTOSAN POLYSULFATE SODIUM 100 MG: 100 CAPSULE, GELATIN COATED ORAL at 11:19

## 2018-09-11 RX ADMIN — CHLORHEXIDINE GLUCONATE 15 ML: 1.2 RINSE ORAL at 06:06

## 2018-09-11 RX ADMIN — ENOXAPARIN SODIUM 40 MG: 40 INJECTION SUBCUTANEOUS at 08:07

## 2018-09-11 RX ADMIN — HYDROCODONE BITARTRATE AND ACETAMINOPHEN 2 TABLET: 5; 325 TABLET ORAL at 11:22

## 2018-09-11 RX ADMIN — SULFAMETHOXAZOLE AND TRIMETHOPRIM 1 TABLET: 800; 160 TABLET ORAL at 13:31

## 2018-09-11 RX ADMIN — PENTOSAN POLYSULFATE SODIUM 100 MG: 100 CAPSULE, GELATIN COATED ORAL at 06:08

## 2018-09-11 RX ADMIN — PANTOPRAZOLE SODIUM 40 MG: 40 TABLET, DELAYED RELEASE ORAL at 06:06

## 2018-09-11 RX ADMIN — OXYCODONE HYDROCHLORIDE 30 MG: 20 TABLET, FILM COATED, EXTENDED RELEASE ORAL at 06:06

## 2018-09-11 RX ADMIN — HYDROMORPHONE HYDROCHLORIDE 0.5 MG: 1 INJECTION, SOLUTION INTRAMUSCULAR; INTRAVENOUS; SUBCUTANEOUS at 13:39

## 2018-09-11 NOTE — ASSESSMENT & PLAN NOTE
Secondary to severe canal stenosis at the L1-2 level secondary to a large central and right paracentral disc extrusion   POD#3: Posterior lumbar decompression, fusion, removal spinal cord stimulator  APS following

## 2018-09-11 NOTE — PROGRESS NOTES
Patient seen for incision check  Denies any new pain, numbness, tingling and or weakness of her extremities  Continues ambulating cleared physical therapy for discharge home with family support  Incision with serosanguineous drainage from it incision  No focal spot of drainage with applied pressure  ABD pad 75% saturated  New Steri-Strips applied over mid incision  Three 4 x 4 recalls old with ABD over and Tegaderm placed to applied pressure  Left gluteal battery site incision clean dry intact with expected scabbing  New dressing applied  Will oversew incision in the area of drainage prior to discharge  Given drainage, complex spinal surgery and multiple medical comorbidities, will place patient on Bactrim [facility] [place] and clindamycin allergy) and plan for outpatient follow-up as scheduled

## 2018-09-11 NOTE — PROGRESS NOTES
Progress Note - Rose Luna 1955, 70 y o  female MRN: [id number]    Unit/Bed#: [facility] 929-01 Encounter: [id number]    Primary Care Provider: Ariadna Flowers MD   Date and time admitted to hospital: 9/6/2018  8:36 AM    Acute blood loss anemia   Assessment & Plan    Monitor Hb  PRBC for Hb <7        Essential (primary) hypertension   Assessment & Plan    BP acceptable  Continue home meds  Continue PRN hydralazine SBP > 180          Continuous opioid dependence (HCC)   Assessment & Plan    S/p ketamine day 3  Continue other pain medications per Pain mock          Acute bilateral back pain, intractable   Assessment & Plan    Secondary to severe canal stenosis at the L1-2 level secondary to a large central and right paracentral disc extrusion   POD#3: Posterior lumbar decompression, fusion, removal spinal cord stimulator  APS following        * Lumbar canal stenosis with diffuse disc bulge   Assessment & Plan    Patient transferred from [facility] for further workup of severe central canal stenosis L3-L4 secondary disc bulge and facet hypertrophic changes  S/p lumbar myelogram: significant lumbar stenosis, please refer to report for detail  POD#3 "DECOMPRESSION SPINE LUMBAR POSTERIOR L1-4, L1-2 DISKECTOMY, POSTERIORLATERAL FUSION L3-4, REMOVAL OF SPINAL CORD STIMULATOR SYSTEM, REMOVAL OF INTERSPINOUS DEVICES"  hemovac drain intact  Continue patients home doses of gabapentin and baclofen  Pain control per APS service  PT/OT - rehab plan            VTE Pharmacologic Prophylaxis: Enoxaparin (Lovenox)  Mechanical: Mechanical VTE prophylaxis in place  Patient Centered Rounds: I have performed bedside rounds with nursing staff today  Discussions with Specialists or Other Care Team Provider:     Education and Discussions with Family / Patient:     Time Spent for Care: More than 50% of total time spent on counseling and coordination of care as described above      Current Length of Stay: 4 day(s)    Current Patient Status: Inpatient   Certification Statement: The patient will continue to require additional inpatient hospital stay due to as above    Discharge Plan:    Code Status: Level 1 - Full Code      Subjective: pain better    Objective:     Vitals:   Temp (24hrs), Av 2 °F (36 8 °C), Min:97 9 °F (36 6 °C), Max:98 4 °F (36 9 °C)    HR:  [78-99] 98  Resp:  [18] 18  BP: (144-187)/(88-97) 167/89  SpO2:  [96 %-98 %] 98 %  Body mass index is 35 9 kg/m²  Input and Output Summary (last 24 hours): Intake/Output Summary (Last 24 hours) at 09/10/18 2115  Last data filed at 09/10/18 1300   Gross per 24 hour   Intake             2390 ml   Output             1200 ml   Net             1190 ml       Physical Exam   HENT:   Head: Normocephalic  Eyes: Pupils are equal, round, and reactive to light  Cardiovascular: Normal heart sounds  Pulmonary/Chest: Effort normal    Neurological: She is alert  Skin: There is pallor  Additional Data:     Labs:      Results from last 7 days  Lab Units 09/10/18  0501   WBC Thousand/uL 6 58   HEMOGLOBIN g/dL 8 6*   HEMATOCRIT % 27 3*   PLATELETS Thousands/uL 251   NEUTROS PCT % 55   LYMPHS PCT % 35   MONOS PCT % 7   EOS PCT % 2       Results from last 7 days  Lab Units 09/10/18  0501 18  1602   SODIUM mmol/L 141 138   POTASSIUM mmol/L 3 6 4 4   CHLORIDE mmol/L 106 103   CO2 mmol/L 30 27   BUN mg/dL 6 25   CREATININE mg/dL 0 52* 1 17   CALCIUM mg/dL 8 4 9 5   ALK PHOS U/L  --  33*   ALT U/L  --  22   AST U/L  --  17       Results from last 7 days  Lab Units 18  0518   INR  1 02       * I Have Reviewed All Lab Data Listed Above      Imaging:  Imaging Personally Reviewed by Myself Includes:     Cultures:   Blood Culture: No results found for: BLOODCX  Urine Culture:   Lab Results   Component Value Date    URINECX >100,000 cfu/ml Klebsiella pneumoniae 2017     Sputum Culture: No components found for: SPUTUMCX  Wound Culture: No results found for: WOUNDCULT    Last 24 Hours Medication List:     Current Facility-Administered Medications:  albuterol 2 puff Inhalation Q4H PRN Fuentes Gifty, PIA    baclofen 10 mg Oral TID Fuentes Gifty, PIA    chlorhexidine 15 mL Swish & Spit Once [facility], NERY    chlorhexidine 15 mL Swish & Spit Early Morning Derrick Calderon MD    docusate sodium 100 mg Oral BID Mauri Li MD    enoxaparin 40 mg Subcutaneous Q24H [facility] PIA Patel    fenofibrate 168 mg Oral Daily Fuentes Gifty, PIA    fish oil 1,000 mg Oral Daily Fuentes Gifty, PIA    fluticasone 2 spray Nasal Daily Fuentes Gifty, PIA    furosemide 40 mg Oral Daily PIA Patel    gabapentin 800 mg Oral TID Fuentes Gifty, PIA    hydrALAZINE 5 mg Intravenous Q6H PRN Fuentes Gifty, CRCOREY    HYDROcodone-acetaminophen 2 tablet Oral Q4H PRN Fuentes Gifty, PIA    HYDROmorphone 0 5 mg Intravenous Q4H PRN Fuentes Gifty, PIA    lactated ringers 75 mL/hr Intravenous Continuous Derrick Calderon MD Last Rate: Stopped (09/10/18 0959)   lidocaine 1 patch Transdermal Daily PIA Patel    lisinopril 10 mg Oral Daily PIA Patel    neomycin-polymyxin B  1 mL in sodium chloride 0 9% 1000 mL irrigation bottle  Irrigation Once Derrick Calderon MD    ondansetron 4 mg Intravenous Q6H PRN Fuentes PIA Durbin    oxyCODONE 30 mg Oral Q8H [facility] PIA Patel    pantoprazole 40 mg Oral BID AC PIA Patel    pentosan polysulfate 100 mg Oral TID AC PIA Patel    polyethylene glycol 17 g Oral Daily Mauri Li MD    pravastatin 20 mg Oral Daily With [employer], PIA    venlafaxine 75 mg Oral BID With Meals Fuentes Gifty, PIA    zolpidem 5 mg Oral HS PRN Fuentes Gifty, PIA         Today, Patient Was Seen By: Francisco J Colon MD    ** Please Note: Dragon 360 Dictation voice to text software may have been used in the creation of this document   **

## 2018-09-11 NOTE — PROGRESS NOTES
Spoke to patient's adult daughter  She stated that Jeffrey Zafar is doing well today  She resides at home with her daughter and  as well as her grandchildren in a two story home  Adult daughter stated that she is a good support system for Jeffrey Zafar and that she is very involved in her health  Currently does not need PT/OT  Reviewed over upcoming appointment on 9/20/18 with PCP  She stated she will be attending and has no issues with transportation  Introduced her to [facility] and care coordination  Is in agreement to f/u phone calls  Took down care coordination contact information  Will follow up

## 2018-09-11 NOTE — PROGRESS NOTES
Rounded w/ Dr Davey Dumont from [facility]  Pt anticipated for discharge later this afternoon

## 2018-09-11 NOTE — PLAN OF CARE
INFECTION - ADULT     Absence or prevention of progression during hospitalization Adequate for Discharge     Absence of fever/infection during neutropenic period Adequate for Discharge        PAIN - ADULT     Verbalizes/displays adequate comfort level or baseline comfort level Adequate for Discharge        Potential for Falls     Patient will remain free of falls Adequate for Discharge        Prexisting or High Potential for Compromised Skin Integrity     Skin integrity is maintained or improved Adequate for Discharge        SAFETY ADULT     Maintain or return to baseline ADL function Adequate for Discharge     Maintain or return mobility status to optimal level Adequate for Discharge

## 2018-09-11 NOTE — DISCHARGE SUMMARY
Discharge Summary - [facility] Gurwinder Ugarte 70 y o  female MRN: [id number]    [facility] Encounter: [id number]    Admission Date:   Admission Orders     Ordered        09/06/18 0951  Inpatient Admission  Once               Admitting Diagnosis: Back pain [M54 9]    HPI:  "Kevon Guzman is a 70 y o  female who presents to [facility] after being transferred from [facility] with intractable back pain in the lumbar region, patient states this has been an ongoing issue for the past three months  Dr Robi Wells, her pain management specialist has been prescribing her a variety of oxycodone, OxyContin, and Vicodin as PRN doses and she has been taking these around the clock to to manage her intractable pain  Dr Robi Wells has also been administering steroid injections into the lumbar region, which "worked for some time" but are no longer effective, according to the patient, which is what prompted the patient to come to the ER last night, 9/5/2018  The patient states that when her pain is not controlled she is unable to wipe herself, walk, or sometimes even eat as the pain is so bad  Patient states she has also been falling as of recently, as evidenced by a bruise on her left shin  The pain radiates b/l from her hips down to her feet  She has numbness, tingling, "feels like someone is stabbing her with nails" in her feet, radiculopathy, that comes and goes from knees to her feet, but it consistently from her hips to her knees  She has does not have loss of sensation when touching her legs, but the pain is severe enough that she did not allow her right thigh to be touched "    Procedures Performed: No orders of the defined types were placed in this encounter  Summary of Hospital Course:  Patient has acute on chronic pain, lumbar myelogram considered with possible spinal decompression with fixation/fusion was done on 9/7/2018  Turcios was kept overnight for pain control   SHe was placed on ketamine drip and She was on multiple pain medications  Repeat Lumbar spine xray on sept 8th, stable alignment satisfactory placement of hardware  Spinal cord stimulator removed  CT lumbar spine without contrast September 9th only post operative changes  Acute pain service has recommended to discharge patient on baclofen, gabapentin, oxycontin and lidoderm patch, 5%, topical patch, one daily  Percocet every 4 hours prn for moderate pain and severe pain and hold for sedation  She was given scripts for period of time until she is able to obtain from her Pain Doctor  Percocet changed to every 6 hrs at home, gabapentin, oxycontin and beclofen would be continued  She can mobilize and physical and occupational therapy at home  She was noted to have serosanguineous drainage from incision  She was placed on bactrim for 14 days  She will follow with neurosurgery outpatient  Significant Findings, Care, Treatment and Services Provided:as noted above    Complications: as above    Discharge Diagnosis: Lumbar canal stenosis with diffuse disc bulge    Resolved Problems  Date Reviewed: 9/10/2018          Resolved    Acute blood loss anemia 9/11/2018     Resolved by  Heath Rosas MD    Overview Signed 9/9/2018  9:48 AM by Gideon Miller MD     Hb today = 8 6  Hb on presentation was 11 9  Most likely secondary to post operative phenomenon  Trend CBCD in AM, transfuse if less than 7  Condition at Discharge: stable         Discharge instructions/Information to patient and family:   See after visit summary for information provided to patient and family  Provisions for Follow-Up Care:  See after visit summary for information related to follow-up care and any pertinent home health orders  PCP: Marcella Santos MD    Disposition: Home    Planned Readmission: No    Discharge Statement   I spent 45 minutes discharging the patient  This time was spent on the day of discharge  I had direct contact with the patient on the day of discharge  Additional documentation is required if more than 30 minutes were spent on discharge  Discharge Medications:  See after visit summary for reconciled discharge medications provided to patient and family

## 2018-09-11 NOTE — ASSESSMENT & PLAN NOTE
Patient transferred from [facility] for further workup of severe central canal stenosis L3-L4 secondary disc bulge and facet hypertrophic changes  S/p lumbar myelogram: significant lumbar stenosis, please refer to report for detail  POD#3 "DECOMPRESSION SPINE LUMBAR POSTERIOR L1-4, L1-2 DISKECTOMY, POSTERIORLATERAL FUSION L3-4, REMOVAL OF SPINAL CORD STIMULATOR SYSTEM, REMOVAL OF INTERSPINOUS DEVICES"  hemovac drain intact  Continue patients home doses of gabapentin and baclofen    Pain control per APS service  PT/OT - rehab plan

## 2018-09-11 NOTE — SOCIAL WORK
Cm reviewed patient ruing care coordination rounds  Patient is medically stable for d/c today  Cm confirmed that patient has no needs from a PT/OT therapy standpoint  Cm met with patient at bedside and reviewed discharge plan  Patient is in agreement with discharge today  Patient reported that she will have transport at 12:30pm today  Cm informed floor RN  CM reviewed d/c planning process including the following: identifying help at home, patient preference for d/c planning needs, Discharge Lounge, [facility] Meds to Bed program, availability of treatment team to discuss questions or concerns patient and/or family may have regarding understanding medications and recognizing signs and symptoms once discharged  CM also encouraged patient to follow up with all recommended appointments after discharge  Patient advised of importance for patient and family to participate in managing patients medical well being

## 2018-09-12 ENCOUNTER — TELEPHONE (OUTPATIENT)
Dept: NEUROSURGERY | Facility: CLINIC | Age: 63
End: 2018-09-12

## 2018-09-12 NOTE — TELEPHONE ENCOUNTER
Spoke with Vivian Del Valle to see how she is doing after surgery and recent d/c from the hospital yesterday  She reports that she is doing very well overall and denies any incisional issues or fevers  Having some drainage at the incision site which was also noted in her hospital chart  She states that "they had to put a couple more stitches in there"  Patient denies any bleeding, fever, redness, significant pain and swelling, other than some mild soreness in her groin area which she describes as an achey pain  Verified date/time/location of her upcoming POV on 9/24/2018 and advised her to call the office with any further questions or concerns, or if any incisional issues or fevers would arise  Went over incision care with patient and she has no further questions at this time  Patient was appreciative for the call

## 2018-09-13 ENCOUNTER — TRANSITIONAL CARE MANAGEMENT (OUTPATIENT)
Dept: INTERNAL MEDICINE CLINIC | Facility: CLINIC | Age: 63
End: 2018-09-13

## 2018-09-20 ENCOUNTER — TELEPHONE (OUTPATIENT)
Dept: NEUROSURGERY | Facility: CLINIC | Age: 63
End: 2018-09-20

## 2018-09-21 ENCOUNTER — OFFICE VISIT (OUTPATIENT)
Dept: INTERNAL MEDICINE CLINIC | Facility: CLINIC | Age: 63
End: 2018-09-21
Payer: MEDICARE

## 2018-09-21 VITALS
WEIGHT: 195 LBS | HEIGHT: 61 IN | DIASTOLIC BLOOD PRESSURE: 78 MMHG | SYSTOLIC BLOOD PRESSURE: 154 MMHG | BODY MASS INDEX: 36.82 KG/M2 | OXYGEN SATURATION: 97 % | HEART RATE: 96 BPM | RESPIRATION RATE: 18 BRPM

## 2018-09-21 DIAGNOSIS — D64.9 ANEMIA, UNSPECIFIED TYPE: ICD-10-CM

## 2018-09-21 DIAGNOSIS — E78.5 HYPERLIPIDEMIA, UNSPECIFIED HYPERLIPIDEMIA TYPE: ICD-10-CM

## 2018-09-21 DIAGNOSIS — M54.16 RADICULOPATHY, LUMBAR REGION: ICD-10-CM

## 2018-09-21 DIAGNOSIS — E55.9 VITAMIN D DEFICIENCY: ICD-10-CM

## 2018-09-21 DIAGNOSIS — M48.062 SPINAL STENOSIS OF LUMBAR REGION WITH NEUROGENIC CLAUDICATION: ICD-10-CM

## 2018-09-21 DIAGNOSIS — B35.4 TINEA CORPORIS: Primary | ICD-10-CM

## 2018-09-21 PROCEDURE — 99495 TRANSJ CARE MGMT MOD F2F 14D: CPT | Performed by: PHYSICIAN ASSISTANT

## 2018-09-21 RX ORDER — KETOCONAZOLE 20 MG/G
CREAM TOPICAL DAILY
Qty: 60 G | Refills: 1 | Status: SHIPPED | OUTPATIENT
Start: 2018-09-21 | End: 2018-11-08 | Stop reason: SDUPTHER

## 2018-09-21 NOTE — PROGRESS NOTES
Assessment/Plan:   Patient Instructions   Continue current medications  Follow up with neurosurgery as scheduled  Have labs done due to post op anemia  Start OTC iron suplimentation  Return in about 2 months (around 11/21/2018) for Next scheduled follow up  Diagnoses and all orders for this visit:    Tinea corporis  -     ketoconazole (NIZORAL) 2 % cream; Apply topically daily    Spinal stenosis of lumbar region with neurogenic claudication    Radiculopathy, lumbar region    Vitamin D deficiency  -     Vitamin D 25 hydroxy; Future    Anemia, unspecified type  -     CBC and differential; Future  -     Comprehensive metabolic panel; Future    Hyperlipidemia, unspecified hyperlipidemia type  -     Lipid panel; Future          Subjective:      Patient ID: Xiomy Rico is a 70 y o  female  Hospital follow-up/RUPA    Patient was hospitalized from 09/06 to 09/11/2018 at [facility] due to intractable back pain that required spinal decompression with fixation and fusion on 09/07/2018  Patient has long history of chronic low back pain secondary to previously failed low back surgery  Patient has been on multiple pain medications and followed by pain specialist with varied interventions that were not helpful  Patient got to the point where she could not even walk due to the pain  At this point she is able to walk without difficulty other than radicular pain into her right leg to her ankle  She also has been having some muscle cramping of her abdominal muscles, and pain she states goes from her lower back around to her pelvis on both sides  She is not having any difficulty urinating at this time  States her bowels are moving without difficulty and without pain  Denies any chest pain or shortness of breath  States she is active at home to the point she does not need physical therapy  She has follow-up scheduled early next week with Neurosurgery    And does have follow-up scheduled with Pain Management  She had undergone decompression of lumbar spine posterior L1 through L4, with a L1-L2 diskectomy, posterior lateral fusion of L3-4, and removal of spinal cord stimulator system, along with removal of interspinous devices  Postop anemia noted  Patient advised to start over-the-counter iron  Cautioned about side effects  We will check further labs  Date and time hospital follow up call was made:  9/13/2018  3:10 PM  Hospital care reviewed:  Records reviewed  Patient was hopsitalized at:  [facility]  Date of admission:  9/5/18  Date of discharge:  9/11/18  Diagnosis:  back surgery   Disposition:  Home  Were the patients medicaitons reviewed and updated:  Yes  Current symptoms:  None  Post hospital issues:  None  Should patient be enrolled in anticoag monitoring?:  No  Scheduled for follow up?:  Yes  Did you obtain your prescribed medications:  Yes  Do you need help managing your perscriptions or medications:  No  Is transportation to your appointments needed:  No  Living Arrangements:  Family members, Spouse or Significiant other  Support System:  Family  The type of support provided:  Emotional, Financial, Physical  Do you have social support:  Yes, as much as I need  Are you recieving outpatient services:  No  Are you recieving home care services:  No  Are you using any community resources:  No  Current waiver service:  No  Have you fallen in the last 12 months:  No  Counseling:  Patient             ALLERGIES:  Allergies   Allergen Reactions    Clindamycin Hives     Category: Allergy;     Erythromycin Hives and Rash     Category: Allergy;     Latex Hives    Penicillins Hives and Shortness Of Breath    Morphine GI Intolerance and Vomiting     Category: Adverse Reaction;     Erythromycin Base Other (See Comments)     vomitting    Other     Penicillin V Seizures     Category: Allergy;     Povidone Iodine Rash     Category:  Adverse Reaction;     Wound Dressings Rash     Category:  Adverse Reaction;        CURRENT MEDICATIONS:    Current Outpatient Prescriptions:     albuterol 2 mg/5 mL syrup, TAKE 1 TEASPOONFUL BY MOUTH EVERY 4 HOURS AS NEEDED AS DIRECTED, Disp: 473 mL, Rfl: 3    baclofen 10 mg tablet, Take 1 tablet (10 mg total) by mouth 3 (three) times a day, Disp: 30 tablet, Rfl: 0    docusate sodium (COLACE) 100 mg capsule, Take 1 capsule (100 mg total) by mouth 2 (two) times a day, Disp: 10 capsule, Rfl: 0    ergocalciferol (VITAMIN D2) 50,000 units, TAKE ONE CAPSULE BY MOUTH WEEKLY, Disp: 4 capsule, Rfl: 3    estradiol (ESTRACE) 0 1 mg/g vaginal cream, Insert 1 g into the vagina 2 (two) times a week, Disp: 42 5 g, Rfl: 3    fenofibrate (TRIGLIDE) 160 MG tablet, TAKE 1 TABLET BY MOUTH EVERY DAY WITH A MEAL, Disp: 30 tablet, Rfl: 4    fluticasone (FLONASE) 50 mcg/act nasal spray, 2 sprays into each nostril daily, Disp: 16 g, Rfl: 0    furosemide (LASIX) 40 mg tablet, Take by mouth, Disp: , Rfl:     gabapentin (NEURONTIN) 800 mg tablet, Take 800 mg by mouth 3 (three) times a day, Disp: , Rfl:     HYDROcodone-acetaminophen (VICODIN) 7 5-500 MG per tablet, Take 1 tablet by mouth every 6 (six) hours as needed for moderate pain, Disp: , Rfl:     lisinopril (ZESTRIL) 10 mg tablet, Take 1 tablet (10 mg total) by mouth daily, Disp: 30 tablet, Rfl: 5    omega-3-acid ethyl esters (LOVAZA) 1 g capsule, TAKE 2 CAPSULE TWICE DAILY, Disp: 120 capsule, Rfl: 3    OxyCODONE HCl ER (OxyCONTIN) 30 MG T12A, Take 1 tablet (30 mg total) by mouth every 8 (eight) hours for 10 days Earliest Fill Date: 9/11/18 Max Daily Amount: 90 mg, Disp: 30 tablet, Rfl: 0    pantoprazole (PROTONIX) 40 mg tablet, TAKE 1 TABLET BY MOUTH TWICE A DAY 30 MINUTES BEFORE BREAKFAST AND DINNER, Disp: 180 tablet, Rfl: 1    pentosan polysulfate (ELMIRON) 100 mg capsule, Take 100 mg by mouth 3 (three) times a day before meals 2 capsules po  3 time a day, Disp: , Rfl:     pravastatin (PRAVACHOL) 20 mg tablet, TAKE 1 TABLET BY MOUTH EVERY DAY, Disp: 30 tablet, Rfl: 5    prochlorperazine (COMPAZINE) 10 mg tablet, Take 1 tablet (10 mg total) by mouth 2 (two) times a day, Disp: 30 tablet, Rfl: 3    sulfamethoxazole-trimethoprim (BACTRIM DS) 800-160 mg per tablet, Take 1 tablet by mouth every 12 (twelve) hours for 14 days, Disp: 26 tablet, Rfl: 0    VENTOLIN  (90 Base) MCG/ACT inhaler, INHALE 2 PUFFS 3 TIMES A DAY AS NEEDED, Disp: 18 Inhaler, Rfl: 3    zolpidem (AMBIEN) 5 mg tablet, Take 5 mg by mouth daily at bedtime as needed for sleep, Disp: , Rfl:     ketoconazole (NIZORAL) 2 % cream, Apply topically daily, Disp: 60 g, Rfl: 1    lidocaine (LIDODERM) 5 %, Place 1 patch on the skin daily Remove & Discard patch within 12 hours or as directed by MD (Patient not taking: Reported on 9/21/2018 ), Disp: 30 patch, Rfl: 0    metoclopramide (REGLAN) 5 mg/5 mL oral solution, Take 5 mg by mouth 4 (four) times a day (before meals and at bedtime), Disp: , Rfl:     polyethylene glycol (MIRALAX) 17 g packet, Take 17 g by mouth daily (Patient not taking: Reported on 9/21/2018 ), Disp: 14 each, Rfl: 0    venlafaxine (EFFEXOR) 75 mg tablet, Take 1 tablet (75 mg total) by mouth 2 (two) times a day with meals for 180 days (Patient not taking: Reported on 9/21/2018 ), Disp: 180 tablet, Rfl: 1    ACTIVE PROBLEM LIST:  Patient Active Problem List   Diagnosis    Acute bilateral back pain, intractable    Continuous opioid dependence (HCC)    Vitamin D deficiency    Cataract, bilateral    Cervical radiculopathy, chronic    Delayed gastric emptying    Essential (primary) hypertension    Fatty liver disease, nonalcoholic    GERD (gastroesophageal reflux disease)    Hot flashes, menopausal    Interstitial cystitis    Lumbar post-laminectomy syndrome    Mild intermittent asthma without complication    Lumbosacral spondylosis without myelopathy    Radiculopathy, lumbar region    Thoracic and lumbosacral neuritis    Hyperlipidemia    Lumbar canal stenosis with diffuse disc bulge    Multiple falls    Lumbar disc herniation    Spinal stenosis of lumbar region with neurogenic claudication    Acute bilateral low back pain with bilateral sciatica    Tinea corporis       PAST MEDICAL HISTORY:  Past Medical History:   Diagnosis Date    Asthma     Cataract     Chronic back pain     Chronic pain disorder     Back    GERD (gastroesophageal reflux disease)     HTN (hypertension)     Hyperlipidemia     Interstitial cystitis     Liver disease     Muscle cramps     Obesity     Pneumonia     Radiculopathy, lumbar region     Spondylosis, cervical, with myelopathy     Vulvovaginitis        PAST SURGICAL HISTORY:  Past Surgical History:   Procedure Laterality Date    APPENDECTOMY      BACK SURGERY      Arthrodeiss lumbar    BACK SURGERY      Spinal stereotaxis of cord    BLADDER SURGERY      Electronic bladder stimulator implantation     SECTION      x3    CHOLECYSTECTOMY      COLON SURGERY      Intestinal stricturoplasty     DECOMPRESSION SPINE LUMBAR POSTERIOR Bilateral 2018    Procedure: DECOMPRESSION SPINE LUMBAR POSTERIOR L1-4, L1-2 DISKECTOMY, POSTERIORLATERAL FUSION L3-4, REMOVAL OF SPINAL CORD STIMULATOR SYSTEM, REMOVAL OF INTERSPINOUS DEVICES;  Surgeon: Nolvia Kinsey MD;  Location: [facility];  Service: Neurosurgery    FINGER SURGERY      Extensor tendon repair (mallet finger)    FL MYELOGRAM LUMBAR  2018    HERNIA REPAIR      x3    HYSTERECTOMY      CO ESOPHAGOGASTRODUODENOSCOPY TRANSORAL DIAGNOSTIC N/A 2017    Procedure: ESOPHAGOGASTRODUODENOSCOPY (EGD); Surgeon: Tamanna Bethea MD;  Location: [facility];   Service: Gastroenterology    SALPINGOOPHORECTOMY      B/L       FAMILY HISTORY:  Family History   Problem Relation Age of Onset    Coronary artery disease Brother         Patient has 3 brothers with coronary artery disease    Diabetes Mother     Throat cancer Mother     COPD Father     Cancer Father     Bipolar disorder Sister     Drug abuse Sister     Alcohol abuse Sister        SOCIAL HISTORY:  Social History     Social History    Marital status: /Civil Union     Spouse name: N/A    Number of children: 3    Years of education: N/A     Occupational History    Retired      Social History Main Topics    Smoking status: Never Smoker    Smokeless tobacco: Never Used    Alcohol use No    Drug use: No    Sexual activity: Not on file     Other Topics Concern    Not on file     Social History Narrative    Brushes teeth 2x day    Regular dental care    Exercise: walking       Review of Systems   Constitutional: Negative for activity change, chills, fatigue and fever  HENT: Negative for congestion  Eyes: Negative for discharge  Respiratory: Negative for cough, chest tightness and shortness of breath  Cardiovascular: Negative for chest pain, palpitations and leg swelling  Gastrointestinal: Negative for abdominal pain, constipation and diarrhea  Genitourinary: Negative for difficulty urinating  Musculoskeletal: Positive for back pain, gait problem and myalgias  Negative for arthralgias  Skin: Negative for rash  Allergic/Immunologic: Negative for immunocompromised state  Neurological: Negative for dizziness, syncope, weakness, light-headedness and headaches  Hematological: Negative for adenopathy  Does not bruise/bleed easily  Psychiatric/Behavioral: Negative for dysphoric mood  The patient is not nervous/anxious  Objective:  Vitals:    09/21/18 1123   BP: 154/78   BP Location: Left arm   Patient Position: Sitting   Cuff Size: Large   Pulse: 96   Resp: 18   SpO2: 97%   Weight: 88 5 kg (195 lb)   Height: 5' 1" (1 549 m)        Physical Exam   Constitutional: She is oriented to person, place, and time  She appears well-developed and well-nourished  No distress  Neck: Neck supple     Cardiovascular: Normal rate, regular rhythm and normal heart sounds  Pulmonary/Chest: Effort normal and breath sounds normal    Abdominal: Soft  There is no tenderness  Musculoskeletal: She exhibits no edema  Wound at lumbar spine and left lateral flank region have staples in place  Mild redness present at the lateral portion of the left flank wound, no drainage however area is tender  No induration at this time  Extremely poor stapling done at that wound  Spine wound looks clean dry well adherent and approximated without signs of infection  Lymphadenopathy:     She has no cervical adenopathy  Neurological: She is alert and oriented to person, place, and time  She has normal reflexes  Skin: Skin is warm and dry  No rash noted  Some ecchymosis of arms present from IVs etc   Psychiatric: She has a normal mood and affect  Her behavior is normal    Nursing note and vitals reviewed  This note was created with voice recognition software  Phonic, grammatical and spelling errors may be present within the note as a result

## 2018-09-24 ENCOUNTER — OFFICE VISIT (OUTPATIENT)
Dept: NEUROSURGERY | Facility: CLINIC | Age: 63
End: 2018-09-24

## 2018-09-24 ENCOUNTER — APPOINTMENT (OUTPATIENT)
Dept: RADIOLOGY | Facility: CLINIC | Age: 63
End: 2018-09-24
Payer: MEDICARE

## 2018-09-24 VITALS
DIASTOLIC BLOOD PRESSURE: 74 MMHG | HEIGHT: 61 IN | SYSTOLIC BLOOD PRESSURE: 132 MMHG | HEART RATE: 95 BPM | TEMPERATURE: 99 F | WEIGHT: 195 LBS | RESPIRATION RATE: 16 BRPM | BODY MASS INDEX: 36.82 KG/M2

## 2018-09-24 DIAGNOSIS — Z98.1 S/P LUMBAR FUSION: ICD-10-CM

## 2018-09-24 DIAGNOSIS — Z09 POSTOPERATIVE EXAMINATION: ICD-10-CM

## 2018-09-24 DIAGNOSIS — Z98.1 S/P LUMBAR FUSION: Primary | ICD-10-CM

## 2018-09-24 PROCEDURE — 99024 POSTOP FOLLOW-UP VISIT: CPT | Performed by: NEUROLOGICAL SURGERY

## 2018-09-24 PROCEDURE — 72100 X-RAY EXAM L-S SPINE 2/3 VWS: CPT

## 2018-09-24 NOTE — PROGRESS NOTES
Assessment/Plan:    No problem-specific Assessment & Plan notes found for this encounter  L1-L4 laminectomy and L3-4 posterolateral fusion    Doing well >50% pain improved, no electrical shocks  Some residual right and flank pain especially with being upright  She had increased ambulation endurance  Wound looks reasonably healed there is an area on the left flank that appears questionable I will recheck in 1 5 weeks  Advised to cont abx to complete course  Diagnoses and all orders for this visit:    S/P lumbar fusion  -     X-ray lumbar spine 2 or 3 views; Future          Subjective:      Patient ID: Romy Veras is a 70 y o  female  HPI    The following portions of the patient's history were reviewed and updated as appropriate:   She  has a past medical history of Asthma; Cataract; Chronic back pain; Chronic pain disorder; GERD (gastroesophageal reflux disease); HTN (hypertension); Hyperlipidemia; Interstitial cystitis; Liver disease; Muscle cramps; Obesity; Pneumonia; Radiculopathy, lumbar region; Spondylosis, cervical, with myelopathy; and Vulvovaginitis    She   Patient Active Problem List    Diagnosis Date Noted    Tinea corporis 09/21/2018    Lumbar canal stenosis with diffuse disc bulge 09/06/2018    Multiple falls 09/06/2018    Lumbar disc herniation 09/05/2018    Spinal stenosis of lumbar region with neurogenic claudication 09/05/2018    Acute bilateral low back pain with bilateral sciatica 09/05/2018    Hyperlipidemia 03/15/2018    Delayed gastric emptying 08/10/2017    Acute bilateral back pain, intractable 07/10/2017    Continuous opioid dependence ([facility] [address] ) 07/10/2017    Hot flashes, menopausal 07/03/2017    Fatty liver disease, nonalcoholic 11/73/7190    Vitamin D deficiency 01/25/2017    Cataract, bilateral 01/25/2017    Cervical radiculopathy, chronic 01/25/2017    Essential (primary) hypertension 01/25/2017    GERD (gastroesophageal reflux disease) 01/25/2017    Interstitial cystitis 2017    Lumbar post-laminectomy syndrome 2017    Mild intermittent asthma without complication     Lumbosacral spondylosis without myelopathy 2017    Radiculopathy, lumbar region 2017    Thoracic and lumbosacral neuritis 2017     She  has a past surgical history that includes Hernia repair; Cholecystectomy; Hysterectomy; Appendectomy;  section; Bladder surgery; Finger surgery; Colon surgery; Salpingoophorectomy; Back surgery; Back surgery; pr esophagogastroduodenoscopy transoral diagnostic (N/A, 2017); [place] myelogram lumbar (2018); and DECOMPRESSION SPINE LUMBAR POSTERIOR (Bilateral, 2018)  Her family history includes Alcohol abuse in her sister; Bipolar disorder in her sister; COPD in her father; Cancer in her father; Coronary artery disease in her brother; Diabetes in her mother; Drug abuse in her sister; Throat cancer in her mother  She  reports that she has never smoked  She has never used smokeless tobacco  She reports that she does not drink alcohol or use drugs    Current Outpatient Prescriptions on File Prior to Visit   Medication Sig    albuterol 2 mg/5 mL syrup TAKE 1 TEASPOONFUL BY MOUTH EVERY 4 HOURS AS NEEDED AS DIRECTED    baclofen 10 mg tablet Take 1 tablet (10 mg total) by mouth 3 (three) times a day    docusate sodium (COLACE) 100 mg capsule Take 1 capsule (100 mg total) by mouth 2 (two) times a day    ergocalciferol (VITAMIN D2) 50,000 units TAKE ONE CAPSULE BY MOUTH WEEKLY    estradiol (ESTRACE) 0 1 mg/g vaginal cream Insert 1 g into the vagina 2 (two) times a week    fenofibrate (TRIGLIDE) 160 MG tablet TAKE 1 TABLET BY MOUTH EVERY DAY WITH A MEAL    fluticasone (FLONASE) 50 mcg/act nasal spray 2 sprays into each nostril daily    furosemide (LASIX) 40 mg tablet Take by mouth    gabapentin (NEURONTIN) 800 mg tablet Take 800 mg by mouth 3 (three) times a day    HYDROcodone-acetaminophen (VICODIN) 7 5-500 MG per tablet Take 1 tablet by mouth every 6 (six) hours as needed for moderate pain    ketoconazole (NIZORAL) 2 % cream Apply topically daily    lisinopril (ZESTRIL) 10 mg tablet Take 1 tablet (10 mg total) by mouth daily    omega-3-acid ethyl esters (LOVAZA) 1 g capsule TAKE 2 CAPSULE TWICE DAILY    pantoprazole (PROTONIX) 40 mg tablet TAKE 1 TABLET BY MOUTH TWICE A DAY 30 MINUTES BEFORE BREAKFAST AND DINNER    pentosan polysulfate (ELMIRON) 100 mg capsule Take 100 mg by mouth 3 (three) times a day before meals 2 capsules po  3 time a day    prochlorperazine (COMPAZINE) 10 mg tablet Take 1 tablet (10 mg total) by mouth 2 (two) times a day    sulfamethoxazole-trimethoprim (BACTRIM DS) 800-160 mg per tablet Take 1 tablet by mouth every 12 (twelve) hours for 14 days    VENTOLIN  (90 Base) MCG/ACT inhaler INHALE 2 PUFFS 3 TIMES A DAY AS NEEDED    zolpidem (AMBIEN) 5 mg tablet Take 5 mg by mouth daily at bedtime as needed for sleep    metoclopramide (REGLAN) 5 mg/5 mL oral solution Take 5 mg by mouth 4 (four) times a day (before meals and at bedtime)    pravastatin (PRAVACHOL) 20 mg tablet TAKE 1 TABLET BY MOUTH EVERY DAY (Patient not taking: Reported on 9/24/2018)    venlafaxine (EFFEXOR) 75 mg tablet Take 1 tablet (75 mg total) by mouth 2 (two) times a day with meals for 180 days (Patient not taking: Reported on 9/21/2018 )    [DISCONTINUED] lidocaine (LIDODERM) 5 % Place 1 patch on the skin daily Remove & Discard patch within 12 hours or as directed by MD (Patient not taking: Reported on 9/24/2018 )    [DISCONTINUED] polyethylene glycol (MIRALAX) 17 g packet Take 17 g by mouth daily (Patient not taking: Reported on 9/21/2018 )     No current facility-administered medications on file prior to visit       Review of Systems   Constitutional: Negative for chills, fatigue and fever  Eyes: Negative for pain and visual disturbance     Respiratory: Negative for cough, shortness of breath and wheezing  Cardiovascular: Negative for chest pain and palpitations  Gastrointestinal: Negative for abdominal pain and nausea  Genitourinary: Negative for difficulty urinating  Musculoskeletal: Positive for arthralgias, back pain and gait problem  Negative for neck pain and neck stiffness  Neurological: Positive for weakness  Negative for dizziness, speech difficulty, numbness and headaches  Objective:      /74 (BP Location: Left arm, Patient Position: Sitting, Cuff Size: Standard)   Pulse 95   Temp 99 °F (37 2 °C) (Tympanic)   Resp 16   Ht 5' 1" (1 549 m)   Wt 88 5 kg (195 lb)   LMP  (LMP Unknown)   BMI 36 84 kg/m²          Physical Exam   Constitutional: She is oriented to person, place, and time  She appears well-developed  HENT:   Head: Normocephalic  Eyes: Conjunctivae are normal  Pupils are equal, round, and reactive to light  Neck: Normal range of motion  Neurological: She is alert and oriented to person, place, and time  She has normal strength  No cranial nerve deficit or sensory deficit  Incision c/d/i  Lumbar incision staples removed stitches in place no leakage  Flank incision staples removed some small separation of edges on the lateral portion steristrips placed  POST-OP EVALUATION  9/24/2018    Chele Briones is a 70 y o  female is here today for routine post-operative follow up  L1-4 lami and L3-4 fusion  She is doing well no issues  Her pain is much improved from prior  No electrical shocks she reports some flank pain and some right lower extremity pain but this has improved as well      Past Medical History:   Diagnosis Date    Asthma     Cataract     Chronic back pain     Chronic pain disorder     Back    GERD (gastroesophageal reflux disease)     HTN (hypertension)     Hyperlipidemia     Interstitial cystitis     Liver disease     Muscle cramps     Obesity     Pneumonia     Radiculopathy, lumbar region     Spondylosis, cervical, with myelopathy     Vulvovaginitis      Past Surgical History:   Procedure Laterality Date    APPENDECTOMY      BACK SURGERY      Arthrodeiss lumbar    BACK SURGERY      Spinal stereotaxis of cord    BLADDER SURGERY      Electronic bladder stimulator implantation     SECTION      x3    CHOLECYSTECTOMY      COLON SURGERY      Intestinal stricturoplasty     DECOMPRESSION SPINE LUMBAR POSTERIOR Bilateral 2018    Procedure: DECOMPRESSION SPINE LUMBAR POSTERIOR L1-4, L1-2 DISKECTOMY, POSTERIORLATERAL FUSION L3-4, REMOVAL OF SPINAL CORD STIMULATOR SYSTEM, REMOVAL OF INTERSPINOUS DEVICES;  Surgeon: Carroll Leong MD;  Location: [facility];  Service: Neurosurgery    FINGER SURGERY      Extensor tendon repair (mallet finger)    FL MYELOGRAM LUMBAR  2018    HERNIA REPAIR      x3    HYSTERECTOMY      [place] ESOPHAGOGASTRODUODENOSCOPY TRANSORAL DIAGNOSTIC N/A 2017    Procedure: ESOPHAGOGASTRODUODENOSCOPY (EGD); Surgeon: Starr Aldana MD;  Location: [facility];   Service: Gastroenterology    SALPINGOOPHORECTOMY      B/L        Current Outpatient Prescriptions:     albuterol 2 mg/5 mL syrup, TAKE 1 TEASPOONFUL BY MOUTH EVERY 4 HOURS AS NEEDED AS DIRECTED, Disp: 473 mL, Rfl: 3    baclofen 10 mg tablet, Take 1 tablet (10 mg total) by mouth 3 (three) times a day, Disp: 30 tablet, Rfl: 0    docusate sodium (COLACE) 100 mg capsule, Take 1 capsule (100 mg total) by mouth 2 (two) times a day, Disp: 10 capsule, Rfl: 0    ergocalciferol (VITAMIN D2) 50,000 units, TAKE ONE CAPSULE BY MOUTH WEEKLY, Disp: 4 capsule, Rfl: 3    estradiol (ESTRACE) 0 1 mg/g vaginal cream, Insert 1 g into the vagina 2 (two) times a week, Disp: 42 5 g, Rfl: 3    fenofibrate (TRIGLIDE) 160 MG tablet, TAKE 1 TABLET BY MOUTH EVERY DAY WITH A MEAL, Disp: 30 tablet, Rfl: 4    fluticasone (FLONASE) 50 mcg/act nasal spray, 2 sprays into each nostril daily, Disp: 16 g, Rfl: 0    furosemide (LASIX) 40 mg tablet, Take by mouth, Disp: , Rfl:     gabapentin (NEURONTIN) 800 mg tablet, Take 800 mg by mouth 3 (three) times a day, Disp: , Rfl:     HYDROcodone-acetaminophen (VICODIN) 7 5-500 MG per tablet, Take 1 tablet by mouth every 6 (six) hours as needed for moderate pain, Disp: , Rfl:     ketoconazole (NIZORAL) 2 % cream, Apply topically daily, Disp: 60 g, Rfl: 1    lisinopril (ZESTRIL) 10 mg tablet, Take 1 tablet (10 mg total) by mouth daily, Disp: 30 tablet, Rfl: 5    omega-3-acid ethyl esters (LOVAZA) 1 g capsule, TAKE 2 CAPSULE TWICE DAILY, Disp: 120 capsule, Rfl: 3    pantoprazole (PROTONIX) 40 mg tablet, TAKE 1 TABLET BY MOUTH TWICE A DAY 30 MINUTES BEFORE BREAKFAST AND DINNER, Disp: 180 tablet, Rfl: 1    pentosan polysulfate (ELMIRON) 100 mg capsule, Take 100 mg by mouth 3 (three) times a day before meals 2 capsules po  3 time a day, Disp: , Rfl:     prochlorperazine (COMPAZINE) 10 mg tablet, Take 1 tablet (10 mg total) by mouth 2 (two) times a day, Disp: 30 tablet, Rfl: 3    sulfamethoxazole-trimethoprim (BACTRIM DS) 800-160 mg per tablet, Take 1 tablet by mouth every 12 (twelve) hours for 14 days, Disp: 26 tablet, Rfl: 0    VENTOLIN  (90 Base) MCG/ACT inhaler, INHALE 2 PUFFS 3 TIMES A DAY AS NEEDED, Disp: 18 Inhaler, Rfl: 3    zolpidem (AMBIEN) 5 mg tablet, Take 5 mg by mouth daily at bedtime as needed for sleep, Disp: , Rfl:     metoclopramide (REGLAN) 5 mg/5 mL oral solution, Take 5 mg by mouth 4 (four) times a day (before meals and at bedtime), Disp: , Rfl:     pravastatin (PRAVACHOL) 20 mg tablet, TAKE 1 TABLET BY MOUTH EVERY DAY (Patient not taking: Reported on 9/24/2018), Disp: 30 tablet, Rfl: 5    venlafaxine (EFFEXOR) 75 mg tablet, Take 1 tablet (75 mg total) by mouth 2 (two) times a day with meals for 180 days (Patient not taking: Reported on 9/21/2018 ), Disp: 180 tablet, Rfl: 1  Allergies: Clindamycin; Erythromycin; Latex; Penicillins; Morphine; Erythromycin base;  Other; Penicillin v; Povidone iodine; and Wound dressings    Objective    Neurological exam reveals:   Musculoskeletal exam reveals: negative    Assessment/Plan   Diagnoses and all orders for this visit:    S/P lumbar fusion  -     X-ray lumbar spine 2 or 3 views;  Future    Postoperative examination        Marychuy Elliott MD

## 2018-09-26 ENCOUNTER — PATIENT OUTREACH (OUTPATIENT)
Dept: FAMILY MEDICINE CLINIC | Facility: CLINIC | Age: 63
End: 2018-09-26

## 2018-10-03 ENCOUNTER — OFFICE VISIT (OUTPATIENT)
Dept: NEUROSURGERY | Facility: CLINIC | Age: 63
End: 2018-10-03

## 2018-10-03 VITALS
DIASTOLIC BLOOD PRESSURE: 92 MMHG | HEART RATE: 92 BPM | SYSTOLIC BLOOD PRESSURE: 173 MMHG | TEMPERATURE: 98.8 F | RESPIRATION RATE: 16 BRPM | HEIGHT: 61 IN | BODY MASS INDEX: 36.82 KG/M2 | WEIGHT: 195 LBS

## 2018-10-03 DIAGNOSIS — M96.842 SEROMA OF MUSCULOSKELETAL STRUCTURE AFTER MUSCULOSKELETAL SYSTEM PROCEDURE: Primary | ICD-10-CM

## 2018-10-03 PROCEDURE — 99024 POSTOP FOLLOW-UP VISIT: CPT | Performed by: NEUROLOGICAL SURGERY

## 2018-10-03 PROCEDURE — 1123F ACP DISCUSS/DSCN MKR DOCD: CPT | Performed by: NEUROLOGICAL SURGERY

## 2018-10-03 RX ORDER — VANCOMYCIN HYDROCHLORIDE 1 G/200ML
1000 INJECTION, SOLUTION INTRAVENOUS ONCE
Status: CANCELLED | OUTPATIENT
Start: 2018-10-03 | End: 2018-10-03

## 2018-10-03 RX ORDER — SULFAMETHOXAZOLE AND TRIMETHOPRIM 800; 160 MG/1; MG/1
1 TABLET ORAL EVERY 12 HOURS SCHEDULED
Qty: 14 TABLET | Refills: 0 | Status: SHIPPED | OUTPATIENT
Start: 2018-10-03 | End: 2018-10-10

## 2018-10-03 NOTE — PROGRESS NOTES
Assessment/Plan:    No problem-specific Assessment & Plan notes found for this encounter  Persistent lumbar seroma that bothers patient  No obvious signs of infection  Patient noted to have prior scar tissue and therefore at risk for impaired wound healing  Recommend wound exploration and wound revision  Diagnoses and all orders for this visit:    Seroma of musculoskeletal structure after musculoskeletal system procedure  -     Case request operating room: EXPLORATION LUMBAR WOUND EVACUATION OF A SEROMA; Standing  -     Case request operating room: EXPLORATION LUMBAR WOUND EVACUATION OF A SEROMA  -     sulfamethoxazole-trimethoprim (BACTRIM DS) 800-160 mg per tablet; Take 1 tablet by mouth every 12 (twelve) hours for 7 days    Other orders  -     vancomycin (VANCOCIN) IVPB (premix) 1,000 mg; Infuse 200 mL (1,000 mg total) into a venous catheter once           Subjective:      Patient ID: Danya Brooks is a 70 y o  female  HPI    The following portions of the patient's history were reviewed and updated as appropriate:   She  has a past medical history of Asthma; Cataract; Chronic back pain; Chronic pain disorder; GERD (gastroesophageal reflux disease); HTN (hypertension); Hyperlipidemia; Interstitial cystitis; Liver disease; Muscle cramps; Obesity; Pneumonia; Radiculopathy, lumbar region; Spondylosis, cervical, with myelopathy; and Vulvovaginitis    She   Patient Active Problem List    Diagnosis Date Noted    Seroma of musculoskeletal structure after musculoskeletal system procedure 10/03/2018    Tinea corporis 09/21/2018    Lumbar canal stenosis with diffuse disc bulge 09/06/2018    Multiple falls 09/06/2018    Lumbar disc herniation 09/05/2018    Spinal stenosis of lumbar region with neurogenic claudication 09/05/2018    Acute bilateral low back pain with bilateral sciatica 09/05/2018    Hyperlipidemia 03/15/2018    Delayed gastric emptying 08/10/2017    Acute bilateral back pain, intractable 07/10/2017    Continuous opioid dependence ([facility] [address] ) 07/10/2017    Hot flashes, menopausal 2017    Fatty liver disease, nonalcoholic 10/17/3748    Vitamin D deficiency 2017    Cataract, bilateral 2017    Cervical radiculopathy, chronic 2017    Essential (primary) hypertension 2017    GERD (gastroesophageal reflux disease) 2017    Interstitial cystitis 2017    Lumbar post-laminectomy syndrome 2017    Mild intermittent asthma without complication     Lumbosacral spondylosis without myelopathy 2017    Radiculopathy, lumbar region 2017    Thoracic and lumbosacral neuritis 2017     She  has a past surgical history that includes Hernia repair; Cholecystectomy; Hysterectomy; Appendectomy;  section; Bladder surgery; Finger surgery; Colon surgery; Salpingoophorectomy; Back surgery; Back surgery; pr esophagogastroduodenoscopy transoral diagnostic (N/A, 2017); [place] myelogram lumbar (2018); and DECOMPRESSION SPINE LUMBAR POSTERIOR (Bilateral, 2018)  Her family history includes Alcohol abuse in her sister; Bipolar disorder in her sister; COPD in her father; Cancer in her father; Coronary artery disease in her brother; Diabetes in her mother; Drug abuse in her sister; Throat cancer in her mother  She  reports that she has never smoked  She has never used smokeless tobacco  She reports that she does not drink alcohol or use drugs    Current Outpatient Prescriptions on File Prior to Visit   Medication Sig    albuterol 2 mg/5 mL syrup TAKE 1 TEASPOONFUL BY MOUTH EVERY 4 HOURS AS NEEDED AS DIRECTED    baclofen 10 mg tablet Take 1 tablet (10 mg total) by mouth 3 (three) times a day    docusate sodium (COLACE) 100 mg capsule Take 1 capsule (100 mg total) by mouth 2 (two) times a day    ergocalciferol (VITAMIN D2) 50,000 units TAKE ONE CAPSULE BY MOUTH WEEKLY    estradiol (ESTRACE) 0 1 mg/g vaginal cream Insert 1 g into the vagina 2 (two) times a week    fenofibrate (TRIGLIDE) 160 MG tablet TAKE 1 TABLET BY MOUTH EVERY DAY WITH A MEAL    fluticasone (FLONASE) 50 mcg/act nasal spray 2 sprays into each nostril daily    furosemide (LASIX) 40 mg tablet Take by mouth    gabapentin (NEURONTIN) 800 mg tablet Take 800 mg by mouth 3 (three) times a day    HYDROcodone-acetaminophen (VICODIN) 7 5-500 MG per tablet Take 1 tablet by mouth every 6 (six) hours as needed for moderate pain    ketoconazole (NIZORAL) 2 % cream Apply topically daily    lisinopril (ZESTRIL) 10 mg tablet Take 1 tablet (10 mg total) by mouth daily    metoclopramide (REGLAN) 5 mg/5 mL oral solution Take 5 mg by mouth 4 (four) times a day (before meals and at bedtime)    omega-3-acid ethyl esters (LOVAZA) 1 g capsule TAKE 2 CAPSULE TWICE DAILY    pantoprazole (PROTONIX) 40 mg tablet TAKE 1 TABLET BY MOUTH TWICE A DAY 30 MINUTES BEFORE BREAKFAST AND DINNER    pentosan polysulfate (ELMIRON) 100 mg capsule Take 100 mg by mouth 3 (three) times a day before meals 2 capsules po  3 time a day    pravastatin (PRAVACHOL) 20 mg tablet TAKE 1 TABLET BY MOUTH EVERY DAY    prochlorperazine (COMPAZINE) 10 mg tablet Take 1 tablet (10 mg total) by mouth 2 (two) times a day    venlafaxine (EFFEXOR) 75 mg tablet Take 1 tablet (75 mg total) by mouth 2 (two) times a day with meals for 180 days    VENTOLIN  (90 Base) MCG/ACT inhaler INHALE 2 PUFFS 3 TIMES A DAY AS NEEDED    zolpidem (AMBIEN) 5 mg tablet Take 5 mg by mouth daily at bedtime as needed for sleep     No current facility-administered medications on file prior to visit       Review of Systems   Constitutional: Negative for chills, fatigue and fever  HENT: Negative  Eyes: Negative for pain and visual disturbance  Respiratory: Negative for cough, shortness of breath and wheezing  Cardiovascular: Negative for chest pain and palpitations  Gastrointestinal: Negative for abdominal pain and nausea  Genitourinary: Negative for difficulty urinating  Musculoskeletal: Positive for arthralgias, back pain and gait problem  Negative for neck pain and neck stiffness  Neurological: Positive for numbness  Negative for dizziness, speech difficulty, weakness and headaches  Psychiatric/Behavioral: The patient is nervous/anxious  Objective:      BP (!) 173/92 (BP Location: Left arm, Patient Position: Sitting, Cuff Size: Standard)   Pulse 92   Temp 98 8 °F (37 1 °C) (Tympanic)   Resp 16   Ht 5' 1" (1 549 m)   Wt 88 5 kg (195 lb)   LMP  (LMP Unknown)   BMI 36 84 kg/m²          Physical Exam   Constitutional: She is oriented to person, place, and time  She appears well-developed and well-nourished  HENT:   Head: Normocephalic and atraumatic  Eyes: Pupils are equal, round, and reactive to light  EOM are normal    Neck: Neck supple  Cardiovascular: Normal rate and normal heart sounds  Pulmonary/Chest: Effort normal and breath sounds normal  No respiratory distress  She has no wheezes  Neurological: She is alert and oriented to person, place, and time  She has normal strength and normal reflexes  No cranial nerve deficit or sensory deficit  She exhibits normal muscle tone  Wound well approximated  There is a superior area with some slight bubbling with appearance of clearish fluid underneath   No drainage noted

## 2018-10-04 NOTE — PRE-PROCEDURE INSTRUCTIONS
Pre-Surgery Instructions:   Medication Instructions    baclofen 10 mg tablet Instructed patient per Anesthesia Guidelines   ergocalciferol (VITAMIN D2) 50,000 units Patient was instructed by Physician and understands   estradiol (ESTRACE) 0 1 mg/g vaginal cream Instructed patient per Anesthesia Guidelines   fenofibrate (TRIGLIDE) 160 MG tablet Instructed patient per Anesthesia Guidelines   fluticasone (FLONASE) 50 mcg/act nasal spray Instructed patient per Anesthesia Guidelines   furosemide (LASIX) 40 mg tablet Instructed patient per Anesthesia Guidelines   gabapentin (NEURONTIN) 800 mg tablet Instructed patient per Anesthesia Guidelines   HYDROcodone-acetaminophen (VICODIN) 7 5-500 MG per tablet Instructed patient per Anesthesia Guidelines   lisinopril (ZESTRIL) 10 mg tablet Instructed patient per Anesthesia Guidelines   omega-3-acid ethyl esters (LOVAZA) 1 g capsule Patient was instructed by Physician and understands   pantoprazole (PROTONIX) 40 mg tablet Instructed patient per Anesthesia Guidelines   pentosan polysulfate (ELMIRON) 100 mg capsule Instructed patient per Anesthesia Guidelines   prochlorperazine (COMPAZINE) 10 mg tablet Instructed patient per Anesthesia Guidelines   sulfamethoxazole-trimethoprim (BACTRIM DS) 800-160 mg per tablet Instructed patient per Anesthesia Guidelines   VENTOLIN  (90 Base) MCG/ACT inhaler Instructed patient per Anesthesia Guidelines   zolpidem (AMBIEN) 5 mg tablet Instructed patient per Anesthesia Guidelines  Pre op and bathing instructions reviewed  Pt has wipes  Pt has bladder stimulator  Instructed to bring controller with her DOS  Pt states that she lost controller and will not have it DOS  Instructed pt to notify   MD of same

## 2018-10-05 ENCOUNTER — PATIENT OUTREACH (OUTPATIENT)
Dept: FAMILY MEDICINE CLINIC | Facility: CLINIC | Age: 63
End: 2018-10-05

## 2018-10-07 DIAGNOSIS — J45.20 MILD INTERMITTENT ASTHMA WITHOUT COMPLICATION: ICD-10-CM

## 2018-10-08 ENCOUNTER — DOCUMENTATION (OUTPATIENT)
Dept: NEUROSURGERY | Facility: CLINIC | Age: 63
End: 2018-10-08

## 2018-10-08 ENCOUNTER — ANESTHESIA EVENT (OUTPATIENT)
Dept: PERIOP | Facility: HOSPITAL | Age: 63
End: 2018-10-08
Payer: MEDICARE

## 2018-10-08 NOTE — ANESTHESIA PREPROCEDURE EVALUATION
Review of Systems/Medical History  Patient summary reviewed  Chart reviewed  History of anesthetic complications PONV    Cardiovascular  EKG reviewed, Hyperlipidemia, Hypertension controlled,    Pulmonary  Asthma , well controlled/ stable ,   Comment: 5/26/17: PFTs: Patient had a full lung function testing with spirometry lung volumes and DLCO  Patient gave a good effort  Results meet the ATS standards for acceptability and repeat ability  The flow volume curve demonstrates a restrictive pattern  There is mild restrictive ventilatory limitation with decreased FVC and FEV1 and a normal FEV1 over FVC ratio and decreased lung volumes  The total lung capacity is mildly decreased  The DLCO is normal   Restrictive ventilatory limitation with normal DLCO possibly secondary to obesity  Clinical correlation is required  GI/Hepatic    GERD , Liver disease ,   Comment: MA     Negative  ROS        Endo/Other    Obesity    GYN  Negative gynecology ROS          Hematology  Negative hematology ROS Anemia ,     Musculoskeletal  Back pain , cervical pain, lumbar pain and spinal stenosis, Sciatica,   Arthritis     Neurology      Comment: Cervical radiculopathy Psychology   Anxiety, Depression ,   Chronic opioid dependence Chronic pain,   Comment: Patient takes vicodin, oxycontin, neurontin         Physical Exam    Airway    Mallampati score: II  TM Distance: >3 FB  Neck ROM: full     Dental       Cardiovascular      Pulmonary      Other Findings        Anesthesia Plan  ASA Score- 3     Anesthesia Type- general with ASA Monitors  Additional Monitors:   Airway Plan:         Plan Factors-    Induction- intravenous  Postoperative Plan-     Informed Consent- Anesthetic plan and risks discussed with patient  I personally reviewed this patient with the CRNA  Discussed and agreed on the Anesthesia Plan with the CRNA  Camila Fraire

## 2018-10-09 ENCOUNTER — HOSPITAL ENCOUNTER (OUTPATIENT)
Facility: HOSPITAL | Age: 63
Setting detail: OUTPATIENT SURGERY
Discharge: HOME | End: 2018-10-09
Attending: NEUROLOGICAL SURGERY | Admitting: NEUROLOGICAL SURGERY
Payer: MEDICARE

## 2018-10-09 ENCOUNTER — ANESTHESIA (OUTPATIENT)
Dept: PERIOP | Facility: HOSPITAL | Age: 63
End: 2018-10-09
Payer: MEDICARE

## 2018-10-09 VITALS
HEART RATE: 103 BPM | TEMPERATURE: 98.7 F | RESPIRATION RATE: 18 BRPM | SYSTOLIC BLOOD PRESSURE: 146 MMHG | WEIGHT: 190 LBS | BODY MASS INDEX: 35.87 KG/M2 | HEIGHT: 61 IN | DIASTOLIC BLOOD PRESSURE: 71 MMHG | OXYGEN SATURATION: 96 %

## 2018-10-09 DIAGNOSIS — M96.842 SEROMA OF MUSCULOSKELETAL STRUCTURE AFTER MUSCULOSKELETAL SYSTEM PROCEDURE: Primary | ICD-10-CM

## 2018-10-09 LAB
GLUCOSE SERPL-MCNC: 102 MG/DL (ref 65–140)
GLUCOSE SERPL-MCNC: 92 MG/DL (ref 65–140)

## 2018-10-09 PROCEDURE — 87205 SMEAR GRAM STAIN: CPT | Performed by: NEUROLOGICAL SURGERY

## 2018-10-09 PROCEDURE — 82948 REAGENT STRIP/BLOOD GLUCOSE: CPT

## 2018-10-09 PROCEDURE — 87070 CULTURE OTHR SPECIMN AEROBIC: CPT | Performed by: NEUROLOGICAL SURGERY

## 2018-10-09 PROCEDURE — 22015 I&D ABSCESS P-SPINE L/S/LS: CPT | Performed by: NEUROLOGICAL SURGERY

## 2018-10-09 PROCEDURE — 87075 CULTR BACTERIA EXCEPT BLOOD: CPT | Performed by: NEUROLOGICAL SURGERY

## 2018-10-09 RX ORDER — GLYCOPYRROLATE 0.2 MG/ML
INJECTION INTRAMUSCULAR; INTRAVENOUS AS NEEDED
Status: DISCONTINUED | OUTPATIENT
Start: 2018-10-09 | End: 2018-10-09

## 2018-10-09 RX ORDER — SODIUM CHLORIDE, SODIUM LACTATE, POTASSIUM CHLORIDE, CALCIUM CHLORIDE 600; 310; 30; 20 MG/100ML; MG/100ML; MG/100ML; MG/100ML
125 INJECTION, SOLUTION INTRAVENOUS CONTINUOUS
Status: DISCONTINUED | OUTPATIENT
Start: 2018-10-09 | End: 2018-10-09 | Stop reason: HOSPADM

## 2018-10-09 RX ORDER — ONDANSETRON 2 MG/ML
INJECTION INTRAMUSCULAR; INTRAVENOUS AS NEEDED
Status: DISCONTINUED | OUTPATIENT
Start: 2018-10-09 | End: 2018-10-09 | Stop reason: SURG

## 2018-10-09 RX ORDER — HYDROMORPHONE HCL/PF 1 MG/ML
SYRINGE (ML) INJECTION AS NEEDED
Status: DISCONTINUED | OUTPATIENT
Start: 2018-10-09 | End: 2018-10-09 | Stop reason: SURG

## 2018-10-09 RX ORDER — ONDANSETRON 2 MG/ML
4 INJECTION INTRAMUSCULAR; INTRAVENOUS EVERY 6 HOURS PRN
Status: DISCONTINUED | OUTPATIENT
Start: 2018-10-09 | End: 2018-10-09 | Stop reason: HOSPADM

## 2018-10-09 RX ORDER — PROPOFOL 10 MG/ML
INJECTION, EMULSION INTRAVENOUS AS NEEDED
Status: DISCONTINUED | OUTPATIENT
Start: 2018-10-09 | End: 2018-10-09 | Stop reason: SURG

## 2018-10-09 RX ORDER — CHLORHEXIDINE GLUCONATE 0.12 MG/ML
15 RINSE ORAL ONCE
Status: COMPLETED | OUTPATIENT
Start: 2018-10-09 | End: 2018-10-09

## 2018-10-09 RX ORDER — FENTANYL CITRATE 50 UG/ML
INJECTION, SOLUTION INTRAMUSCULAR; INTRAVENOUS AS NEEDED
Status: DISCONTINUED | OUTPATIENT
Start: 2018-10-09 | End: 2018-10-09 | Stop reason: SURG

## 2018-10-09 RX ORDER — OXYCODONE HYDROCHLORIDE AND ACETAMINOPHEN 5; 325 MG/1; MG/1
2 TABLET ORAL EVERY 4 HOURS PRN
Status: DISCONTINUED | OUTPATIENT
Start: 2018-10-09 | End: 2018-10-09 | Stop reason: HOSPADM

## 2018-10-09 RX ORDER — LIDOCAINE HYDROCHLORIDE AND EPINEPHRINE 10; 10 MG/ML; UG/ML
INJECTION, SOLUTION INFILTRATION; PERINEURAL AS NEEDED
Status: DISCONTINUED | OUTPATIENT
Start: 2018-10-09 | End: 2018-10-09 | Stop reason: HOSPADM

## 2018-10-09 RX ORDER — MAGNESIUM HYDROXIDE 1200 MG/15ML
LIQUID ORAL AS NEEDED
Status: DISCONTINUED | OUTPATIENT
Start: 2018-10-09 | End: 2018-10-09 | Stop reason: HOSPADM

## 2018-10-09 RX ORDER — LIDOCAINE HYDROCHLORIDE 10 MG/ML
INJECTION, SOLUTION INFILTRATION; PERINEURAL AS NEEDED
Status: DISCONTINUED | OUTPATIENT
Start: 2018-10-09 | End: 2018-10-09 | Stop reason: SURG

## 2018-10-09 RX ORDER — GLYCOPYRROLATE 0.2 MG/ML
INJECTION INTRAMUSCULAR; INTRAVENOUS AS NEEDED
Status: DISCONTINUED | OUTPATIENT
Start: 2018-10-09 | End: 2018-10-09 | Stop reason: SURG

## 2018-10-09 RX ORDER — SULFAMETHOXAZOLE AND TRIMETHOPRIM 800; 160 MG/1; MG/1
1 TABLET ORAL EVERY 12 HOURS SCHEDULED
Qty: 28 TABLET | Refills: 0 | Status: SHIPPED | OUTPATIENT
Start: 2018-10-09 | End: 2018-10-23

## 2018-10-09 RX ORDER — METOCLOPRAMIDE HYDROCHLORIDE 5 MG/ML
INJECTION INTRAMUSCULAR; INTRAVENOUS AS NEEDED
Status: DISCONTINUED | OUTPATIENT
Start: 2018-10-09 | End: 2018-10-09 | Stop reason: SURG

## 2018-10-09 RX ORDER — ROCURONIUM BROMIDE 10 MG/ML
INJECTION, SOLUTION INTRAVENOUS AS NEEDED
Status: DISCONTINUED | OUTPATIENT
Start: 2018-10-09 | End: 2018-10-09 | Stop reason: SURG

## 2018-10-09 RX ORDER — PROPOFOL 10 MG/ML
INJECTION, EMULSION INTRAVENOUS CONTINUOUS PRN
Status: DISCONTINUED | OUTPATIENT
Start: 2018-10-09 | End: 2018-10-09 | Stop reason: SURG

## 2018-10-09 RX ORDER — HYDROCODONE BITARTRATE AND ACETAMINOPHEN 5; 325 MG/1; MG/1
2 TABLET ORAL EVERY 6 HOURS PRN
Qty: 28 TABLET | Refills: 0 | Status: SHIPPED | OUTPATIENT
Start: 2018-10-09 | End: 2018-10-16

## 2018-10-09 RX ORDER — METOCLOPRAMIDE HYDROCHLORIDE 5 MG/ML
10 INJECTION INTRAMUSCULAR; INTRAVENOUS ONCE AS NEEDED
Status: DISCONTINUED | OUTPATIENT
Start: 2018-10-09 | End: 2018-10-09 | Stop reason: HOSPADM

## 2018-10-09 RX ORDER — MIDAZOLAM HYDROCHLORIDE 1 MG/ML
INJECTION INTRAMUSCULAR; INTRAVENOUS AS NEEDED
Status: DISCONTINUED | OUTPATIENT
Start: 2018-10-09 | End: 2018-10-09 | Stop reason: SURG

## 2018-10-09 RX ORDER — VANCOMYCIN HYDROCHLORIDE 1 G/200ML
1000 INJECTION, SOLUTION INTRAVENOUS ONCE
Status: COMPLETED | OUTPATIENT
Start: 2018-10-09 | End: 2018-10-09

## 2018-10-09 RX ORDER — HYDROMORPHONE HCL/PF 1 MG/ML
0.5 SYRINGE (ML) INJECTION
Status: DISCONTINUED | OUTPATIENT
Start: 2018-10-09 | End: 2018-10-09 | Stop reason: HOSPADM

## 2018-10-09 RX ORDER — ONDANSETRON 2 MG/ML
4 INJECTION INTRAMUSCULAR; INTRAVENOUS ONCE AS NEEDED
Status: DISCONTINUED | OUTPATIENT
Start: 2018-10-09 | End: 2018-10-09 | Stop reason: HOSPADM

## 2018-10-09 RX ADMIN — HYDROMORPHONE HYDROCHLORIDE 0.5 MG: 1 INJECTION, SOLUTION INTRAMUSCULAR; INTRAVENOUS; SUBCUTANEOUS at 08:02

## 2018-10-09 RX ADMIN — HYDROMORPHONE HYDROCHLORIDE 0.5 MG: 1 INJECTION, SOLUTION INTRAMUSCULAR; INTRAVENOUS; SUBCUTANEOUS at 08:56

## 2018-10-09 RX ADMIN — SODIUM CHLORIDE, SODIUM LACTATE, POTASSIUM CHLORIDE, AND CALCIUM CHLORIDE: .6; .31; .03; .02 INJECTION, SOLUTION INTRAVENOUS at 07:30

## 2018-10-09 RX ADMIN — HYDROMORPHONE HYDROCHLORIDE 0.5 MG: 1 INJECTION, SOLUTION INTRAMUSCULAR; INTRAVENOUS; SUBCUTANEOUS at 09:06

## 2018-10-09 RX ADMIN — PROPOFOL 100 MCG/KG/MIN: 10 INJECTION, EMULSION INTRAVENOUS at 07:43

## 2018-10-09 RX ADMIN — METOCLOPRAMIDE HYDROCHLORIDE 10 MG: 5 INJECTION INTRAMUSCULAR; INTRAVENOUS at 08:20

## 2018-10-09 RX ADMIN — VANCOMYCIN HYDROCHLORIDE 3 MG: 1 INJECTION, SOLUTION INTRAVENOUS at 08:28

## 2018-10-09 RX ADMIN — VANCOMYCIN HYDROCHLORIDE 1000 MG: 1 INJECTION, SOLUTION INTRAVENOUS at 07:34

## 2018-10-09 RX ADMIN — OXYCODONE HYDROCHLORIDE AND ACETAMINOPHEN 2 TABLET: 5; 325 TABLET ORAL at 09:57

## 2018-10-09 RX ADMIN — FENTANYL CITRATE 50 MCG: 50 INJECTION INTRAMUSCULAR; INTRAVENOUS at 07:41

## 2018-10-09 RX ADMIN — FENTANYL CITRATE 50 MCG: 50 INJECTION INTRAMUSCULAR; INTRAVENOUS at 08:02

## 2018-10-09 RX ADMIN — ONDANSETRON 4 MG: 2 INJECTION INTRAMUSCULAR; INTRAVENOUS at 07:56

## 2018-10-09 RX ADMIN — LIDOCAINE HYDROCHLORIDE 50 MG: 10 INJECTION, SOLUTION INFILTRATION; PERINEURAL at 07:41

## 2018-10-09 RX ADMIN — ROCURONIUM BROMIDE 30 MG: 10 INJECTION INTRAVENOUS at 07:42

## 2018-10-09 RX ADMIN — HYDROMORPHONE HYDROCHLORIDE 0.5 MG: 1 INJECTION, SOLUTION INTRAMUSCULAR; INTRAVENOUS; SUBCUTANEOUS at 09:14

## 2018-10-09 RX ADMIN — PROPOFOL 200 MG: 10 INJECTION, EMULSION INTRAVENOUS at 07:41

## 2018-10-09 RX ADMIN — GLYCOPYRROLATE 0.6 MG: 0.2 INJECTION, SOLUTION INTRAMUSCULAR; INTRAVENOUS at 08:25

## 2018-10-09 RX ADMIN — MIDAZOLAM HYDROCHLORIDE 2 MG: 1 INJECTION, SOLUTION INTRAMUSCULAR; INTRAVENOUS at 07:28

## 2018-10-09 RX ADMIN — HYDROMORPHONE HYDROCHLORIDE 0.5 MG: 1 INJECTION, SOLUTION INTRAMUSCULAR; INTRAVENOUS; SUBCUTANEOUS at 08:48

## 2018-10-09 RX ADMIN — HYDROMORPHONE HYDROCHLORIDE 0.5 MG: 1 INJECTION, SOLUTION INTRAMUSCULAR; INTRAVENOUS; SUBCUTANEOUS at 07:50

## 2018-10-09 RX ADMIN — CHLORHEXIDINE GLUCONATE 15 ML: 1.2 RINSE ORAL at 07:16

## 2018-10-09 RX ADMIN — NEOSTIGMINE METHYLSULFATE 3 MG: 1 INJECTION, SOLUTION INTRAMUSCULAR; INTRAVENOUS; SUBCUTANEOUS at 08:25

## 2018-10-09 RX ADMIN — DEXAMETHASONE SODIUM PHOSPHATE 10 MG: 10 INJECTION INTRAMUSCULAR; INTRAVENOUS at 07:56

## 2018-10-09 NOTE — ANESTHESIA POSTPROCEDURE EVALUATION
Post-Op Assessment Note      CV Status:  Stable    Mental Status:  Alert and awake    Hydration Status:  Euvolemic    PONV Controlled:  Controlled    Airway Patency:  Patent    Post Op Vitals Reviewed: Yes          Staff: CRNA           BP  146//67   Temp 98 1   Pulse 96   Resp 14   SpO2 100

## 2018-10-09 NOTE — H&P (VIEW-ONLY)
Assessment/Plan:    No problem-specific Assessment & Plan notes found for this encounter  Persistent lumbar seroma that bothers patient  No obvious signs of infection  Patient noted to have prior scar tissue and therefore at risk for impaired wound healing  Recommend wound exploration and wound revision  Diagnoses and all orders for this visit:    Seroma of musculoskeletal structure after musculoskeletal system procedure  -     Case request operating room: EXPLORATION LUMBAR WOUND EVACUATION OF A SEROMA; Standing  -     Case request operating room: EXPLORATION LUMBAR WOUND EVACUATION OF A SEROMA  -     sulfamethoxazole-trimethoprim (BACTRIM DS) 800-160 mg per tablet; Take 1 tablet by mouth every 12 (twelve) hours for 7 days    Other orders  -     vancomycin (VANCOCIN) IVPB (premix) 1,000 mg; Infuse 200 mL (1,000 mg total) into a venous catheter once           Subjective:      Patient ID: Ely Pitts is a 70 y o  female  HPI    The following portions of the patient's history were reviewed and updated as appropriate:   She  has a past medical history of Asthma; Cataract; Chronic back pain; Chronic pain disorder; GERD (gastroesophageal reflux disease); HTN (hypertension); Hyperlipidemia; Interstitial cystitis; Liver disease; Muscle cramps; Obesity; Pneumonia; Radiculopathy, lumbar region; Spondylosis, cervical, with myelopathy; and Vulvovaginitis    She   Patient Active Problem List    Diagnosis Date Noted    Seroma of musculoskeletal structure after musculoskeletal system procedure 10/03/2018    Tinea corporis 09/21/2018    Lumbar canal stenosis with diffuse disc bulge 09/06/2018    Multiple falls 09/06/2018    Lumbar disc herniation 09/05/2018    Spinal stenosis of lumbar region with neurogenic claudication 09/05/2018    Acute bilateral low back pain with bilateral sciatica 09/05/2018    Hyperlipidemia 03/15/2018    Delayed gastric emptying 08/10/2017    Acute bilateral back pain, intractable 07/10/2017    Continuous opioid dependence ([facility] [address] ) 07/10/2017    Hot flashes, menopausal 2017    Fatty liver disease, nonalcoholic /15/6093    Vitamin D deficiency 2017    Cataract, bilateral 2017    Cervical radiculopathy, chronic 2017    Essential (primary) hypertension 2017    GERD (gastroesophageal reflux disease) 2017    Interstitial cystitis 2017    Lumbar post-laminectomy syndrome 2017    Mild intermittent asthma without complication     Lumbosacral spondylosis without myelopathy 2017    Radiculopathy, lumbar region 2017    Thoracic and lumbosacral neuritis 2017     She  has a past surgical history that includes Hernia repair; Cholecystectomy; Hysterectomy; Appendectomy;  section; Bladder surgery; Finger surgery; Colon surgery; Salpingoophorectomy; Back surgery; Back surgery; pr esophagogastroduodenoscopy transoral diagnostic (N/A, 2017); [place] myelogram lumbar (2018); and DECOMPRESSION SPINE LUMBAR POSTERIOR (Bilateral, 2018)  Her family history includes Alcohol abuse in her sister; Bipolar disorder in her sister; COPD in her father; Cancer in her father; Coronary artery disease in her brother; Diabetes in her mother; Drug abuse in her sister; Throat cancer in her mother  She  reports that she has never smoked  She has never used smokeless tobacco  She reports that she does not drink alcohol or use drugs    Current Outpatient Prescriptions on File Prior to Visit   Medication Sig    albuterol 2 mg/5 mL syrup TAKE 1 TEASPOONFUL BY MOUTH EVERY 4 HOURS AS NEEDED AS DIRECTED    baclofen 10 mg tablet Take 1 tablet (10 mg total) by mouth 3 (three) times a day    docusate sodium (COLACE) 100 mg capsule Take 1 capsule (100 mg total) by mouth 2 (two) times a day    ergocalciferol (VITAMIN D2) 50,000 units TAKE ONE CAPSULE BY MOUTH WEEKLY    estradiol (ESTRACE) 0 1 mg/g vaginal cream Insert 1 g into the vagina 2 (two) times a week    fenofibrate (TRIGLIDE) 160 MG tablet TAKE 1 TABLET BY MOUTH EVERY DAY WITH A MEAL    fluticasone (FLONASE) 50 mcg/act nasal spray 2 sprays into each nostril daily    furosemide (LASIX) 40 mg tablet Take by mouth    gabapentin (NEURONTIN) 800 mg tablet Take 800 mg by mouth 3 (three) times a day    HYDROcodone-acetaminophen (VICODIN) 7 5-500 MG per tablet Take 1 tablet by mouth every 6 (six) hours as needed for moderate pain    ketoconazole (NIZORAL) 2 % cream Apply topically daily    lisinopril (ZESTRIL) 10 mg tablet Take 1 tablet (10 mg total) by mouth daily    metoclopramide (REGLAN) 5 mg/5 mL oral solution Take 5 mg by mouth 4 (four) times a day (before meals and at bedtime)    omega-3-acid ethyl esters (LOVAZA) 1 g capsule TAKE 2 CAPSULE TWICE DAILY    pantoprazole (PROTONIX) 40 mg tablet TAKE 1 TABLET BY MOUTH TWICE A DAY 30 MINUTES BEFORE BREAKFAST AND DINNER    pentosan polysulfate (ELMIRON) 100 mg capsule Take 100 mg by mouth 3 (three) times a day before meals 2 capsules po  3 time a day    pravastatin (PRAVACHOL) 20 mg tablet TAKE 1 TABLET BY MOUTH EVERY DAY    prochlorperazine (COMPAZINE) 10 mg tablet Take 1 tablet (10 mg total) by mouth 2 (two) times a day    venlafaxine (EFFEXOR) 75 mg tablet Take 1 tablet (75 mg total) by mouth 2 (two) times a day with meals for 180 days    VENTOLIN  (90 Base) MCG/ACT inhaler INHALE 2 PUFFS 3 TIMES A DAY AS NEEDED    zolpidem (AMBIEN) 5 mg tablet Take 5 mg by mouth daily at bedtime as needed for sleep     No current facility-administered medications on file prior to visit       Review of Systems   Constitutional: Negative for chills, fatigue and fever  HENT: Negative  Eyes: Negative for pain and visual disturbance  Respiratory: Negative for cough, shortness of breath and wheezing  Cardiovascular: Negative for chest pain and palpitations  Gastrointestinal: Negative for abdominal pain and nausea  Genitourinary: Negative for difficulty urinating  Musculoskeletal: Positive for arthralgias, back pain and gait problem  Negative for neck pain and neck stiffness  Neurological: Positive for numbness  Negative for dizziness, speech difficulty, weakness and headaches  Psychiatric/Behavioral: The patient is nervous/anxious  Objective:      BP (!) 173/92 (BP Location: Left arm, Patient Position: Sitting, Cuff Size: Standard)   Pulse 92   Temp 98 8 °F (37 1 °C) (Tympanic)   Resp 16   Ht 5' 1" (1 549 m)   Wt 88 5 kg (195 lb)   LMP  (LMP Unknown)   BMI 36 84 kg/m²          Physical Exam   Constitutional: She is oriented to person, place, and time  She appears well-developed and well-nourished  HENT:   Head: Normocephalic and atraumatic  Eyes: Pupils are equal, round, and reactive to light  EOM are normal    Neck: Neck supple  Cardiovascular: Normal rate and normal heart sounds  Pulmonary/Chest: Effort normal and breath sounds normal  No respiratory distress  She has no wheezes  Neurological: She is alert and oriented to person, place, and time  She has normal strength and normal reflexes  No cranial nerve deficit or sensory deficit  She exhibits normal muscle tone  Wound well approximated  There is a superior area with some slight bubbling with appearance of clearish fluid underneath   No drainage noted

## 2018-10-09 NOTE — OP NOTE
OPERATIVE REPORT  PATIENT NAME: Anthony Moreno    :  1955  MRN: [id number]  Pt Location: AN OR ROOM 04    SURGERY DATE: 10/9/2018    Surgeon(s) and Role:     Zulema Al MD - Primary    Preop Diagnosis:  Seroma of musculoskeletal structure after musculoskeletal system procedure [M96 842]    Post-Op Diagnosis Codes:     * Seroma of musculoskeletal structure after musculoskeletal system procedure [M96 842]    Procedure(s) (LRB):  LUMBAR WOUND EXPLORATION/EVACUATION OF SERONMA (Bilateral)   Sharp debridement of necrotic skin edges    Specimen(s):  ID Type Source Tests Collected by Time Destination   A : lumbar spine wound cultures Wound Wound ANAEROBIC CULTURE AND GRAM STAIN, WOUND CULTURE Jhoan Moreno MD 10/9/2018 1523        Estimated Blood Loss:   Minimal    Drains:       Anesthesia Type:   General    Operative Indications:  Seroma of musculoskeletal structure after musculoskeletal system procedure [U75 857]      Operative Findings:  See dictated note, no obvious purulent collection  Fibrin material and seroma noted    Complications:   None    Procedure and Technique:  The patient was taken to the operative theatre and successfully induced under anesthesia  She was positioned prone on gel rolls all pressure points were padded  Her prior incisions were marked and then she was prepped and draped in sterile fashion, a timeout was performed  I made and incision with a #10 Blade and used mets and self retaining retractors  I was immediately able to expose deep below the fascia and to the epidural space of the prior lumbar decompression  There was noted a clear yellowish fluid that was either seroma or fat necrosis  The surrounding tissues appeared healthy with bleeding edges and fibrin material  I cultured the fluid and surface and this was sent off as deep cultures  I then debrided the wound ensuring all edges were bleeding and healthy   I removed fragmented bone chips and removed vicryl stitches and sharply removed any non-viable tissues using mets  I then irrigated the wound with copious amount of antibiotic coated saline  Approximately 1 5 L  I then sharply cut the necrotic skin edges using a met  I then left 1 g of vancomycin powder within the wound and closed in layers with 0 PDS for the fascia and 2-0 prolene for the skin I also reinforced the wound with staples  The patient was then repositioned supine on a stretcher, extubated to room air and taken to the PACU for further recover  All needle and sponge counts were correct at the end of the procedure   There were no complications        I was present for the entire procedure    Patient Disposition:  PACU     SIGNATURE: Terra Fowler MD  DATE: October 9, 2018  TIME: 4:44 PM

## 2018-10-10 ENCOUNTER — NURSE TRIAGE (OUTPATIENT)
Dept: NEUROSURGERY | Facility: CLINIC | Age: 63
End: 2018-10-10

## 2018-10-10 NOTE — TELEPHONE ENCOUNTER
Spoke with Awa Briones's  to see how she is doing after surgery 10/9/18  He reports that she is doing very well overall and denies any incisional issues or fevers  He denies any bleeding, fever, redness, significant pain and swelling, but patient does report incisional soreness  Verified date/time/location of her upcoming POV on 10/24/2018 and advised him to call the office with any further questions or concerns, or if any incisional issues or fevers would arise  Per Dr Hayde Conway, he would like patient to be seen at 2 weeks by a nurse (he is out of the office) to remove just the staples not the sutures and then again at 4 weeks by him  These appts were scheduled  Went over incision care with patient and he has no further questions at this time  He was appreciative for the call

## 2018-10-11 LAB
BACTERIA SPEC ANAEROBE CULT: NORMAL
BACTERIA WND AEROBE CULT: NORMAL
GRAM STN SPEC: NORMAL

## 2018-10-12 ENCOUNTER — TELEPHONE (OUTPATIENT)
Dept: NEUROSURGERY | Facility: CLINIC | Age: 63
End: 2018-10-12

## 2018-10-12 NOTE — TELEPHONE ENCOUNTER
Received call from patient's family member stating that patient has some blood drainage on her bandage  He changed it before bed last night but now there is some blood on the bandage again some of it is dried  Advised him that she can remove the bandage and clean the area with soap and water and then he can place a new dry bandage over the area  He should continue to monitor and call the office if the drainage in creases or if she exhibits any s/s infection which at the moment she does not have any  He was appreciative for the information

## 2018-10-16 ENCOUNTER — PATIENT OUTREACH (OUTPATIENT)
Dept: FAMILY MEDICINE CLINIC | Facility: CLINIC | Age: 63
End: 2018-10-16

## 2018-10-16 NOTE — PROGRESS NOTES
Spoke to [facility]   He stated that Gama Gonzalez is doing better  Denied pain, fever, bleeding, swelling or redness to incisional site (lumbar wound)  Stefan still intact  Stressed the importance of notifying PCP with any changes  He verbalized understanding  Stated that she is tolerating her prescribed medications  Has f/u appointment with neurosurgery next week (10/24/18) to remove staples  Also has next f/u appointment scheduled with PCP next month  Has no issues with transportation  Continues to have contact information  Will f/u

## 2018-10-24 ENCOUNTER — CLINICAL SUPPORT (OUTPATIENT)
Dept: NEUROSURGERY | Facility: CLINIC | Age: 63
End: 2018-10-24

## 2018-10-24 VITALS
SYSTOLIC BLOOD PRESSURE: 124 MMHG | BODY MASS INDEX: 35.87 KG/M2 | DIASTOLIC BLOOD PRESSURE: 78 MMHG | HEIGHT: 61 IN | RESPIRATION RATE: 16 BRPM | TEMPERATURE: 98.3 F | WEIGHT: 190 LBS

## 2018-10-24 DIAGNOSIS — Z98.890 STATUS POST SURGERY: Primary | ICD-10-CM

## 2018-10-24 DIAGNOSIS — K21.9 GASTROESOPHAGEAL REFLUX DISEASE, ESOPHAGITIS PRESENCE NOT SPECIFIED: ICD-10-CM

## 2018-10-24 PROCEDURE — 99024 POSTOP FOLLOW-UP VISIT: CPT

## 2018-10-24 RX ORDER — PANTOPRAZOLE SODIUM 40 MG/1
TABLET, DELAYED RELEASE ORAL
Qty: 180 TABLET | Refills: 1 | Status: SHIPPED | OUTPATIENT
Start: 2018-10-24 | End: 2019-04-13 | Stop reason: SDUPTHER

## 2018-10-24 NOTE — PROGRESS NOTES
Epi Mancuso 70 y o  female MRN: [id number]     Vitals:    10/24/18 1312   BP: 124/78   Resp: 16   Temp: 98 3 °F (36 8 °C)   Weight: 86 2 kg (190 lb)   Height: 5' 1" (1 549 m)       Chief Complaint  Patient presents post: LUMBAR WOUND EXPLORATION/EVACUATION OF SEROMA (Bilateral)   Sharp debridement of necrotic skin edges    History of Present Illness  Patient presents for 2 week POV for incision check with her   She reports that she is doing well overall and denies any incisional issues or fevers other than some small amount of bleeding from the incision  She reports that she only has occasional low back pain but nothing severe  She has been compliant with taking her post-op antibiotic as prescribed  Assessment  Wound Exam: Incision well approximated  Mild erythema and edema surrounding sutures (see image below)                        Procedure  Staple/suture removal  Per Dr Lennie Blanco just remove staples and leave in sutures for now  location: Low back  Procedure Note: 15 staples  were removed  Patient Status:  the patient tolerated the procedure well  Complications: None  Cleansed are with peroxide  Incision MARITZA  Discussion/Summary  Reviewed incision care with patient including daily observation for s/s infection including: increased erythema, edema, drainage, dehiscence of incision or fever >101  Should these be observed, she understands that she is to call and/or return immediately for reassessment  Advised patient to continue cleansing area with mild soap and water and pat dry  Not to apply any lotions, creams, or ointments, & not to submerge in any water for two more weeks  She is to maintain activity restrictions until cleared by the surgeon   Activity levels were also reviewed with the patient in detail, she is to lift no greater than 10 pounds, advised to limit bend/twisting at the waist and ambulation is encouraged as tolerated  Verified date/time/location of upcoming POV and reminded her to complete x-rays prior to her visit on 11/7/2018   She is to call the office with any further questions or concerns, or if any incisional issues or fevers would arise

## 2018-10-25 DIAGNOSIS — I10 ESSENTIAL (PRIMARY) HYPERTENSION: ICD-10-CM

## 2018-10-25 RX ORDER — FUROSEMIDE 40 MG/1
TABLET ORAL
Qty: 90 TABLET | Refills: 3 | Status: SHIPPED | OUTPATIENT
Start: 2018-10-25 | End: 2019-10-10 | Stop reason: SDUPTHER

## 2018-10-26 ENCOUNTER — TELEPHONE (OUTPATIENT)
Dept: NEUROSURGERY | Facility: CLINIC | Age: 63
End: 2018-10-26

## 2018-10-27 NOTE — TELEPHONE ENCOUNTER
Follow up call as have not heard back from Ed/Mirna --  I had [facility]  connect me directly to phone# [phone] -- which goes directly to voice mail so left message on voice mail explaining I am calling as requested and left message on voice mail with our office phone# requesting for Ed/Mirna call office back

## 2018-10-27 NOTE — TELEPHONE ENCOUNTER
Dusty Mcleod is s/p the followin18 (Dr Ryan Rader)-- Decompression Spine Lumbar posterior L1-L4, L1-L2 diskectomy, posterolateral fusion L3-L4, Removal of SCS system, removal of interspinous devices (Dr Ryan Rader)  10/9/18 (Dr Ryan Rader) -- Lumbar wound exploration / evacuation of seroma    I had [facility]  connect me directly to [address] [place] [address] listed home phone#[phone] -- Ed picked up (Mirna's spouse)  Ed reports he noticed yellowish discharge from her surgical incision tonight  Ed reports this is first time she has had yellow discharge from her incision since her most recent surgery  According to Dusty Mcleod it is purulent yellow drainage  She reports her staples were removed from back incision this past Wednesday and sutures remain in place  Ed reports there is some redness of incision region where staples were removed earlier this week and stable swelling of incision  Dusty Mcleod reports she completed 2 weeks course of Bactrim this past Wednesday  Dusty Mcleod denies feeling feverish and reports she does not have a thermometer at home to check her temperature  She reported experiencing 8/10 low back pain earlier today but reports she has no back pain currently laying down  She denies denies numbness/tingling   is asking if an antibiotic can be called in to pharmacy  Explained to patient and  it is most appropriate for her to present to nearest Emergency Room [facility] to have incision evaluated and ER provider could contact neurosurgery / transfer to [facility] deemed necessary  Patient expressed understanding but  [facility]  Again explained to  recommendation for [facility]

## 2018-10-27 NOTE — TELEPHONE ENCOUNTER
Received notification from DRU Jackson @[facility] that Velma Aponte is requesting call to phone# [phone] for [facility] regarding incision drainage / antibiotic  I had [facility]  connect me directly to phone# [phone] -- which goes directly to voice mail so left message on voice mail explaining I am calling as requested and left message on voice mail with our office phone# requesting for Ed/Mirna call office back

## 2018-10-29 ENCOUNTER — OFFICE VISIT (OUTPATIENT)
Dept: INTERNAL MEDICINE CLINIC | Facility: CLINIC | Age: 63
End: 2018-10-29
Payer: MEDICARE

## 2018-10-29 ENCOUNTER — DOCUMENTATION (OUTPATIENT)
Dept: NEUROSURGERY | Facility: CLINIC | Age: 63
End: 2018-10-29

## 2018-10-29 VITALS
SYSTOLIC BLOOD PRESSURE: 120 MMHG | HEIGHT: 61 IN | RESPIRATION RATE: 20 BRPM | BODY MASS INDEX: 35.87 KG/M2 | HEART RATE: 88 BPM | OXYGEN SATURATION: 96 % | WEIGHT: 190 LBS | TEMPERATURE: 99.5 F | DIASTOLIC BLOOD PRESSURE: 74 MMHG

## 2018-10-29 DIAGNOSIS — T81.9XXA ABNORMAL SURGICAL WOUND, INITIAL ENCOUNTER: Primary | ICD-10-CM

## 2018-10-29 PROCEDURE — 99213 OFFICE O/P EST LOW 20 MIN: CPT | Performed by: PHYSICIAN ASSISTANT

## 2018-10-29 RX ORDER — SULFAMETHOXAZOLE AND TRIMETHOPRIM 800; 160 MG/1; MG/1
1 TABLET ORAL EVERY 12 HOURS SCHEDULED
Qty: 14 TABLET | Refills: 0 | Status: SHIPPED | OUTPATIENT
Start: 2018-10-29 | End: 2018-11-05

## 2018-10-29 NOTE — TELEPHONE ENCOUNTER
Spoke with Ed on the phone  States that they did not go to the ER and when they woke up the next day, it had crusted over  He does say, however, that it Is more swollen than it was before and she is having quite a bit of pain  Offered patient a nurse visit today at the [place] office  He states that it's a little far for them and he was trying to avoid coming down here  He is going to call PCP and see if he is comfortable seeing patient to take a look at the incision if not he will schedule an appt down here  Gave him my direct number and he is going to contact me with an update

## 2018-10-29 NOTE — TELEPHONE ENCOUNTER
Patient did not present to ER as instructed  Please touch base with Dr Radhika Baptiste to see if he wants to arrange incision check in office or how he wants to proceed further otherwise  Thank you

## 2018-10-29 NOTE — PATIENT INSTRUCTIONS
Will start antibiotic twice a day   will send picture later in the week to evaluate the healing  Advised patient to cover wound lightly with bandages

## 2018-10-29 NOTE — PROGRESS NOTES
Assessment/Plan:      Patient Instructions   Will start antibiotic twice a day   will send picture later in the week to evaluate the healing  Advised patient to cover wound lightly with bandages  No Follow-up on file  Diagnoses and all orders for this visit:    Abnormal surgical wound, initial encounter  -     sulfamethoxazole-trimethoprim (BACTRIM DS) 800-160 mg per tablet; Take 1 tablet by mouth every 12 (twelve) hours for 7 days          Subjective:      Patient ID: Maggie Trinidad is a 70 y o  female  Acute visit    Patient is status post lumbar surgery, complicated by a wound seroma that required drainage  She was treated with antibiotics for 2 weeks  States she finished antibiotics 5 days ago  Since then she has noted that the wound has started to feel painful, and tight  She feels the lower part of the wound feels hard  She notes that the wound is also draining   has not noted any purulent drainage  No fever but patient states she has had chills today and has low-grade temp of 99° in the office  ALLERGIES:  Allergies   Allergen Reactions    Clindamycin Hives     Category: Allergy;     Erythromycin Hives and Rash     Category: Allergy;     Latex Hives    Penicillins Hives and Shortness Of Breath    Morphine GI Intolerance and Vomiting     Category: Adverse Reaction;     Erythromycin Base Other (See Comments)     vomitting    Other     Penicillin V Seizures     Category: Allergy;     Adhesive [Medical Tape] Rash     Skin irritation    Povidone Iodine Rash     Category:  Adverse Reaction;        CURRENT MEDICATIONS:    Current Outpatient Prescriptions:     albuterol 2 mg/5 mL syrup, TAKE 1 TEASPOONFUL BY MOUTH EVERY 4 HOURS AS NEEDED AS DIRECTED, Disp: 473 mL, Rfl: 3    baclofen 10 mg tablet, Take 1 tablet (10 mg total) by mouth 3 (three) times a day, Disp: 30 tablet, Rfl: 0    ergocalciferol (VITAMIN D2) 50,000 units, TAKE ONE CAPSULE BY MOUTH WEEKLY, Disp: 4 capsule, Rfl: 3    estradiol (ESTRACE) 0 1 mg/g vaginal cream, Insert 1 g into the vagina 2 (two) times a week, Disp: 42 5 g, Rfl: 3    fenofibrate (TRIGLIDE) 160 MG tablet, TAKE 1 TABLET BY MOUTH EVERY DAY WITH A MEAL, Disp: 30 tablet, Rfl: 4    fluticasone (FLONASE) 50 mcg/act nasal spray, 2 sprays into each nostril daily, Disp: 16 g, Rfl: 0    furosemide (LASIX) 40 mg tablet, TAKE 1 TABLET (40 MG) BY MOUTH DAILY, Disp: 90 tablet, Rfl: 3    gabapentin (NEURONTIN) 800 mg tablet, Take 800 mg by mouth 3 (three) times a day, Disp: , Rfl:     ketoconazole (NIZORAL) 2 % cream, Apply topically daily, Disp: 60 g, Rfl: 1    lisinopril (ZESTRIL) 10 mg tablet, Take 1 tablet (10 mg total) by mouth daily, Disp: 30 tablet, Rfl: 5    omega-3-acid ethyl esters (LOVAZA) 1 g capsule, TAKE 2 CAPSULE TWICE DAILY, Disp: 120 capsule, Rfl: 3    pantoprazole (PROTONIX) 40 mg tablet, TAKE 1 TABLET BY MOUTH TWICE A DAY 30 MINUTES BEFORE BREAKFAST AND DINNER, Disp: 180 tablet, Rfl: 1    pentosan polysulfate (ELMIRON) 100 mg capsule, Take 100 mg by mouth 3 (three) times a day before meals 2 capsules po  3 time a day, Disp: , Rfl:     prochlorperazine (COMPAZINE) 10 mg tablet, Take 1 tablet (10 mg total) by mouth 2 (two) times a day, Disp: 30 tablet, Rfl: 3    VENTOLIN  (90 Base) MCG/ACT inhaler, INHALE 2 PUFFS 3 TIMES A DAY AS NEEDED, Disp: 18 Inhaler, Rfl: 3    zolpidem (AMBIEN) 5 mg tablet, Take 5 mg by mouth daily at bedtime as needed for sleep, Disp: , Rfl:     sulfamethoxazole-trimethoprim (BACTRIM DS) 800-160 mg per tablet, Take 1 tablet by mouth every 12 (twelve) hours for 7 days, Disp: 14 tablet, Rfl: 0    ACTIVE PROBLEM LIST:  Patient Active Problem List   Diagnosis    Acute bilateral back pain, intractable    Continuous opioid dependence (HCC)    Vitamin D deficiency    Cataract, bilateral    Cervical radiculopathy, chronic    Delayed gastric emptying    Essential (primary) hypertension  Fatty liver disease, nonalcoholic    GERD (gastroesophageal reflux disease)    Hot flashes, menopausal    Interstitial cystitis    Lumbar post-laminectomy syndrome    Mild intermittent asthma without complication    Lumbosacral spondylosis without myelopathy    Radiculopathy, lumbar region    Thoracic and lumbosacral neuritis    Hyperlipidemia    Lumbar canal stenosis with diffuse disc bulge    Multiple falls    Lumbar disc herniation    Spinal stenosis of lumbar region with neurogenic claudication    Acute bilateral low back pain with bilateral sciatica    Tinea corporis    Seroma of musculoskeletal structure after musculoskeletal system procedure    Abnormal surgical wound       PAST MEDICAL HISTORY:  Past Medical History:   Diagnosis Date    Anemia     Anxiety     Asthma     Cataract     Chronic back pain     Chronic pain disorder     Back    Depression     GERD (gastroesophageal reflux disease)     HTN (hypertension)     Hyperlipidemia     Interstitial cystitis     Muscle cramps     Muscle weakness     Obesity     Pneumonia     PONV (postoperative nausea and vomiting)     Radiculopathy, lumbar region     Spondylosis, cervical, with myelopathy     Vulvovaginitis        PAST SURGICAL HISTORY:  Past Surgical History:   Procedure Laterality Date    APPENDECTOMY      BACK SURGERY      Arthrodeiss lumbar    BACK SURGERY      Spinal stereotaxis of cord    BLADDER SURGERY      Electronic bladder stimulator implantation     SECTION      x3    CHOLECYSTECTOMY      COLON SURGERY      Intestinal stricturoplasty     DECOMPRESSION SPINE LUMBAR POSTERIOR Bilateral 2018    Procedure: DECOMPRESSION SPINE LUMBAR POSTERIOR L1-4, L1-2 DISKECTOMY, POSTERIORLATERAL FUSION L3-4, REMOVAL OF SPINAL CORD STIMULATOR SYSTEM, REMOVAL OF INTERSPINOUS DEVICES;  Surgeon: Yovany Haywood MD;  [facility];  Service: Neurosurgery    FINGER SURGERY      Extensor tendon repair (mallet finger)    FL MYELOGRAM LUMBAR  9/7/2018    HERNIA REPAIR      x3    HYSTERECTOMY      NJ DRAINAGE OF HEMATOMA/FLUID Bilateral 10/9/2018    Procedure: LUMBAR WOUND EXPLORATION/EVACUATION OF SERONMA;  Surgeon: Sergo Mccauley MD;  [facility];  Service: Neurosurgery    NJ ESOPHAGOGASTRODUODENOSCOPY TRANSORAL DIAGNOSTIC N/A 8/23/2017    Procedure: ESOPHAGOGASTRODUODENOSCOPY (EGD); Surgeon: Breanne Wolfe MD;  Location: [facility]; Service: Gastroenterology    SALPINGOOPHORECTOMY      B/L       FAMILY HISTORY:  Family History   Problem Relation Age of Onset    Coronary artery disease Brother         Patient has 3 brothers with coronary artery disease    Diabetes Mother     Throat cancer Mother     COPD Father     Cancer Father     Bipolar disorder Sister     Drug abuse Sister     Alcohol abuse Sister        SOCIAL HISTORY:  Social History     Social History    Marital status: /Civil Union     Spouse name: N/A    Number of children: 3    Years of education: N/A     Occupational History    Retired      Social History Main Topics    Smoking status: Never Smoker    Smokeless tobacco: Never Used    Alcohol use No    Drug use: No    Sexual activity: Not on file     Other Topics Concern    Not on file     Social History Narrative    Brushes teeth 2x day    Regular dental care    Exercise: walking       Review of Systems   Constitutional: Negative for activity change, chills, fatigue and fever  HENT: Negative for congestion  Eyes: Negative for discharge  Respiratory: Negative for cough, chest tightness and shortness of breath  Cardiovascular: Negative for chest pain, palpitations and leg swelling  Gastrointestinal: Negative for abdominal pain  Genitourinary: Negative for difficulty urinating  Musculoskeletal: Positive for back pain  Negative for arthralgias and myalgias  Skin: Positive for wound  Negative for rash     Allergic/Immunologic: Negative for immunocompromised state  Neurological: Negative for dizziness, syncope, weakness, light-headedness and headaches  Hematological: Negative for adenopathy  Does not bruise/bleed easily  Psychiatric/Behavioral: Negative for dysphoric mood  The patient is not nervous/anxious  Objective:  Vitals:    10/29/18 1456   BP: 120/74   BP Location: Left arm   Patient Position: Sitting   Cuff Size: Large   Pulse: 88   Resp: 20   Temp: 99 5 °F (37 5 °C)   SpO2: 96%   Weight: 86 2 kg (190 lb)   Height: 5' 1" (1 549 m)        Physical Exam   Constitutional: She is oriented to person, place, and time  She appears well-developed and well-nourished  No distress  Sitting in a wheelchair   Cardiovascular: Normal rate, regular rhythm and normal heart sounds  Pulmonary/Chest: Effort normal and breath sounds normal    Musculoskeletal: She exhibits no edema  Neurological: She is alert and oriented to person, place, and time  Skin: Skin is warm and dry  Wound of lumbar area does have burton wound redness especially at the suture sites with some induration at the lower portion of the wound  I was not able to express any discharge  She did however complain of pain on palpation  Mid part of the wound does have a whitish/yellow eschar present  Pictures of the wound at that time of her last discharge, does show the wound now to be redder in appearance  Nursing note and vitals reviewed  RESULTS:        This note was created with voice recognition software  Phonic, grammatical and spelling errors may be present within the note as a result

## 2018-10-29 NOTE — PROGRESS NOTES
Received message from patient's , Natacha Nino concerned with the drainage coming from her incision  He states that he spoke with LEATHA Cramer over the weekend and she recommended that he brought her into the office for an incision check or report to the ED  He just wanted her to call in an antibiotic for the patient which she was not comfortable doing without an office visit  Patient's  sent me a picture of the incision (see below) and I had Dr Leonila Mccormick review the image with me  He does not feel that it is infected at this time as long as the patient isn't running a fever  Patient may have some fat necrosis or serous drainage from incision and per Dr Leonila Mccormick, she may require Wound care to get the incision to fully heal/close  Per Dr Leonila Mccormick, okay to continue to follow up as scheduled  Patient has appt with PCP today and I advised patient's  to have either the PCP or one of the nurses call me with an update  He is going to do this

## 2018-11-03 DIAGNOSIS — E55.9 VITAMIN D DEFICIENCY: ICD-10-CM

## 2018-11-04 RX ORDER — ERGOCALCIFEROL 1.25 MG/1
CAPSULE ORAL
Qty: 4 CAPSULE | Refills: 3 | Status: SHIPPED | OUTPATIENT
Start: 2018-11-04 | End: 2018-11-06 | Stop reason: SDUPTHER

## 2018-11-05 ENCOUNTER — TELEPHONE (OUTPATIENT)
Dept: NEUROSURGERY | Facility: CLINIC | Age: 63
End: 2018-11-05

## 2018-11-05 NOTE — TELEPHONE ENCOUNTER
Received call/message from patient's , Ed  He sent me a picture of her incision  States that she does not have any other s/s infection or worsening pain or other symptoms but it does continue to drain  Offered appointment to come into the office today but  said that "he would not be able to leave the shop today"  Advised him, will review images with Dr Berny Jennings when he gets in the office this afternoon and will call him back with any recommendations  Otherwise patient has f/u at [facility] office scheduled for 11/7 and reminded him that she should have x-rays completed before that appt

## 2018-11-05 NOTE — TELEPHONE ENCOUNTER
Per Dr Kyle Hand, it appears that patient has some fat necrosis and it does not seem infected  Cultures from before came back negative  Patient to follow up as scheduled with XR prior  Called patient  and informed him of this  He was appreciative for the call

## 2018-11-06 ENCOUNTER — APPOINTMENT (OUTPATIENT)
Dept: RADIOLOGY | Facility: CLINIC | Age: 63
End: 2018-11-06
Payer: MEDICARE

## 2018-11-06 DIAGNOSIS — Z98.890 POSTOPERATIVE STATE: ICD-10-CM

## 2018-11-06 DIAGNOSIS — E55.9 VITAMIN D DEFICIENCY: ICD-10-CM

## 2018-11-06 DIAGNOSIS — M48.062 SPINAL STENOSIS OF LUMBAR REGION WITH NEUROGENIC CLAUDICATION: ICD-10-CM

## 2018-11-06 DIAGNOSIS — M51.26 LUMBAR DISC HERNIATION: ICD-10-CM

## 2018-11-06 PROCEDURE — 72100 X-RAY EXAM L-S SPINE 2/3 VWS: CPT

## 2018-11-06 RX ORDER — ERGOCALCIFEROL 1.25 MG/1
50000 CAPSULE ORAL WEEKLY
Qty: 12 CAPSULE | Refills: 0 | Status: SHIPPED | OUTPATIENT
Start: 2018-11-06 | End: 2018-11-08 | Stop reason: SDUPTHER

## 2018-11-06 NOTE — TELEPHONE ENCOUNTER
Marley Hare,    Patient would like 12 capsules dispensed to [facility] pharmacy on file  RX has been prepped

## 2018-11-07 ENCOUNTER — PATIENT OUTREACH (OUTPATIENT)
Dept: FAMILY MEDICINE CLINIC | Facility: CLINIC | Age: 63
End: 2018-11-07

## 2018-11-07 ENCOUNTER — OFFICE VISIT (OUTPATIENT)
Dept: NEUROSURGERY | Facility: CLINIC | Age: 63
End: 2018-11-07

## 2018-11-07 VITALS
DIASTOLIC BLOOD PRESSURE: 77 MMHG | BODY MASS INDEX: 35.9 KG/M2 | RESPIRATION RATE: 16 BRPM | HEART RATE: 103 BPM | SYSTOLIC BLOOD PRESSURE: 149 MMHG | HEIGHT: 61 IN | TEMPERATURE: 98 F

## 2018-11-07 DIAGNOSIS — Z98.1 HISTORY OF LUMBAR FUSION: Primary | ICD-10-CM

## 2018-11-07 PROCEDURE — 99024 POSTOP FOLLOW-UP VISIT: CPT | Performed by: NEUROLOGICAL SURGERY

## 2018-11-07 RX ORDER — LEVOFLOXACIN 500 MG/1
500 TABLET, FILM COATED ORAL EVERY 24 HOURS
Qty: 14 TABLET | Refills: 0 | Status: SHIPPED | OUTPATIENT
Start: 2018-11-07 | End: 2018-11-19 | Stop reason: SDUPTHER

## 2018-11-07 NOTE — PROGRESS NOTES
Assessment/Plan:    No problem-specific Assessment & Plan notes found for this encounter  Wound healing issues, s/p 4 weeks from wound washout, no obvious infection  Wound still leaking, will attempt 2 weeks oral abx  And f/u in 2 weeks  We will cont wound care will follow for 4 more weeks and see if wound closes next option would be a reexploration and wound vac if this fails  Diagnoses and all orders for this visit:    History of lumbar fusion  -     levofloxacin (LEVAQUIN) 500 mg tablet; Take 1 tablet (500 mg total) by mouth every 24 hours for 14 days          Subjective:      Patient ID: Oneal Carmen is a 70 y o  female  S/p wound revision, s/p lumbar decompression and fusion  She has overall improvement in BLE pain but still has hip pain  She had a wound washout recently no cultures wound is still having some issues  HPI    The following portions of the patient's history were reviewed and updated as appropriate: allergies, current medications, past family history, past medical history, past social history, past surgical history and problem list     Review of Systems   Constitutional: Positive for fatigue  Negative for chills and fever  HENT: Negative  Eyes: Negative for pain and visual disturbance  Respiratory: Negative for cough, shortness of breath and wheezing  Cardiovascular: Negative for chest pain and palpitations  Gastrointestinal: Positive for abdominal pain  Negative for diarrhea  Genitourinary: Negative for difficulty urinating  Musculoskeletal: Positive for back pain  Negative for arthralgias, gait problem, neck pain and neck stiffness  Objective:      /77 (BP Location: Left arm, Patient Position: Sitting, Cuff Size: Standard)   Pulse 103   Temp 98 °F (36 7 °C) (Tympanic)   Resp 16   Ht 5' 1" (1 549 m)   LMP  (LMP Unknown)   BMI 35 90 kg/m²          Physical Exam   Constitutional: She is oriented to person, place, and time   She appears well-developed  HENT:   Head: Normocephalic  Neck: Normal range of motion  Neurological: She is alert and oriented to person, place, and time  Wound approximated, some clear yellowish drainage  No erythema or purulent drainage

## 2018-11-08 DIAGNOSIS — T81.9XXA ABNORMAL SURGICAL WOUND, INITIAL ENCOUNTER: ICD-10-CM

## 2018-11-08 DIAGNOSIS — R11.0 NAUSEA: ICD-10-CM

## 2018-11-08 DIAGNOSIS — S39.012A STRAIN OF LUMBAR REGION, INITIAL ENCOUNTER: ICD-10-CM

## 2018-11-08 DIAGNOSIS — E55.9 VITAMIN D DEFICIENCY: ICD-10-CM

## 2018-11-08 DIAGNOSIS — B35.4 TINEA CORPORIS: ICD-10-CM

## 2018-11-08 RX ORDER — ERGOCALCIFEROL 1.25 MG/1
50000 CAPSULE ORAL WEEKLY
Qty: 12 CAPSULE | Refills: 0 | Status: SHIPPED | OUTPATIENT
Start: 2018-11-08 | End: 2018-11-29 | Stop reason: ALTCHOICE

## 2018-11-09 RX ORDER — PROCHLORPERAZINE MALEATE 10 MG
10 TABLET ORAL 2 TIMES DAILY
Qty: 30 TABLET | Refills: 3 | Status: SHIPPED | OUTPATIENT
Start: 2018-11-09 | End: 2018-12-12 | Stop reason: SDUPTHER

## 2018-11-09 RX ORDER — KETOCONAZOLE 20 MG/G
CREAM TOPICAL
Qty: 60 G | Refills: 1 | Status: SHIPPED | OUTPATIENT
Start: 2018-11-09

## 2018-11-09 RX ORDER — METHYLPREDNISOLONE 4 MG/1
TABLET ORAL
Refills: 0 | OUTPATIENT
Start: 2018-11-09

## 2018-11-09 RX ORDER — SULFAMETHOXAZOLE AND TRIMETHOPRIM 800; 160 MG/1; MG/1
TABLET ORAL
Qty: 14 TABLET | Refills: 0 | OUTPATIENT
Start: 2018-11-09

## 2018-11-16 ENCOUNTER — PATIENT OUTREACH (OUTPATIENT)
Dept: FAMILY MEDICINE CLINIC | Facility: CLINIC | Age: 63
End: 2018-11-16

## 2018-11-16 NOTE — PROGRESS NOTES
Mirna's  stated that she is feeling better today  lumber wound was draining fluid so she was put on antibiotic by neurosurgeon  She was put on Levaquin 500mg PO for 14 days  Today is her 9th day  Denies fever, weakness, redness or drainage  Also stressed the importance of keeping the wound clean  Did have slight pain this morning but gave her prescribed pain medication and it was affective  Does have a f/u appointment scheduled for next week 11/26/18 to assess the wound again  Reviewed over s/s of infections again and importance of notifying PCP and or neurosurgery with any changes  He verbalized understanding  Currently has no additional questions or concerns  Will f/u

## 2018-11-19 DIAGNOSIS — Z98.1 HISTORY OF LUMBAR FUSION: ICD-10-CM

## 2018-11-19 RX ORDER — LEVOFLOXACIN 500 MG/1
500 TABLET, FILM COATED ORAL EVERY 24 HOURS
Qty: 5 TABLET | Refills: 0 | OUTPATIENT
Start: 2018-11-19 | End: 2018-11-29 | Stop reason: ALTCHOICE

## 2018-11-19 NOTE — TELEPHONE ENCOUNTER
Received message from patient's  with the attached photo:        He states that she only has enough antibiotic to last until Thurs but the patient isn't following up in the office with Dr Samantha Lombardo until next Monday  He is wondering if she should have a refill to cover her until she follows up in the office  I spoke with and reviewed the image with Dr Samantha Lombardo who stated that we can refill patients antibiotic so that she has enough until Monday  This was phoned into her pharmacy on file

## 2018-11-20 DIAGNOSIS — E78.2 MIXED HYPERLIPIDEMIA: ICD-10-CM

## 2018-11-20 RX ORDER — PRAVASTATIN SODIUM 20 MG
TABLET ORAL
Qty: 30 TABLET | Refills: 5 | Status: SHIPPED | OUTPATIENT
Start: 2018-11-20 | End: 2018-11-29 | Stop reason: SDUPTHER

## 2018-11-26 ENCOUNTER — OFFICE VISIT (OUTPATIENT)
Dept: NEUROSURGERY | Facility: CLINIC | Age: 63
End: 2018-11-26

## 2018-11-26 VITALS
BODY MASS INDEX: 35.9 KG/M2 | DIASTOLIC BLOOD PRESSURE: 107 MMHG | SYSTOLIC BLOOD PRESSURE: 167 MMHG | TEMPERATURE: 98.3 F | RESPIRATION RATE: 16 BRPM | HEART RATE: 93 BPM | HEIGHT: 61 IN

## 2018-11-26 DIAGNOSIS — E78.1 HYPERTRIGLYCERIDEMIA: ICD-10-CM

## 2018-11-26 DIAGNOSIS — I10 ESSENTIAL (PRIMARY) HYPERTENSION: ICD-10-CM

## 2018-11-26 DIAGNOSIS — Z98.890 STATUS POST LUMBAR LAMINECTOMY: Primary | ICD-10-CM

## 2018-11-26 DIAGNOSIS — T81.30XA WOUND DEHISCENCE: ICD-10-CM

## 2018-11-26 PROCEDURE — 99024 POSTOP FOLLOW-UP VISIT: CPT | Performed by: NEUROLOGICAL SURGERY

## 2018-11-26 RX ORDER — OMEGA-3-ACID ETHYL ESTERS 1 G/1
2 CAPSULE, LIQUID FILLED ORAL 2 TIMES DAILY
Qty: 360 CAPSULE | Refills: 0 | Status: SHIPPED | OUTPATIENT
Start: 2018-11-26 | End: 2019-02-28 | Stop reason: SDUPTHER

## 2018-11-26 RX ORDER — LISINOPRIL 10 MG/1
10 TABLET ORAL DAILY
Qty: 60 TABLET | Refills: 0 | Status: SHIPPED | OUTPATIENT
Start: 2018-11-26 | End: 2019-01-29 | Stop reason: SDUPTHER

## 2018-11-26 RX ORDER — FENOFIBRATE 160 MG/1
160 TABLET ORAL DAILY
Qty: 90 TABLET | Refills: 0 | Status: SHIPPED | OUTPATIENT
Start: 2018-11-26 | End: 2019-03-23 | Stop reason: SDUPTHER

## 2018-11-26 NOTE — PROGRESS NOTES
Assessment/Plan:    No problem-specific Assessment & Plan notes found for this encounter  6 weeks post op from wound revision surgery  Wound healing well  Almost completed antibiotics  Will removed stitches in 2 weeks  Diagnoses and all orders for this visit:    Status post lumbar laminectomy  -     X-ray lumbar spine 2 or 3 views; Future    Wound dehiscence          Subjective:      Patient ID: Jaky Gordon is a 70 y o  female  doing much better no drainage of incision  Edges approximated  Stitches in place  No drainage or erythema  Completed antibiotics  HPI    The following portions of the patient's history were reviewed and updated as appropriate: allergies, current medications, past family history, past medical history, past social history, past surgical history and problem list     Review of Systems   Constitutional: Negative for chills, fatigue and fever  HENT: Negative  Eyes: Negative for pain and visual disturbance  Respiratory: Negative for cough, shortness of breath and wheezing  Cardiovascular: Negative for chest pain and palpitations  Gastrointestinal: Negative for abdominal pain and nausea  Genitourinary: Negative for difficulty urinating  Musculoskeletal: Positive for arthralgias and back pain  Negative for gait problem, neck pain and neck stiffness  Neurological: Negative for dizziness, speech difficulty, weakness, numbness and headaches  Objective:      BP (!) 167/107 (BP Location: Right arm, Patient Position: Sitting, Cuff Size: Standard)   Pulse 93   Temp 98 3 °F (36 8 °C) (Tympanic)   Resp 16   Ht 5' 1" (1 549 m)   LMP  (LMP Unknown)   BMI 35 90 kg/m²          Physical Exam   Constitutional: She is oriented to person, place, and time  She appears well-developed and well-nourished  Eyes: Pupils are equal, round, and reactive to light  EOM are normal    Neurological: She is alert and oriented to person, place, and time  She has normal strength  No cranial nerve deficit or sensory deficit

## 2018-11-26 NOTE — TELEPHONE ENCOUNTER
Kate Montano,    Prescriptions have been prepped according to medication quantity requested by patient

## 2018-11-27 ENCOUNTER — PATIENT OUTREACH (OUTPATIENT)
Dept: FAMILY MEDICINE CLINIC | Facility: CLINIC | Age: 63
End: 2018-11-27

## 2018-11-27 NOTE — PROGRESS NOTES
Mirna's  Kervin Post) stated that mirna is feeling better  Did attend appointment with neurosurgeon yesterday  Was ordered to keep stitches in for a few more weeks to her lumber wound  Completed antibiotics  Has no drainage, redness, fever, inflammation  Stated wound is dry and intact  Reviewed over upcoming appointment with Rosa Martinez on 11/29/18  Filemon Armijo will be attending and has no issues with transportation  Stated that she currently does not need anything at this time  Has no questions or concerns  Did again stress importance of notifying PCP and or neurosurgeon with any s/s of infections  Reviewed again over signs and symptoms to look for  Will f/u

## 2018-11-28 ENCOUNTER — APPOINTMENT (OUTPATIENT)
Dept: LAB | Facility: CLINIC | Age: 63
End: 2018-11-28
Payer: MEDICARE

## 2018-11-28 DIAGNOSIS — E55.9 VITAMIN D DEFICIENCY: ICD-10-CM

## 2018-11-28 DIAGNOSIS — E78.5 HYPERLIPIDEMIA, UNSPECIFIED HYPERLIPIDEMIA TYPE: ICD-10-CM

## 2018-11-28 DIAGNOSIS — D64.9 ANEMIA, UNSPECIFIED TYPE: ICD-10-CM

## 2018-11-28 LAB
25(OH)D3 SERPL-MCNC: 48.6 NG/ML (ref 30–100)
ALBUMIN SERPL BCP-MCNC: 3.5 G/DL (ref 3.5–5)
ALP SERPL-CCNC: 46 U/L (ref 46–116)
ALT SERPL W P-5'-P-CCNC: 21 U/L (ref 12–78)
ANION GAP SERPL CALCULATED.3IONS-SCNC: 7 MMOL/L (ref 4–13)
AST SERPL W P-5'-P-CCNC: 19 U/L (ref 5–45)
BASOPHILS # BLD AUTO: 0.03 THOUSANDS/ΜL (ref 0–0.1)
BASOPHILS NFR BLD AUTO: 1 % (ref 0–1)
BILIRUB SERPL-MCNC: 0.23 MG/DL (ref 0.2–1)
BUN SERPL-MCNC: 12 MG/DL (ref 5–25)
CALCIUM SERPL-MCNC: 9.5 MG/DL (ref 8.3–10.1)
CHLORIDE SERPL-SCNC: 104 MMOL/L (ref 100–108)
CHOLEST SERPL-MCNC: 205 MG/DL (ref 50–200)
CO2 SERPL-SCNC: 27 MMOL/L (ref 21–32)
CREAT SERPL-MCNC: 0.82 MG/DL (ref 0.6–1.3)
EOSINOPHIL # BLD AUTO: 0.09 THOUSAND/ΜL (ref 0–0.61)
EOSINOPHIL NFR BLD AUTO: 2 % (ref 0–6)
ERYTHROCYTE [DISTWIDTH] IN BLOOD BY AUTOMATED COUNT: 13.2 % (ref 11.6–15.1)
GFR SERPL CREATININE-BSD FRML MDRD: 75 ML/MIN/1.73SQ M
GLUCOSE P FAST SERPL-MCNC: 80 MG/DL (ref 65–99)
HCT VFR BLD AUTO: 35.7 % (ref 34.8–46.1)
HDLC SERPL-MCNC: 56 MG/DL (ref 40–60)
HGB BLD-MCNC: 11 G/DL (ref 11.5–15.4)
IMM GRANULOCYTES # BLD AUTO: 0.04 THOUSAND/UL (ref 0–0.2)
IMM GRANULOCYTES NFR BLD AUTO: 1 % (ref 0–2)
LDLC SERPL CALC-MCNC: 99 MG/DL (ref 0–100)
LYMPHOCYTES # BLD AUTO: 2.09 THOUSANDS/ΜL (ref 0.6–4.47)
LYMPHOCYTES NFR BLD AUTO: 39 % (ref 14–44)
MCH RBC QN AUTO: 28.4 PG (ref 26.8–34.3)
MCHC RBC AUTO-ENTMCNC: 30.8 G/DL (ref 31.4–37.4)
MCV RBC AUTO: 92 FL (ref 82–98)
MONOCYTES # BLD AUTO: 0.41 THOUSAND/ΜL (ref 0.17–1.22)
MONOCYTES NFR BLD AUTO: 8 % (ref 4–12)
NEUTROPHILS # BLD AUTO: 2.72 THOUSANDS/ΜL (ref 1.85–7.62)
NEUTS SEG NFR BLD AUTO: 49 % (ref 43–75)
NONHDLC SERPL-MCNC: 149 MG/DL
NRBC BLD AUTO-RTO: 0 /100 WBCS
PLATELET # BLD AUTO: 319 THOUSANDS/UL (ref 149–390)
PMV BLD AUTO: 10.8 FL (ref 8.9–12.7)
POTASSIUM SERPL-SCNC: 3.8 MMOL/L (ref 3.5–5.3)
PROT SERPL-MCNC: 7.4 G/DL (ref 6.4–8.2)
RBC # BLD AUTO: 3.88 MILLION/UL (ref 3.81–5.12)
SODIUM SERPL-SCNC: 138 MMOL/L (ref 136–145)
TRIGL SERPL-MCNC: 248 MG/DL
WBC # BLD AUTO: 5.38 THOUSAND/UL (ref 4.31–10.16)

## 2018-11-28 PROCEDURE — 36415 COLL VENOUS BLD VENIPUNCTURE: CPT

## 2018-11-28 PROCEDURE — 80061 LIPID PANEL: CPT

## 2018-11-28 PROCEDURE — 85025 COMPLETE CBC W/AUTO DIFF WBC: CPT

## 2018-11-28 PROCEDURE — 82306 VITAMIN D 25 HYDROXY: CPT

## 2018-11-28 PROCEDURE — 80053 COMPREHEN METABOLIC PANEL: CPT

## 2018-11-29 ENCOUNTER — OFFICE VISIT (OUTPATIENT)
Dept: INTERNAL MEDICINE CLINIC | Facility: CLINIC | Age: 63
End: 2018-11-29
Payer: MEDICARE

## 2018-11-29 VITALS
HEART RATE: 90 BPM | OXYGEN SATURATION: 96 % | DIASTOLIC BLOOD PRESSURE: 82 MMHG | SYSTOLIC BLOOD PRESSURE: 126 MMHG | WEIGHT: 194 LBS | HEIGHT: 61 IN | BODY MASS INDEX: 36.63 KG/M2 | RESPIRATION RATE: 22 BRPM

## 2018-11-29 DIAGNOSIS — E55.9 VITAMIN D DEFICIENCY: ICD-10-CM

## 2018-11-29 DIAGNOSIS — E78.2 MIXED HYPERLIPIDEMIA: ICD-10-CM

## 2018-11-29 DIAGNOSIS — I10 ESSENTIAL (PRIMARY) HYPERTENSION: Primary | ICD-10-CM

## 2018-11-29 DIAGNOSIS — M96.1 LUMBAR POST-LAMINECTOMY SYNDROME: ICD-10-CM

## 2018-11-29 DIAGNOSIS — J30.9 ALLERGIC RHINITIS, UNSPECIFIED SEASONALITY, UNSPECIFIED TRIGGER: ICD-10-CM

## 2018-11-29 DIAGNOSIS — E78.5 HYPERLIPIDEMIA, UNSPECIFIED HYPERLIPIDEMIA TYPE: ICD-10-CM

## 2018-11-29 DIAGNOSIS — K21.9 GASTROESOPHAGEAL REFLUX DISEASE, ESOPHAGITIS PRESENCE NOT SPECIFIED: ICD-10-CM

## 2018-11-29 PROCEDURE — 99214 OFFICE O/P EST MOD 30 MIN: CPT | Performed by: PHYSICIAN ASSISTANT

## 2018-11-29 RX ORDER — PRAVASTATIN SODIUM 20 MG
20 TABLET ORAL DAILY
Qty: 30 TABLET | Refills: 5 | Status: SHIPPED | OUTPATIENT
Start: 2018-11-29 | End: 2019-01-16 | Stop reason: ALTCHOICE

## 2018-11-29 RX ORDER — CETIRIZINE HYDROCHLORIDE, PSEUDOEPHEDRINE HYDROCHLORIDE 5; 120 MG/1; MG/1
1 TABLET, FILM COATED, EXTENDED RELEASE ORAL 2 TIMES DAILY
Qty: 60 TABLET | Refills: 5 | Status: SHIPPED | OUTPATIENT
Start: 2018-11-29 | End: 2019-06-26 | Stop reason: SDUPTHER

## 2018-11-29 RX ORDER — FLUTICASONE PROPIONATE 50 MCG
2 SPRAY, SUSPENSION (ML) NASAL DAILY
Qty: 16 G | Refills: 5 | Status: SHIPPED | OUTPATIENT
Start: 2018-11-29 | End: 2019-05-15 | Stop reason: SDUPTHER

## 2018-11-29 RX ORDER — FLUTICASONE PROPIONATE 50 MCG
2 SPRAY, SUSPENSION (ML) NASAL DAILY
Qty: 16 G | Refills: 0 | Status: SHIPPED | OUTPATIENT
Start: 2018-11-29 | End: 2018-11-29 | Stop reason: SDUPTHER

## 2018-11-29 NOTE — PROGRESS NOTES
Assessment/Plan:   Patient Instructions   Restart you're pravastatin, 1 tablet daily at bedtime  If you developed significant muscle aches please let me know  Continue other medications  Can stop the once a week vitamin-D  Please obtain over-the-counter vitamin D3 5000 units and take 1 daily  This is to continue to replace her vitamin-D  Schedule follow-up here in 6 months  Repeat labs at that time  No Follow-up on file  Diagnoses and all orders for this visit:    Essential (primary) hypertension    Hyperlipidemia, unspecified hyperlipidemia type    Mixed hyperlipidemia  -     pravastatin (PRAVACHOL) 20 mg tablet; Take 1 tablet (20 mg total) by mouth daily  -     Comprehensive metabolic panel; Future  -     Lipid panel; Future    Allergic rhinitis, unspecified seasonality, unspecified trigger  -     fluticasone (FLONASE) 50 mcg/act nasal spray; 2 sprays into each nostril daily  -     cetirizine-pseudoephedrine (ZYRTEC-D ALLERGY & CONGESTION) 5-120 MG per tablet; Take 1 tablet by mouth 2 (two) times a day  -     CBC and differential; Future    Vitamin D deficiency    Lumbar post-laminectomy syndrome    Gastroesophageal reflux disease, esophagitis presence not specified    Other orders  -     OxyCODONE ER (XTAMPZA ER) 27 MG C12A; Take by mouth          Subjective:      Patient ID: Priscilla Hightower is a 70 y o  female  Follow-up    Hypertension:  Decent control on current medications  Her initial blood pressure was elevated after she had walked into the office  This is significant because she had been recently wheeled in by wheelchair for several visits due to her back problem  According to the patient her  she has been able to be up, and do her self care, and move about the house  Recheck of blood pressure show much better control after she had sat for period of time  Hyperlipidemia:  Lipids are slightly elevated this time    She had been holding her Pravachol due to previous complaints of muscle aches  She did not notice being off the Pravachol that this improved at all  She will restart her Pravachol  Vitamin-D deficiency:  Vitamin-D level now in normal range  She will start on vitamin D 3 OTC 5000 units daily  GERD:  Symptoms stable, however she needs to take the PPI daily  Interstitial cystitis:  On Elmiron for which she takes 200 mg 3 times a day  She has been on this medication for a long period of time, which has kept her symptoms stable   is stating that this medication will require prior authorization at the new year  Chronic low back pain with radiculopathy:  Since her surgery she states that the shooting severe pain has improved  She continues with lower back pain that radiates into her left leg  She is on chronic pain medication  She does follow with pain management  Allergic rhinitis:  Uses Zyrtec D daily and finds that this controls her symptoms  Back Pain   This is a chronic problem  The current episode started more than 1 year ago  The problem occurs constantly  The problem is unchanged  The pain is present in the sacro-iliac  The quality of the pain is described as stabbing  The pain radiates to the left thigh  The pain is at a severity of 8/10  The pain is the same all the time  The symptoms are aggravated by bending, coughing, position, lying down, sitting, standing, stress and twisting  Stiffness is present all day  Associated symptoms include abdominal pain, leg pain, paresthesias, pelvic pain, tingling and weakness  Pertinent negatives include no bladder incontinence, bowel incontinence, chest pain, dysuria, fever, headaches, numbness, paresis, perianal numbness or weight loss  Risk factors include recent trauma  ALLERGIES:  Allergies   Allergen Reactions    Clindamycin Hives     Category: Allergy;     Erythromycin Hives and Rash     Category:  Allergy;     Latex Hives    Penicillins Hives and Shortness Of Breath    Morphine GI Intolerance and Vomiting     Category: Adverse Reaction;     Erythromycin Base Other (See Comments)     vomitting    Other     Penicillin V Seizures     Category: Allergy;     Adhesive [Medical Tape] Rash     Skin irritation    Povidone Iodine Rash     Category:  Adverse Reaction;        CURRENT MEDICATIONS:    Current Outpatient Prescriptions:     albuterol 2 mg/5 mL syrup, TAKE 1 TEASPOONFUL BY MOUTH EVERY 4 HOURS AS NEEDED AS DIRECTED, Disp: 473 mL, Rfl: 3    baclofen 10 mg tablet, Take 1 tablet (10 mg total) by mouth 3 (three) times a day, Disp: 30 tablet, Rfl: 0    estradiol (ESTRACE) 0 1 mg/g vaginal cream, Insert 1 g into the vagina 2 (two) times a week, Disp: 42 5 g, Rfl: 3    fenofibrate (TRIGLIDE) 160 MG tablet, Take 1 tablet (160 mg total) by mouth daily for 90 days Take with a meal, Disp: 90 tablet, Rfl: 0    fluticasone (FLONASE) 50 mcg/act nasal spray, 2 sprays into each nostril daily, Disp: 16 g, Rfl: 0    furosemide (LASIX) 40 mg tablet, TAKE 1 TABLET (40 MG) BY MOUTH DAILY, Disp: 90 tablet, Rfl: 3    gabapentin (NEURONTIN) 800 mg tablet, Take 800 mg by mouth 3 (three) times a day, Disp: , Rfl:     lisinopril (ZESTRIL) 10 mg tablet, Take 1 tablet (10 mg total) by mouth daily for 60 doses, Disp: 60 tablet, Rfl: 0    omega-3-acid ethyl esters (LOVAZA) 1 g capsule, Take 2 capsules (2 g total) by mouth 2 (two) times a day for 360 doses, Disp: 360 capsule, Rfl: 0    OxyCODONE ER (XTAMPZA ER) 27 MG C12A, Take by mouth, Disp: , Rfl:     pantoprazole (PROTONIX) 40 mg tablet, TAKE 1 TABLET BY MOUTH TWICE A DAY 30 MINUTES BEFORE BREAKFAST AND DINNER, Disp: 180 tablet, Rfl: 1    pentosan polysulfate (ELMIRON) 100 mg capsule, Take 100 mg by mouth 3 (three) times a day before meals 2 capsules po  3 time a day, Disp: , Rfl:     pravastatin (PRAVACHOL) 20 mg tablet, Take 1 tablet (20 mg total) by mouth daily, Disp: 30 tablet, Rfl: 5    prochlorperazine (COMPAZINE) 10 mg tablet, TAKE 1 TABLET (10 MG TOTAL) BY MOUTH 2 (TWO) TIMES A DAY, Disp: 30 tablet, Rfl: 3    VENTOLIN  (90 Base) MCG/ACT inhaler, INHALE 2 PUFFS 3 TIMES A DAY AS NEEDED, Disp: 18 Inhaler, Rfl: 3    zolpidem (AMBIEN) 5 mg tablet, Take 5 mg by mouth daily at bedtime as needed for sleep, Disp: , Rfl:     cetirizine-pseudoephedrine (ZYRTEC-D ALLERGY & CONGESTION) 5-120 MG per tablet, Take 1 tablet by mouth 2 (two) times a day, Disp: 60 tablet, Rfl: 5    ketoconazole (NIZORAL) 2 % cream, APPLY TO AFFECTED AREA EVERY DAY, Disp: 60 g, Rfl: 1    levofloxacin (LEVAQUIN) 500 mg tablet, Take 1 tablet (500 mg total) by mouth every 24 hours for 14 days (Patient not taking: Reported on 11/29/2018 ), Disp: 5 tablet, Rfl: 0    ACTIVE PROBLEM LIST:  Patient Active Problem List   Diagnosis    Acute bilateral back pain, intractable    Continuous opioid dependence (HCC)    Vitamin D deficiency    Cataract, bilateral    Cervical radiculopathy, chronic    Delayed gastric emptying    Essential (primary) hypertension    Fatty liver disease, nonalcoholic    GERD (gastroesophageal reflux disease)    Hot flashes, menopausal    Interstitial cystitis    Lumbar post-laminectomy syndrome    Mild intermittent asthma without complication    Lumbosacral spondylosis without myelopathy    Radiculopathy, lumbar region    Thoracic and lumbosacral neuritis    Hyperlipidemia    Lumbar canal stenosis with diffuse disc bulge    Multiple falls    Lumbar disc herniation    Spinal stenosis of lumbar region with neurogenic claudication    Acute bilateral low back pain with bilateral sciatica    Tinea corporis    Seroma of musculoskeletal structure after musculoskeletal system procedure    Abnormal surgical wound    Allergic rhinitis       PAST MEDICAL HISTORY:  Past Medical History:   Diagnosis Date    Anemia     Anxiety     Asthma     Cataract     Chronic back pain     Chronic pain disorder     Back    Depression     GERD (gastroesophageal reflux disease)     HTN (hypertension)     Hyperlipidemia     Interstitial cystitis     Muscle cramps     Muscle weakness     Obesity     Pneumonia     PONV (postoperative nausea and vomiting)     Radiculopathy, lumbar region     Spondylosis, cervical, with myelopathy     Vulvovaginitis        PAST SURGICAL HISTORY:  Past Surgical History:   Procedure Laterality Date    APPENDECTOMY      BACK SURGERY      Arthrodeiss lumbar    BACK SURGERY      Spinal stereotaxis of cord    BLADDER SURGERY      Electronic bladder stimulator implantation     SECTION      x3    CHOLECYSTECTOMY      COLON SURGERY      Intestinal stricturoplasty     DECOMPRESSION SPINE LUMBAR POSTERIOR Bilateral 2018    Procedure: DECOMPRESSION SPINE LUMBAR POSTERIOR L1-4, L1-2 DISKECTOMY, POSTERIORLATERAL FUSION L3-4, REMOVAL OF SPINAL CORD STIMULATOR SYSTEM, REMOVAL OF INTERSPINOUS DEVICES;  Surgeon: Sergo Mccauley MD;  Location: [facility];  Service: Neurosurgery    FINGER SURGERY      Extensor tendon repair (mallet finger)    FL MYELOGRAM LUMBAR  2018    HERNIA REPAIR      x3    HYSTERECTOMY      HI DRAINAGE OF HEMATOMA/FLUID Bilateral 10/9/2018    Procedure: LUMBAR WOUND EXPLORATION/EVACUATION OF SERONMA;  Surgeon: Sergo Mccauley MD;  Location[facility];  Service: Neurosurgery    HI ESOPHAGOGASTRODUODENOSCOPY TRANSORAL DIAGNOSTIC N/A 2017    Procedure: ESOPHAGOGASTRODUODENOSCOPY (EGD); Surgeon: Breanne Wolfe MD;  Location: [facility];   Service: Gastroenterology    SALPINGOOPHORECTOMY      B/L       FAMILY HISTORY:  Family History   Problem Relation Age of Onset    Coronary artery disease Brother         Patient has 3 brothers with coronary artery disease    Diabetes Mother     Throat cancer Mother     COPD Father     Cancer Father     Bipolar disorder Sister     Drug abuse Sister     Alcohol abuse Sister        SOCIAL HISTORY:  Social History     Social History    Marital status: /Civil Union     Spouse name: N/A    Number of children: 3    Years of education: N/A     Occupational History    Retired      Social History Main Topics    Smoking status: Never Smoker    Smokeless tobacco: Never Used    Alcohol use No    Drug use: No    Sexual activity: Not on file     Other Topics Concern    Not on file     Social History Narrative    Brushes teeth 2x day    Regular dental care    Exercise: walking       Review of Systems   Constitutional: Negative for fever and weight loss  HENT: Positive for congestion and postnasal drip  Respiratory: Negative for cough, chest tightness, shortness of breath and wheezing  Cardiovascular: Negative for chest pain  Gastrointestinal: Positive for abdominal pain  Negative for bowel incontinence, constipation and diarrhea  Genitourinary: Positive for pelvic pain  Negative for bladder incontinence and dysuria  Musculoskeletal: Positive for back pain  Skin: Negative for rash  Neurological: Positive for tingling, weakness and paresthesias  Negative for numbness and headaches  Psychiatric/Behavioral: Negative for decreased concentration, dysphoric mood and suicidal ideas  The patient is not nervous/anxious  Objective:  Vitals:    11/29/18 1327 11/29/18 1355   BP: 170/78 126/82   BP Location: Right arm Left arm   Patient Position: Sitting Sitting   Cuff Size: Large    Pulse: 90    Resp: 22    SpO2: 96%    Weight: 88 kg (194 lb)    Height: 5' 1" (1 549 m)         Physical Exam   Constitutional: She is oriented to person, place, and time  She appears well-developed and well-nourished  No distress  HENT:   HEENT-granular, injected conjunctivae, TM's with fluid behind,nasal mucosa pale and boggy with clear/white mucoid drainage, granular soft palate, posterior pharynx with clear/white post nasal drainage   Neck: Neck supple  Cardiovascular: Normal rate, regular rhythm and normal heart sounds      Pulmonary/Chest: Effort normal and breath sounds normal  She has no wheezes  Abdominal: Soft  There is no tenderness  Musculoskeletal: She exhibits no edema  Lymphadenopathy:     She has no cervical adenopathy  Neurological: She is alert and oriented to person, place, and time  Skin: Skin is warm and dry  No rash noted  Psychiatric: She has a normal mood and affect  Her behavior is normal    Nursing note and vitals reviewed          RESULTS:    Recent Results (from the past 1008 hour(s))   CBC and differential    Collection Time: 11/28/18  6:51 AM   Result Value Ref Range    WBC 5 38 4 31 - 10 16 Thousand/uL    RBC 3 88 3 81 - 5 12 Million/uL    Hemoglobin 11 0 (L) 11 5 - 15 4 g/dL    Hematocrit 35 7 34 8 - 46 1 %    MCV 92 82 - 98 fL    MCH 28 4 26 8 - 34 3 pg    MCHC 30 8 (L) 31 4 - 37 4 g/dL    RDW 13 2 11 6 - 15 1 %    MPV 10 8 8 9 - 12 7 fL    Platelets 892 282 - 028 Thousands/uL    nRBC 0 /100 WBCs    Neutrophils Relative 49 43 - 75 %    Immat GRANS % 1 0 - 2 %    Lymphocytes Relative 39 14 - 44 %    Monocytes Relative 8 4 - 12 %    Eosinophils Relative 2 0 - 6 %    Basophils Relative 1 0 - 1 %    Neutrophils Absolute 2 72 1 85 - 7 62 Thousands/µL    Immature Grans Absolute 0 04 0 00 - 0 20 Thousand/uL    Lymphocytes Absolute 2 09 0 60 - 4 47 Thousands/µL    Monocytes Absolute 0 41 0 17 - 1 22 Thousand/µL    Eosinophils Absolute 0 09 0 00 - 0 61 Thousand/µL    Basophils Absolute 0 03 0 00 - 0 10 Thousands/µL   Comprehensive metabolic panel    Collection Time: 11/28/18  6:51 AM   Result Value Ref Range    Sodium 138 136 - 145 mmol/L    Potassium 3 8 3 5 - 5 3 mmol/L    Chloride 104 100 - 108 mmol/L    CO2 27 21 - 32 mmol/L    ANION GAP 7 4 - 13 mmol/L    BUN 12 5 - 25 mg/dL    Creatinine 0 82 0 60 - 1 30 mg/dL    Glucose, Fasting 80 65 - 99 mg/dL    Calcium 9 5 8 3 - 10 1 mg/dL    AST 19 5 - 45 U/L    ALT 21 12 - 78 U/L    Alkaline Phosphatase 46 46 - 116 U/L    Total Protein 7 4 6 4 - 8 2 g/dL    Albumin 3 5 3 5 - 5 0 g/dL    Total Bilirubin 0 23 0 20 - 1 00 mg/dL    eGFR 75 ml/min/1 73sq m   Lipid panel    Collection Time: 11/28/18  6:51 AM   Result Value Ref Range    Cholesterol 205 (H) 50 - 200 mg/dL    Triglycerides 248 (H) <=150 mg/dL    HDL, Direct 56 40 - 60 mg/dL    LDL Calculated 99 0 - 100 mg/dL    Non-HDL-Chol (CHOL-HDL) 149 mg/dl   Vitamin D 25 hydroxy    Collection Time: 11/28/18  6:51 AM   Result Value Ref Range    Vit D, 25-Hydroxy 48 6 30 0 - 100 0 ng/mL       This note was created with voice recognition software  Phonic, grammatical and spelling errors may be present within the note as a result

## 2018-11-29 NOTE — PATIENT INSTRUCTIONS
Restart you're pravastatin, 1 tablet daily at bedtime  If you developed significant muscle aches please let me know  Continue other medications  Can stop the once a week vitamin-D  Please obtain over-the-counter vitamin D3 5000 units and take 1 daily  This is to continue to replace her vitamin-D  Schedule follow-up here in 6 months  Repeat labs at that time

## 2018-12-06 ENCOUNTER — PATIENT OUTREACH (OUTPATIENT)
Dept: INTERNAL MEDICINE CLINIC | Facility: CLINIC | Age: 63
End: 2018-12-06

## 2018-12-10 ENCOUNTER — TELEPHONE (OUTPATIENT)
Dept: NEUROSURGERY | Facility: CLINIC | Age: 63
End: 2018-12-10

## 2018-12-10 NOTE — TELEPHONE ENCOUNTER
FYI    Pt  called to cancel pts appointment tomorrow with [facility]  Reminded him the pt has sutures to remove which is part of the reason for the visit  He reports he removed them himself  He states she is doing well and she will have have a f/u xray as ordered  Encouraged a reschedule appt  , but he declines stating they have already made so many trips down  Encouraged him to call back with any concerns or questions

## 2018-12-11 ENCOUNTER — PATIENT OUTREACH (OUTPATIENT)
Dept: INTERNAL MEDICINE CLINIC | Facility: CLINIC | Age: 63
End: 2018-12-11

## 2018-12-12 DIAGNOSIS — R11.0 NAUSEA: ICD-10-CM

## 2018-12-12 RX ORDER — PROCHLORPERAZINE MALEATE 10 MG
10 TABLET ORAL 2 TIMES DAILY
Qty: 60 TABLET | Refills: 0 | Status: SHIPPED | OUTPATIENT
Start: 2018-12-12 | End: 2019-02-12 | Stop reason: SDUPTHER

## 2018-12-12 NOTE — TELEPHONE ENCOUNTER
Patient should not be on this medication on a daily basis but using this only as needed for nausea  Ninety days seems on reasonable

## 2018-12-28 ENCOUNTER — EPISODE CHANGES (OUTPATIENT)
Dept: CASE MANAGEMENT | Facility: HOSPITAL | Age: 63
End: 2018-12-28

## 2019-01-16 ENCOUNTER — OFFICE VISIT (OUTPATIENT)
Dept: INTERNAL MEDICINE CLINIC | Facility: CLINIC | Age: 64
End: 2019-01-16
Payer: MEDICARE

## 2019-01-16 VITALS
HEIGHT: 61 IN | DIASTOLIC BLOOD PRESSURE: 80 MMHG | WEIGHT: 193 LBS | SYSTOLIC BLOOD PRESSURE: 130 MMHG | BODY MASS INDEX: 36.44 KG/M2 | RESPIRATION RATE: 22 BRPM

## 2019-01-16 DIAGNOSIS — E78.5 HYPERLIPIDEMIA, UNSPECIFIED HYPERLIPIDEMIA TYPE: ICD-10-CM

## 2019-01-16 DIAGNOSIS — L03.211 FACIAL CELLULITIS: Primary | ICD-10-CM

## 2019-01-16 PROCEDURE — 3008F BODY MASS INDEX DOCD: CPT | Performed by: PHYSICIAN ASSISTANT

## 2019-01-16 PROCEDURE — 99213 OFFICE O/P EST LOW 20 MIN: CPT | Performed by: PHYSICIAN ASSISTANT

## 2019-01-16 PROCEDURE — 1160F RVW MEDS BY RX/DR IN RCRD: CPT | Performed by: PHYSICIAN ASSISTANT

## 2019-01-16 RX ORDER — ATORVASTATIN CALCIUM 20 MG/1
20 TABLET, FILM COATED ORAL DAILY
Qty: 30 TABLET | Refills: 5 | Status: SHIPPED | OUTPATIENT
Start: 2019-01-16 | End: 2019-07-13 | Stop reason: SDUPTHER

## 2019-01-16 RX ORDER — SULFAMETHOXAZOLE AND TRIMETHOPRIM 800; 160 MG/1; MG/1
1 TABLET ORAL EVERY 12 HOURS SCHEDULED
Qty: 14 TABLET | Refills: 0 | Status: SHIPPED | OUTPATIENT
Start: 2019-01-16 | End: 2019-01-23

## 2019-01-16 NOTE — PATIENT INSTRUCTIONS
Will change pravastatin to atorvastatin 20 mg daily  Please keep me informed of any side effects  Will make available prescription for antibiotic in the need of any recurrent facial swelling and redness  Please inform me if you start the antibiotic

## 2019-01-16 NOTE — PROGRESS NOTES
Assessment/Plan:      Patient Instructions   Will change pravastatin to atorvastatin 20 mg daily  Please keep me informed of any side effects  Will make available prescription for antibiotic in the need of any recurrent facial swelling and redness  Please inform me if you start the antibiotic  BMI Counseling: Body mass index is 36 47 kg/m²  Discussed the patient's BMI with her  The BMI is above average  BMI counseling and education was provided to the patient  Nutrition recommendations include reducing portion sizes and decreasing overall calorie intake  Exercise recommendations include exercising 3-5 times per week  Return for Next scheduled follow up  Diagnoses and all orders for this visit:    Facial cellulitis  -     sulfamethoxazole-trimethoprim (BACTRIM DS) 800-160 mg per tablet; Take 1 tablet by mouth every 12 (twelve) hours for 7 days    Hyperlipidemia, unspecified hyperlipidemia type  -     atorvastatin (LIPITOR) 20 mg tablet; Take 1 tablet (20 mg total) by mouth daily          Subjective:      Patient ID: An Henao is a 70 y o  female  Acute visit    Patient present with her   She is stating that last week her glasses were rubbing on the right side of her face  At that time a breakdown in the skin occurred, she developed redness swelling and some mild drainage  She started using topical triple antibiotic ointment and had some improvement in the rash  She was concerned because the area still is just slightly pink but much improved  Patient is stating that the pravastatin she is on is causing abdominal symptoms and some mild muscle aches  She knows that she needs a cholesterol-lowering agent and would like to try a different 1  Rash   This is a new problem  The current episode started in the past 7 days  The problem has been gradually worsening since onset  The affected locations include thehead  The rash is characterized by pain and redness   She was exposed to nothing  Associated symptoms include facial edema  Pertinent negatives include no anorexia, congestion, cough, diarrhea, eye pain, fatigue, fever, joint pain, nail changes, rhinorrhea, shortness of breath, sore throat or vomiting  ALLERGIES:  Allergies   Allergen Reactions    Clindamycin Hives     Category: Allergy;     Erythromycin Hives and Rash     Category: Allergy;     Latex Hives    Penicillins Hives and Shortness Of Breath    Morphine GI Intolerance and Vomiting     Category: Adverse Reaction;     Erythromycin Base Other (See Comments)     vomitting    Other     Penicillin V Seizures     Category: Allergy;     Adhesive [Medical Tape] Rash     Skin irritation    Povidone Iodine Rash     Category:  Adverse Reaction;        CURRENT MEDICATIONS:    Current Outpatient Prescriptions:     albuterol 2 mg/5 mL syrup, TAKE 1 TEASPOONFUL BY MOUTH EVERY 4 HOURS AS NEEDED AS DIRECTED, Disp: 473 mL, Rfl: 3    baclofen 10 mg tablet, Take 1 tablet (10 mg total) by mouth 3 (three) times a day, Disp: 30 tablet, Rfl: 0    cetirizine-pseudoephedrine (ZYRTEC-D ALLERGY & CONGESTION) 5-120 MG per tablet, Take 1 tablet by mouth 2 (two) times a day, Disp: 60 tablet, Rfl: 5    fenofibrate (TRIGLIDE) 160 MG tablet, Take 1 tablet (160 mg total) by mouth daily for 90 days Take with a meal, Disp: 90 tablet, Rfl: 0    fluticasone (FLONASE) 50 mcg/act nasal spray, 2 sprays into each nostril daily, Disp: 16 g, Rfl: 5    furosemide (LASIX) 40 mg tablet, TAKE 1 TABLET (40 MG) BY MOUTH DAILY, Disp: 90 tablet, Rfl: 3    gabapentin (NEURONTIN) 800 mg tablet, Take 800 mg by mouth 3 (three) times a day, Disp: , Rfl:     lisinopril (ZESTRIL) 10 mg tablet, Take 1 tablet (10 mg total) by mouth daily for 60 doses, Disp: 60 tablet, Rfl: 0    omega-3-acid ethyl esters (LOVAZA) 1 g capsule, Take 2 capsules (2 g total) by mouth 2 (two) times a day for 360 doses, Disp: 360 capsule, Rfl: 0    OxyCODONE ER (XTAMPZA ER) 27 MG C12A, Take by mouth, Disp: , Rfl:     pantoprazole (PROTONIX) 40 mg tablet, TAKE 1 TABLET BY MOUTH TWICE A DAY 30 MINUTES BEFORE BREAKFAST AND DINNER, Disp: 180 tablet, Rfl: 1    pentosan polysulfate (ELMIRON) 100 mg capsule, Take 100 mg by mouth 3 (three) times a day before meals 2 capsules po  3 time a day, Disp: , Rfl:     prochlorperazine (COMPAZINE) 10 mg tablet, Take 1 tablet (10 mg total) by mouth 2 (two) times a day As needed for nausea, Disp: 60 tablet, Rfl: 0    VENTOLIN  (90 Base) MCG/ACT inhaler, INHALE 2 PUFFS 3 TIMES A DAY AS NEEDED, Disp: 18 Inhaler, Rfl: 3    zolpidem (AMBIEN) 5 mg tablet, Take 5 mg by mouth daily at bedtime as needed for sleep, Disp: , Rfl:     atorvastatin (LIPITOR) 20 mg tablet, Take 1 tablet (20 mg total) by mouth daily, Disp: 30 tablet, Rfl: 5    estradiol (ESTRACE) 0 1 mg/g vaginal cream, Insert 1 g into the vagina 2 (two) times a week, Disp: 42 5 g, Rfl: 3    ketoconazole (NIZORAL) 2 % cream, APPLY TO AFFECTED AREA EVERY DAY, Disp: 60 g, Rfl: 1    sulfamethoxazole-trimethoprim (BACTRIM DS) 800-160 mg per tablet, Take 1 tablet by mouth every 12 (twelve) hours for 7 days, Disp: 14 tablet, Rfl: 0    ACTIVE PROBLEM LIST:  Patient Active Problem List   Diagnosis    Acute bilateral back pain, intractable    Continuous opioid dependence (HCC)    Vitamin D deficiency    Cataract, bilateral    Cervical radiculopathy, chronic    Delayed gastric emptying    Essential (primary) hypertension    Fatty liver disease, nonalcoholic    GERD (gastroesophageal reflux disease)    Hot flashes, menopausal    Interstitial cystitis    Lumbar post-laminectomy syndrome    Mild intermittent asthma without complication    Lumbosacral spondylosis without myelopathy    Radiculopathy, lumbar region    Thoracic and lumbosacral neuritis    Hyperlipidemia    Lumbar canal stenosis with diffuse disc bulge    Multiple falls    Lumbar disc herniation    Spinal stenosis of lumbar region with neurogenic claudication    Acute bilateral low back pain with bilateral sciatica    Tinea corporis    Seroma of musculoskeletal structure after musculoskeletal system procedure    Abnormal surgical wound    Allergic rhinitis    Facial cellulitis       PAST MEDICAL HISTORY:  Past Medical History:   Diagnosis Date    Anemia     Anxiety     Asthma     Cataract     Chronic back pain     Chronic pain disorder     Back    Depression     GERD (gastroesophageal reflux disease)     HTN (hypertension)     Hyperlipidemia     Interstitial cystitis     Muscle cramps     Muscle weakness     Obesity     Pneumonia     PONV (postoperative nausea and vomiting)     Radiculopathy, lumbar region     Spondylosis, cervical, with myelopathy     Vulvovaginitis        PAST SURGICAL HISTORY:  Past Surgical History:   Procedure Laterality Date    APPENDECTOMY      BACK SURGERY      Arthrodeiss lumbar    BACK SURGERY      Spinal stereotaxis of cord    BLADDER SURGERY      Electronic bladder stimulator implantation     SECTION      x3    CHOLECYSTECTOMY      COLON SURGERY      Intestinal stricturoplasty     DECOMPRESSION SPINE LUMBAR POSTERIOR Bilateral 2018    Procedure: DECOMPRESSION SPINE LUMBAR POSTERIOR L1-4, L1-2 DISKECTOMY, POSTERIORLATERAL FUSION L3-4, REMOVAL OF SPINAL CORD STIMULATOR SYSTEM, REMOVAL OF INTERSPINOUS DEVICES;  Surgeon: Jaquan Keyes MD;  [facility];  Service: Neurosurgery    FINGER SURGERY      Extensor tendon repair (mallet finger)    FL MYELOGRAM LUMBAR  2018    HERNIA REPAIR      x3    HYSTERECTOMY      MS DRAINAGE OF HEMATOMA/FLUID Bilateral 10/9/2018    Procedure: LUMBAR WOUND EXPLORATION/EVACUATION OF SERONMA;  Surgeon: Jaquan Keyes MD;  [facility];  Service: Neurosurgery    MS ESOPHAGOGASTRODUODENOSCOPY TRANSORAL DIAGNOSTIC N/A 2017    Procedure: ESOPHAGOGASTRODUODENOSCOPY (EGD);   Surgeon: Jayashree Sylvester MD; Location: [facility]; Service: Gastroenterology    SALPINGOOPHORECTOMY      B/L       FAMILY HISTORY:  Family History   Problem Relation Age of Onset    Coronary artery disease Brother         Patient has 3 brothers with coronary artery disease    Diabetes Mother     Throat cancer Mother     COPD Father     Cancer Father     Bipolar disorder Sister     Drug abuse Sister     Alcohol abuse Sister        SOCIAL HISTORY:  Social History     Social History    Marital status: /Civil Union     Spouse name: N/A    Number of children: 3    Years of education: N/A     Occupational History    Retired      Social History Main Topics    Smoking status: Never Smoker    Smokeless tobacco: Never Used    Alcohol use No    Drug use: No    Sexual activity: Not on file     Other Topics Concern    Not on file     Social History Narrative    Brushes teeth 2x day    Regular dental care    Exercise: walking       Review of Systems   Constitutional: Negative for fatigue and fever  HENT: Negative for congestion, rhinorrhea and sore throat  Eyes: Negative for pain  Respiratory: Negative for cough and shortness of breath  Cardiovascular: Negative for chest pain, palpitations and leg swelling  Gastrointestinal: Negative for abdominal pain, anorexia, blood in stool, diarrhea and vomiting  Genitourinary: Negative for difficulty urinating  Musculoskeletal: Positive for arthralgias, back pain and neck pain  Negative for joint pain  Skin: Positive for rash  Negative for nail changes  Allergic/Immunologic: Negative for immunocompromised state  Neurological: Negative for dizziness, light-headedness and headaches  Psychiatric/Behavioral: Negative for dysphoric mood and sleep disturbance           Objective:  Vitals:    01/16/19 1308   BP: 162/82   BP Location: Right arm   Patient Position: Sitting   Cuff Size: Large   Resp: 22   Weight: 87 5 kg (193 lb)   Height: 5' 1" (1 549 m)     Body mass index is 36 47 kg/m²  Physical Exam   Constitutional: She is oriented to person, place, and time  She appears well-developed and well-nourished  No distress  Neck: Neck supple  Cardiovascular: Normal rate, regular rhythm and normal heart sounds  Pulmonary/Chest: Effort normal and breath sounds normal    Abdominal: Soft  There is no tenderness  Musculoskeletal: She exhibits no edema  Lymphadenopathy:     She has no cervical adenopathy  Neurological: She is alert and oriented to person, place, and time  Skin: Skin is warm and dry  There is erythema (Mild erythema at temporal area, no open areas)  Psychiatric: She has a normal mood and affect  Her behavior is normal    Nursing note and vitals reviewed  RESULTS:    No results found for this or any previous visit (from the past 1008 hour(s))  This note was created with voice recognition software  Phonic, grammatical and spelling errors may be present within the note as a result

## 2019-01-28 DIAGNOSIS — N95.1 HOT FLASHES, MENOPAUSAL: ICD-10-CM

## 2019-01-28 RX ORDER — VENLAFAXINE 75 MG/1
TABLET ORAL
Qty: 180 TABLET | Refills: 1 | OUTPATIENT
Start: 2019-01-28

## 2019-01-29 DIAGNOSIS — I10 ESSENTIAL (PRIMARY) HYPERTENSION: ICD-10-CM

## 2019-01-29 RX ORDER — LISINOPRIL 10 MG/1
TABLET ORAL
Qty: 90 TABLET | Refills: 1 | Status: SHIPPED | OUTPATIENT
Start: 2019-01-29 | End: 2019-07-28 | Stop reason: SDUPTHER

## 2019-02-12 DIAGNOSIS — R11.0 NAUSEA: ICD-10-CM

## 2019-02-13 RX ORDER — PROCHLORPERAZINE MALEATE 10 MG
10 TABLET ORAL 2 TIMES DAILY
Qty: 60 TABLET | Refills: 0 | Status: SHIPPED | OUTPATIENT
Start: 2019-02-13 | End: 2019-03-21 | Stop reason: SDUPTHER

## 2019-02-16 DIAGNOSIS — J45.20 MILD INTERMITTENT ASTHMA WITHOUT COMPLICATION: ICD-10-CM

## 2019-02-28 DIAGNOSIS — E78.1 HYPERTRIGLYCERIDEMIA: ICD-10-CM

## 2019-02-28 RX ORDER — OMEGA-3-ACID ETHYL ESTERS 1 G/1
2 CAPSULE, LIQUID FILLED ORAL 2 TIMES DAILY
Qty: 360 CAPSULE | Refills: 0 | Status: SHIPPED | OUTPATIENT
Start: 2019-02-28 | End: 2019-05-21 | Stop reason: SDUPTHER

## 2019-03-21 DIAGNOSIS — R11.0 NAUSEA: ICD-10-CM

## 2019-03-21 RX ORDER — PROCHLORPERAZINE MALEATE 10 MG
10 TABLET ORAL 2 TIMES DAILY
Qty: 60 TABLET | Refills: 0 | Status: SHIPPED | OUTPATIENT
Start: 2019-03-21 | End: 2020-06-23 | Stop reason: SDUPTHER

## 2019-03-23 DIAGNOSIS — E78.1 HYPERTRIGLYCERIDEMIA: ICD-10-CM

## 2019-03-24 RX ORDER — FENOFIBRATE 160 MG/1
160 TABLET ORAL DAILY
Qty: 90 TABLET | Refills: 0 | Status: SHIPPED | OUTPATIENT
Start: 2019-03-24 | End: 2019-06-15 | Stop reason: SDUPTHER

## 2019-04-13 DIAGNOSIS — K21.9 GASTROESOPHAGEAL REFLUX DISEASE, ESOPHAGITIS PRESENCE NOT SPECIFIED: ICD-10-CM

## 2019-04-14 RX ORDER — PANTOPRAZOLE SODIUM 40 MG/1
TABLET, DELAYED RELEASE ORAL
Qty: 180 TABLET | Refills: 1 | Status: SHIPPED | OUTPATIENT
Start: 2019-04-14 | End: 2019-10-04 | Stop reason: SDUPTHER

## 2019-04-17 DIAGNOSIS — N30.10 INTERSTITIAL CYSTITIS: Primary | ICD-10-CM

## 2019-04-17 RX ORDER — PENTOSAN POLYSULFATE SODIUM 100 MG/1
CAPSULE, GELATIN COATED ORAL
Qty: 180 CAPSULE | Refills: 5 | Status: SHIPPED | OUTPATIENT
Start: 2019-04-17 | End: 2019-12-18 | Stop reason: SDUPTHER

## 2019-04-22 ENCOUNTER — OFFICE VISIT (OUTPATIENT)
Dept: INTERNAL MEDICINE CLINIC | Facility: CLINIC | Age: 64
End: 2019-04-22
Payer: MEDICARE

## 2019-04-22 VITALS
DIASTOLIC BLOOD PRESSURE: 82 MMHG | BODY MASS INDEX: 34.93 KG/M2 | WEIGHT: 185 LBS | HEIGHT: 61 IN | SYSTOLIC BLOOD PRESSURE: 130 MMHG | RESPIRATION RATE: 20 BRPM | OXYGEN SATURATION: 98 % | HEART RATE: 90 BPM

## 2019-04-22 DIAGNOSIS — M62.830 MUSCLE SPASM OF BACK: Primary | ICD-10-CM

## 2019-04-22 DIAGNOSIS — Z11.59 NEED FOR HEPATITIS C SCREENING TEST: ICD-10-CM

## 2019-04-22 PROCEDURE — 99213 OFFICE O/P EST LOW 20 MIN: CPT | Performed by: PHYSICIAN ASSISTANT

## 2019-04-22 RX ORDER — TIZANIDINE 4 MG/1
4 TABLET ORAL 3 TIMES DAILY
Qty: 90 TABLET | Refills: 1 | Status: SHIPPED | OUTPATIENT
Start: 2019-04-22 | End: 2019-06-20 | Stop reason: SDUPTHER

## 2019-05-15 DIAGNOSIS — J30.9 ALLERGIC RHINITIS, UNSPECIFIED SEASONALITY, UNSPECIFIED TRIGGER: ICD-10-CM

## 2019-05-15 RX ORDER — FLUTICASONE PROPIONATE 50 MCG
SPRAY, SUSPENSION (ML) NASAL
Qty: 16 G | Refills: 5 | Status: SHIPPED | OUTPATIENT
Start: 2019-05-15 | End: 2020-06-23 | Stop reason: SDUPTHER

## 2019-05-21 DIAGNOSIS — E78.1 HYPERTRIGLYCERIDEMIA: ICD-10-CM

## 2019-05-21 RX ORDER — OMEGA-3-ACID ETHYL ESTERS 1 G/1
2 CAPSULE, LIQUID FILLED ORAL 2 TIMES DAILY
Qty: 360 CAPSULE | Refills: 0 | Status: SHIPPED | OUTPATIENT
Start: 2019-05-21 | End: 2019-08-26 | Stop reason: SDUPTHER

## 2019-05-22 ENCOUNTER — APPOINTMENT (OUTPATIENT)
Dept: LAB | Facility: CLINIC | Age: 64
End: 2019-05-22
Payer: MEDICARE

## 2019-05-22 DIAGNOSIS — E78.2 MIXED HYPERLIPIDEMIA: ICD-10-CM

## 2019-05-22 DIAGNOSIS — J30.9 ALLERGIC RHINITIS, UNSPECIFIED SEASONALITY, UNSPECIFIED TRIGGER: ICD-10-CM

## 2019-05-22 DIAGNOSIS — Z11.59 NEED FOR HEPATITIS C SCREENING TEST: ICD-10-CM

## 2019-05-22 LAB
ALBUMIN SERPL BCP-MCNC: 3.7 G/DL (ref 3.5–5)
ALP SERPL-CCNC: 41 U/L (ref 46–116)
ALT SERPL W P-5'-P-CCNC: 24 U/L (ref 12–78)
ANION GAP SERPL CALCULATED.3IONS-SCNC: 5 MMOL/L (ref 4–13)
AST SERPL W P-5'-P-CCNC: 16 U/L (ref 5–45)
BASOPHILS # BLD AUTO: 0.05 THOUSANDS/ΜL (ref 0–0.1)
BASOPHILS NFR BLD AUTO: 1 % (ref 0–1)
BILIRUB SERPL-MCNC: 0.27 MG/DL (ref 0.2–1)
BUN SERPL-MCNC: 18 MG/DL (ref 5–25)
CALCIUM SERPL-MCNC: 8.7 MG/DL (ref 8.3–10.1)
CHLORIDE SERPL-SCNC: 105 MMOL/L (ref 100–108)
CHOLEST SERPL-MCNC: 153 MG/DL (ref 50–200)
CO2 SERPL-SCNC: 29 MMOL/L (ref 21–32)
CREAT SERPL-MCNC: 1.08 MG/DL (ref 0.6–1.3)
EOSINOPHIL # BLD AUTO: 0.25 THOUSAND/ΜL (ref 0–0.61)
EOSINOPHIL NFR BLD AUTO: 5 % (ref 0–6)
ERYTHROCYTE [DISTWIDTH] IN BLOOD BY AUTOMATED COUNT: 13.7 % (ref 11.6–15.1)
GFR SERPL CREATININE-BSD FRML MDRD: 54 ML/MIN/1.73SQ M
GLUCOSE P FAST SERPL-MCNC: 104 MG/DL (ref 65–99)
HCT VFR BLD AUTO: 38.3 % (ref 34.8–46.1)
HCV AB SER QL: NORMAL
HDLC SERPL-MCNC: 63 MG/DL (ref 40–60)
HGB BLD-MCNC: 11.9 G/DL (ref 11.5–15.4)
IMM GRANULOCYTES # BLD AUTO: 0.01 THOUSAND/UL (ref 0–0.2)
IMM GRANULOCYTES NFR BLD AUTO: 0 % (ref 0–2)
LDLC SERPL CALC-MCNC: 65 MG/DL (ref 0–100)
LYMPHOCYTES # BLD AUTO: 1.89 THOUSANDS/ΜL (ref 0.6–4.47)
LYMPHOCYTES NFR BLD AUTO: 35 % (ref 14–44)
MCH RBC QN AUTO: 28.7 PG (ref 26.8–34.3)
MCHC RBC AUTO-ENTMCNC: 31.1 G/DL (ref 31.4–37.4)
MCV RBC AUTO: 92 FL (ref 82–98)
MONOCYTES # BLD AUTO: 0.3 THOUSAND/ΜL (ref 0.17–1.22)
MONOCYTES NFR BLD AUTO: 6 % (ref 4–12)
NEUTROPHILS # BLD AUTO: 2.86 THOUSANDS/ΜL (ref 1.85–7.62)
NEUTS SEG NFR BLD AUTO: 53 % (ref 43–75)
NONHDLC SERPL-MCNC: 90 MG/DL
NRBC BLD AUTO-RTO: 0 /100 WBCS
PLATELET # BLD AUTO: 346 THOUSANDS/UL (ref 149–390)
PMV BLD AUTO: 10.8 FL (ref 8.9–12.7)
POTASSIUM SERPL-SCNC: 3.7 MMOL/L (ref 3.5–5.3)
PROT SERPL-MCNC: 7.7 G/DL (ref 6.4–8.2)
RBC # BLD AUTO: 4.15 MILLION/UL (ref 3.81–5.12)
SODIUM SERPL-SCNC: 139 MMOL/L (ref 136–145)
TRIGL SERPL-MCNC: 127 MG/DL
WBC # BLD AUTO: 5.36 THOUSAND/UL (ref 4.31–10.16)

## 2019-05-22 PROCEDURE — 80061 LIPID PANEL: CPT

## 2019-05-22 PROCEDURE — 80053 COMPREHEN METABOLIC PANEL: CPT

## 2019-05-22 PROCEDURE — 86803 HEPATITIS C AB TEST: CPT

## 2019-05-22 PROCEDURE — 36415 COLL VENOUS BLD VENIPUNCTURE: CPT

## 2019-05-22 PROCEDURE — 85025 COMPLETE CBC W/AUTO DIFF WBC: CPT

## 2019-05-24 ENCOUNTER — OFFICE VISIT (OUTPATIENT)
Dept: INTERNAL MEDICINE CLINIC | Facility: CLINIC | Age: 64
End: 2019-05-24
Payer: MEDICARE

## 2019-05-24 VITALS
RESPIRATION RATE: 20 BRPM | HEART RATE: 100 BPM | DIASTOLIC BLOOD PRESSURE: 78 MMHG | BODY MASS INDEX: 33.99 KG/M2 | HEIGHT: 61 IN | SYSTOLIC BLOOD PRESSURE: 154 MMHG | WEIGHT: 180 LBS | OXYGEN SATURATION: 98 %

## 2019-05-24 DIAGNOSIS — K21.9 GASTROESOPHAGEAL REFLUX DISEASE, ESOPHAGITIS PRESENCE NOT SPECIFIED: ICD-10-CM

## 2019-05-24 DIAGNOSIS — N30.10 INTERSTITIAL CYSTITIS: ICD-10-CM

## 2019-05-24 DIAGNOSIS — E78.2 MIXED HYPERLIPIDEMIA: ICD-10-CM

## 2019-05-24 DIAGNOSIS — E55.9 VITAMIN D DEFICIENCY: Primary | ICD-10-CM

## 2019-05-24 DIAGNOSIS — I10 ESSENTIAL (PRIMARY) HYPERTENSION: ICD-10-CM

## 2019-05-24 DIAGNOSIS — H26.9 CATARACT OF BOTH EYES, UNSPECIFIED CATARACT TYPE: ICD-10-CM

## 2019-05-24 PROBLEM — T81.9XXA ABNORMAL SURGICAL WOUND: Status: RESOLVED | Noted: 2018-10-29 | Resolved: 2019-05-24

## 2019-05-24 PROBLEM — L03.211 FACIAL CELLULITIS: Status: RESOLVED | Noted: 2019-01-16 | Resolved: 2019-05-24

## 2019-05-24 PROCEDURE — 99214 OFFICE O/P EST MOD 30 MIN: CPT | Performed by: PHYSICIAN ASSISTANT

## 2019-05-26 DIAGNOSIS — J45.20 MILD INTERMITTENT ASTHMA WITHOUT COMPLICATION: ICD-10-CM

## 2019-05-27 ENCOUNTER — HOSPITAL ENCOUNTER (EMERGENCY)
Facility: HOSPITAL | Age: 64
Discharge: HOME | End: 2019-05-27
Attending: EMERGENCY MEDICINE | Admitting: EMERGENCY MEDICINE
Payer: MEDICARE

## 2019-05-27 VITALS
WEIGHT: 182.98 LBS | TEMPERATURE: 98.2 F | RESPIRATION RATE: 17 BRPM | HEART RATE: 79 BPM | BODY MASS INDEX: 34.55 KG/M2 | DIASTOLIC BLOOD PRESSURE: 68 MMHG | SYSTOLIC BLOOD PRESSURE: 116 MMHG | OXYGEN SATURATION: 97 % | HEIGHT: 61 IN

## 2019-05-27 DIAGNOSIS — N12 PYELONEPHRITIS: Primary | ICD-10-CM

## 2019-05-27 LAB
ALBUMIN SERPL BCP-MCNC: 3.9 G/DL (ref 3.5–5)
ALP SERPL-CCNC: 46 U/L (ref 46–116)
ALT SERPL W P-5'-P-CCNC: 32 U/L (ref 12–78)
ANION GAP SERPL CALCULATED.3IONS-SCNC: 9 MMOL/L (ref 4–13)
AST SERPL W P-5'-P-CCNC: 25 U/L (ref 5–45)
BACTERIA UR QL AUTO: ABNORMAL /HPF
BASOPHILS # BLD AUTO: 0.05 THOUSANDS/ΜL (ref 0–0.1)
BASOPHILS NFR BLD AUTO: 1 % (ref 0–1)
BILIRUB SERPL-MCNC: 0.2 MG/DL (ref 0.2–1)
BILIRUB UR QL STRIP: NEGATIVE
BUN SERPL-MCNC: 19 MG/DL (ref 5–25)
CALCIUM SERPL-MCNC: 9.9 MG/DL (ref 8.3–10.1)
CHLORIDE SERPL-SCNC: 103 MMOL/L (ref 100–108)
CLARITY UR: CLEAR
CO2 SERPL-SCNC: 28 MMOL/L (ref 21–32)
COLOR UR: YELLOW
CREAT SERPL-MCNC: 1.06 MG/DL (ref 0.6–1.3)
EOSINOPHIL # BLD AUTO: 0.25 THOUSAND/ΜL (ref 0–0.61)
EOSINOPHIL NFR BLD AUTO: 3 % (ref 0–6)
ERYTHROCYTE [DISTWIDTH] IN BLOOD BY AUTOMATED COUNT: 13.9 % (ref 11.6–15.1)
GFR SERPL CREATININE-BSD FRML MDRD: 55 ML/MIN/1.73SQ M
GLUCOSE SERPL-MCNC: 94 MG/DL (ref 65–140)
GLUCOSE UR STRIP-MCNC: NEGATIVE MG/DL
HCT VFR BLD AUTO: 39.3 % (ref 34.8–46.1)
HGB BLD-MCNC: 12.4 G/DL (ref 11.5–15.4)
HGB UR QL STRIP.AUTO: NEGATIVE
IMM GRANULOCYTES # BLD AUTO: 0.03 THOUSAND/UL (ref 0–0.2)
IMM GRANULOCYTES NFR BLD AUTO: 0 % (ref 0–2)
KETONES UR STRIP-MCNC: ABNORMAL MG/DL
LEUKOCYTE ESTERASE UR QL STRIP: ABNORMAL
LYMPHOCYTES # BLD AUTO: 2.41 THOUSANDS/ΜL (ref 0.6–4.47)
LYMPHOCYTES NFR BLD AUTO: 33 % (ref 14–44)
MCH RBC QN AUTO: 28.7 PG (ref 26.8–34.3)
MCHC RBC AUTO-ENTMCNC: 31.6 G/DL (ref 31.4–37.4)
MCV RBC AUTO: 91 FL (ref 82–98)
MONOCYTES # BLD AUTO: 0.54 THOUSAND/ΜL (ref 0.17–1.22)
MONOCYTES NFR BLD AUTO: 7 % (ref 4–12)
NEUTROPHILS # BLD AUTO: 3.98 THOUSANDS/ΜL (ref 1.85–7.62)
NEUTS SEG NFR BLD AUTO: 56 % (ref 43–75)
NITRITE UR QL STRIP: NEGATIVE
NON-SQ EPI CELLS URNS QL MICRO: ABNORMAL /HPF
NRBC BLD AUTO-RTO: 0 /100 WBCS
PH UR STRIP.AUTO: 6.5 [PH]
PLATELET # BLD AUTO: 409 THOUSANDS/UL (ref 149–390)
PMV BLD AUTO: 10.4 FL (ref 8.9–12.7)
POTASSIUM SERPL-SCNC: 4.2 MMOL/L (ref 3.5–5.3)
PROT SERPL-MCNC: 8.2 G/DL (ref 6.4–8.2)
PROT UR STRIP-MCNC: NEGATIVE MG/DL
RBC # BLD AUTO: 4.32 MILLION/UL (ref 3.81–5.12)
RBC #/AREA URNS AUTO: ABNORMAL /HPF
SODIUM SERPL-SCNC: 140 MMOL/L (ref 136–145)
SP GR UR STRIP.AUTO: 1.02 (ref 1–1.03)
UROBILINOGEN UR QL STRIP.AUTO: 0.2 E.U./DL
WBC # BLD AUTO: 7.26 THOUSAND/UL (ref 4.31–10.16)
WBC #/AREA URNS AUTO: ABNORMAL /HPF

## 2019-05-27 PROCEDURE — 96361 HYDRATE IV INFUSION ADD-ON: CPT

## 2019-05-27 PROCEDURE — 81001 URINALYSIS AUTO W/SCOPE: CPT | Performed by: EMERGENCY MEDICINE

## 2019-05-27 PROCEDURE — 36415 COLL VENOUS BLD VENIPUNCTURE: CPT | Performed by: EMERGENCY MEDICINE

## 2019-05-27 PROCEDURE — 96374 THER/PROPH/DIAG INJ IV PUSH: CPT

## 2019-05-27 PROCEDURE — 80053 COMPREHEN METABOLIC PANEL: CPT | Performed by: EMERGENCY MEDICINE

## 2019-05-27 PROCEDURE — 99284 EMERGENCY DEPT VISIT MOD MDM: CPT

## 2019-05-27 PROCEDURE — 99283 EMERGENCY DEPT VISIT LOW MDM: CPT | Performed by: EMERGENCY MEDICINE

## 2019-05-27 PROCEDURE — 85025 COMPLETE CBC W/AUTO DIFF WBC: CPT | Performed by: EMERGENCY MEDICINE

## 2019-05-27 PROCEDURE — 87086 URINE CULTURE/COLONY COUNT: CPT | Performed by: EMERGENCY MEDICINE

## 2019-05-27 RX ORDER — CEPHALEXIN 250 MG/1
250 CAPSULE ORAL EVERY 6 HOURS SCHEDULED
Qty: 28 CAPSULE | Refills: 0 | Status: SHIPPED | OUTPATIENT
Start: 2019-05-27 | End: 2019-06-03

## 2019-05-27 RX ORDER — ONDANSETRON 4 MG/1
4 TABLET, FILM COATED ORAL EVERY 8 HOURS PRN
Qty: 12 TABLET | Refills: 0 | Status: SHIPPED | OUTPATIENT
Start: 2019-05-27 | End: 2020-01-14 | Stop reason: SDUPTHER

## 2019-05-27 RX ORDER — CEPHALEXIN 250 MG/1
250 CAPSULE ORAL EVERY 6 HOURS SCHEDULED
Qty: 56 CAPSULE | Refills: 0 | Status: SHIPPED | OUTPATIENT
Start: 2019-05-27 | End: 2019-05-27 | Stop reason: SDUPTHER

## 2019-05-27 RX ORDER — ACETAMINOPHEN 325 MG/1
650 TABLET ORAL ONCE
Status: COMPLETED | OUTPATIENT
Start: 2019-05-27 | End: 2019-05-27

## 2019-05-27 RX ORDER — CEPHALEXIN 250 MG/1
500 CAPSULE ORAL ONCE
Status: COMPLETED | OUTPATIENT
Start: 2019-05-27 | End: 2019-05-27

## 2019-05-27 RX ORDER — ONDANSETRON 2 MG/ML
4 INJECTION INTRAMUSCULAR; INTRAVENOUS ONCE
Status: COMPLETED | OUTPATIENT
Start: 2019-05-27 | End: 2019-05-27

## 2019-05-27 RX ADMIN — SODIUM CHLORIDE 1000 ML: 0.9 INJECTION, SOLUTION INTRAVENOUS at 11:50

## 2019-05-27 RX ADMIN — ACETAMINOPHEN 650 MG: 325 TABLET, FILM COATED ORAL at 11:50

## 2019-05-27 RX ADMIN — CEPHALEXIN 500 MG: 250 CAPSULE ORAL at 12:45

## 2019-05-27 RX ADMIN — ONDANSETRON 4 MG: 2 INJECTION INTRAMUSCULAR; INTRAVENOUS at 11:50

## 2019-05-28 LAB — BACTERIA UR CULT: NORMAL

## 2019-05-30 ENCOUNTER — OFFICE VISIT (OUTPATIENT)
Dept: GASTROENTEROLOGY | Facility: CLINIC | Age: 64
End: 2019-05-30
Payer: MEDICARE

## 2019-05-30 VITALS
HEART RATE: 95 BPM | WEIGHT: 179 LBS | DIASTOLIC BLOOD PRESSURE: 90 MMHG | SYSTOLIC BLOOD PRESSURE: 146 MMHG | BODY MASS INDEX: 33.82 KG/M2

## 2019-05-30 DIAGNOSIS — K31.84 GASTROPARESIS: ICD-10-CM

## 2019-05-30 DIAGNOSIS — R07.9 CHEST PAIN, UNSPECIFIED TYPE: Primary | ICD-10-CM

## 2019-05-30 PROCEDURE — 99213 OFFICE O/P EST LOW 20 MIN: CPT | Performed by: PHYSICIAN ASSISTANT

## 2019-05-30 RX ORDER — ONDANSETRON 4 MG/1
4 TABLET, ORALLY DISINTEGRATING ORAL EVERY 6 HOURS PRN
Qty: 30 TABLET | Refills: 1 | Status: SHIPPED | OUTPATIENT
Start: 2019-05-30 | End: 2019-07-05 | Stop reason: SDUPTHER

## 2019-06-03 DIAGNOSIS — J45.20 MILD INTERMITTENT ASTHMA WITHOUT COMPLICATION: ICD-10-CM

## 2019-06-03 RX ORDER — ALBUTEROL SULFATE 90 UG/1
2 AEROSOL, METERED RESPIRATORY (INHALATION) 3 TIMES DAILY PRN
Qty: 18 INHALER | Refills: 3 | Status: SHIPPED | OUTPATIENT
Start: 2019-06-03 | End: 2019-09-15 | Stop reason: SDUPTHER

## 2019-06-15 DIAGNOSIS — E78.1 HYPERTRIGLYCERIDEMIA: ICD-10-CM

## 2019-06-17 RX ORDER — FENOFIBRATE 160 MG/1
160 TABLET ORAL DAILY
Qty: 90 TABLET | Refills: 0 | Status: SHIPPED | OUTPATIENT
Start: 2019-06-17 | End: 2019-09-18 | Stop reason: SDUPTHER

## 2019-06-20 DIAGNOSIS — M62.830 MUSCLE SPASM OF BACK: ICD-10-CM

## 2019-06-20 RX ORDER — TIZANIDINE 4 MG/1
4 TABLET ORAL 3 TIMES DAILY
Qty: 90 TABLET | Refills: 1 | Status: SHIPPED | OUTPATIENT
Start: 2019-06-20 | End: 2019-08-17 | Stop reason: SDUPTHER

## 2019-06-21 ENCOUNTER — OFFICE VISIT (OUTPATIENT)
Dept: LAB | Facility: HOSPITAL | Age: 64
End: 2019-06-21
Payer: MEDICARE

## 2019-06-21 DIAGNOSIS — R07.9 CHEST PAIN, UNSPECIFIED TYPE: ICD-10-CM

## 2019-06-21 LAB
ATRIAL RATE: 84 BPM
P AXIS: 53 DEGREES
PR INTERVAL: 156 MS
QRS AXIS: 14 DEGREES
QRSD INTERVAL: 96 MS
QT INTERVAL: 364 MS
QTC INTERVAL: 430 MS
T WAVE AXIS: 37 DEGREES
VENTRICULAR RATE: 84 BPM

## 2019-06-21 PROCEDURE — 93005 ELECTROCARDIOGRAM TRACING: CPT

## 2019-06-21 PROCEDURE — 93010 ELECTROCARDIOGRAM REPORT: CPT | Performed by: INTERNAL MEDICINE

## 2019-06-26 DIAGNOSIS — J30.9 ALLERGIC RHINITIS, UNSPECIFIED SEASONALITY, UNSPECIFIED TRIGGER: ICD-10-CM

## 2019-06-26 RX ORDER — CETIRIZINE HYDROCHLORIDE, PSEUDOEPHEDRINE HYDROCHLORIDE 5; 120 MG/1; MG/1
1 TABLET, FILM COATED, EXTENDED RELEASE ORAL 2 TIMES DAILY
Qty: 60 TABLET | Refills: 5 | Status: SHIPPED | OUTPATIENT
Start: 2019-06-26 | End: 2020-01-09 | Stop reason: SDUPTHER

## 2019-06-27 DIAGNOSIS — E13.43 GASTROPARESIS DUE TO SECONDARY DIABETES (HCC): Primary | ICD-10-CM

## 2019-06-28 RX ORDER — ERYTHROMYCIN 250 MG/1
250 TABLET, COATED ORAL 3 TIMES DAILY
Qty: 42 TABLET | Refills: 2 | Status: SHIPPED | OUTPATIENT
Start: 2019-06-28 | End: 2019-07-12

## 2019-07-05 DIAGNOSIS — K31.84 GASTROPARESIS: ICD-10-CM

## 2019-07-05 RX ORDER — ONDANSETRON 4 MG/1
4 TABLET, ORALLY DISINTEGRATING ORAL EVERY 6 HOURS PRN
Qty: 30 TABLET | Refills: 2 | Status: SHIPPED | OUTPATIENT
Start: 2019-07-05 | End: 2019-09-26

## 2019-07-05 NOTE — TELEPHONE ENCOUNTER
----- Message from [place]   Caitlin Bob sent at 7/3/2019  7:30 PM EDT -----  Regarding: Prescription Question  Contact: [phone]  please call me I can not take the medication you prescribed for me   tory murray  [phone]

## 2019-07-05 NOTE — TELEPHONE ENCOUNTER
Called and spoke to patient she stated the erythromycin is too strong makes her very nauseous, slurring words and felt non coherent  Wants to know what other medication she can take instead       Also would like a refill on her Zofran

## 2019-07-05 NOTE — TELEPHONE ENCOUNTER
Can you call her to discuss? I called her last week and discussed erythromycin/domperidone and we tried erythromycin and I did not react to it well  Other option could be EGD with botox possibly

## 2019-07-05 NOTE — TELEPHONE ENCOUNTER
Called and relayed to patient she doesn't want the EGD with Botox she's very addiment about this she wants to be on a antibiotic but with a possible lesser dose     Please advise thank you!

## 2019-07-08 ENCOUNTER — TELEPHONE (OUTPATIENT)
Dept: OTHER | Facility: HOSPITAL | Age: 64
End: 2019-07-08

## 2019-07-08 NOTE — TELEPHONE ENCOUNTER
Spoke to patient  She does not want EGD with Botox  She is interested in domperidone  We did discuss cardiac side effects such as life-threatening anemia  She voices understanding  Please have [facility] right out a script for domperidone so that we can see fax it to the specialty pharmacy  Please call back patient with contact information for the pharmacy    Thank you

## 2019-07-13 DIAGNOSIS — E78.5 HYPERLIPIDEMIA, UNSPECIFIED HYPERLIPIDEMIA TYPE: ICD-10-CM

## 2019-07-14 RX ORDER — ATORVASTATIN CALCIUM 20 MG/1
TABLET, FILM COATED ORAL
Qty: 90 TABLET | Refills: 1 | Status: SHIPPED | OUTPATIENT
Start: 2019-07-14 | End: 2020-01-14 | Stop reason: ALTCHOICE

## 2019-07-17 ENCOUNTER — TELEPHONE (OUTPATIENT)
Dept: GASTROENTEROLOGY | Facility: CLINIC | Age: 64
End: 2019-07-17

## 2019-07-17 NOTE — TELEPHONE ENCOUNTER
----- Message from [place]   Lefty Currie sent at 7/17/2019  7:17 AM EDT -----  Regarding: Prescription Question  Contact: [phone]  please call Lalito Webb  [phone] or [phone]  1955

## 2019-07-17 NOTE — TELEPHONE ENCOUNTER
Spoke to patient  I explained to her [place] that it take sometimes up to a month  Please call her back with the specialty pharmacy that we submitted  her domperidone script to so that way she can call them for updates    Thank you

## 2019-07-26 ENCOUNTER — DOCUMENTATION (OUTPATIENT)
Dept: INTERNAL MEDICINE CLINIC | Facility: CLINIC | Age: 64
End: 2019-07-26

## 2019-07-26 ENCOUNTER — OFFICE VISIT (OUTPATIENT)
Dept: INTERNAL MEDICINE CLINIC | Facility: CLINIC | Age: 64
End: 2019-07-26
Payer: MEDICARE

## 2019-07-26 VITALS
DIASTOLIC BLOOD PRESSURE: 80 MMHG | HEIGHT: 61 IN | SYSTOLIC BLOOD PRESSURE: 114 MMHG | HEART RATE: 84 BPM | OXYGEN SATURATION: 98 % | BODY MASS INDEX: 33.23 KG/M2 | WEIGHT: 176 LBS | RESPIRATION RATE: 16 BRPM

## 2019-07-26 DIAGNOSIS — H25.13 AGE-RELATED NUCLEAR CATARACT OF BOTH EYES: ICD-10-CM

## 2019-07-26 DIAGNOSIS — Z01.818 PRE-OP EXAMINATION: Primary | ICD-10-CM

## 2019-07-26 DIAGNOSIS — Z12.39 SCREENING FOR BREAST CANCER: ICD-10-CM

## 2019-07-26 PROCEDURE — 99214 OFFICE O/P EST MOD 30 MIN: CPT | Performed by: INTERNAL MEDICINE

## 2019-07-26 PROCEDURE — 1124F ACP DISCUSS-NO DSCNMKR DOCD: CPT | Performed by: INTERNAL MEDICINE

## 2019-07-26 NOTE — PROGRESS NOTES
Assessment/Plan:     Patient is considered cleared for the planned procedure and no further workup is indicated  She has already stopped her Elmiron in preparation for the procedures  Also, she only takes her water pill as needed and has not been taking it lately  BMI Counseling: Body mass index is 33 25 kg/m²  Discussed the patient's BMI with her  The BMI is above average  No BMI follow-up plan is appropriate  Patient is 70 years old and weight reduction/weight gain would further complicate their underlying physical disability  Return if symptoms worsen or fail to improve  No problem-specific Assessment & Plan notes found for this encounter  Diagnoses and all orders for this visit:    Pre-op examination    Age-related nuclear cataract of both eyes    Screening for breast cancer  -     Mammo screening bilateral w 3d & cad; Future          Subjective:      Patient ID: Gisela Cohen is a 75 y o  female  Patient comes in today with her  for preop clearance for bilateral cataract surgery  Secondary to worsening vision  She has no history of heart disease  She does have asthma but very mild and only uses an inhaler as needed  Her blood pressure is well controlled with her current medications  She has no diabetes  Her main issues are pain from lumbar spinal stenosis and other orthopedic issues  She is followed by pain management  She reports that she does get nauseous after anesthesia in the past   But no respiratory issues otherwise  ALLERGIES:  Allergies   Allergen Reactions    Clindamycin Hives     Category: Allergy;     Erythromycin Hives and Rash     Category: Allergy;     Latex Hives    Penicillins Hives and Shortness Of Breath    Morphine GI Intolerance and Vomiting     Category: Adverse Reaction;     Erythromycin Base Other (See Comments)     vomitting    Other     Penicillin V Seizures     Category:  Allergy;     Adhesive [Medical Tape] Rash     Skin irritation    Povidone Iodine Rash     Category:  Adverse Reaction;        CURRENT MEDICATIONS:    Current Outpatient Medications:     albuterol (VENTOLIN HFA) 90 mcg/act inhaler, Inhale 2 puffs 3 (three) times a day as needed for shortness of breath, Disp: 18 Inhaler, Rfl: 3    albuterol 2 mg/5 mL syrup, TAKE 1 TEASPOONFUL BY MOUTH EVERY 4 HOURS AS NEEDED AS DIRECTED, Disp: 473 mL, Rfl: 3    atorvastatin (LIPITOR) 20 mg tablet, TAKE 1 TABLET BY MOUTH EVERY DAY, Disp: 90 tablet, Rfl: 1    cetirizine-pseudoephedrine (ZYRTEC-D ALLERGY & CONGESTION) 5-120 MG per tablet, Take 1 tablet by mouth 2 (two) times a day, Disp: 60 tablet, Rfl: 5    estradiol (ESTRACE) 0 1 mg/g vaginal cream, Insert 1 g into the vagina 2 (two) times a week, Disp: 42 5 g, Rfl: 3    fenofibrate (TRIGLIDE) 160 MG tablet, TAKE 1 TABLET (160 MG TOTAL) BY MOUTH DAILY FOR 90 DAYS TAKE WITH A MEAL, Disp: 90 tablet, Rfl: 0    fluticasone (FLONASE) 50 mcg/act nasal spray, SPRAY 2 SPRAYS INTO EACH NOSTRIL EVERY DAY, Disp: 16 g, Rfl: 5    furosemide (LASIX) 40 mg tablet, TAKE 1 TABLET (40 MG) BY MOUTH DAILY, Disp: 90 tablet, Rfl: 3    gabapentin (NEURONTIN) 800 mg tablet, Take 800 mg by mouth 3 (three) times a day, Disp: , Rfl:     ketoconazole (NIZORAL) 2 % cream, APPLY TO AFFECTED AREA EVERY DAY, Disp: 60 g, Rfl: 1    lisinopril (ZESTRIL) 10 mg tablet, Take 1 tablet daily, Disp: 90 tablet, Rfl: 1    omega-3-acid ethyl esters (LOVAZA) 1 g capsule, TAKE 2 CAPSULES (2 G TOTAL) BY MOUTH 2 (TWO) TIMES A DAY  DOSES, Disp: 360 capsule, Rfl: 0    ondansetron (ZOFRAN) 4 mg tablet, Take 1 tablet (4 mg total) by mouth every 8 (eight) hours as needed for nausea or vomiting, Disp: 12 tablet, Rfl: 0    OXYCONTIN 20 MG 12 hr tablet, TAKE OEN TABLET BY MOUTH EVERY 12 HOURS, Disp: , Rfl: 0    pantoprazole (PROTONIX) 40 mg tablet, TAKE 1 TABLET BY MOUTH TWICE A DAY 30 MINUTES BEFORE BREAKFAST AND DINNER, Disp: 180 tablet, Rfl: 1    prochlorperazine (COMPAZINE) 10 mg tablet, TAKE 1 TABLET (10 MG TOTAL) BY MOUTH 2 (TWO) TIMES A DAY AS NEEDED FOR NAUSEA, Disp: 60 tablet, Rfl: 0    tiZANidine (ZANAFLEX) 4 mg tablet, TAKE 1 TABLET (4 MG TOTAL) BY MOUTH 3 (THREE) TIMES A DAY, Disp: 90 tablet, Rfl: 1    zolpidem (AMBIEN) 5 mg tablet, Take 5 mg by mouth daily at bedtime as needed for sleep, Disp: , Rfl:     ELMIRON 100 MG capsule, take 2 capsules by mouth three times a day (Patient not taking: Reported on 7/26/2019), Disp: 180 capsule, Rfl: 5    ondansetron (ZOFRAN-ODT) 4 mg disintegrating tablet, Take 1 tablet (4 mg total) by mouth every 6 (six) hours as needed for nausea or vomiting (Patient not taking: Reported on 7/26/2019), Disp: 30 tablet, Rfl: 2    OxyCODONE ER (XTAMPZA ER) 27 MG C12A, Take by mouth, Disp: , Rfl:     ACTIVE PROBLEM LIST:  Patient Active Problem List   Diagnosis    Acute bilateral back pain, intractable    Continuous opioid dependence (HCC)    Vitamin D deficiency    Cataract, bilateral    Cervical radiculopathy, chronic    Delayed gastric emptying    Essential (primary) hypertension    Fatty liver disease, nonalcoholic    GERD (gastroesophageal reflux disease)    Hot flashes, menopausal    Interstitial cystitis    Lumbar post-laminectomy syndrome    Mild intermittent asthma without complication    Lumbosacral spondylosis without myelopathy    Radiculopathy, lumbar region    Thoracic and lumbosacral neuritis    Hyperlipidemia    Lumbar canal stenosis with diffuse disc bulge    Multiple falls    Lumbar disc herniation    Spinal stenosis of lumbar region with neurogenic claudication    Acute bilateral low back pain with bilateral sciatica    Tinea corporis    Seroma of musculoskeletal structure after musculoskeletal system procedure    Allergic rhinitis       PAST MEDICAL HISTORY:  Past Medical History:   Diagnosis Date    Anemia     Anxiety     Asthma     Cataract     Chronic back pain     Chronic pain disorder Back    Depression     GERD (gastroesophageal reflux disease)     HTN (hypertension)     Hyperlipidemia     Interstitial cystitis     Muscle cramps     Muscle weakness     Obesity     Pneumonia     PONV (postoperative nausea and vomiting)     Radiculopathy, lumbar region     Spondylosis, cervical, with myelopathy     Vulvovaginitis        PAST SURGICAL HISTORY:  Past Surgical History:   Procedure Laterality Date    APPENDECTOMY      BACK SURGERY      Arthrodeiss lumbar    BACK SURGERY      Spinal stereotaxis of cord    BLADDER SURGERY      Electronic bladder stimulator implantation     SECTION      x3    CHOLECYSTECTOMY      COLON SURGERY      Intestinal stricturoplasty     DECOMPRESSION SPINE LUMBAR POSTERIOR Bilateral 2018    Procedure: DECOMPRESSION SPINE LUMBAR POSTERIOR L1-4, L1-2 DISKECTOMY, POSTERIORLATERAL FUSION L3-4, REMOVAL OF SPINAL CORD STIMULATOR SYSTEM, REMOVAL OF INTERSPINOUS DEVICES;  Surgeon: Indira Quinones MD;  [facility];  Service: Neurosurgery    FINGER SURGERY      Extensor tendon repair (mallet finger)    FL MYELOGRAM LUMBAR  2018    HERNIA REPAIR      x3    HYSTERECTOMY      WA DRAINAGE OF HEMATOMA/FLUID Bilateral 10/9/2018    Procedure: LUMBAR WOUND EXPLORATION/EVACUATION OF SERONMA;  Surgeon: Indira Quinones MD;  [facility];  Service: Neurosurgery    WA ESOPHAGOGASTRODUODENOSCOPY TRANSORAL DIAGNOSTIC N/A 2017    Procedure: ESOPHAGOGASTRODUODENOSCOPY (EGD); Surgeon: Nash Flanagan MD;  [facility];   Service: Gastroenterology    SALPINGOOPHORECTOMY      B/L       FAMILY HISTORY:  Family History   Problem Relation Age of Onset    Coronary artery disease Brother         Patient has 3 brothers with coronary artery disease    Diabetes Mother     Throat cancer Mother     COPD Father     Cancer Father     Bipolar disorder Sister     Drug abuse Sister     Alcohol abuse Sister        SOCIAL HISTORY:  Social History     Socioeconomic History    Marital status: /Civil Union     Spouse name: Not on file    Number of children: 3    Years of education: Not on file    Highest education level: Not on file   Occupational History    Occupation: Retired   Social Needs    Financial resource strain: Not on file    Food insecurity:     Worry: Not on file     Inability: Not on file   [employer] needs:     Medical: Not on file     Non-medical: Not on file   Tobacco Use    Smoking status: Never Smoker    Smokeless tobacco: Never Used   Substance and Sexual Activity    Alcohol use: No    Drug use: No    Sexual activity: Not on file   Lifestyle    Physical activity:     Days per week: Not on file     Minutes per session: Not on file    Stress: Not on file   Relationships    Social connections:     Talks on phone: Not on file     Gets together: Not on file     Attends Alevism service: Not on file     Active member of club or organization: Not on file     Attends meetings of clubs or organizations: Not on file     Relationship status: Not on file    Intimate partner violence:     Fear of current or ex partner: Not on file     Emotionally abused: Not on file     Physically abused: Not on file     Forced sexual activity: Not on file   Other Topics Concern    Not on file   Social History Narrative    Brushes teeth 2x day    Regular dental care    Exercise: walking       Review of Systems   Constitutional: Negative for fever  Respiratory: Negative for cough, shortness of breath and wheezing  Cardiovascular: Negative for chest pain and palpitations  Gastrointestinal: Negative for abdominal pain  Skin: Negative for rash  Hematological: Does not bruise/bleed easily           Objective:  Vitals:    07/26/19 0922   BP: 114/80   BP Location: Right arm   Patient Position: Sitting   Cuff Size: Standard   Pulse: 84   Resp: 16   SpO2: 98%   Weight: 79 8 kg (176 lb)   Height: 5' 1" (1 549 m)     Body mass index is 33 25 kg/m²  Physical Exam   Constitutional: She is oriented to person, place, and time  She appears well-developed and well-nourished  HENT:   Right Ear: External ear normal    Left Ear: External ear normal    Nose: Nose normal    Mouth/Throat: Oropharynx is clear and moist    Eyes: Pupils are equal, round, and reactive to light  Conjunctivae and EOM are normal    Neck: Neck supple  Carotid bruit is not present  Cardiovascular: Normal rate, regular rhythm and normal heart sounds  Pulmonary/Chest: Effort normal and breath sounds normal    Abdominal: Soft  Bowel sounds are normal    Musculoskeletal: She exhibits no edema  Neurological: She is alert and oriented to person, place, and time  Skin: No rash noted  Nursing note and vitals reviewed  RESULTS:    Recent Results (from the past 1008 hour(s))   ECG 12 lead    Collection Time: 06/21/19 11:39 AM   Result Value Ref Range    Ventricular Rate 84 BPM    Atrial Rate 84 BPM    IL Interval 156 ms    QRSD Interval 96 ms    QT Interval 364 ms    QTC Interval 430 ms    P [place] 53 degrees    QRS Axis 14 degrees    T Wave Axis 37 degrees       This note was created with voice recognition software  Phonic, grammatical and spelling errors may be present within the note as a result

## 2019-07-28 DIAGNOSIS — I10 ESSENTIAL (PRIMARY) HYPERTENSION: ICD-10-CM

## 2019-07-28 RX ORDER — LISINOPRIL 10 MG/1
TABLET ORAL
Qty: 90 TABLET | Refills: 1 | Status: SHIPPED | OUTPATIENT
Start: 2019-07-28 | End: 2020-01-28

## 2019-08-17 DIAGNOSIS — M62.830 MUSCLE SPASM OF BACK: ICD-10-CM

## 2019-08-19 RX ORDER — TIZANIDINE 4 MG/1
4 TABLET ORAL 3 TIMES DAILY
Qty: 90 TABLET | Refills: 1 | Status: SHIPPED | OUTPATIENT
Start: 2019-08-19 | End: 2019-10-16 | Stop reason: SDUPTHER

## 2019-08-26 DIAGNOSIS — E78.1 HYPERTRIGLYCERIDEMIA: ICD-10-CM

## 2019-08-26 RX ORDER — OMEGA-3-ACID ETHYL ESTERS 1 G/1
2 CAPSULE, LIQUID FILLED ORAL 2 TIMES DAILY
Qty: 360 CAPSULE | Refills: 0 | Status: SHIPPED | OUTPATIENT
Start: 2019-08-26 | End: 2019-12-18 | Stop reason: ALTCHOICE

## 2019-09-15 DIAGNOSIS — J45.20 MILD INTERMITTENT ASTHMA WITHOUT COMPLICATION: ICD-10-CM

## 2019-09-15 RX ORDER — ALBUTEROL SULFATE 90 UG/1
2 AEROSOL, METERED RESPIRATORY (INHALATION) 3 TIMES DAILY PRN
Qty: 18 INHALER | Refills: 3 | Status: SHIPPED | OUTPATIENT
Start: 2019-09-15 | End: 2020-01-05

## 2019-09-18 DIAGNOSIS — E78.1 HYPERTRIGLYCERIDEMIA: ICD-10-CM

## 2019-09-18 RX ORDER — FENOFIBRATE 160 MG/1
160 TABLET ORAL DAILY
Qty: 90 TABLET | Refills: 0 | Status: SHIPPED | OUTPATIENT
Start: 2019-09-18 | End: 2020-02-17

## 2019-09-23 RX ORDER — SODIUM FLUORIDE 6.1 MG/ML
GEL, DENTIFRICE DENTAL
Refills: 5 | COMMUNITY
Start: 2019-08-30 | End: 2019-12-18 | Stop reason: ALTCHOICE

## 2019-09-23 RX ORDER — HYDROCODONE BITARTRATE AND ACETAMINOPHEN 10; 325 MG/1; MG/1
1 TABLET ORAL EVERY 6 HOURS PRN
Refills: 0 | COMMUNITY
Start: 2019-08-22 | End: 2019-12-18 | Stop reason: ALTCHOICE

## 2019-09-23 RX ORDER — OFLOXACIN 3 MG/ML
SOLUTION/ DROPS OPHTHALMIC
Refills: 3 | COMMUNITY
Start: 2019-08-12 | End: 2019-12-18 | Stop reason: ALTCHOICE

## 2019-09-23 RX ORDER — PREDNISOLONE ACETATE 10 MG/ML
SUSPENSION/ DROPS OPHTHALMIC
Refills: 3 | COMMUNITY
Start: 2019-09-07 | End: 2019-12-18 | Stop reason: ALTCHOICE

## 2019-09-23 RX ORDER — ERYTHROMYCIN 250 MG/1
TABLET, DELAYED RELEASE ORAL
Refills: 2 | COMMUNITY
Start: 2019-09-03 | End: 2019-12-18 | Stop reason: ALTCHOICE

## 2019-09-26 ENCOUNTER — OFFICE VISIT (OUTPATIENT)
Dept: GASTROENTEROLOGY | Facility: CLINIC | Age: 64
End: 2019-09-26
Payer: MEDICARE

## 2019-09-26 ENCOUNTER — TELEPHONE (OUTPATIENT)
Dept: GASTROENTEROLOGY | Facility: CLINIC | Age: 64
End: 2019-09-26

## 2019-09-26 VITALS
HEART RATE: 87 BPM | BODY MASS INDEX: 32.93 KG/M2 | DIASTOLIC BLOOD PRESSURE: 82 MMHG | HEIGHT: 61 IN | SYSTOLIC BLOOD PRESSURE: 124 MMHG | WEIGHT: 174.4 LBS

## 2019-09-26 DIAGNOSIS — K31.84 GASTROPARESIS: Primary | ICD-10-CM

## 2019-09-26 PROCEDURE — 99213 OFFICE O/P EST LOW 20 MIN: CPT | Performed by: PHYSICIAN ASSISTANT

## 2019-09-26 RX ORDER — ONDANSETRON 4 MG/1
4 TABLET, ORALLY DISINTEGRATING ORAL
Qty: 90 TABLET | Refills: 2 | Status: SHIPPED | OUTPATIENT
Start: 2019-09-26 | End: 2020-01-27 | Stop reason: SDUPTHER

## 2019-09-26 NOTE — PROGRESS NOTES
[facility] ([facility]) [facility]  [facility] 75 [facility] MRN: [id number]       CC: Follow-up for gastroparesis    HPI:  Jeffrey Zafar is a 40-year-old female with history of GERD, gastroparesis secondary to opioid use, chronic pain from cervical and lumbar radiculopathy, interstitial cystitis and asthma  Patient is known to us for history of gastroparesis  She reports that she is trying to cut down on the amount of opioids she is using  She is following with pain management regarding this  Patient has failed Reglan and erythromycin  She was trialed on domperidone, however reports that after taking this for several weeks she would have consistent headache while taking the medicine  She denies signs of overt GI bleeding or unintentional weight loss  Patient's last EGD was in 2017 with Dr Mike Prado  She was noted to have nonerosive gastritis with negative biopsies for H pylori  Review of Systems:    CONSTITUTIONAL: Denies any fever, chills, or rigors  Good appetite, and no recent weight loss  HEENT: No earache or tinnitus  Denies hearing loss or visual disturbances  CARDIOVASCULAR: No chest pain or palpitations  RESPIRATORY: Denies any cough, hemoptysis, shortness of breath or dyspnea on exertion  GASTROINTESTINAL: As noted in the History of Present Illness  GENITOURINARY: No problems with urination  Denies any hematuria or dysuria  NEUROLOGIC: No dizziness or vertigo, denies headaches  MUSCULOSKELETAL: Denies any muscle or joint pain  SKIN: Denies skin rashes or itching  ENDOCRINE: Denies excessive thirst  Denies intolerance to heat or cold  PSYCHOSOCIAL: Denies depression or anxiety  Denies any recent memory loss         Current Outpatient Medications   Medication Sig Dispense Refill    albuterol (PROVENTIL HFA,VENTOLIN HFA) 90 mcg/act inhaler INHALE 2 PUFFS 3 (THREE) TIMES A DAY AS NEEDED FOR SHORTNESS OF BREATH 18 Inhaler 3    albuterol 2 mg/5 mL syrup TAKE 1 TEASPOONFUL BY MOUTH EVERY 4 HOURS AS NEEDED AS DIRECTED 473 mL 3    atorvastatin (LIPITOR) 20 mg tablet TAKE 1 TABLET BY MOUTH EVERY DAY 90 tablet 1    cetirizine-pseudoephedrine (ZYRTEC-D ALLERGY & CONGESTION) 5-120 MG per tablet Take 1 tablet by mouth 2 (two) times a day 60 tablet 5    ELMIRON 100 MG capsule take 2 capsules by mouth three times a day 180 capsule 5    estradiol (ESTRACE) 0 1 mg/g vaginal cream Insert 1 g into the vagina 2 (two) times a week 42 5 g 3    fenofibrate (TRIGLIDE) 160 MG tablet TAKE 1 TABLET (160 MG TOTAL) BY MOUTH DAILY FOR 90 DAYS TAKE WITH A MEAL 90 tablet 0    fluticasone (FLONASE) 50 mcg/act nasal spray SPRAY 2 SPRAYS INTO EACH NOSTRIL EVERY DAY 16 g 5    furosemide (LASIX) 40 mg tablet TAKE 1 TABLET (40 MG) BY MOUTH DAILY 90 tablet 3    gabapentin (NEURONTIN) 800 mg tablet Take 800 mg by mouth 3 (three) times a day      HYDROcodone-acetaminophen (NORCO)  mg per tablet Take 1 tablet by mouth every 6 (six) hours as needed  0    ketoconazole (NIZORAL) 2 % cream APPLY TO AFFECTED AREA EVERY DAY 60 g 1    lisinopril (ZESTRIL) 10 mg tablet TAKE 1 TABLET BY MOUTH EVERY DAY 90 tablet 1    omega-3-acid ethyl esters (LOVAZA) 1 g capsule TAKE 2 CAPSULES (2 G TOTAL) BY MOUTH 2 (TWO) TIMES A DAY  DOSES 360 capsule 0    OxyCODONE ER (XTAMPZA ER) 27 MG C12A Take by mouth      pantoprazole (PROTONIX) 40 mg tablet TAKE 1 TABLET BY MOUTH TWICE A DAY 30 MINUTES BEFORE BREAKFAST AND DINNER 180 tablet 1    PREVIDENT 5000 DRY MOUTH 1 1 % GEL Use as directed  5    prochlorperazine (COMPAZINE) 10 mg tablet TAKE 1 TABLET (10 MG TOTAL) BY MOUTH 2 (TWO) TIMES A DAY AS NEEDED FOR NAUSEA 60 tablet 0    tiZANidine (ZANAFLEX) 4 mg tablet TAKE 1 TABLET (4 MG TOTAL) BY MOUTH 3 (THREE) TIMES A DAY 90 tablet 1    zolpidem (AMBIEN) 5 mg tablet Take 5 mg by mouth daily at bedtime as needed for sleep      Erythromycin 250 MG TBEC TAKE 1 TABLET (250 MG TOTAL) BY MOUTH 3 (THREE) TIMES A DAY FOR 14 DAYS  2    ofloxacin (OCUFLOX) 0 3 % ophthalmic solution PLEASE SEE ATTACHED FOR DETAILED DIRECTIONS  3    ondansetron (ZOFRAN) 4 mg tablet Take 1 tablet (4 mg total) by mouth every 8 (eight) hours as needed for nausea or vomiting (Patient not taking: Reported on 2019) 12 tablet 0    ondansetron (ZOFRAN-ODT) 4 mg disintegrating tablet Take 1 tablet (4 mg total) by mouth 3 (three) times a day before meals 90 tablet 2    OXYCONTIN 20 MG 12 hr tablet TAKE OEN TABLET BY MOUTH EVERY 12 HOURS  0    prednisoLONE acetate (PRED FORTE) 1 % ophthalmic suspension INSTILL 1 DROP INTO RIGHT EYE FOUR TIMES A DAY AS DIRECTED FOR USE AFTER SURGERY  3     No current facility-administered medications for this visit        Past Medical History:   Diagnosis Date    Anemia     Anxiety     Asthma     Cataract     Chronic back pain     Chronic pain disorder     Back    Depression     GERD (gastroesophageal reflux disease)     HTN (hypertension)     Hyperlipidemia     Interstitial cystitis     Muscle cramps     Muscle weakness     Obesity     Pneumonia     PONV (postoperative nausea and vomiting)     Radiculopathy, lumbar region     Spondylosis, cervical, with myelopathy     Vulvovaginitis      Past Surgical History:   Procedure Laterality Date    APPENDECTOMY      BACK SURGERY      Arthrodeiss lumbar    BACK SURGERY      Spinal stereotaxis of cord    BLADDER SURGERY      Electronic bladder stimulator implantation    CATARACT EXTRACTION, BILATERAL       SECTION      x3    CHOLECYSTECTOMY      COLON SURGERY      Intestinal stricturoplasty     DECOMPRESSION SPINE LUMBAR POSTERIOR Bilateral 2018    Procedure: DECOMPRESSION SPINE LUMBAR POSTERIOR L1-4, L1-2 DISKECTOMY, POSTERIORLATERAL FUSION L3-4, REMOVAL OF SPINAL CORD STIMULATOR SYSTEM, REMOVAL OF INTERSPINOUS DEVICES;  Surgeon: Piotr Huizar MD;  [facility];  Service: Neurosurgery    FINGER SURGERY      Extensor tendon repair (mallet finger)  FL MYELOGRAM LUMBAR  9/7/2018    HERNIA REPAIR      x3    HYSTERECTOMY      LA DRAINAGE OF HEMATOMA/FLUID Bilateral 10/9/2018    Procedure: LUMBAR WOUND EXPLORATION/EVACUATION OF SERONMA;  Surgeon: Abbie Gracia MD;  [facility];  Service: Neurosurgery    LA ESOPHAGOGASTRODUODENOSCOPY TRANSORAL DIAGNOSTIC N/A 8/23/2017    Procedure: ESOPHAGOGASTRODUODENOSCOPY (EGD); Surgeon: Agustín Mendez MD;  Location: [facility];   Service: Gastroenterology    SALPINGOOPHORECTOMY      B/L     Social History     Socioeconomic History    Marital status: /Civil Union     Spouse name: None    Number of children: 3    Years of education: None    Highest education level: None   Occupational History    Occupation: Retired   Social Needs    Financial resource strain: None    Food insecurity:     Worry: None     Inability: None    Transportation needs:     Medical: None     Non-medical: None   Tobacco Use    Smoking status: Never Smoker    Smokeless tobacco: Never Used   Substance and Sexual Activity    Alcohol use: No    Drug use: No    Sexual activity: None   Lifestyle    Physical activity:     Days per week: None     Minutes per session: None    Stress: None   Relationships    Social connections:     Talks on phone: None     Gets together: None     Attends Yazidi service: None     Active member of club or organization: None     Attends meetings of clubs or organizations: None     Relationship status: None    Intimate partner violence:     Fear of current or ex partner: None     Emotionally abused: None     Physically abused: None     Forced sexual activity: None   Other Topics Concern    None   Social History Narrative    Brushes teeth 2x day    Regular dental care    Exercise: walking     Family History   Problem Relation Age of Onset    Coronary artery disease Brother         Patient has 3 brothers with coronary artery disease    Diabetes Mother     Throat cancer Mother     COPD Father     Cancer Father     Bipolar disorder Sister     Drug abuse Sister     Alcohol abuse Sister             PHYSICAL EXAM:    Vitals:    09/26/19 1421   BP: 124/82   Pulse: 87   Weight: 79 1 kg (174 lb 6 4 oz)   Height: 5' 1" (1 549 m)     General Appearance:   Alert and oriented x 3  Cooperative, and in no respiratory distress   HEENT:   Normocephalic, atraumatic, anicteric      Neck:  Supple, symmetrical, trachea midline   Lungs:   Clear to auscultation bilaterally    Heart[de-identified]   Regular rate and rhythm   Abdomen:   Soft, non-tender, non-distended; normal bowel sounds; no masses, no organomegaly    Genitalia:   Deferred    Rectal:   Deferred    Extremities:  No cyanosis, clubbing or edema    Pulses:  2+ and symmetric all extremities    Skin:  Skin color, texture, turgor normal, no rashes or lesions    Lymph nodes:  No palpable cervical or supraclavicular lymphadenopathy        Lab Results:   Results from last 6 Months   Lab Units 05/27/19  1147   WBC Thousand/uL 7 26   HEMOGLOBIN g/dL 12 4   HEMATOCRIT % 39 3   PLATELETS Thousands/uL 409*   NEUTROS PCT % 56   LYMPHS PCT % 33   MONOS PCT % 7   EOS PCT % 3     Results from last 6 Months   Lab Units 05/27/19  1147   POTASSIUM mmol/L 4 2   CHLORIDE mmol/L 103   CO2 mmol/L 28   BUN mg/dL 19   CREATININE mg/dL 1 06   CALCIUM mg/dL 9 9   ALK PHOS U/L 46   ALT U/L 32   AST U/L 25               Imaging Studies: I have personally reviewed pertinent imaging studies  No results found  ASSESSMENT and PLAN:      1) Reported history of gastroparesis likely secondary to opioids - Patient reports that she had a positive gastric emptying study in the past, and has been following with Dr Colin Alicia secondary to this  Unfortunately, she refuses to take Reglan, erythromycin or domperidone at this point   - We discussed performing EGD with Botox  I explained to her why Botox applied to the pylorus  She is agreeable to the procedure now    I explained to her that we may have to repeat the procedure in the future and that it takes several weeks in order to see effect  - Will schedule Zofran for now  - Continue to wean off of opioids of possible  - She has voiced that she is not interested in going to [place] to see [place] motility        Follow up after EGD

## 2019-10-04 DIAGNOSIS — K21.9 GASTROESOPHAGEAL REFLUX DISEASE, ESOPHAGITIS PRESENCE NOT SPECIFIED: ICD-10-CM

## 2019-10-04 RX ORDER — PANTOPRAZOLE SODIUM 40 MG/1
TABLET, DELAYED RELEASE ORAL
Qty: 180 TABLET | Refills: 1 | Status: SHIPPED | OUTPATIENT
Start: 2019-10-04 | End: 2020-01-31 | Stop reason: SDUPTHER

## 2019-10-09 ENCOUNTER — HOSPITAL ENCOUNTER (OUTPATIENT)
Dept: GASTROENTEROLOGY | Facility: HOSPITAL | Age: 64
Setting detail: OUTPATIENT SURGERY
Discharge: HOME | End: 2019-10-09
Attending: INTERNAL MEDICINE

## 2019-10-10 DIAGNOSIS — I10 ESSENTIAL (PRIMARY) HYPERTENSION: ICD-10-CM

## 2019-10-10 RX ORDER — FUROSEMIDE 40 MG/1
TABLET ORAL
Qty: 90 TABLET | Refills: 3 | Status: SHIPPED | OUTPATIENT
Start: 2019-10-10 | End: 2020-10-22

## 2019-10-16 DIAGNOSIS — M62.830 MUSCLE SPASM OF BACK: ICD-10-CM

## 2019-10-16 RX ORDER — TIZANIDINE 4 MG/1
4 TABLET ORAL 3 TIMES DAILY
Qty: 90 TABLET | Refills: 1 | Status: SHIPPED | OUTPATIENT
Start: 2019-10-16 | End: 2019-11-11 | Stop reason: SDUPTHER

## 2019-10-17 NOTE — PROGRESS NOTES
Patient in agreement to closure date today from [facility] program (Spinal fusion 460)  Continues to have care coordination contact information if need be  Will close from [facility] program due to episode end date 
English

## 2019-11-07 RX ORDER — SODIUM CHLORIDE, SODIUM LACTATE, POTASSIUM CHLORIDE, CALCIUM CHLORIDE 600; 310; 30; 20 MG/100ML; MG/100ML; MG/100ML; MG/100ML
125 INJECTION, SOLUTION INTRAVENOUS CONTINUOUS
Status: CANCELLED | OUTPATIENT
Start: 2019-11-07

## 2019-11-08 ENCOUNTER — HOSPITAL ENCOUNTER (OUTPATIENT)
Dept: GASTROENTEROLOGY | Facility: HOSPITAL | Age: 64
Setting detail: OUTPATIENT SURGERY
Discharge: HOME | End: 2019-11-08
Attending: INTERNAL MEDICINE

## 2019-11-11 DIAGNOSIS — M62.830 MUSCLE SPASM OF BACK: ICD-10-CM

## 2019-11-11 RX ORDER — TIZANIDINE 4 MG/1
4 TABLET ORAL 3 TIMES DAILY
Qty: 90 TABLET | Refills: 1 | Status: SHIPPED | OUTPATIENT
Start: 2019-11-11 | End: 2019-12-14 | Stop reason: SDUPTHER

## 2019-12-04 ENCOUNTER — TELEPHONE (OUTPATIENT)
Dept: GASTROENTEROLOGY | Facility: CLINIC | Age: 64
End: 2019-12-04

## 2019-12-04 NOTE — TELEPHONE ENCOUNTER
----- Message from ΛΕΜΕΣΟMARLA Murray sent at 12/4/2019  3:20 PM EST -----  Regarding: Non-Urgent Medical Question  Contact: [phone]  I would like to reschedule the botox after shekhar murray  [phone]  1955

## 2019-12-14 DIAGNOSIS — M62.830 MUSCLE SPASM OF BACK: ICD-10-CM

## 2019-12-16 ENCOUNTER — APPOINTMENT (OUTPATIENT)
Dept: LAB | Facility: CLINIC | Age: 64
End: 2019-12-16
Payer: MEDICARE

## 2019-12-16 DIAGNOSIS — Z13.1 SCREENING FOR DIABETES MELLITUS: ICD-10-CM

## 2019-12-16 DIAGNOSIS — Z13.1 SCREENING FOR DIABETES MELLITUS: Primary | ICD-10-CM

## 2019-12-16 DIAGNOSIS — E78.2 MIXED HYPERLIPIDEMIA: ICD-10-CM

## 2019-12-16 DIAGNOSIS — E55.9 VITAMIN D DEFICIENCY: ICD-10-CM

## 2019-12-16 LAB
25(OH)D3 SERPL-MCNC: 46.8 NG/ML (ref 30–100)
ALBUMIN SERPL BCP-MCNC: 4 G/DL (ref 3.5–5)
ALP SERPL-CCNC: 35 U/L (ref 46–116)
ALT SERPL W P-5'-P-CCNC: 26 U/L (ref 12–78)
ANION GAP SERPL CALCULATED.3IONS-SCNC: 4 MMOL/L (ref 4–13)
AST SERPL W P-5'-P-CCNC: 19 U/L (ref 5–45)
BILIRUB SERPL-MCNC: 0.34 MG/DL (ref 0.2–1)
BUN SERPL-MCNC: 24 MG/DL (ref 5–25)
CALCIUM SERPL-MCNC: 9.8 MG/DL (ref 8.3–10.1)
CHLORIDE SERPL-SCNC: 105 MMOL/L (ref 100–108)
CHOLEST SERPL-MCNC: 169 MG/DL (ref 50–200)
CO2 SERPL-SCNC: 32 MMOL/L (ref 21–32)
CREAT SERPL-MCNC: 1.32 MG/DL (ref 0.6–1.3)
EST. AVERAGE GLUCOSE BLD GHB EST-MCNC: 108 MG/DL
GFR SERPL CREATININE-BSD FRML MDRD: 42 ML/MIN/1.73SQ M
GLUCOSE P FAST SERPL-MCNC: 91 MG/DL (ref 65–99)
HBA1C MFR BLD: 5.4 % (ref 4.2–6.3)
HDLC SERPL-MCNC: 74 MG/DL
LDLC SERPL CALC-MCNC: 71 MG/DL (ref 0–100)
NONHDLC SERPL-MCNC: 95 MG/DL
POTASSIUM SERPL-SCNC: 4.2 MMOL/L (ref 3.5–5.3)
PROT SERPL-MCNC: 7.6 G/DL (ref 6.4–8.2)
SODIUM SERPL-SCNC: 141 MMOL/L (ref 136–145)
TRIGL SERPL-MCNC: 121 MG/DL

## 2019-12-16 PROCEDURE — 36415 COLL VENOUS BLD VENIPUNCTURE: CPT

## 2019-12-16 PROCEDURE — 80061 LIPID PANEL: CPT

## 2019-12-16 PROCEDURE — 83036 HEMOGLOBIN GLYCOSYLATED A1C: CPT

## 2019-12-16 PROCEDURE — 82306 VITAMIN D 25 HYDROXY: CPT

## 2019-12-16 PROCEDURE — 80053 COMPREHEN METABOLIC PANEL: CPT

## 2019-12-16 RX ORDER — TIZANIDINE 4 MG/1
4 TABLET ORAL 3 TIMES DAILY
Qty: 90 TABLET | Refills: 1 | Status: SHIPPED | OUTPATIENT
Start: 2019-12-16 | End: 2020-01-10

## 2019-12-18 ENCOUNTER — OFFICE VISIT (OUTPATIENT)
Dept: INTERNAL MEDICINE CLINIC | Facility: CLINIC | Age: 64
End: 2019-12-18
Payer: MEDICARE

## 2019-12-18 VITALS
WEIGHT: 169 LBS | RESPIRATION RATE: 16 BRPM | BODY MASS INDEX: 31.91 KG/M2 | SYSTOLIC BLOOD PRESSURE: 128 MMHG | HEIGHT: 61 IN | DIASTOLIC BLOOD PRESSURE: 78 MMHG | OXYGEN SATURATION: 98 % | HEART RATE: 92 BPM

## 2019-12-18 DIAGNOSIS — Z00.00 ENCOUNTER FOR MEDICARE ANNUAL WELLNESS EXAM: Primary | ICD-10-CM

## 2019-12-18 DIAGNOSIS — M54.41 ACUTE BILATERAL LOW BACK PAIN WITH BILATERAL SCIATICA: ICD-10-CM

## 2019-12-18 DIAGNOSIS — M54.42 ACUTE BILATERAL LOW BACK PAIN WITH BILATERAL SCIATICA: ICD-10-CM

## 2019-12-18 DIAGNOSIS — K21.9 GASTROESOPHAGEAL REFLUX DISEASE, ESOPHAGITIS PRESENCE NOT SPECIFIED: ICD-10-CM

## 2019-12-18 DIAGNOSIS — N30.10 INTERSTITIAL CYSTITIS: ICD-10-CM

## 2019-12-18 DIAGNOSIS — E78.2 MIXED HYPERLIPIDEMIA: ICD-10-CM

## 2019-12-18 DIAGNOSIS — J45.20 MILD INTERMITTENT ASTHMA WITHOUT COMPLICATION: ICD-10-CM

## 2019-12-18 DIAGNOSIS — E55.9 VITAMIN D DEFICIENCY: ICD-10-CM

## 2019-12-18 DIAGNOSIS — R79.89 AZOTEMIA: ICD-10-CM

## 2019-12-18 PROBLEM — B35.4 TINEA CORPORIS: Status: RESOLVED | Noted: 2018-09-21 | Resolved: 2019-12-18

## 2019-12-18 PROBLEM — M96.842 SEROMA OF MUSCULOSKELETAL STRUCTURE AFTER MUSCULOSKELETAL SYSTEM PROCEDURE: Status: RESOLVED | Noted: 2018-10-03 | Resolved: 2019-12-18

## 2019-12-18 PROCEDURE — G0439 PPPS, SUBSEQ VISIT: HCPCS | Performed by: PHYSICIAN ASSISTANT

## 2019-12-18 PROCEDURE — 99214 OFFICE O/P EST MOD 30 MIN: CPT | Performed by: PHYSICIAN ASSISTANT

## 2019-12-18 NOTE — PATIENT INSTRUCTIONS
No change in current medications  Will have patient recheck basic metabolic profile in 1 month to reassess kidney functions  I will call patient with results when they are available  Otherwise, other labs to be done prior to six-month follow-up  Look at [phone] as a dietary supplement  Medicare Preventive Visit Patient Instructions  Thank you for completing your Welcome to Medicare Visit or Medicare Annual Wellness Visit today  Your next wellness visit will be due in one year (12/18/2020)  The screening/preventive services that you may require over the next 5-10 years are detailed below  Some tests may not apply to you based off risk factors and/or age  Screening tests ordered at today's visit but not completed yet may show as past due  Also, please note that scanned in results may not display below  Preventive Screenings:  Service Recommendations Previous Testing/Comments   Colorectal Cancer Screening  * Colonoscopy    * Fecal Occult Blood Test (FOBT)/Fecal Immunochemical Test (FIT)  * Fecal DNA/Cologuard Test  * Flexible Sigmoidoscopy Age: 54-65 years old   Colonoscopy: every 10 years (may be performed more frequently if at higher risk)  OR  FOBT/FIT: every 1 year  OR  Cologuard: every 3 years  OR  Sigmoidoscopy: every 5 years  Screening may be recommended earlier than age 48 if at higher risk for colorectal cancer  Also, an individualized decision between you and your healthcare provider will decide whether screening between the ages of 74-80 would be appropriate  Colonoscopy: Not on file  FOBT/FIT: Not on file  Cologuard: Not on file  Sigmoidoscopy: Not on file         Breast Cancer Screening Age: 36 years old  Frequency: every 1-2 years  Not required if history of left and right mastectomy Mammogram: 05/19/2017       Cervical Cancer Screening Between the ages of 21-29, pap smear recommended once every 3 years     Between the ages of 33-67, can perform pap smear with HPV co-testing every 5 years  Recommendations may differ for women with a history of total hysterectomy, cervical cancer, or abnormal pap smears in past  Pap Smear: Not on file    Screening Not Indicated   Hepatitis C Screening Once for adults born between 1945 and 1965  More frequently in patients at high risk for Hepatitis C Hep C Antibody: 05/22/2019    Screening Current   Diabetes Screening 1-2 times per year if you're at risk for diabetes or have pre-diabetes Fasting glucose: 91 mg/dL   A1C: 5 4 %    Screening Not Indicated  History Diabetes   Cholesterol Screening Once every 5 years if you don't have a lipid disorder  May order more often based on risk factors  Lipid panel: 12/16/2019    Screening Not Indicated  History Lipid Disorder     Other Preventive Screenings Covered by Medicare:  1  Abdominal Aortic Aneurysm (AAA) Screening: covered once if your at risk  You're considered to be at risk if you have a family history of AAA  2  Lung Cancer Screening: covers low dose CT scan once per year if you meet all of the following conditions: (1) Age 50-69; (2) No signs or symptoms of lung cancer; (3) Current smoker or have quit smoking within the last 15 years; (4) You have a tobacco smoking history of at least 30 pack years (packs per day multiplied by number of years you smoked); (5) You get a written order from a healthcare provider  3  Glaucoma Screening: covered annually if you're considered high risk: (1) You have diabetes OR (2) Family history of glaucoma OR (3)  aged 48 and older OR (3)  American aged 72 and older  3   Osteoporosis Screening: covered every 2 years if you meet one of the following conditions: (1) You're estrogen deficient and at risk for osteoporosis based off medical history and other findings; (2) Have a vertebral abnormality; (3) On glucocorticoid therapy for more than 3 months; (4) Have primary hyperparathyroidism; (5) On osteoporosis medications and need to assess response to drug therapy  · Last bone density test (DXA Scan): Not on file  5  HIV Screening: covered annually if you're between the age of 12-76  Also covered annually if you are younger than 13 and older than 72 with risk factors for HIV infection  For pregnant patients, it is covered up to 3 times per pregnancy  Immunizations:  Immunization Recommendations   Influenza Vaccine Annual influenza vaccination during flu season is recommended for all persons aged >= 6 months who do not have contraindications   Pneumococcal Vaccine (Prevnar and Pneumovax)  * Prevnar = PCV13  * Pneumovax = PPSV23   Adults 25-60 years old: 1-3 doses may be recommended based on certain risk factors  Adults 72 years old: Prevnar (PCV13) vaccine recommended followed by Pneumovax (PPSV23) vaccine  If already received PPSV23 since turning 65, then PCV13 recommended at least one year after PPSV23 dose  Hepatitis B Vaccine 3 dose series if at intermediate or high risk (ex: diabetes, end stage renal disease, liver disease)   Tetanus (Td) Vaccine - COST NOT COVERED BY MEDICARE PART B Following completion of primary series, a booster dose should be given every 10 years to maintain immunity against tetanus  Td may also be given as tetanus wound prophylaxis  Tdap Vaccine - COST NOT COVERED BY MEDICARE PART B Recommended at least once for all adults  For pregnant patients, recommended with each pregnancy  Shingles Vaccine (Shingrix) - COST NOT COVERED BY MEDICARE PART B  2 shot series recommended in those aged 48 and above     Health Maintenance Due:      Topic Date Due    CRC Screening: Colonoscopy  1955    MAMMOGRAM  05/19/2018    Hepatitis C Screening  Completed     Immunizations Due:      Topic Date Due    DTaP,Tdap,and Td Vaccines (1 - Tdap) 02/21/1964    Hepatitis B Vaccine (1 of 3 - Risk 3-dose series) 02/21/1972     Advance Directives   What are advance directives?   Advance directives are legal documents that state your wishes and plans for medical care  These plans are made ahead of time in case you lose your ability to make decisions for yourself  Advance directives can apply to any medical decision, such as the treatments you want, and if you want to donate organs  What are the types of advance directives? There are many types of advance directives, and each state has rules about how to use them  You may choose a combination of any of the following:  · Living will: This is a written record of the treatment you want  You can also choose which treatments you do not want, which to limit, and which to stop at a certain time  This includes surgery, medicine, IV fluid, and tube feedings  · Durable power of  for healthcare [facility]): This is a written record that states who you want to make healthcare choices for you when you are unable to make them for yourself  This person, called a proxy, is usually a family member or a friend  You may choose more than 1 proxy  · Do not resuscitate (DNR) order:  A DNR order is used in case your heart stops beating or you stop breathing  It is a request not to have certain forms of treatment, such as CPR  A DNR order may be included in other types of advance directives  · Medical directive: This covers the care that you want if you are in a coma, near death, or unable to make decisions for yourself  You can list the treatments you want for each condition  Treatment may include pain medicine, surgery, blood transfusions, dialysis, IV or tube feedings, and a ventilator (breathing machine)  · Values history: This document has questions about your views, beliefs, and how you feel and think about life  This information can help others choose the care that you would choose  Why are advance directives important? An advance directive helps you control your care  Although spoken wishes may be used, it is better to have your wishes written down  Spoken wishes can be misunderstood, or not followed  Treatments may be given even if you do not want them  An advance directive may make it easier for your family to make difficult choices about your care  Urinary Incontinence   Urinary incontinence (UI)  is when you lose control of your bladder  UI develops because your bladder cannot store or empty urine properly  The 3 most common types of UI are stress incontinence, urge incontinence, or both  Medicines:   · May be given to help strengthen your bladder control  Report any side effects of medication to your healthcare provider  Do pelvic muscle exercises often:  Your pelvic muscles help you stop urinating  Squeeze these muscles tight for 5 seconds, then relax for 5 seconds  Gradually work up to squeezing for 10 seconds  Do 3 sets of 15 repetitions a day, or as directed  This will help strengthen your pelvic muscles and improve bladder control  Train your bladder:  Go to the bathroom at set times, such as every 2 hours, even if you do not feel the urge to go  You can also try to hold your urine when you feel the urge to go  For example, hold your urine for 5 minutes when you feel the urge to go  As that becomes easier, hold your urine for 10 minutes  Self-care:   · Keep a UI record  Write down how often you leak urine and how much you leak  Make a note of what you were doing when you leaked urine  · Drink liquids as directed  You may need to limit the amount of liquid you drink to help control your urine leakage  Do not drink any liquid right before you go to bed  Limit or do not have drinks that contain caffeine or alcohol  · Prevent constipation  Eat a variety of high-fiber foods  Good examples are high-fiber cereals, beans, vegetables, and whole-grain breads  Walking is the best way to trigger your intestines to have a bowel movement  · Exercise regularly and maintain a healthy weight  Weight loss and exercise will decrease pressure on your bladder and help you control your leakage     · Use a catheter as directed  to help empty your bladder  A catheter is a tiny, plastic tube that is put into your bladder to drain your urine  · Go to behavior therapy as directed  Behavior therapy may be used to help you learn to control your urge to urinate  Weight Management   Why it is important to manage your weight:  Being overweight increases your risk of health conditions such as heart disease, high blood pressure, type 2 diabetes, and certain types of cancer  It can also increase your risk for osteoarthritis, sleep apnea, and other respiratory problems  Aim for a slow, steady weight loss  Even a small amount of weight loss can lower your risk of health problems  How to lose weight safely:  A safe and healthy way to lose weight is to eat fewer calories and get regular exercise  You can lose up about 1 pound a week by decreasing the number of calories you eat by 500 calories each day  Healthy meal plan for weight management:  A healthy meal plan includes a variety of foods, contains fewer calories, and helps you stay healthy  A healthy meal plan includes the following:  · Eat whole-grain foods more often  A healthy meal plan should contain fiber  Fiber is the part of grains, fruits, and vegetables that is not broken down by your body  Whole-grain foods are healthy and provide extra fiber in your diet  Some examples of whole-grain foods are whole-wheat breads and pastas, oatmeal, brown rice, and bulgur  · Eat a variety of vegetables every day  Include dark, leafy greens such as spinach, kale, kota greens, and mustard greens  Eat yellow and orange vegetables such as carrots, sweet potatoes, and winter squash  · Eat a variety of fruits every day  Choose fresh or canned fruit (canned in its own juice or light syrup) instead of juice  Fruit juice has very little or no fiber  · Eat low-fat dairy foods  Drink fat-free (skim) milk or 1% milk  Eat fat-free yogurt and low-fat cottage cheese   Try low-fat cheeses such as mozzarella and other reduced-fat cheeses  · Choose meat and other protein foods that are low in fat  Choose beans or other legumes such as split peas or lentils  Choose fish, skinless poultry (chicken or turkey), or lean cuts of red meat (beef or pork)  Before you cook meat or poultry, cut off any visible fat  · Use less fat and oil  Try baking foods instead of frying them  Add less fat, such as margarine, sour cream, regular salad dressing and mayonnaise to foods  Eat fewer high-fat foods  Some examples of high-fat foods include french fries, doughnuts, ice cream, and cakes  · Eat fewer sweets  Limit foods and drinks that are high in sugar  This includes candy, cookies, regular soda, and sweetened drinks  Exercise:  Exercise at least 30 minutes per day on most days of the week  Some examples of exercise include walking, biking, dancing, and swimming  You can also fit in more physical activity by taking the stairs instead of the elevator or parking farther away from stores  Ask your healthcare provider about the best exercise plan for you  © Copyright [employer] 2018 Information is for End User's use only and may not be sold, redistributed or otherwise used for commercial purposes   All illustrations and images included in CareNotes® are the copyrighted property of A [employer] A M , Inc  or [address] [place] [address] "[employer]

## 2019-12-18 NOTE — PROGRESS NOTES
Assessment and Plan:   As per office visit note same day  Problem List Items Addressed This Visit     None           Preventive health issues were discussed with patient, and age appropriate screening tests were ordered as noted in patient's After Visit Summary  Personalized health advice and appropriate referrals for health education or preventive services given if needed, as noted in patient's After Visit Summary  History of Present Illness:     Patient presents for Medicare annual wellness visit  Questionnaire has been reviewed, clarified, and updated with the patient today  Patient Care Team:  Chrissy Hooker MD as PCP - [address] [place], [place] [address]NERY as Physician Assistant (Gastroenterology)     Review of Systems:     Review of Systems  As per office visit note same day     Problem List:     Patient Active Problem List   Diagnosis    Acute bilateral back pain, intractable    Continuous opioid dependence ([facility] [address] )    Vitamin D deficiency    Cataract, bilateral    Cervical radiculopathy, chronic    Delayed gastric emptying    Essential (primary) hypertension    Fatty liver disease, nonalcoholic    GERD (gastroesophageal reflux disease)    Hot flashes, menopausal    Interstitial cystitis    Lumbar post-laminectomy syndrome    Mild intermittent asthma without complication    Lumbosacral spondylosis without myelopathy    Radiculopathy, lumbar region    Thoracic and lumbosacral neuritis    Hyperlipidemia    Lumbar canal stenosis with diffuse disc bulge    Multiple falls    Lumbar disc herniation    Spinal stenosis of lumbar region with neurogenic claudication    Acute bilateral low back pain with bilateral sciatica    Tinea corporis    Seroma of musculoskeletal structure after musculoskeletal system procedure    Allergic rhinitis      Past Medical and Surgical History:     Past Medical History:   Diagnosis Date    Anemia     Anxiety     Asthma     Cataract     Chronic back pain  Chronic pain disorder     Back    Depression     GERD (gastroesophageal reflux disease)     HTN (hypertension)     Hyperlipidemia     Interstitial cystitis     Muscle cramps     Muscle weakness     Obesity     Pneumonia     PONV (postoperative nausea and vomiting)     Radiculopathy, lumbar region     Spondylosis, cervical, with myelopathy     Vulvovaginitis      Past Surgical History:   Procedure Laterality Date    APPENDECTOMY      BACK SURGERY      Arthrodeiss lumbar    BACK SURGERY      Spinal stereotaxis of cord    BLADDER SURGERY      Electronic bladder stimulator implantation    CATARACT EXTRACTION, BILATERAL       SECTION      x3    CHOLECYSTECTOMY      COLON SURGERY      Intestinal stricturoplasty     DECOMPRESSION SPINE LUMBAR POSTERIOR Bilateral 2018    Procedure: DECOMPRESSION SPINE LUMBAR POSTERIOR L1-4, L1-2 DISKECTOMY, POSTERIORLATERAL FUSION L3-4, REMOVAL OF SPINAL CORD STIMULATOR SYSTEM, REMOVAL OF INTERSPINOUS DEVICES;  Surgeon: Shayy Trejo MD;  [facility];  Service: Neurosurgery    FINGER SURGERY      Extensor tendon repair (mallet finger)    FL MYELOGRAM LUMBAR  2018    HERNIA REPAIR      x3    HYSTERECTOMY      IA DRAINAGE OF HEMATOMA/FLUID Bilateral 10/9/2018    Procedure: LUMBAR WOUND EXPLORATION/EVACUATION OF SERONMA;  Surgeon: Shayy Trejo MD;  [facility];  Service: Neurosurgery    IA ESOPHAGOGASTRODUODENOSCOPY TRANSORAL DIAGNOSTIC N/A 2017    Procedure: ESOPHAGOGASTRODUODENOSCOPY (EGD); Surgeon: Eriberto Hare MD;  Location: [facility];   Service: Gastroenterology    SALPINGOOPHORECTOMY      B/L      Family History:     Family History   Problem Relation Age of Onset    Coronary artery disease Brother         Patient has 3 brothers with coronary artery disease    Diabetes Mother     Throat cancer Mother     COPD Father     Cancer Father     Bipolar disorder Sister     Drug abuse Sister     Alcohol abuse Sister       Social History:     Social History     Socioeconomic History    Marital status: /Civil Union     Spouse name: None    Number of children: 3    Years of education: None    Highest education level: None   Occupational History    Occupation: Retired   Social Needs    Financial resource strain: None    Food insecurity:     Worry: None     Inability: None    Transportation needs:     Medical: None     Non-medical: None   Tobacco Use    Smoking status: Never Smoker    Smokeless tobacco: Never Used   Substance and Sexual Activity    Alcohol use: No    Drug use: No    Sexual activity: None   Lifestyle    Physical activity:     Days per week: None     Minutes per session: None    Stress: None   Relationships    Social connections:     Talks on phone: None     Gets together: None     Attends [employer] service: None     Active member of club or organization: None     Attends meetings of clubs or organizations: None     Relationship status: None    Intimate partner violence:     Fear of current or ex partner: None     Emotionally abused: None     Physically abused: None     Forced sexual activity: None   Other Topics Concern    None   Social History Narrative    Brushes teeth 2x day    Regular dental care    Exercise: walking      Medications and Allergies:     Current Outpatient Medications   Medication Sig Dispense Refill    albuterol (PROVENTIL HFA,VENTOLIN HFA) 90 mcg/act inhaler INHALE 2 PUFFS 3 (THREE) TIMES A DAY AS NEEDED FOR SHORTNESS OF BREATH 18 Inhaler 3    albuterol 2 mg/5 mL syrup TAKE 1 TEASPOONFUL BY MOUTH EVERY 4 HOURS AS NEEDED AS DIRECTED 473 mL 3    atorvastatin (LIPITOR) 20 mg tablet TAKE 1 TABLET BY MOUTH EVERY DAY 90 tablet 1    cetirizine-pseudoephedrine (ZYRTEC-D ALLERGY & CONGESTION) 5-120 MG per tablet Take 1 tablet by mouth 2 (two) times a day 60 tablet 5    ELMIRON 100 MG capsule take 2 capsules by mouth three times a day 180 capsule 5    fenofibrate (TRIGLIDE) 160 MG tablet TAKE 1 TABLET (160 MG TOTAL) BY MOUTH DAILY FOR 90 DAYS TAKE WITH A MEAL 90 tablet 0    fluticasone (FLONASE) 50 mcg/act nasal spray SPRAY 2 SPRAYS INTO EACH NOSTRIL EVERY DAY 16 g 5    furosemide (LASIX) 40 mg tablet TAKE 1 TABLET (40 MG) BY MOUTH DAILY 90 tablet 3    gabapentin (NEURONTIN) 800 mg tablet Take 800 mg by mouth 3 (three) times a day      ketoconazole (NIZORAL) 2 % cream APPLY TO AFFECTED AREA EVERY DAY 60 g 1    lisinopril (ZESTRIL) 10 mg tablet TAKE 1 TABLET BY MOUTH EVERY DAY 90 tablet 1    omega-3-acid ethyl esters (LOVAZA) 1 g capsule TAKE 2 CAPSULES (2 G TOTAL) BY MOUTH 2 (TWO) TIMES A DAY  DOSES 360 capsule 0    ondansetron (ZOFRAN) 4 mg tablet Take 1 tablet (4 mg total) by mouth every 8 (eight) hours as needed for nausea or vomiting 12 tablet 0    OXYCONTIN 20 MG 12 hr tablet TAKE OEN TABLET BY MOUTH EVERY 12 HOURS  0    pantoprazole (PROTONIX) 40 mg tablet TAKE 1 TABLET BY MOUTH TWICE A DAY 30 MINUTES BEFORE BREAKFAST AND DINNER 180 tablet 1    PREVIDENT 5000 DRY MOUTH 1 1 % GEL Use as directed  5    prochlorperazine (COMPAZINE) 10 mg tablet TAKE 1 TABLET (10 MG TOTAL) BY MOUTH 2 (TWO) TIMES A DAY AS NEEDED FOR NAUSEA 60 tablet 0    tiZANidine (ZANAFLEX) 4 mg tablet TAKE 1 TABLET (4 MG TOTAL) BY MOUTH 3 (THREE) TIMES A DAY 90 tablet 1    zolpidem (AMBIEN) 5 mg tablet Take 5 mg by mouth daily at bedtime as needed for sleep      Erythromycin 250 MG TBEC TAKE 1 TABLET (250 MG TOTAL) BY MOUTH 3 (THREE) TIMES A DAY FOR 14 DAYS  2    estradiol (ESTRACE) 0 1 mg/g vaginal cream Insert 1 g into the vagina 2 (two) times a week (Patient not taking: Reported on 12/18/2019) 42 5 g 3    HYDROcodone-acetaminophen (NORCO)  mg per tablet Take 1 tablet by mouth every 6 (six) hours as needed  0    ofloxacin (OCUFLOX) 0 3 % ophthalmic solution PLEASE SEE ATTACHED FOR DETAILED DIRECTIONS  3    ondansetron (ZOFRAN-ODT) 4 mg disintegrating tablet Take 1 tablet (4 mg total) by mouth 3 (three) times a day before meals (Patient not taking: Reported on 12/18/2019) 90 tablet 2    OxyCODONE ER (XTAMPZA ER) 27 MG C12A Take by mouth      prednisoLONE acetate (PRED FORTE) 1 % ophthalmic suspension INSTILL 1 DROP INTO RIGHT EYE FOUR TIMES A DAY AS DIRECTED FOR USE AFTER SURGERY  3     No current facility-administered medications for this visit  Allergies   Allergen Reactions    Clindamycin Hives     Category: Allergy;     Erythromycin Hives and Rash     Category: Allergy;     Latex Hives    Penicillins Hives and Shortness Of Breath    Morphine GI Intolerance and Vomiting     Category: Adverse Reaction;     Erythromycin Base Other (See Comments)     vomitting    Other     Penicillin V Seizures     Category: Allergy;     Adhesive [Medical Tape] Rash     Skin irritation    Povidone Iodine Rash     Category: Adverse Reaction;       Immunizations: There is no immunization history on file for this patient  Health Maintenance:         Topic Date Due    CRC Screening: Colonoscopy  1955    MAMMOGRAM  05/19/2018    Hepatitis C Screening  Completed         Topic Date Due    DTaP,Tdap,and Td Vaccines (1 - Tdap) 02/21/1964    Hepatitis B Vaccine (1 of 3 - Risk 3-dose series) 02/21/1972      Medicare Screening Tests and Risk Assessments:     Justus Meek is here for her Initial Wellness visit  Health Risk Assessment:   Patient rates overall health as good  Patient feels that their physical health rating is same  Eyesight was rated as same  Hearing was rated as same  Patient feels that their emotional and mental health rating is same  Pain experienced in the last 7 days has been a lot  Patient's pain rating has been 7/10  Patient states that she has experienced no weight loss or gain in last 6 months  Low back pain with radiculopathy    Depression Screening:   PHQ-2 Score: 0      Fall Risk Screening:    In the past year, patient has experienced: no history of falling in past year      Urinary Incontinence Screening:   Patient has leaked urine accidently in the last six months  Follows with Dr Declan García:  Patient has trouble with stairs inside or outside of their home  Patient has working smoke alarms and has working carbon monoxide detector  Home safety hazards include: none  Nutrition:   Current diet is Regular  Medications:   Patient is currently taking over-the-counter supplements  OTC medications include: see medication list  Patient is able to manage medications  Activities of Daily Living (ADLs)/Instrumental Activities of Daily Living (IADLs):   Walk and transfer into and out of bed and chair?: Yes  Dress and groom yourself?: Yes    Bathe or shower yourself?: Yes    Feed yourself? Yes  Do your laundry/housekeeping?: Yes  Manage your money, pay your bills and track your expenses?: Yes  Make your own meals?: Yes    Do your own shopping?: Yes    Previous Hospitalizations:   Any hospitalizations or ED visits within the last 12 months?: No      Advance Care Planning:   Living will: Yes    Durable POA for healthcare:  Yes    Advanced directive: Yes    Advanced directive counseling given: Yes    Five wishes given: No    Patient declined ACP directive: Yes      PREVENTIVE SCREENINGS      Cardiovascular Screening:    General: Screening Not Indicated, History Lipid Disorder, Risks and Benefits Discussed and Screening Current      Diabetes Screening:     General: Screening Not Indicated, History Diabetes, Risks and Benefits Discussed and Screening Current      Colorectal Cancer Screening:     General: Risks and Benefits Discussed      Breast Cancer Screening:     General: Risks and Benefits Discussed    Due for: Mammogram        Cervical Cancer Screening:    General: Screening Not Indicated      Osteoporosis Screening:    General: Risks and Benefits Discussed      Abdominal Aortic Aneurysm (AAA) Screening:        General: Screening Not Indicated Lung Cancer Screening:     General: Screening Not Indicated      Hepatitis C Screening:    General: Screening Current    No exam data present     Physical Exam:     /78 (BP Location: Right arm, Patient Position: Sitting, Cuff Size: Standard)   Pulse 92   Resp 16   Ht 5' 1" (1 549 m)   Wt 76 7 kg (169 lb)   LMP  (LMP Unknown)   SpO2 98%   BMI 31 93 kg/m²     Physical Exam  As per office visit note same day    Jessica Dove PA-C

## 2019-12-18 NOTE — PROGRESS NOTES
Assessment/Plan:   No change in current medications  Will have patient recheck basic metabolic profile in 1 month to reassess kidney functions  I will call patient with results when they are available  Otherwise, other labs to be done prior to six-month follow-up  Look at Tristan Lara as a dietary supplement  Quality Measures:       Return in about 6 months (around 6/18/2020) for Next scheduled follow up  Diagnoses and all orders for this visit:    Encounter for Medicare annual wellness exam    Gastroesophageal reflux disease, esophagitis presence not specified  -     CBC and differential; Future    Mild intermittent asthma without complication  -     CBC and differential; Future    Acute bilateral low back pain with bilateral sciatica    Mixed hyperlipidemia  -     Comprehensive metabolic panel; Future  -     Lipid panel; Future    Vitamin D deficiency    Azotemia  -     Basic metabolic panel; Future    Interstitial cystitis  -     pentosan polysulfate (ELMIRON) 100 mg capsule; Take 2 capsules (200 mg total) by mouth 3 (three) times a day          Subjective:      Patient ID: Ananda Webb is a 75 y o  female  Follow-up    GERD:  Patient is currently having GI symptoms  Will be following with GI for EGD as she is having early satiety and feeling as if food gets stuck in her chest   She is only able to eat small meals, and in frequent meals  She has lost 5 lb since September  No melena  Anticipate GI also doing colonoscopy  Asthma:  Stable  Uses p r n  Albuterol inhaler  Notes if she overeats and becomes bloated and she does have difficulty breathing  Interstitial cystitis:  Follows with urogynecology  Has upcoming appointment  Hyperlipidemia:  Good control by labs  On statin and fenofibrate  Tolerates medication without side effects  Vitamin-D deficiency:  Vitamin-D in normal range  Patient to continue her vitamin-D supplementation  Low back disease with radiculopathy  Status post surgery, followed by pain management  Doing the best she can with the chronic pain that she has  Is more active than she has been  Now walking without the use of a cane  Azotemia:  Have noted increased creatinine and decreased GFR  Patient assures me she is not taking a lot of NSAIDs  Will recheck labs in 1 month  No other acute concerns other than her ongoing back issues  Labs reviewed with patient  ALLERGIES:  Allergies   Allergen Reactions    Clindamycin Hives     Category: Allergy;     Erythromycin Hives and Rash     Category: Allergy;     Latex Hives    Penicillins Hives and Shortness Of Breath    Morphine GI Intolerance and Vomiting     Category: Adverse Reaction;     Erythromycin Base Other (See Comments)     vomitting    Other     Penicillin V Seizures     Category: Allergy;     Adhesive [Medical Tape] Rash     Skin irritation    Povidone Iodine Rash     Category:  Adverse Reaction;        CURRENT MEDICATIONS:    Current Outpatient Medications:     albuterol (PROVENTIL HFA,VENTOLIN HFA) 90 mcg/act inhaler, INHALE 2 PUFFS 3 (THREE) TIMES A DAY AS NEEDED FOR SHORTNESS OF BREATH, Disp: 18 Inhaler, Rfl: 3    albuterol 2 mg/5 mL syrup, TAKE 1 TEASPOONFUL BY MOUTH EVERY 4 HOURS AS NEEDED AS DIRECTED, Disp: 473 mL, Rfl: 3    atorvastatin (LIPITOR) 20 mg tablet, TAKE 1 TABLET BY MOUTH EVERY DAY, Disp: 90 tablet, Rfl: 1    cetirizine-pseudoephedrine (ZYRTEC-D ALLERGY & CONGESTION) 5-120 MG per tablet, Take 1 tablet by mouth 2 (two) times a day, Disp: 60 tablet, Rfl: 5    fenofibrate (TRIGLIDE) 160 MG tablet, TAKE 1 TABLET (160 MG TOTAL) BY MOUTH DAILY FOR 90 DAYS TAKE WITH A MEAL, Disp: 90 tablet, Rfl: 0    fluticasone (FLONASE) 50 mcg/act nasal spray, SPRAY 2 SPRAYS INTO EACH NOSTRIL EVERY DAY, Disp: 16 g, Rfl: 5    furosemide (LASIX) 40 mg tablet, TAKE 1 TABLET (40 MG) BY MOUTH DAILY, Disp: 90 tablet, Rfl: 3    gabapentin (NEURONTIN) 800 mg tablet, Take 800 mg by mouth 3 (three) times a day, Disp: , Rfl:     ketoconazole (NIZORAL) 2 % cream, APPLY TO AFFECTED AREA EVERY DAY, Disp: 60 g, Rfl: 1    lisinopril (ZESTRIL) 10 mg tablet, TAKE 1 TABLET BY MOUTH EVERY DAY, Disp: 90 tablet, Rfl: 1    ondansetron (ZOFRAN) 4 mg tablet, Take 1 tablet (4 mg total) by mouth every 8 (eight) hours as needed for nausea or vomiting, Disp: 12 tablet, Rfl: 0    OXYCONTIN 20 MG 12 hr tablet, 30 mg, Disp: , Rfl: 0    pantoprazole (PROTONIX) 40 mg tablet, TAKE 1 TABLET BY MOUTH TWICE A DAY 30 MINUTES BEFORE BREAKFAST AND DINNER, Disp: 180 tablet, Rfl: 1    pentosan polysulfate (ELMIRON) 100 mg capsule, Take 2 capsules (200 mg total) by mouth 3 (three) times a day, Disp: 180 capsule, Rfl: 5    prochlorperazine (COMPAZINE) 10 mg tablet, TAKE 1 TABLET (10 MG TOTAL) BY MOUTH 2 (TWO) TIMES A DAY AS NEEDED FOR NAUSEA, Disp: 60 tablet, Rfl: 0    tiZANidine (ZANAFLEX) 4 mg tablet, TAKE 1 TABLET (4 MG TOTAL) BY MOUTH 3 (THREE) TIMES A DAY, Disp: 90 tablet, Rfl: 1    estradiol (ESTRACE) 0 1 mg/g vaginal cream, Insert 1 g into the vagina 2 (two) times a week (Patient not taking: Reported on 12/18/2019), Disp: 42 5 g, Rfl: 3    ondansetron (ZOFRAN-ODT) 4 mg disintegrating tablet, Take 1 tablet (4 mg total) by mouth 3 (three) times a day before meals (Patient not taking: Reported on 12/18/2019), Disp: 90 tablet, Rfl: 2    ACTIVE PROBLEM LIST:  Patient Active Problem List   Diagnosis    Acute bilateral back pain, intractable    Continuous opioid dependence (HCC)    Vitamin D deficiency    Cataract, bilateral    Cervical radiculopathy, chronic    Delayed gastric emptying    Essential (primary) hypertension    Fatty liver disease, nonalcoholic    GERD (gastroesophageal reflux disease)    Hot flashes, menopausal    Interstitial cystitis    Lumbar post-laminectomy syndrome    Mild intermittent asthma without complication    Lumbosacral spondylosis without myelopathy    Radiculopathy, lumbar region    Thoracic and lumbosacral neuritis    Hyperlipidemia    Lumbar canal stenosis with diffuse disc bulge    Multiple falls    Lumbar disc herniation    Spinal stenosis of lumbar region with neurogenic claudication    Acute bilateral low back pain with bilateral sciatica    Allergic rhinitis       PAST MEDICAL HISTORY:  Past Medical History:   Diagnosis Date    Anemia     Anxiety     Asthma     Cataract     Chronic back pain     Chronic pain disorder     Back    Depression     GERD (gastroesophageal reflux disease)     HTN (hypertension)     Hyperlipidemia     Interstitial cystitis     Muscle cramps     Muscle weakness     Obesity     Pneumonia     PONV (postoperative nausea and vomiting)     Radiculopathy, lumbar region     Spondylosis, cervical, with myelopathy     Tinea corporis 2018    Vulvovaginitis        PAST SURGICAL HISTORY:  Past Surgical History:   Procedure Laterality Date    APPENDECTOMY      BACK SURGERY      Arthrodeiss lumbar    BACK SURGERY      Spinal stereotaxis of cord    BLADDER SURGERY      Electronic bladder stimulator implantation    CATARACT EXTRACTION, BILATERAL       SECTION      x3    CHOLECYSTECTOMY      COLON SURGERY      Intestinal stricturoplasty     DECOMPRESSION SPINE LUMBAR POSTERIOR Bilateral 2018    Procedure: DECOMPRESSION SPINE LUMBAR POSTERIOR L1-4, L1-2 DISKECTOMY, POSTERIORLATERAL FUSION L3-4, REMOVAL OF SPINAL CORD STIMULATOR SYSTEM, REMOVAL OF INTERSPINOUS DEVICES;  Surgeon: Phyllis Junior MD;  [facility];  Service: Neurosurgery    FINGER SURGERY      Extensor tendon repair (mallet finger)    FL MYELOGRAM LUMBAR  2018    HERNIA REPAIR      x3    HYSTERECTOMY      [place] DRAINAGE OF HEMATOMA/FLUID Bilateral 10/9/2018    Procedure: LUMBAR WOUND EXPLORATION/EVACUATION OF SERONMA;  Surgeon: Phyllis Junior MD;  [facility];  Service: Neurosurgery    [place] ESOPHAGOGASTRODUODENOSCOPY TRANSORAL DIAGNOSTIC N/A 8/23/2017    Procedure: ESOPHAGOGASTRODUODENOSCOPY (EGD); Surgeon: Peter Linton MD;  [facility];   Service: Gastroenterology    SALPINGOOPHORECTOMY      B/L       FAMILY HISTORY:  Family History   Problem Relation Age of Onset    Coronary artery disease Brother         Patient has 3 brothers with coronary artery disease    Diabetes Mother     Throat cancer Mother     COPD Father     Cancer Father     Bipolar disorder Sister     Drug abuse Sister     Alcohol abuse Sister        SOCIAL HISTORY:  Social History     Socioeconomic History    Marital status: /Civil Union     Spouse name: Not on file    Number of children: 3    Years of education: Not on file    Highest education level: Not on file   Occupational History    Occupation: Retired   Social Needs    Financial resource strain: Not on file    Food insecurity:     Worry: Not on file     Inability: Not on file   Foruforever needs:     Medical: Not on file     Non-medical: Not on file   Tobacco Use    Smoking status: Never Smoker    Smokeless tobacco: Never Used   Substance and Sexual Activity    Alcohol use: No    Drug use: No    Sexual activity: Not on file   Lifestyle    Physical activity:     Days per week: Not on file     Minutes per session: Not on file    Stress: Not on file   Relationships    Social connections:     Talks on phone: Not on file     Gets together: Not on file     Attends Yazidi service: Not on file     Active member of club or organization: Not on file     Attends meetings of clubs or organizations: Not on file     Relationship status: Not on file    Intimate partner violence:     Fear of current or ex partner: Not on file     Emotionally abused: Not on file     Physically abused: Not on file     Forced sexual activity: Not on file   Other Topics Concern    Not on file   Social History Narrative    Brushes teeth 2x day    Regular dental care    Exercise: walking       Review of Systems   Constitutional: Negative for activity change, chills, fatigue and fever  HENT: Negative for congestion  Eyes: Negative for discharge  Respiratory: Negative for cough, chest tightness and shortness of breath  Cardiovascular: Negative for chest pain, palpitations and leg swelling  Gastrointestinal: Negative for abdominal pain  Genitourinary: Negative for difficulty urinating  Musculoskeletal: Positive for arthralgias, back pain, gait problem, neck pain and neck stiffness  Negative for myalgias  Skin: Negative for rash  Allergic/Immunologic: Negative for immunocompromised state  Neurological: Negative for dizziness, syncope, weakness, light-headedness and headaches  Hematological: Negative for adenopathy  Does not bruise/bleed easily  Psychiatric/Behavioral: Positive for sleep disturbance (Secondary to pain)  Negative for dysphoric mood and suicidal ideas  The patient is not nervous/anxious  Objective:  Vitals:    12/18/19 1445   BP: 128/78   BP Location: Right arm   Patient Position: Sitting   Cuff Size: Standard   Pulse: 92   Resp: 16   SpO2: 98%   Weight: 76 7 kg (169 lb)   Height: 5' 1" (1 549 m)     Body mass index is 31 93 kg/m²  Physical Exam   Constitutional: She is oriented to person, place, and time  She appears well-developed and well-nourished  No distress  HENT:   Head: Normocephalic  Eyes: Pupils are equal, round, and reactive to light  Neck: No JVD present  Carotid bruit is not present  No thyromegaly present  Cardiovascular: Normal rate, regular rhythm and normal heart sounds  Pulmonary/Chest: Effort normal and breath sounds normal  No respiratory distress  She has no wheezes  She exhibits no tenderness  Abdominal: Soft  There is no tenderness  Musculoskeletal: She exhibits no edema  Lymphadenopathy:     She has no cervical adenopathy  Neurological: She is alert and oriented to person, place, and time  Skin: Skin is warm and dry   No rash noted  Psychiatric: She has a normal mood and affect  Her behavior is normal    Nursing note and vitals reviewed  RESULTS:    Recent Results (from the past 1008 hour(s))   Vitamin D 25 hydroxy    Collection Time: 12/16/19  7:00 AM   Result Value Ref Range    Vit D, 25-Hydroxy 46 8 30 0 - 100 0 ng/mL   Comprehensive metabolic panel    Collection Time: 12/16/19  7:00 AM   Result Value Ref Range    Sodium 141 136 - 145 mmol/L    Potassium 4 2 3 5 - 5 3 mmol/L    Chloride 105 100 - 108 mmol/L    CO2 32 21 - 32 mmol/L    ANION GAP 4 4 - 13 mmol/L    BUN 24 5 - 25 mg/dL    Creatinine 1 32 (H) 0 60 - 1 30 mg/dL    Glucose, Fasting 91 65 - 99 mg/dL    Calcium 9 8 8 3 - 10 1 mg/dL    AST 19 5 - 45 U/L    ALT 26 12 - 78 U/L    Alkaline Phosphatase 35 (L) 46 - 116 U/L    Total Protein 7 6 6 4 - 8 2 g/dL    Albumin 4 0 3 5 - 5 0 g/dL    Total Bilirubin 0 34 0 20 - 1 00 mg/dL    eGFR 42 ml/min/1 73sq m   Lipid panel    Collection Time: 12/16/19  7:00 AM   Result Value Ref Range    Cholesterol 169 50 - 200 mg/dL    Triglycerides 121 <=150 mg/dL    HDL, Direct 74 >=40 mg/dL    LDL Calculated 71 0 - 100 mg/dL    Non-HDL-Chol (CHOL-HDL) 95 mg/dl   Hemoglobin A1C    Collection Time: 12/16/19  8:17 AM   Result Value Ref Range    Hemoglobin A1C 5 4 4 2 - 6 3 %     mg/dl       This note was created with voice recognition software  Phonic, grammatical and spelling errors may be present within the note as a result

## 2020-01-05 DIAGNOSIS — J45.20 MILD INTERMITTENT ASTHMA WITHOUT COMPLICATION: ICD-10-CM

## 2020-01-06 ENCOUNTER — TELEPHONE (OUTPATIENT)
Dept: GASTROENTEROLOGY | Facility: CLINIC | Age: 65
End: 2020-01-06

## 2020-01-06 NOTE — TELEPHONE ENCOUNTER
Pt called asking if she should still do procedure Wednesday she has infection in her tooth and is on antibiotics please call

## 2020-01-06 NOTE — TELEPHONE ENCOUNTER
----- Message from GRISELΜKISHORΣΟMARLA Marcelino sent at 1/6/2020 10:27 AM EST -----  Regarding: Pre-Op/Post-Op Question  Contact: [address] [facility]  [phone]

## 2020-01-06 NOTE — TELEPHONE ENCOUNTER
Patient called scheduled for procedure on 01/08/20 - Patient currently on anti-biotic for infected tooth  Is this going to effect procedure   Please call Tammy Cole at [phone]

## 2020-01-08 ENCOUNTER — HOSPITAL ENCOUNTER (OUTPATIENT)
Dept: GASTROENTEROLOGY | Facility: HOSPITAL | Age: 65
Setting detail: OUTPATIENT SURGERY
Discharge: HOME | End: 2020-01-08
Attending: INTERNAL MEDICINE
Payer: MEDICARE

## 2020-01-08 ENCOUNTER — ANESTHESIA EVENT (OUTPATIENT)
Dept: GASTROENTEROLOGY | Facility: HOSPITAL | Age: 65
End: 2020-01-08

## 2020-01-08 ENCOUNTER — ANESTHESIA (OUTPATIENT)
Dept: GASTROENTEROLOGY | Facility: HOSPITAL | Age: 65
End: 2020-01-08

## 2020-01-08 VITALS
WEIGHT: 168.65 LBS | BODY MASS INDEX: 31.84 KG/M2 | RESPIRATION RATE: 16 BRPM | TEMPERATURE: 97.6 F | HEIGHT: 61 IN | OXYGEN SATURATION: 97 % | DIASTOLIC BLOOD PRESSURE: 55 MMHG | SYSTOLIC BLOOD PRESSURE: 106 MMHG | HEART RATE: 76 BPM

## 2020-01-08 DIAGNOSIS — K31.84 GASTROPARESIS: ICD-10-CM

## 2020-01-08 PROCEDURE — 43236 UPPR GI SCOPE W/SUBMUC INJ: CPT | Performed by: INTERNAL MEDICINE

## 2020-01-08 PROCEDURE — 1124F ACP DISCUSS-NO DSCNMKR DOCD: CPT | Performed by: INTERNAL MEDICINE

## 2020-01-08 RX ORDER — METOCLOPRAMIDE HYDROCHLORIDE 5 MG/ML
10 INJECTION INTRAMUSCULAR; INTRAVENOUS EVERY 6 HOURS PRN
Status: DISCONTINUED | OUTPATIENT
Start: 2020-01-08 | End: 2020-01-12 | Stop reason: HOSPADM

## 2020-01-08 RX ORDER — ONDANSETRON 2 MG/ML
INJECTION INTRAMUSCULAR; INTRAVENOUS AS NEEDED
Status: DISCONTINUED | OUTPATIENT
Start: 2020-01-08 | End: 2020-01-08 | Stop reason: SURG

## 2020-01-08 RX ORDER — LIDOCAINE HYDROCHLORIDE 10 MG/ML
INJECTION, SOLUTION EPIDURAL; INFILTRATION; INTRACAUDAL; PERINEURAL AS NEEDED
Status: DISCONTINUED | OUTPATIENT
Start: 2020-01-08 | End: 2020-01-08 | Stop reason: SURG

## 2020-01-08 RX ORDER — PROPOFOL 10 MG/ML
INJECTION, EMULSION INTRAVENOUS AS NEEDED
Status: DISCONTINUED | OUTPATIENT
Start: 2020-01-08 | End: 2020-01-08 | Stop reason: SURG

## 2020-01-08 RX ORDER — SODIUM CHLORIDE, SODIUM LACTATE, POTASSIUM CHLORIDE, CALCIUM CHLORIDE 600; 310; 30; 20 MG/100ML; MG/100ML; MG/100ML; MG/100ML
50 INJECTION, SOLUTION INTRAVENOUS CONTINUOUS
Status: DISCONTINUED | OUTPATIENT
Start: 2020-01-08 | End: 2020-01-12 | Stop reason: HOSPADM

## 2020-01-08 RX ADMIN — SODIUM CHLORIDE, SODIUM LACTATE, POTASSIUM CHLORIDE, AND CALCIUM CHLORIDE: .6; .31; .03; .02 INJECTION, SOLUTION INTRAVENOUS at 08:34

## 2020-01-08 RX ADMIN — ONDANSETRON 4 MG: 2 INJECTION INTRAMUSCULAR; INTRAVENOUS at 08:55

## 2020-01-08 RX ADMIN — PROPOFOL 30 MG: 10 INJECTION, EMULSION INTRAVENOUS at 08:58

## 2020-01-08 RX ADMIN — PROPOFOL 100 MG: 10 INJECTION, EMULSION INTRAVENOUS at 08:57

## 2020-01-08 RX ADMIN — METOCLOPRAMIDE 10 MG: 5 INJECTION, SOLUTION INTRAMUSCULAR; INTRAVENOUS at 09:36

## 2020-01-08 RX ADMIN — LIDOCAINE HYDROCHLORIDE 50 MG: 10 INJECTION, SOLUTION EPIDURAL; INFILTRATION; INTRACAUDAL; PERINEURAL at 08:57

## 2020-01-08 RX ADMIN — ONABOTULINUMTOXINA 100 UNITS: 100 INJECTION, POWDER, LYOPHILIZED, FOR SOLUTION INTRADERMAL; INTRAMUSCULAR at 09:00

## 2020-01-08 NOTE — DISCHARGE INSTRUCTIONS
Upper Endoscopy   WHAT YOU NEED TO KNOW:   An upper endoscopy is also called an upper gastrointestinal (GI) endoscopy, or an esophagogastroduodenoscopy (EGD)  You may feel bloated, gassy, or have some abdominal discomfort after your procedure  Your throat may be sore for 24 to 36 hours  You may burp or pass gas from air that is still inside your body  DISCHARGE INSTRUCTIONS:   Call 911 if:   · You have sudden chest pain or trouble breathing  Seek care immediately if:   · You feel dizzy or faint  · You have trouble swallowing  · You have severe throat pain  · Your bowel movements are very dark or black  · Your abdomen is hard and firm and you have severe pain  · You vomit blood  Contact your healthcare provider if:   · You feel full or bloated and cannot burp or pass gas  · You have not had a bowel movement for 3 days after your procedure  · You have neck pain  · You have a fever or chills  · You have nausea or are vomiting  · You have a rash or hives  · You have questions or concerns about your endoscopy  Relieve a sore throat:  Suck on throat lozenges or crushed ice  Gargle with a small amount of warm salt water  Mix 1 teaspoon of salt and 1 cup of warm water to make salt water  Relieve gas and discomfort from bloating:  Lie on your right side with a heating pad on your abdomen  Take short walks to help pass gas  Eat small meals until bloating is relieved  Rest after your procedure:  Do not drive or make important decisions until the day after your procedure  Return to your normal activity as directed  You can usually return to work the day after your procedure  Follow up with your healthcare provider as directed:  Write down your questions so you remember to ask them during your visits  © 2017 [employer]  Information is for End User's use only and may not be sold, redistributed or otherwise used for commercial purposes   All illustrations and images included in [employer] are the copyrighted property of [employer]  or [employer]  The above information is an  only  It is not intended as medical advice for individual conditions or treatments  Talk to your doctor, nurse or pharmacist before following any medical regimen to see if it is safe and effective for you

## 2020-01-08 NOTE — H&P
History and Physical - [facility]  [facility] Sydnie Choudhury 75 y o  female MRN: [id number]                  HPI: Jesus Mckinney is a 75y o  year old female who presents for endoscopy for botulinum toxin injection of the antrum for refractory gastroparesis      REVIEW OF SYSTEMS: Per the HPI, and otherwise unremarkable      Historical Information   Past Medical History:   Diagnosis Date    Anemia     Anxiety     Asthma     Cataract     Chronic back pain     Chronic pain disorder     Back    Depression     GERD (gastroesophageal reflux disease)     HTN (hypertension)     Hyperlipidemia     Interstitial cystitis     Muscle cramps     Muscle weakness     Obesity     Pneumonia     PONV (postoperative nausea and vomiting)     Radiculopathy, lumbar region     Spondylosis, cervical, with myelopathy     Tinea corporis 2018    Vulvovaginitis      Past Surgical History:   Procedure Laterality Date    APPENDECTOMY      BACK SURGERY      Arthrodeiss lumbar    BACK SURGERY      Spinal stereotaxis of cord    BLADDER SURGERY      Electronic bladder stimulator implantation    CATARACT EXTRACTION      CATARACT EXTRACTION, BILATERAL       SECTION      x3    CHOLECYSTECTOMY      COLON SURGERY      Intestinal stricturoplasty     DECOMPRESSION SPINE LUMBAR POSTERIOR Bilateral 2018    Procedure: DECOMPRESSION SPINE LUMBAR POSTERIOR L1-4, L1-2 DISKECTOMY, POSTERIORLATERAL FUSION L3-4, REMOVAL OF SPINAL CORD STIMULATOR SYSTEM, REMOVAL OF INTERSPINOUS DEVICES;  Surgeon: Dashawn Mcgrath MD;  [facility];  Service: Neurosurgery    FINGER SURGERY      Extensor tendon repair (mallet finger)    FL MYELOGRAM LUMBAR  2018    HERNIA REPAIR      x3    HYSTERECTOMY      [place] DRAINAGE OF HEMATOMA/FLUID Bilateral 10/9/2018    Procedure: LUMBAR WOUND EXPLORATION/EVACUATION OF SERONMA;  Surgeon: Dashawn Mcgrath MD;  [facility];  Service: Neurosurgery    [place] ESOPHAGOGASTRODUODENOSCOPY TRANSORAL DIAGNOSTIC N/A 8/23/2017    Procedure: ESOPHAGOGASTRODUODENOSCOPY (EGD); Surgeon: Alfredo Pedraza MD;  [facility]; Service: Gastroenterology    SALPINGOOPHORECTOMY      B/L     Social History   Social History     Substance and Sexual Activity   Alcohol Use No     Social History     Substance and Sexual Activity   Drug Use No     Social History     Tobacco Use   Smoking Status Never Smoker   Smokeless Tobacco Never Used     Family History   Problem Relation Age of Onset    Coronary artery disease Brother         Patient has 3 brothers with coronary artery disease    Diabetes Mother     Throat cancer Mother     COPD Father     Cancer Father     Bipolar disorder Sister     Drug abuse Sister     Alcohol abuse Sister        Meds/Allergies       (Not in a hospital admission)    Allergies   Allergen Reactions    Clindamycin Hives     Category: Allergy;     Erythromycin Hives and Rash     Category: Allergy;     Latex Hives    Penicillins Hives and Shortness Of Breath    Morphine GI Intolerance and Vomiting     Category: Adverse Reaction;     Erythromycin Base Other (See Comments)     vomitting    Other     Penicillin V Seizures     Category: Allergy;     Adhesive [Medical Tape] Rash     Skin irritation    Povidone Iodine Rash     Category: Adverse Reaction;        Objective     Blood pressure 144/71, pulse 84, temperature 98 9 °F (37 2 °C), temperature source Temporal, resp  rate 18, height 5' 1" (1 549 m), weight 76 5 kg (168 lb 10 4 oz), SpO2 97 %, not currently breastfeeding        PHYSICAL EXAM    /71   Pulse 84   Temp 98 9 °F (37 2 °C) (Temporal)   Resp 18   Ht 5' 1" (1 549 m)   Wt 76 5 kg (168 lb 10 4 oz)   LMP  (LMP Unknown)   SpO2 97%   BMI 31 87 kg/m²       Gen: NAD  CV: RRR  CHEST: Clear  ABD: soft, NT/ND  EXT: no edema      ASSESSMENT/PLAN:  This is a 75y o  year old female here for EGD with Botox, and she is stable and optimized for her procedure

## 2020-01-08 NOTE — ANESTHESIA POSTPROCEDURE EVALUATION
Post-Op Assessment Note    CV Status:  Stable  Pain Score: 0    Pain management: adequate     Mental Status:  Sleepy   PONV Controlled:  None   Airway Patency:  Patent and adequate   Post Op Vitals Reviewed: Yes      Staff: CRNA           BP   106/58   Temp   97 2   Pulse  78   Resp   16   SpO2   96

## 2020-01-08 NOTE — ANESTHESIA PREPROCEDURE EVALUATION
Review of Systems/Medical History  Patient summary reviewed  Chart reviewed  History of anesthetic complications PONV    Cardiovascular  EKG reviewed, Exercise tolerance (METS): <4,  Hyperlipidemia, Hypertension controlled,   Comment: Negative NM stress test 2017,  Pulmonary  Asthma , well controlled/ stable Asthma type of rescue: PRN inhaler,   Comment: Use PO albuterol in addition to prn inhaler, no recent flares     GI/Hepatic    GERD poorly controlled, Liver disease ,   Comment: MA    Chronic nausea, takes zofran PO TID, last dose 4 am     Chronic kidney disease stage 2,        Endo/Other    Obesity    GYN    Hysterectomy,        Hematology  Anemia ,     Musculoskeletal  Back pain , cervical pain, thoracic pain and lumbar pain,        Neurology   Psychology   Anxiety, Depression ,   Chronic opioid dependence Chronic pain,            Physical Exam    Airway    Mallampati score: II  TM Distance: >3 FB  Neck ROM: full     Dental       Cardiovascular      Pulmonary      Other Findings        Anesthesia Plan  ASA Score- 3     Anesthesia Type- IV sedation with anesthesia with ASA Monitors     Additional Monitors:   Airway Plan:     Comment: Recent labs personally reviewed:  Lab Results       Component                Value               Date                       WBC                      7 26                05/27/2019                 HGB                      12 4                05/27/2019                 PLT                      409 (H)             05/27/2019            Lab Results       Component                Value               Date                       NA                       142                 12/09/2015                 K                        4 2                 12/16/2019                 BUN                      24                  12/16/2019                 CREATININE               1 32 (H)            12/16/2019                 GLUCOSE                  96                  12/09/2015            Lab Results       Component                Value               Date                       PTT                      29 09/07/2018             Lab Results       Component                Value               Date                       INR                      1 02                09/07/2018              Blood type Christoph Willis I, Lizz Guaman MD, have personally seen and evaluated the patient prior to anesthetic care  I have reviewed the pre-anesthetic record, medical history, allergies, medications and any other medical records if appropriate to the anesthetic care  If a CRNA is involved in the case, I have reviewed the CRNA assessment, if present, and agree  I discussed the anesthetic plan and risks/benefits/alternatives with the patient including possible PONV, sore throat, and possibility of rare anesthetic and surgical emergencies        Plan Factors-  Patient did not smoke on day of surgery  Induction- intravenous  Postoperative Plan-     Informed Consent- Anesthetic plan and risks discussed with patient  I personally reviewed this patient with the CRNA  Discussed and agreed on the Anesthesia Plan with the CRNA  Daxa Back

## 2020-01-09 DIAGNOSIS — J30.9 ALLERGIC RHINITIS, UNSPECIFIED SEASONALITY, UNSPECIFIED TRIGGER: ICD-10-CM

## 2020-01-09 RX ORDER — CETIRIZINE HYDROCHLORIDE, PSEUDOEPHEDRINE HYDROCHLORIDE 5; 120 MG/1; MG/1
1 TABLET, FILM COATED, EXTENDED RELEASE ORAL 2 TIMES DAILY
Qty: 180 TABLET | Refills: 1 | Status: SHIPPED | OUTPATIENT
Start: 2020-01-09 | End: 2020-04-13 | Stop reason: SDUPTHER

## 2020-01-10 DIAGNOSIS — M62.830 MUSCLE SPASM OF BACK: ICD-10-CM

## 2020-01-10 RX ORDER — TIZANIDINE 4 MG/1
4 TABLET ORAL 3 TIMES DAILY
Qty: 90 TABLET | Refills: 0 | Status: SHIPPED | OUTPATIENT
Start: 2020-01-10 | End: 2020-02-06

## 2020-01-12 DIAGNOSIS — J45.20 MILD INTERMITTENT ASTHMA WITHOUT COMPLICATION: ICD-10-CM

## 2020-01-13 DIAGNOSIS — J45.20 MILD INTERMITTENT ASTHMA WITHOUT COMPLICATION: ICD-10-CM

## 2020-01-14 DIAGNOSIS — N12 PYELONEPHRITIS: ICD-10-CM

## 2020-01-14 DIAGNOSIS — E78.5 HYPERLIPIDEMIA, UNSPECIFIED HYPERLIPIDEMIA TYPE: ICD-10-CM

## 2020-01-14 RX ORDER — ATORVASTATIN CALCIUM 20 MG/1
20 TABLET, FILM COATED ORAL DAILY
Qty: 90 TABLET | Refills: 1 | Status: SHIPPED | OUTPATIENT
Start: 2020-01-14 | End: 2021-01-26 | Stop reason: SDUPTHER

## 2020-01-14 RX ORDER — ATORVASTATIN CALCIUM 20 MG/1
TABLET, FILM COATED ORAL
Qty: 90 TABLET | Refills: 1 | OUTPATIENT
Start: 2020-01-14

## 2020-01-14 RX ORDER — ONDANSETRON 4 MG/1
4 TABLET, FILM COATED ORAL EVERY 8 HOURS PRN
Qty: 30 TABLET | Refills: 1 | Status: SHIPPED | OUTPATIENT
Start: 2020-01-14 | End: 2020-04-22

## 2020-01-14 NOTE — TELEPHONE ENCOUNTER
Check to see what pharmacy she and her  are now using, as I am not sure this is the correct pharmacy    If it is not, let's deleted from her profile

## 2020-01-27 DIAGNOSIS — K31.84 GASTROPARESIS: ICD-10-CM

## 2020-01-27 NOTE — TELEPHONE ENCOUNTER
Spoke with patient's  she is requesting the disintegrating tablet of Zofran 4 mg to be sent to [facility]

## 2020-01-28 DIAGNOSIS — I10 ESSENTIAL (PRIMARY) HYPERTENSION: ICD-10-CM

## 2020-01-28 RX ORDER — LISINOPRIL 10 MG/1
TABLET ORAL
Qty: 90 TABLET | Refills: 1 | Status: SHIPPED | OUTPATIENT
Start: 2020-01-28 | End: 2020-04-20

## 2020-01-28 RX ORDER — ONDANSETRON 4 MG/1
4 TABLET, ORALLY DISINTEGRATING ORAL
Qty: 90 TABLET | Refills: 2 | Status: SHIPPED | OUTPATIENT
Start: 2020-01-28 | End: 2020-01-31 | Stop reason: SDUPTHER

## 2020-01-31 ENCOUNTER — OFFICE VISIT (OUTPATIENT)
Dept: INTERNAL MEDICINE CLINIC | Facility: CLINIC | Age: 65
End: 2020-01-31
Payer: MEDICARE

## 2020-01-31 VITALS
HEART RATE: 84 BPM | OXYGEN SATURATION: 99 % | DIASTOLIC BLOOD PRESSURE: 70 MMHG | RESPIRATION RATE: 18 BRPM | BODY MASS INDEX: 31.34 KG/M2 | SYSTOLIC BLOOD PRESSURE: 118 MMHG | WEIGHT: 166 LBS | HEIGHT: 61 IN

## 2020-01-31 DIAGNOSIS — L30.1 DYSHYDROSIS: ICD-10-CM

## 2020-01-31 DIAGNOSIS — H10.32 ACUTE CONJUNCTIVITIS OF LEFT EYE, UNSPECIFIED ACUTE CONJUNCTIVITIS TYPE: ICD-10-CM

## 2020-01-31 DIAGNOSIS — K21.9 GASTROESOPHAGEAL REFLUX DISEASE, ESOPHAGITIS PRESENCE NOT SPECIFIED: ICD-10-CM

## 2020-01-31 DIAGNOSIS — L30.9 ECZEMA, UNSPECIFIED TYPE: Primary | ICD-10-CM

## 2020-01-31 PROCEDURE — 99213 OFFICE O/P EST LOW 20 MIN: CPT | Performed by: PHYSICIAN ASSISTANT

## 2020-01-31 RX ORDER — TOBRAMYCIN 3 MG/ML
1 SOLUTION/ DROPS OPHTHALMIC
Qty: 5 ML | Refills: 0 | Status: SHIPPED | OUTPATIENT
Start: 2020-01-31 | End: 2020-03-03 | Stop reason: ALTCHOICE

## 2020-01-31 RX ORDER — PANTOPRAZOLE SODIUM 40 MG/1
40 TABLET, DELAYED RELEASE ORAL
Qty: 180 TABLET | Refills: 1 | Status: SHIPPED | OUTPATIENT
Start: 2020-01-31 | End: 2020-09-07

## 2020-01-31 RX ORDER — TRIAMCINOLONE ACETONIDE 5 MG/G
CREAM TOPICAL 3 TIMES DAILY
Qty: 30 G | Refills: 3 | Status: SHIPPED | OUTPATIENT
Start: 2020-01-31 | End: 2020-05-25

## 2020-01-31 NOTE — PATIENT INSTRUCTIONS
Use steroid cream sparingly on right shin area and hand and also behind left ear  Do this twice a day until cleared, then put cream on the shelf in case you need to use later  Use eyedrops in left eye 4 times a day for next 5-7 days    If I is not clearing get in contact with your eye professional

## 2020-01-31 NOTE — PROGRESS NOTES
Assessment/Plan:   Patient Instructions   Use steroid cream sparingly on right shin area and hand and also behind left ear  Do this twice a day until cleared, then put cream on the shelf in case you need to use later  Use eyedrops in left eye 4 times a day for next 5-7 days  If I is not clearing get in contact with your eye professional        Quality Measures:       No follow-ups on file  Diagnoses and all orders for this visit:    Eczema, unspecified type  -     triamcinolone (KENALOG) 0 5 % cream; Apply topically 3 (three) times a day    Dyshydrosis  -     triamcinolone (KENALOG) 0 5 % cream; Apply topically 3 (three) times a day    Acute conjunctivitis of left eye, unspecified acute conjunctivitis type  -     tobramycin (TOBREX) 0 3 % SOLN; Administer 1 drop to both eyes every 4 (four) hours while awake          Subjective:      Patient ID: Ananda Webb is a 75 y o  female  Acute visit    Rash on hands and right leg  Present for approximately 4 weeks ago  Very itchy at night  She admits she has been scratching  Left eye has been red, watering, and itchy  She complains of a film over the eye at times  No upper respiratory symptoms  She does admit she has been itching the I also  Patient's ROS has been reviewed  Rash   This is a new problem  The current episode started 1 to 4 weeks ago  The problem is unchanged  The affected locations include theright hand and right lower leg  The rash is characterized by blistering, pain, swelling and itchiness  It is unknown if there was an exposure to a precipitant  Associated symptoms include eye pain, facial edema and fatigue  Pertinent negatives include no anorexia, congestion, cough, diarrhea, fever, joint pain, nail changes, rhinorrhea, shortness of breath, sore throat or vomiting  ALLERGIES:  Allergies   Allergen Reactions    Clindamycin Hives     Category: Allergy;     Erythromycin Hives and Rash     Category:  Allergy;     Latex Hives    Penicillins Hives and Shortness Of Breath    Morphine GI Intolerance and Vomiting     Category: Adverse Reaction;     Erythromycin Base Other (See Comments)     vomitting    Other     Penicillin V Seizures     Category: Allergy;     Adhesive [Medical Tape] Rash     Skin irritation    Povidone Iodine Rash     Category:  Adverse Reaction;        CURRENT MEDICATIONS:    Current Outpatient Medications:     albuterol 2 mg/5 mL syrup, Take 5 mL (2 mg total) by mouth every 4 (four) hours as needed (wheezing), Disp: 473 mL, Rfl: 3    atorvastatin (LIPITOR) 20 mg tablet, Take 1 tablet (20 mg total) by mouth daily, Disp: 90 tablet, Rfl: 1    cetirizine-pseudoephedrine (ZYRTEC-D ALLERGY & CONGESTION) 5-120 MG per tablet, Take 1 tablet by mouth 2 (two) times a day, Disp: 180 tablet, Rfl: 1    fluticasone (FLONASE) 50 mcg/act nasal spray, SPRAY 2 SPRAYS INTO EACH NOSTRIL EVERY DAY, Disp: 16 g, Rfl: 5    furosemide (LASIX) 40 mg tablet, TAKE 1 TABLET (40 MG) BY MOUTH DAILY, Disp: 90 tablet, Rfl: 3    gabapentin (NEURONTIN) 800 mg tablet, Take 800 mg by mouth 3 (three) times a day, Disp: , Rfl:     ketoconazole (NIZORAL) 2 % cream, APPLY TO AFFECTED AREA EVERY DAY, Disp: 60 g, Rfl: 1    lisinopril (ZESTRIL) 10 mg tablet, TAKE 1 TABLET BY MOUTH EVERY DAY, Disp: 90 tablet, Rfl: 1    ondansetron (ZOFRAN) 4 mg tablet, Take 1 tablet (4 mg total) by mouth every 8 (eight) hours as needed for nausea or vomiting, Disp: 30 tablet, Rfl: 1    OXYCONTIN 20 MG 12 hr tablet, 30 mg, Disp: , Rfl: 0    pantoprazole (PROTONIX) 40 mg tablet, TAKE 1 TABLET BY MOUTH TWICE A DAY 30 MINUTES BEFORE BREAKFAST AND DINNER, Disp: 180 tablet, Rfl: 1    prochlorperazine (COMPAZINE) 10 mg tablet, TAKE 1 TABLET (10 MG TOTAL) BY MOUTH 2 (TWO) TIMES A DAY AS NEEDED FOR NAUSEA, Disp: 60 tablet, Rfl: 0    tiZANidine (ZANAFLEX) 4 mg tablet, TAKE 1 TABLET (4 MG TOTAL) BY MOUTH 3 (THREE) TIMES A DAY, Disp: 90 tablet, Rfl: 0    VENTOLIN HFA 108 (90 Base) MCG/ACT inhaler, INHALE 2 PUFFS 3 (THREE) TIMES A DAY AS NEEDED FOR SHORTNESS OF BREATH, Disp: 18 Inhaler, Rfl: 3    estradiol (ESTRACE) 0 1 mg/g vaginal cream, Insert 1 g into the vagina 2 (two) times a week (Patient not taking: Reported on 12/18/2019), Disp: 42 5 g, Rfl: 3    fenofibrate (TRIGLIDE) 160 MG tablet, TAKE 1 TABLET (160 MG TOTAL) BY MOUTH DAILY FOR 90 DAYS TAKE WITH A MEAL, Disp: 90 tablet, Rfl: 0    pentosan polysulfate (ELMIRON) 100 mg capsule, Take 2 capsules (200 mg total) by mouth 3 (three) times a day (Patient not taking: Reported on 1/31/2020), Disp: 180 capsule, Rfl: 5    tobramycin (TOBREX) 0 3 % SOLN, Administer 1 drop to both eyes every 4 (four) hours while awake, Disp: 5 mL, Rfl: 0    triamcinolone (KENALOG) 0 5 % cream, Apply topically 3 (three) times a day, Disp: 30 g, Rfl: 3    ACTIVE PROBLEM LIST:  Patient Active Problem List   Diagnosis    Acute bilateral back pain, intractable    Continuous opioid dependence (HCC)    Vitamin D deficiency    Cataract, bilateral    Cervical radiculopathy, chronic    Delayed gastric emptying    Essential (primary) hypertension    Fatty liver disease, nonalcoholic    GERD (gastroesophageal reflux disease)    Hot flashes, menopausal    Interstitial cystitis    Lumbar post-laminectomy syndrome    Mild intermittent asthma without complication    Lumbosacral spondylosis without myelopathy    Radiculopathy, lumbar region    Thoracic and lumbosacral neuritis    Hyperlipidemia    Lumbar canal stenosis with diffuse disc bulge    Multiple falls    Lumbar disc herniation    Spinal stenosis of lumbar region with neurogenic claudication    Acute bilateral low back pain with bilateral sciatica    Allergic rhinitis       PAST MEDICAL HISTORY:  Past Medical History:   Diagnosis Date    Anemia     Anxiety     Asthma     Cataract     Chronic back pain     Chronic pain disorder     Back    Depression     GERD (gastroesophageal reflux disease)     HTN (hypertension)     Hyperlipidemia     Interstitial cystitis     Muscle cramps     Muscle weakness     Obesity     Pneumonia     PONV (postoperative nausea and vomiting)     Radiculopathy, lumbar region     Spondylosis, cervical, with myelopathy     Tinea corporis 2018    Vulvovaginitis        PAST SURGICAL HISTORY:  Past Surgical History:   Procedure Laterality Date    APPENDECTOMY      BACK SURGERY      Arthrodeiss lumbar    BACK SURGERY      Spinal stereotaxis of cord    BLADDER SURGERY      Electronic bladder stimulator implantation    CATARACT EXTRACTION      CATARACT EXTRACTION, BILATERAL       SECTION      x3    CHOLECYSTECTOMY      COLON SURGERY      Intestinal stricturoplasty     DECOMPRESSION SPINE LUMBAR POSTERIOR Bilateral 2018    Procedure: DECOMPRESSION SPINE LUMBAR POSTERIOR L1-4, L1-2 DISKECTOMY, POSTERIORLATERAL FUSION L3-4, REMOVAL OF SPINAL CORD STIMULATOR SYSTEM, REMOVAL OF INTERSPINOUS DEVICES;  Surgeon: Dagmar Barragan MD;  [facility];  Service: Neurosurgery    FINGER SURGERY      Extensor tendon repair (mallet finger)    FL MYELOGRAM LUMBAR  2018    HERNIA REPAIR      x3    HYSTERECTOMY      [place] DRAINAGE OF HEMATOMA/FLUID Bilateral 10/9/2018    Procedure: LUMBAR WOUND EXPLORATION/EVACUATION OF SERONMA;  Surgeon: Dagmar Barragan MD;  [facility];  Service: Neurosurgery    [place] ESOPHAGOGASTRODUODENOSCOPY TRANSORAL DIAGNOSTIC N/A 2017    Procedure: ESOPHAGOGASTRODUODENOSCOPY (EGD); Surgeon: Christopher Gannon MD;  [facility];   Service: Gastroenterology    SALPINGOOPHORECTOMY      B/L       FAMILY HISTORY:  Family History   Problem Relation Age of Onset    Coronary artery disease Brother         Patient has 3 brothers with coronary artery disease    Diabetes Mother     Throat cancer Mother     COPD Father     Cancer Father     Bipolar disorder Sister     Drug abuse Sister  Alcohol abuse Sister        SOCIAL HISTORY:  Social History     Socioeconomic History    Marital status: /Civil Union     Spouse name: Not on file    Number of children: 3    Years of education: Not on file    Highest education level: Not on file   Occupational History    Occupation: Retired   Social Needs    Financial resource strain: Not on file    Food insecurity:     Worry: Not on file     Inability: Not on file   [employer] needs:     Medical: Not on file     Non-medical: Not on file   Tobacco Use    Smoking status: Never Smoker    Smokeless tobacco: Never Used   Substance and Sexual Activity    Alcohol use: No    Drug use: No    Sexual activity: Not on file   Lifestyle    Physical activity:     Days per week: Not on file     Minutes per session: Not on file    Stress: Not on file   Relationships    Social connections:     Talks on phone: Not on file     Gets together: Not on file     Attends Bahai service: Not on file     Active member of club or organization: Not on file     Attends meetings of clubs or organizations: Not on file     Relationship status: Not on file    Intimate partner violence:     Fear of current or ex partner: Not on file     Emotionally abused: Not on file     Physically abused: Not on file     Forced sexual activity: Not on file   Other Topics Concern    Not on file   Social History Narrative    Brushes teeth 2x day    Regular dental care    Exercise: walking       Review of Systems   Constitutional: Positive for fatigue  Negative for fever  HENT: Negative for congestion, rhinorrhea and sore throat  Eyes: Positive for pain, redness and itching  Negative for visual disturbance  Respiratory: Negative for cough and shortness of breath  Gastrointestinal: Negative for anorexia, diarrhea and vomiting  Musculoskeletal: Negative for joint pain  Skin: Positive for rash  Negative for nail changes           Objective:  Vitals:    01/31/20 9313 BP: 118/70   BP Location: Left arm   Patient Position: Sitting   Cuff Size: Large   Pulse: 84   Resp: 18   SpO2: 99%   Weight: 75 3 kg (166 lb)   Height: 5' 1" (1 549 m)     Body mass index is 31 37 kg/m²  Physical Exam   Constitutional: She appears well-developed and well-nourished  HENT:   Head: Normocephalic  Eyes: Pupils are equal, round, and reactive to light  EOM are normal  Left eye exhibits discharge  Left conjunctiva is injected  No scleral icterus  Erythema and injection of both palpebral and bulbar conjunctiva with clear drainage   Neck: Neck supple  Lymphadenopathy:     She has no cervical adenopathy  Skin: Skin is warm and dry  Dyshidrotic rash on dorsal left hand over metacarpophalangeal joint region  Present also in right hand  Eczematous type excoriated rash on right pretibial area and also behind left ear  No secondary signs of infection at this time  RESULTS:    No results found for this or any previous visit (from the past 1008 hour(s))  This note was created with voice recognition software  Phonic, grammatical and spelling errors may be present within the note as a result

## 2020-02-06 ENCOUNTER — TELEPHONE (OUTPATIENT)
Dept: INTERNAL MEDICINE CLINIC | Facility: CLINIC | Age: 65
End: 2020-02-06

## 2020-02-06 DIAGNOSIS — M62.830 MUSCLE SPASM OF BACK: ICD-10-CM

## 2020-02-06 RX ORDER — TIZANIDINE 4 MG/1
4 TABLET ORAL 3 TIMES DAILY
Qty: 270 TABLET | Refills: 1 | Status: SHIPPED | OUTPATIENT
Start: 2020-02-06 | End: 2020-04-16 | Stop reason: SDUPTHER

## 2020-02-06 RX ORDER — TIZANIDINE 4 MG/1
4 TABLET ORAL 3 TIMES DAILY
Qty: 90 TABLET | Refills: 1 | Status: SHIPPED | OUTPATIENT
Start: 2020-02-06 | End: 2020-02-06

## 2020-02-17 DIAGNOSIS — E78.1 HYPERTRIGLYCERIDEMIA: ICD-10-CM

## 2020-02-17 RX ORDER — FENOFIBRATE 160 MG/1
TABLET ORAL
Qty: 90 TABLET | Refills: 3 | Status: SHIPPED | OUTPATIENT
Start: 2020-02-17 | End: 2020-02-19 | Stop reason: SDUPTHER

## 2020-02-19 DIAGNOSIS — E78.1 HYPERTRIGLYCERIDEMIA: ICD-10-CM

## 2020-02-19 RX ORDER — FENOFIBRATE 160 MG/1
160 TABLET ORAL DAILY
Qty: 90 TABLET | Refills: 3 | Status: SHIPPED | OUTPATIENT
Start: 2020-02-19 | End: 2021-01-26 | Stop reason: SDUPTHER

## 2020-02-25 ENCOUNTER — TELEPHONE (OUTPATIENT)
Dept: NEUROSURGERY | Facility: CLINIC | Age: 65
End: 2020-02-25

## 2020-02-25 DIAGNOSIS — Z98.890 STATUS POST LUMBAR LAMINECTOMY: Primary | ICD-10-CM

## 2020-02-25 NOTE — TELEPHONE ENCOUNTER
reports pt is experiencing clicking and back pain  Pt was to have a f/u with xray over a yr ago and did not complete  He questions getting it now with a f/u  New order placed and appt  offered and accepted

## 2020-03-03 ENCOUNTER — OFFICE VISIT (OUTPATIENT)
Dept: INTERNAL MEDICINE CLINIC | Facility: CLINIC | Age: 65
End: 2020-03-03
Payer: MEDICARE

## 2020-03-03 VITALS
SYSTOLIC BLOOD PRESSURE: 120 MMHG | BODY MASS INDEX: 31.72 KG/M2 | DIASTOLIC BLOOD PRESSURE: 74 MMHG | WEIGHT: 168 LBS | RESPIRATION RATE: 18 BRPM | HEART RATE: 78 BPM | HEIGHT: 61 IN

## 2020-03-03 DIAGNOSIS — Z12.39 SCREENING FOR BREAST CANCER: ICD-10-CM

## 2020-03-03 DIAGNOSIS — R22.0 LEFT FACIAL SWELLING: Primary | ICD-10-CM

## 2020-03-03 DIAGNOSIS — Z12.39 SCREENING FOR MALIGNANT NEOPLASM OF BREAST: ICD-10-CM

## 2020-03-03 DIAGNOSIS — Z12.12 SCREENING FOR COLORECTAL CANCER: ICD-10-CM

## 2020-03-03 DIAGNOSIS — Z12.11 SCREENING FOR COLORECTAL CANCER: ICD-10-CM

## 2020-03-03 PROCEDURE — 3074F SYST BP LT 130 MM HG: CPT | Performed by: PHYSICIAN ASSISTANT

## 2020-03-03 PROCEDURE — 99213 OFFICE O/P EST LOW 20 MIN: CPT | Performed by: PHYSICIAN ASSISTANT

## 2020-03-03 PROCEDURE — 1160F RVW MEDS BY RX/DR IN RCRD: CPT | Performed by: PHYSICIAN ASSISTANT

## 2020-03-03 PROCEDURE — 3078F DIAST BP <80 MM HG: CPT | Performed by: PHYSICIAN ASSISTANT

## 2020-03-03 PROCEDURE — 3008F BODY MASS INDEX DOCD: CPT | Performed by: PHYSICIAN ASSISTANT

## 2020-03-03 PROCEDURE — 1036F TOBACCO NON-USER: CPT | Performed by: PHYSICIAN ASSISTANT

## 2020-03-03 RX ORDER — OXYCODONE HYDROCHLORIDE 10 MG/1
10 TABLET ORAL EVERY 8 HOURS PRN
COMMUNITY
Start: 2020-02-13

## 2020-03-03 NOTE — PATIENT INSTRUCTIONS
Clinically patient's symptoms sound as if her left parotid gland is becoming intermittently swollen possibly from a stone  We will have a CT of the salivary glands done  She will also put her furosemide (Lasix) on hold and only uses legs are swelling  Will bring back for blood pressure check in 2 weeks  Also have asked patient to eat something sour tonight and let me know tomorrow morning whether not left side of face swelled

## 2020-03-03 NOTE — PROGRESS NOTES
Assessment/Plan:   Patient Instructions   Clinically patient's symptoms sound as if her left parotid gland is becoming intermittently swollen possibly from a stone  We will have a CT of the salivary glands done  She will also put her furosemide (Lasix) on hold and only uses legs are swelling  Will bring back for blood pressure check in 2 weeks  Also have asked patient to eat something sour tonight and let me know tomorrow morning whether not left side of face swelled  Quality Measures:       Return in about 2 weeks (around 3/17/2020) for Next scheduled follow up  Diagnoses and all orders for this visit:    Left facial swelling  -     CT salivary glands; Future    Screening for colorectal cancer  -     Cologuard; Future    Screening for malignant neoplasm of breast    Screening for breast cancer  -     Cancel: Mammo screening bilateral w 3d & cad; Future  -     Mammo screening bilateral w 3d & cad; Future    Other orders  -     Cancel: Ambulatory referral to Gastroenterology; Future  -     Cancel: Mammo screening bilateral w 3d & cad; Future  -     oxyCODONE (ROXICODONE) 10 MG TABS          Subjective:      Patient ID: Kendra Becerra is a 77 y o  female  Acute visit    Swelling left side of face  Patient states for the better part of months she has been having intermittent swelling of the left side of her face  Wanted is swollen it is tender and red  She is stating this can occur when eating any kind of food, not just sour foods which she states she does not eat much  She has had dental work on that side of her face, she has seen her dentist on follow-up and he had put her on sulfa antibiotic  Despite being on the antibiotic the face continues to intermittently swell  She also still complains of a film intermittently covering her eyes, that will last a few minutes and then clear  She states it almost feels as if she has another cataract    She also feels somewhat congested at times     She also continues to have ongoing GI issues  She is status post Botox injection for gastroparesis  She is stating that the procedure seem to have helped  She is on what sounds to be a gluten free diet which she is complaining about  We had a long discussion about the diet, and different food groups she could eat  Will have mammogram done tomorrow  Electing to do colo guard for colon cancer screening  ALLERGIES:  Allergies   Allergen Reactions    Clindamycin Hives     Category: Allergy;     Erythromycin Hives and Rash     Category: Allergy;     Latex Hives    Penicillins Hives and Shortness Of Breath    Morphine GI Intolerance and Vomiting     Category: Adverse Reaction;     Erythromycin Base Other (See Comments)     vomitting    Other     Penicillin V Seizures     Category: Allergy;     Adhesive [Medical Tape] Rash     Skin irritation    Povidone Iodine Rash     Category:  Adverse Reaction;        CURRENT MEDICATIONS:    Current Outpatient Medications:     albuterol 2 mg/5 mL syrup, Take 5 mL (2 mg total) by mouth every 4 (four) hours as needed (wheezing), Disp: 473 mL, Rfl: 3    atorvastatin (LIPITOR) 20 mg tablet, Take 1 tablet (20 mg total) by mouth daily, Disp: 90 tablet, Rfl: 1    cetirizine-pseudoephedrine (ZYRTEC-D ALLERGY & CONGESTION) 5-120 MG per tablet, Take 1 tablet by mouth 2 (two) times a day, Disp: 180 tablet, Rfl: 1    fenofibrate (TRIGLIDE) 160 MG tablet, Take 1 tablet (160 mg total) by mouth daily, Disp: 90 tablet, Rfl: 3    fluticasone (FLONASE) 50 mcg/act nasal spray, SPRAY 2 SPRAYS INTO EACH NOSTRIL EVERY DAY, Disp: 16 g, Rfl: 5    furosemide (LASIX) 40 mg tablet, TAKE 1 TABLET (40 MG) BY MOUTH DAILY, Disp: 90 tablet, Rfl: 3    gabapentin (NEURONTIN) 800 mg tablet, Take 800 mg by mouth 3 (three) times a day, Disp: , Rfl:     ketoconazole (NIZORAL) 2 % cream, APPLY TO AFFECTED AREA EVERY DAY, Disp: 60 g, Rfl: 1    lisinopril (ZESTRIL) 10 mg tablet, TAKE 1 TABLET BY MOUTH EVERY DAY, Disp: 90 tablet, Rfl: 1    ondansetron (ZOFRAN) 4 mg tablet, Take 1 tablet (4 mg total) by mouth every 8 (eight) hours as needed for nausea or vomiting, Disp: 30 tablet, Rfl: 1    oxyCODONE (ROXICODONE) 10 MG TABS, , Disp: , Rfl:     pantoprazole (PROTONIX) 40 mg tablet, Take 1 tablet (40 mg total) by mouth 2 (two) times a day before meals 30 minutes before breakfast and dinner, Disp: 180 tablet, Rfl: 1    prochlorperazine (COMPAZINE) 10 mg tablet, TAKE 1 TABLET (10 MG TOTAL) BY MOUTH 2 (TWO) TIMES A DAY AS NEEDED FOR NAUSEA, Disp: 60 tablet, Rfl: 0    tiZANidine (ZANAFLEX) 4 mg tablet, TAKE 1 TABLET (4 MG TOTAL) BY MOUTH 3 (THREE) TIMES A DAY, Disp: 270 tablet, Rfl: 1    triamcinolone (KENALOG) 0 5 % cream, Apply topically 3 (three) times a day, Disp: 30 g, Rfl: 3    VENTOLIN  (90 Base) MCG/ACT inhaler, INHALE 2 PUFFS 3 (THREE) TIMES A DAY AS NEEDED FOR SHORTNESS OF BREATH, Disp: 18 Inhaler, Rfl: 3    estradiol (ESTRACE) 0 1 mg/g vaginal cream, Insert 1 g into the vagina 2 (two) times a week (Patient not taking: Reported on 12/18/2019), Disp: 42 5 g, Rfl: 3    pentosan polysulfate (ELMIRON) 100 mg capsule, Take 2 capsules (200 mg total) by mouth 3 (three) times a day (Patient not taking: Reported on 1/31/2020), Disp: 180 capsule, Rfl: 5    ACTIVE PROBLEM LIST:  Patient Active Problem List   Diagnosis    Acute bilateral back pain, intractable    Continuous opioid dependence (HCC)    Vitamin D deficiency    Cataract, bilateral    Cervical radiculopathy, chronic    Delayed gastric emptying    Essential (primary) hypertension    Fatty liver disease, nonalcoholic    GERD (gastroesophageal reflux disease)    Hot flashes, menopausal    Interstitial cystitis    Lumbar post-laminectomy syndrome    Mild intermittent asthma without complication    Lumbosacral spondylosis without myelopathy    Radiculopathy, lumbar region    Thoracic and lumbosacral neuritis    Hyperlipidemia    Lumbar canal stenosis with diffuse disc bulge    Multiple falls    Lumbar disc herniation    Spinal stenosis of lumbar region with neurogenic claudication    Acute bilateral low back pain with bilateral sciatica    Allergic rhinitis       PAST MEDICAL HISTORY:  Past Medical History:   Diagnosis Date    Anemia     Anxiety     Asthma     Cataract     Chronic back pain     Chronic pain disorder     Back    Depression     GERD (gastroesophageal reflux disease)     HTN (hypertension)     Hyperlipidemia     Interstitial cystitis     Muscle cramps     Muscle weakness     Obesity     Pneumonia     PONV (postoperative nausea and vomiting)     Radiculopathy, lumbar region     Spondylosis, cervical, with myelopathy     Tinea corporis 2018    Vulvovaginitis        PAST SURGICAL HISTORY:  Past Surgical History:   Procedure Laterality Date    APPENDECTOMY      BACK SURGERY      Arthrodeiss lumbar    BACK SURGERY      Spinal stereotaxis of cord    BLADDER SURGERY      Electronic bladder stimulator implantation    CATARACT EXTRACTION      CATARACT EXTRACTION, BILATERAL       SECTION      x3    CHOLECYSTECTOMY      COLON SURGERY      Intestinal stricturoplasty     DECOMPRESSION SPINE LUMBAR POSTERIOR Bilateral 2018    Procedure: DECOMPRESSION SPINE LUMBAR POSTERIOR L1-4, L1-2 DISKECTOMY, POSTERIORLATERAL FUSION L3-4, REMOVAL OF SPINAL CORD STIMULATOR SYSTEM, REMOVAL OF INTERSPINOUS DEVICES;  Surgeon: Heidy Ya MD;  [facility];  Service: Neurosurgery    FINGER SURGERY      Extensor tendon repair (mallet finger)    FL MYELOGRAM LUMBAR  2018    HERNIA REPAIR      x3    HYSTERECTOMY      WI DRAINAGE OF HEMATOMA/FLUID Bilateral 10/9/2018    Procedure: LUMBAR WOUND EXPLORATION/EVACUATION OF SERONMA;  Surgeon: Heidy Ya MD;  [facility];  Service: Neurosurgery    WI ESOPHAGOGASTRODUODENOSCOPY TRANSORAL DIAGNOSTIC N/A 2017 Procedure: ESOPHAGOGASTRODUODENOSCOPY (EGD); Surgeon: Erin Rivas MD;  [facility];   Service: Gastroenterology    SALPINGOOPHORECTOMY      B/L       FAMILY HISTORY:  Family History   Problem Relation Age of Onset    Coronary artery disease Brother         Patient has 3 brothers with coronary artery disease    Diabetes Mother     Throat cancer Mother     COPD Father     Cancer Father     Bipolar disorder Sister     Drug abuse Sister     Alcohol abuse Sister        SOCIAL HISTORY:  Social History     Socioeconomic History    Marital status: /Civil Union     Spouse name: Not on file    Number of children: 3    Years of education: Not on file    Highest education level: Not on file   Occupational History    Occupation: Retired   Social Needs    Financial resource strain: Not on file    Food insecurity:     Worry: Not on file     Inability: Not on file   [email or web address] needs:     Medical: Not on file     Non-medical: Not on file   Tobacco Use    Smoking status: Never Smoker    Smokeless tobacco: Never Used   Substance and Sexual Activity    Alcohol use: No    Drug use: No    Sexual activity: Not on file   Lifestyle    Physical activity:     Days per week: Not on file     Minutes per session: Not on file    Stress: Not on file   Relationships    Social connections:     Talks on phone: Not on file     Gets together: Not on file     Attends Buddhism service: Not on file     Active member of club or organization: Not on file     Attends meetings of clubs or organizations: Not on file     Relationship status: Not on file    Intimate partner violence:     Fear of current or ex partner: Not on file     Emotionally abused: Not on file     Physically abused: Not on file     Forced sexual activity: Not on file   Other Topics Concern    Not on file   Social History Narrative    Brushes teeth 2x day    Regular dental care    Exercise: walking       Review of Systems Constitutional: Negative for activity change, chills, fatigue and fever  HENT: Negative for congestion  Eyes: Negative for discharge  Respiratory: Negative for cough, chest tightness and shortness of breath  Cardiovascular: Negative for chest pain, palpitations and leg swelling  Gastrointestinal: Negative for abdominal pain  Genitourinary: Negative for difficulty urinating  Musculoskeletal: Negative for arthralgias and myalgias  Skin: Negative for rash  Allergic/Immunologic: Negative for immunocompromised state  Neurological: Negative for dizziness, syncope, weakness, light-headedness and headaches  Hematological: Negative for adenopathy  Does not bruise/bleed easily  Psychiatric/Behavioral: Negative for dysphoric mood  The patient is not nervous/anxious  Objective:  Vitals:    03/03/20 1318   BP: 120/74   BP Location: Left arm   Patient Position: Sitting   Cuff Size: Adult   Pulse: 78   Resp: 18   Weight: 76 2 kg (168 lb)   Height: 5' 1" (1 549 m)     Body mass index is 31 74 kg/m²  Physical Exam   Constitutional: She is oriented to person, place, and time  She appears well-developed and well-nourished  No distress  HENT:   Head: Normocephalic and atraumatic  No appreciable swelling today of left side of face  buccal mucosa on left appears within normal limits  Stensen's duct is not inflamed  No complaint of tenderness on palpating the left side of face  Eyes: Pupils are equal, round, and reactive to light  Neck: Neck supple  No JVD present  Carotid bruit is not present  No thyromegaly present  Cardiovascular: Normal rate, regular rhythm and normal heart sounds  Pulmonary/Chest: Effort normal and breath sounds normal    Musculoskeletal: She exhibits no edema  Lymphadenopathy:     She has no cervical adenopathy  Neurological: She is alert and oriented to person, place, and time  Skin: Skin is warm and dry  No rash noted     Psychiatric: She has a normal mood and affect  Her behavior is normal    Nursing note and vitals reviewed  RESULTS:    No results found for this or any previous visit (from the past 1008 hour(s))  This note was created with voice recognition software  Phonic, grammatical and spelling errors may be present within the note as a result

## 2020-03-06 ENCOUNTER — APPOINTMENT (OUTPATIENT)
Dept: RADIOLOGY | Facility: CLINIC | Age: 65
End: 2020-03-06
Payer: MEDICARE

## 2020-03-06 DIAGNOSIS — Z98.890 STATUS POST LUMBAR LAMINECTOMY: ICD-10-CM

## 2020-03-06 PROCEDURE — 72100 X-RAY EXAM L-S SPINE 2/3 VWS: CPT

## 2020-03-13 ENCOUNTER — HOSPITAL ENCOUNTER (OUTPATIENT)
Dept: CT IMAGING | Facility: CLINIC | Age: 65
Discharge: HOME | End: 2020-03-13
Payer: MEDICARE

## 2020-03-13 DIAGNOSIS — R22.0 LEFT FACIAL SWELLING: ICD-10-CM

## 2020-03-13 PROCEDURE — 70490 CT SOFT TISSUE NECK W/O DYE: CPT

## 2020-03-20 DIAGNOSIS — J45.20 MILD INTERMITTENT ASTHMA WITHOUT COMPLICATION: ICD-10-CM

## 2020-03-23 ENCOUNTER — TELEMEDICINE (OUTPATIENT)
Dept: INTERNAL MEDICINE CLINIC | Facility: CLINIC | Age: 65
End: 2020-03-23
Payer: MEDICARE

## 2020-03-23 DIAGNOSIS — R11.2 NON-INTRACTABLE VOMITING WITH NAUSEA, UNSPECIFIED VOMITING TYPE: ICD-10-CM

## 2020-03-23 DIAGNOSIS — R22.0 LEFT FACIAL SWELLING: Primary | ICD-10-CM

## 2020-03-23 PROCEDURE — 3078F DIAST BP <80 MM HG: CPT | Performed by: PHYSICIAN ASSISTANT

## 2020-03-23 PROCEDURE — 99213 OFFICE O/P EST LOW 20 MIN: CPT | Performed by: PHYSICIAN ASSISTANT

## 2020-03-23 PROCEDURE — 3074F SYST BP LT 130 MM HG: CPT | Performed by: PHYSICIAN ASSISTANT

## 2020-03-23 PROCEDURE — 1036F TOBACCO NON-USER: CPT | Performed by: PHYSICIAN ASSISTANT

## 2020-03-23 PROCEDURE — 1160F RVW MEDS BY RX/DR IN RCRD: CPT | Performed by: PHYSICIAN ASSISTANT

## 2020-03-23 NOTE — PROGRESS NOTES
Virtual Regular Visit    Problem List Items Addressed This Visit     None      Visit Diagnoses     Left facial swelling    -  Primary    Non-intractable vomiting with nausea, unspecified vomiting type                   Reason for visit is follow-up/acute visit  Encounter provider Rosas Marshall PA-C    Provider located at [address] [place] [id number]      Recent Visits  No visits were found meeting these conditions  Showing recent visits within past 7 days and meeting all other requirements     Future Appointments  No visits were found meeting these conditions  Showing future appointments within next 150 days and meeting all other requirements        After connecting through [employer], the patient was identified by name and date of birth  Christina Suárez was informed that this is a telemedicine visit and that the visit is being conducted through telephone which may not be secure and therefore, might not be HIPAA-compliant  My office door was closed  No one else was in the room  She acknowledged consent and understanding of privacy and security of the video platform  The patient has agreed to participate and understands they can discontinue the visit at any time  James Chaudhary is a 77 y o  female  At last visit patient was having symptoms of left facial swelling  Symptoms were suspicious of parotid gland swelling and suggestive of a salivary gland stone  CT of the salivary glands were done but did not confirm this  Reading of the CT was that of unremarkable bilateral parotid gland with no increased attenuation  Radiologist did remark on mildly prominent bilateral jugulodigastric lymph nodes which did not meet criteria for enlargement  Also remarked was that of soft tissue density seen in the vallecula due to lymphoid tissue  At any rate patient's symptoms have improved    No further swelling since several days after last visit  Denies pain or fever  Nausea, vomiting, diarrhea  Present for the past 2 days  Has slow down today  Today feeling tired and lightheaded  Starting to drink Pedialyte and clear sodas for hydration  Denies abdominal pain  Thinks this was related to eating foods not on her celiac diet  Other chronic problems stable this time        Past Medical History:   Diagnosis Date    Anemia     Anxiety     Asthma     Cataract     Chronic back pain     Chronic pain disorder     Back    Depression     GERD (gastroesophageal reflux disease)     HTN (hypertension)     Hyperlipidemia     Interstitial cystitis     Muscle cramps     Muscle weakness     Obesity     Pneumonia     PONV (postoperative nausea and vomiting)     Radiculopathy, lumbar region     Spondylosis, cervical, with myelopathy     Tinea corporis 2018    Vulvovaginitis        Past Surgical History:   Procedure Laterality Date    APPENDECTOMY      BACK SURGERY      Arthrodeiss lumbar    BACK SURGERY      Spinal stereotaxis of cord    BLADDER SURGERY      Electronic bladder stimulator implantation    CATARACT EXTRACTION      CATARACT EXTRACTION, BILATERAL       SECTION      x3    CHOLECYSTECTOMY      COLON SURGERY      Intestinal stricturoplasty     DECOMPRESSION SPINE LUMBAR POSTERIOR Bilateral 2018    Procedure: DECOMPRESSION SPINE LUMBAR POSTERIOR L1-4, L1-2 DISKECTOMY, POSTERIORLATERAL FUSION L3-4, REMOVAL OF SPINAL CORD STIMULATOR SYSTEM, REMOVAL OF INTERSPINOUS DEVICES;  Surgeon: Goldie Saldivar MD;  [facility];  Service: Neurosurgery    FINGER SURGERY      Extensor tendon repair (mallet finger)    FL MYELOGRAM LUMBAR  2018    HERNIA REPAIR      x3    HYSTERECTOMY      NY DRAINAGE OF HEMATOMA/FLUID Bilateral 10/9/2018    Procedure: LUMBAR WOUND EXPLORATION/EVACUATION OF SERONMA;  Surgeon: Goldie Saldivar MD;  [facility];  Service: Neurosurgery    NY ESOPHAGOGASTRODUODENOSCOPY TRANSORAL DIAGNOSTIC N/A 8/23/2017    Procedure: ESOPHAGOGASTRODUODENOSCOPY (EGD); Surgeon: Esperanza Taylor MD;  [facility];   Service: Gastroenterology    SALPINGOOPHORECTOMY      B/L       Current Outpatient Medications   Medication Sig Dispense Refill    albuterol 2 mg/5 mL syrup TAKE 5ML (2MG) BY MOUTH EVERY 4 HOURS AS NEEDED (WHEEZING) 473 mL 3    atorvastatin (LIPITOR) 20 mg tablet Take 1 tablet (20 mg total) by mouth daily 90 tablet 1    cetirizine-pseudoephedrine (ZYRTEC-D ALLERGY & CONGESTION) 5-120 MG per tablet Take 1 tablet by mouth 2 (two) times a day 180 tablet 1    estradiol (ESTRACE) 0 1 mg/g vaginal cream Insert 1 g into the vagina 2 (two) times a week (Patient not taking: Reported on 12/18/2019) 42 5 g 3    fenofibrate (TRIGLIDE) 160 MG tablet Take 1 tablet (160 mg total) by mouth daily 90 tablet 3    fluticasone (FLONASE) 50 mcg/act nasal spray SPRAY 2 SPRAYS INTO EACH NOSTRIL EVERY DAY 16 g 5    furosemide (LASIX) 40 mg tablet TAKE 1 TABLET (40 MG) BY MOUTH DAILY 90 tablet 3    gabapentin (NEURONTIN) 800 mg tablet Take 800 mg by mouth 3 (three) times a day      ketoconazole (NIZORAL) 2 % cream APPLY TO AFFECTED AREA EVERY DAY 60 g 1    lisinopril (ZESTRIL) 10 mg tablet TAKE 1 TABLET BY MOUTH EVERY DAY 90 tablet 1    ondansetron (ZOFRAN) 4 mg tablet Take 1 tablet (4 mg total) by mouth every 8 (eight) hours as needed for nausea or vomiting 30 tablet 1    oxyCODONE (ROXICODONE) 10 MG TABS       pantoprazole (PROTONIX) 40 mg tablet Take 1 tablet (40 mg total) by mouth 2 (two) times a day before meals 30 minutes before breakfast and dinner 180 tablet 1    pentosan polysulfate (ELMIRON) 100 mg capsule Take 2 capsules (200 mg total) by mouth 3 (three) times a day (Patient not taking: Reported on 1/31/2020) 180 capsule 5    prochlorperazine (COMPAZINE) 10 mg tablet TAKE 1 TABLET (10 MG TOTAL) BY MOUTH 2 (TWO) TIMES A DAY AS NEEDED FOR NAUSEA 60 tablet 0    tiZANidine (ZANAFLEX) 4 mg tablet TAKE 1 TABLET (4 MG TOTAL) BY MOUTH 3 (THREE) TIMES A  tablet 1    triamcinolone (KENALOG) 0 5 % cream Apply topically 3 (three) times a day 30 g 3    VENTOLIN  (90 Base) MCG/ACT inhaler INHALE 2 PUFFS 3 (THREE) TIMES A DAY AS NEEDED FOR SHORTNESS OF BREATH 18 Inhaler 3     No current facility-administered medications for this visit  Allergies   Allergen Reactions    Clindamycin Hives     Category: Allergy;     Erythromycin Hives and Rash     Category: Allergy;     Latex Hives    Penicillins Hives and Shortness Of Breath    Morphine GI Intolerance and Vomiting     Category: Adverse Reaction;     Erythromycin Base Other (See Comments)     vomitting    Other     Penicillin V Seizures     Category: Allergy;     Adhesive [Medical Tape] Rash     Skin irritation    Povidone Iodine Rash     Category: Adverse Reaction; Review of Systems as per HPI  I spent 15 minutes with the patient during this visit

## 2020-03-23 NOTE — PROGRESS NOTES
Virtual Regular Visit    Problem List Items Addressed This Visit     None      Visit Diagnoses     Left facial swelling    -  Primary    Non-intractable vomiting with nausea, unspecified vomiting type                   Reason for visit is ***    Encounter provider Beni Britt PA-C    Provider located at [address] [place] [id number]      Recent Visits  No visits were found meeting these conditions  Showing recent visits within past 7 days and meeting all other requirements     Future Appointments  No visits were found meeting these conditions  Showing future appointments within next 150 days and meeting all other requirements        After connecting through [employer], the patient was identified by name and date of birth  Edinson Fields was informed that this is a telemedicine visit and that the visit is being conducted through {AMB CORONAVIRUS VISIT TQEKJO:03466} which may not be secure and therefore, might not be HIPAA-compliant  {Telemedicine confidentiality :45338} {Telemedicine participants:97333}  She acknowledged consent and understanding of privacy and security of the video platform  The patient has agreed to participate and understands they can discontinue the visit at any time  Ashvin Box is a 77 y o  female ***        Past Medical History:   Diagnosis Date    Anemia     Anxiety     Asthma     Cataract     Chronic back pain     Chronic pain disorder     Back    Depression     GERD (gastroesophageal reflux disease)     HTN (hypertension)     Hyperlipidemia     Interstitial cystitis     Muscle cramps     Muscle weakness     Obesity     Pneumonia     PONV (postoperative nausea and vomiting)     Radiculopathy, lumbar region     Spondylosis, cervical, with myelopathy     Tinea corporis 9/21/2018    Vulvovaginitis        Past Surgical History:   Procedure Laterality Date    APPENDECTOMY      BACK SURGERY      Arthrodeiss lumbar    BACK SURGERY      Spinal stereotaxis of cord    BLADDER SURGERY      Electronic bladder stimulator implantation    CATARACT EXTRACTION      CATARACT EXTRACTION, BILATERAL       SECTION      x3    CHOLECYSTECTOMY      COLON SURGERY      Intestinal stricturoplasty     DECOMPRESSION SPINE LUMBAR POSTERIOR Bilateral 2018    Procedure: DECOMPRESSION SPINE LUMBAR POSTERIOR L1-4, L1-2 DISKECTOMY, POSTERIORLATERAL FUSION L3-4, REMOVAL OF SPINAL CORD STIMULATOR SYSTEM, REMOVAL OF INTERSPINOUS DEVICES;  Surgeon: Katie Flowers MD;  [facility];  Service: Neurosurgery    FINGER SURGERY      Extensor tendon repair (mallet finger)    FL MYELOGRAM LUMBAR  2018    HERNIA REPAIR      x3    HYSTERECTOMY      [place] DRAINAGE OF HEMATOMA/FLUID Bilateral 10/9/2018    Procedure: LUMBAR WOUND EXPLORATION/EVACUATION OF SERONMA;  Surgeon: Katie Flowers MD;  [facility];  Service: Neurosurgery    RI ESOPHAGOGASTRODUODENOSCOPY TRANSORAL DIAGNOSTIC N/A 2017    Procedure: ESOPHAGOGASTRODUODENOSCOPY (EGD); Surgeon: Aramis Santiago MD;  [facility];   Service: Gastroenterology    SALPINGOOPHORECTOMY      B/L       Current Outpatient Medications   Medication Sig Dispense Refill    albuterol 2 mg/5 mL syrup TAKE 5ML (2MG) BY MOUTH EVERY 4 HOURS AS NEEDED (WHEEZING) 473 mL 3    atorvastatin (LIPITOR) 20 mg tablet Take 1 tablet (20 mg total) by mouth daily 90 tablet 1    cetirizine-pseudoephedrine (ZYRTEC-D ALLERGY & CONGESTION) 5-120 MG per tablet Take 1 tablet by mouth 2 (two) times a day 180 tablet 1    estradiol (ESTRACE) 0 1 mg/g vaginal cream Insert 1 g into the vagina 2 (two) times a week (Patient not taking: Reported on 2019) 42 5 g 3    fenofibrate (TRIGLIDE) 160 MG tablet Take 1 tablet (160 mg total) by mouth daily 90 tablet 3    fluticasone (FLONASE) 50 mcg/act nasal spray SPRAY 2 SPRAYS INTO EACH NOSTRIL EVERY DAY 16 g 5    furosemide (LASIX) 40 mg tablet TAKE 1 TABLET (40 MG) BY MOUTH DAILY 90 tablet 3    gabapentin (NEURONTIN) 800 mg tablet Take 800 mg by mouth 3 (three) times a day      ketoconazole (NIZORAL) 2 % cream APPLY TO AFFECTED AREA EVERY DAY 60 g 1    lisinopril (ZESTRIL) 10 mg tablet TAKE 1 TABLET BY MOUTH EVERY DAY 90 tablet 1    ondansetron (ZOFRAN) 4 mg tablet Take 1 tablet (4 mg total) by mouth every 8 (eight) hours as needed for nausea or vomiting 30 tablet 1    oxyCODONE (ROXICODONE) 10 MG TABS       pantoprazole (PROTONIX) 40 mg tablet Take 1 tablet (40 mg total) by mouth 2 (two) times a day before meals 30 minutes before breakfast and dinner 180 tablet 1    pentosan polysulfate (ELMIRON) 100 mg capsule Take 2 capsules (200 mg total) by mouth 3 (three) times a day (Patient not taking: Reported on 1/31/2020) 180 capsule 5    prochlorperazine (COMPAZINE) 10 mg tablet TAKE 1 TABLET (10 MG TOTAL) BY MOUTH 2 (TWO) TIMES A DAY AS NEEDED FOR NAUSEA 60 tablet 0    tiZANidine (ZANAFLEX) 4 mg tablet TAKE 1 TABLET (4 MG TOTAL) BY MOUTH 3 (THREE) TIMES A  tablet 1    triamcinolone (KENALOG) 0 5 % cream Apply topically 3 (three) times a day 30 g 3    VENTOLIN  (90 Base) MCG/ACT inhaler INHALE 2 PUFFS 3 (THREE) TIMES A DAY AS NEEDED FOR SHORTNESS OF BREATH 18 Inhaler 3     No current facility-administered medications for this visit  Allergies   Allergen Reactions    Clindamycin Hives     Category: Allergy;     Erythromycin Hives and Rash     Category: Allergy;     Latex Hives    Penicillins Hives and Shortness Of Breath    Morphine GI Intolerance and Vomiting     Category: Adverse Reaction;     Erythromycin Base Other (See Comments)     vomitting    Other     Penicillin V Seizures     Category: Allergy;     Adhesive [Medical Tape] Rash     Skin irritation    Povidone Iodine Rash     Category: Adverse Reaction;         Review of Systems    Physical Exam     I spent *** minutes with the patient during this visit

## 2020-03-23 NOTE — PATIENT INSTRUCTIONS
Continue clear liquids times 24 hours  Can then progress to brat diet  If tolerating can progress diet to your full celiac diet  Follow-up if not steadily improving

## 2020-04-10 ENCOUNTER — TELEPHONE (OUTPATIENT)
Dept: OTHER | Facility: OTHER | Age: 65
End: 2020-04-10

## 2020-04-10 DIAGNOSIS — H10.32 ACUTE CONJUNCTIVITIS OF LEFT EYE, UNSPECIFIED ACUTE CONJUNCTIVITIS TYPE: ICD-10-CM

## 2020-04-12 RX ORDER — TOBRAMYCIN 3 MG/ML
SOLUTION/ DROPS OPHTHALMIC
Qty: 5 ML | Refills: 0 | OUTPATIENT
Start: 2020-04-12

## 2020-04-13 DIAGNOSIS — J30.9 ALLERGIC RHINITIS, UNSPECIFIED SEASONALITY, UNSPECIFIED TRIGGER: ICD-10-CM

## 2020-04-13 RX ORDER — CETIRIZINE HYDROCHLORIDE, PSEUDOEPHEDRINE HYDROCHLORIDE 5; 120 MG/1; MG/1
1 TABLET, FILM COATED, EXTENDED RELEASE ORAL 2 TIMES DAILY
Qty: 180 TABLET | Refills: 1 | Status: SHIPPED | OUTPATIENT
Start: 2020-04-13 | End: 2020-10-05

## 2020-04-15 ENCOUNTER — TELEPHONE (OUTPATIENT)
Dept: GASTROENTEROLOGY | Facility: CLINIC | Age: 65
End: 2020-04-15

## 2020-04-16 DIAGNOSIS — M62.830 MUSCLE SPASM OF BACK: ICD-10-CM

## 2020-04-16 RX ORDER — TIZANIDINE 4 MG/1
4 TABLET ORAL 3 TIMES DAILY
Qty: 270 TABLET | Refills: 1 | Status: SHIPPED | OUTPATIENT
Start: 2020-04-16 | End: 2020-08-14

## 2020-04-19 DIAGNOSIS — I10 ESSENTIAL (PRIMARY) HYPERTENSION: ICD-10-CM

## 2020-04-20 RX ORDER — LISINOPRIL 10 MG/1
TABLET ORAL
Qty: 90 TABLET | Refills: 1 | Status: SHIPPED | OUTPATIENT
Start: 2020-04-20 | End: 2020-10-16

## 2020-04-22 ENCOUNTER — TELEPHONE (OUTPATIENT)
Dept: GASTROENTEROLOGY | Facility: CLINIC | Age: 65
End: 2020-04-22

## 2020-04-22 DIAGNOSIS — K31.84 GASTROPARESIS: Primary | ICD-10-CM

## 2020-04-22 RX ORDER — PROMETHAZINE HYDROCHLORIDE 25 MG/1
25 TABLET ORAL EVERY 6 HOURS PRN
Qty: 60 TABLET | Refills: 2 | Status: SHIPPED | OUTPATIENT
Start: 2020-04-22 | End: 2020-06-03 | Stop reason: ALTCHOICE

## 2020-04-24 ENCOUNTER — TELEPHONE (OUTPATIENT)
Dept: GASTROENTEROLOGY | Facility: CLINIC | Age: 65
End: 2020-04-24

## 2020-05-06 DIAGNOSIS — K21.9 GASTROESOPHAGEAL REFLUX DISEASE, ESOPHAGITIS PRESENCE NOT SPECIFIED: Primary | ICD-10-CM

## 2020-05-06 DIAGNOSIS — I10 ESSENTIAL (PRIMARY) HYPERTENSION: ICD-10-CM

## 2020-05-06 RX ORDER — ONDANSETRON 4 MG/1
4 TABLET, ORALLY DISINTEGRATING ORAL EVERY 8 HOURS PRN
Qty: 60 TABLET | Refills: 1 | Status: SHIPPED | OUTPATIENT
Start: 2020-05-06 | End: 2020-05-27 | Stop reason: SDUPTHER

## 2020-05-07 ENCOUNTER — TELEMEDICINE (OUTPATIENT)
Dept: NEUROSURGERY | Facility: CLINIC | Age: 65
End: 2020-05-07
Payer: MEDICARE

## 2020-05-07 ENCOUNTER — TELEMEDICINE (OUTPATIENT)
Dept: INTERNAL MEDICINE CLINIC | Facility: CLINIC | Age: 65
End: 2020-05-07
Payer: MEDICARE

## 2020-05-07 DIAGNOSIS — J01.40 ACUTE NON-RECURRENT PANSINUSITIS: Primary | ICD-10-CM

## 2020-05-07 DIAGNOSIS — Z98.1 S/P LUMBAR FUSION: ICD-10-CM

## 2020-05-07 DIAGNOSIS — M54.41 CHRONIC BILATERAL LOW BACK PAIN WITH BILATERAL SCIATICA: Primary | ICD-10-CM

## 2020-05-07 DIAGNOSIS — G89.29 CHRONIC BILATERAL LOW BACK PAIN WITH BILATERAL SCIATICA: Primary | ICD-10-CM

## 2020-05-07 DIAGNOSIS — M54.42 CHRONIC BILATERAL LOW BACK PAIN WITH BILATERAL SCIATICA: Primary | ICD-10-CM

## 2020-05-07 DIAGNOSIS — H92.01 RIGHT EAR PAIN: ICD-10-CM

## 2020-05-07 DIAGNOSIS — M47.817 LUMBOSACRAL SPONDYLOSIS WITHOUT MYELOPATHY: ICD-10-CM

## 2020-05-07 PROCEDURE — 99213 OFFICE O/P EST LOW 20 MIN: CPT | Performed by: PHYSICIAN ASSISTANT

## 2020-05-07 PROCEDURE — 99214 OFFICE O/P EST MOD 30 MIN: CPT | Performed by: PHYSICIAN ASSISTANT

## 2020-05-07 RX ORDER — SULFAMETHOXAZOLE AND TRIMETHOPRIM 800; 160 MG/1; MG/1
1 TABLET ORAL EVERY 12 HOURS SCHEDULED
Qty: 20 TABLET | Refills: 0 | Status: SHIPPED | OUTPATIENT
Start: 2020-05-07 | End: 2020-05-14 | Stop reason: SDUPTHER

## 2020-05-08 ENCOUNTER — TELEPHONE (OUTPATIENT)
Dept: NEUROSURGERY | Facility: CLINIC | Age: 65
End: 2020-05-08

## 2020-05-08 DIAGNOSIS — Z98.1 STATUS POST LUMBAR SPINAL FUSION: ICD-10-CM

## 2020-05-08 DIAGNOSIS — M53.9 ACUTE LOW BACK PAIN DUE TO SPINAL DISORDER: Primary | ICD-10-CM

## 2020-05-08 DIAGNOSIS — M54.50 ACUTE LOW BACK PAIN DUE TO SPINAL DISORDER: Primary | ICD-10-CM

## 2020-05-14 ENCOUNTER — TELEPHONE (OUTPATIENT)
Dept: INTERNAL MEDICINE CLINIC | Facility: CLINIC | Age: 65
End: 2020-05-14

## 2020-05-14 ENCOUNTER — OFFICE VISIT (OUTPATIENT)
Dept: INTERNAL MEDICINE CLINIC | Facility: CLINIC | Age: 65
End: 2020-05-14
Payer: MEDICARE

## 2020-05-14 VITALS
WEIGHT: 168 LBS | HEIGHT: 61 IN | OXYGEN SATURATION: 98 % | SYSTOLIC BLOOD PRESSURE: 122 MMHG | DIASTOLIC BLOOD PRESSURE: 78 MMHG | HEART RATE: 82 BPM | BODY MASS INDEX: 31.72 KG/M2 | TEMPERATURE: 98.2 F | RESPIRATION RATE: 16 BRPM

## 2020-05-14 DIAGNOSIS — K90.41 GLUTEN INTOLERANCE: ICD-10-CM

## 2020-05-14 DIAGNOSIS — M76.52 PATELLAR TENDONITIS OF LEFT KNEE: ICD-10-CM

## 2020-05-14 DIAGNOSIS — Z91.018 FOOD ALLERGY: ICD-10-CM

## 2020-05-14 DIAGNOSIS — J01.40 ACUTE NON-RECURRENT PANSINUSITIS: ICD-10-CM

## 2020-05-14 DIAGNOSIS — H66.91 RIGHT OTITIS MEDIA, UNSPECIFIED OTITIS MEDIA TYPE: Primary | ICD-10-CM

## 2020-05-14 PROCEDURE — 99214 OFFICE O/P EST MOD 30 MIN: CPT | Performed by: PHYSICIAN ASSISTANT

## 2020-05-14 PROCEDURE — 3078F DIAST BP <80 MM HG: CPT | Performed by: PHYSICIAN ASSISTANT

## 2020-05-14 PROCEDURE — 3008F BODY MASS INDEX DOCD: CPT | Performed by: PHYSICIAN ASSISTANT

## 2020-05-14 PROCEDURE — 1036F TOBACCO NON-USER: CPT | Performed by: PHYSICIAN ASSISTANT

## 2020-05-14 PROCEDURE — 3074F SYST BP LT 130 MM HG: CPT | Performed by: PHYSICIAN ASSISTANT

## 2020-05-14 PROCEDURE — 1160F RVW MEDS BY RX/DR IN RCRD: CPT | Performed by: PHYSICIAN ASSISTANT

## 2020-05-14 RX ORDER — SULFAMETHOXAZOLE AND TRIMETHOPRIM 800; 160 MG/1; MG/1
1 TABLET ORAL EVERY 12 HOURS SCHEDULED
Qty: 10 TABLET | Refills: 0 | Status: SHIPPED | OUTPATIENT
Start: 2020-05-14 | End: 2020-05-19

## 2020-05-19 DIAGNOSIS — J45.20 MILD INTERMITTENT ASTHMA WITHOUT COMPLICATION: ICD-10-CM

## 2020-05-20 ENCOUNTER — TELEPHONE (OUTPATIENT)
Dept: INTERNAL MEDICINE CLINIC | Facility: CLINIC | Age: 65
End: 2020-05-20

## 2020-05-22 DIAGNOSIS — L30.1 DYSHYDROSIS: ICD-10-CM

## 2020-05-22 DIAGNOSIS — L30.9 ECZEMA, UNSPECIFIED TYPE: ICD-10-CM

## 2020-05-22 DIAGNOSIS — J45.20 MILD INTERMITTENT ASTHMA WITHOUT COMPLICATION: ICD-10-CM

## 2020-05-25 RX ORDER — TRIAMCINOLONE ACETONIDE 5 MG/G
CREAM TOPICAL
Qty: 30 G | Refills: 3 | Status: SHIPPED | OUTPATIENT
Start: 2020-05-25 | End: 2021-01-14 | Stop reason: SDUPTHER

## 2020-05-27 DIAGNOSIS — K21.9 GASTROESOPHAGEAL REFLUX DISEASE, ESOPHAGITIS PRESENCE NOT SPECIFIED: ICD-10-CM

## 2020-05-27 DIAGNOSIS — N30.10 INTERSTITIAL CYSTITIS: ICD-10-CM

## 2020-05-27 RX ORDER — ONDANSETRON 4 MG/1
4 TABLET, ORALLY DISINTEGRATING ORAL EVERY 8 HOURS PRN
Qty: 60 TABLET | Refills: 1 | Status: SHIPPED | OUTPATIENT
Start: 2020-05-27 | End: 2020-06-23 | Stop reason: SDUPTHER

## 2020-05-28 ENCOUNTER — HOSPITAL ENCOUNTER (OUTPATIENT)
Dept: CT IMAGING | Facility: HOSPITAL | Age: 65
Discharge: HOME | End: 2020-05-28
Payer: MEDICARE

## 2020-05-28 DIAGNOSIS — M53.9 ACUTE LOW BACK PAIN DUE TO SPINAL DISORDER: ICD-10-CM

## 2020-05-28 DIAGNOSIS — Z98.1 STATUS POST LUMBAR SPINAL FUSION: ICD-10-CM

## 2020-05-28 DIAGNOSIS — M54.50 ACUTE LOW BACK PAIN DUE TO SPINAL DISORDER: ICD-10-CM

## 2020-05-28 PROCEDURE — 72132 CT LUMBAR SPINE W/DYE: CPT

## 2020-05-28 RX ADMIN — IOHEXOL 85 ML: 350 INJECTION, SOLUTION INTRAVENOUS at 16:48

## 2020-06-01 ENCOUNTER — APPOINTMENT (OUTPATIENT)
Dept: LAB | Facility: CLINIC | Age: 65
End: 2020-06-01
Payer: MEDICARE

## 2020-06-01 DIAGNOSIS — M53.9 ACUTE LOW BACK PAIN DUE TO SPINAL DISORDER: ICD-10-CM

## 2020-06-01 DIAGNOSIS — K90.41 GLUTEN INTOLERANCE: Primary | ICD-10-CM

## 2020-06-01 DIAGNOSIS — Z98.1 STATUS POST LUMBAR SPINAL FUSION: ICD-10-CM

## 2020-06-01 DIAGNOSIS — Z91.018 FOOD ALLERGY: ICD-10-CM

## 2020-06-01 DIAGNOSIS — M54.50 ACUTE LOW BACK PAIN DUE TO SPINAL DISORDER: ICD-10-CM

## 2020-06-01 LAB
ALBUMIN SERPL BCP-MCNC: 4.2 G/DL (ref 3.5–5)
ALP SERPL-CCNC: 38 U/L (ref 46–116)
ALT SERPL W P-5'-P-CCNC: 26 U/L (ref 12–78)
ANION GAP SERPL CALCULATED.3IONS-SCNC: 5 MMOL/L (ref 4–13)
AST SERPL W P-5'-P-CCNC: 19 U/L (ref 5–45)
BASOPHILS # BLD AUTO: 0.06 THOUSANDS/ΜL (ref 0–0.1)
BASOPHILS NFR BLD AUTO: 1 % (ref 0–1)
BILIRUB SERPL-MCNC: 0.36 MG/DL (ref 0.2–1)
BUN SERPL-MCNC: 26 MG/DL (ref 5–25)
CALCIUM SERPL-MCNC: 10 MG/DL (ref 8.3–10.1)
CHLORIDE SERPL-SCNC: 101 MMOL/L (ref 100–108)
CO2 SERPL-SCNC: 32 MMOL/L (ref 21–32)
CREAT SERPL-MCNC: 1.64 MG/DL (ref 0.6–1.3)
CRP SERPL QL: 4.1 MG/L
EOSINOPHIL # BLD AUTO: 0.27 THOUSAND/ΜL (ref 0–0.61)
EOSINOPHIL NFR BLD AUTO: 4 % (ref 0–6)
ERYTHROCYTE [DISTWIDTH] IN BLOOD BY AUTOMATED COUNT: 13 % (ref 11.6–15.1)
ERYTHROCYTE [SEDIMENTATION RATE] IN BLOOD: 49 MM/HOUR (ref 0–20)
GFR SERPL CREATININE-BSD FRML MDRD: 32 ML/MIN/1.73SQ M
GLUCOSE P FAST SERPL-MCNC: 83 MG/DL (ref 65–99)
HCT VFR BLD AUTO: 39.3 % (ref 34.8–46.1)
HGB BLD-MCNC: 12.1 G/DL (ref 11.5–15.4)
IMM GRANULOCYTES # BLD AUTO: 0.02 THOUSAND/UL (ref 0–0.2)
IMM GRANULOCYTES NFR BLD AUTO: 0 % (ref 0–2)
LYMPHOCYTES # BLD AUTO: 2.37 THOUSANDS/ΜL (ref 0.6–4.47)
LYMPHOCYTES NFR BLD AUTO: 34 % (ref 14–44)
MCH RBC QN AUTO: 29 PG (ref 26.8–34.3)
MCHC RBC AUTO-ENTMCNC: 30.8 G/DL (ref 31.4–37.4)
MCV RBC AUTO: 94 FL (ref 82–98)
MONOCYTES # BLD AUTO: 0.48 THOUSAND/ΜL (ref 0.17–1.22)
MONOCYTES NFR BLD AUTO: 7 % (ref 4–12)
NEUTROPHILS # BLD AUTO: 3.88 THOUSANDS/ΜL (ref 1.85–7.62)
NEUTS SEG NFR BLD AUTO: 54 % (ref 43–75)
NRBC BLD AUTO-RTO: 0 /100 WBCS
PLATELET # BLD AUTO: 409 THOUSANDS/UL (ref 149–390)
PMV BLD AUTO: 10.9 FL (ref 8.9–12.7)
POTASSIUM SERPL-SCNC: 4.3 MMOL/L (ref 3.5–5.3)
PROT SERPL-MCNC: 8.1 G/DL (ref 6.4–8.2)
RBC # BLD AUTO: 4.17 MILLION/UL (ref 3.81–5.12)
SODIUM SERPL-SCNC: 138 MMOL/L (ref 136–145)
WBC # BLD AUTO: 7.08 THOUSAND/UL (ref 4.31–10.16)

## 2020-06-01 PROCEDURE — 82785 ASSAY OF IGE: CPT

## 2020-06-01 PROCEDURE — 86255 FLUORESCENT ANTIBODY SCREEN: CPT

## 2020-06-01 PROCEDURE — 85025 COMPLETE CBC W/AUTO DIFF WBC: CPT

## 2020-06-01 PROCEDURE — 86003 ALLG SPEC IGE CRUDE XTRC EA: CPT

## 2020-06-01 PROCEDURE — 80053 COMPREHEN METABOLIC PANEL: CPT

## 2020-06-01 PROCEDURE — 36415 COLL VENOUS BLD VENIPUNCTURE: CPT

## 2020-06-01 PROCEDURE — 82784 ASSAY IGA/IGD/IGG/IGM EACH: CPT

## 2020-06-01 PROCEDURE — 83516 IMMUNOASSAY NONANTIBODY: CPT

## 2020-06-01 PROCEDURE — 86140 C-REACTIVE PROTEIN: CPT

## 2020-06-01 PROCEDURE — 85652 RBC SED RATE AUTOMATED: CPT

## 2020-06-02 ENCOUNTER — TELEPHONE (OUTPATIENT)
Dept: NEUROSURGERY | Facility: CLINIC | Age: 65
End: 2020-06-02

## 2020-06-02 LAB
ENDOMYSIUM IGA SER QL: NEGATIVE
GLIADIN PEPTIDE IGA SER-ACNC: 4 UNITS (ref 0–19)
GLIADIN PEPTIDE IGG SER-ACNC: 2 UNITS (ref 0–19)
IGA SERPL-MCNC: 185 MG/DL (ref 87–352)
TTG IGA SER-ACNC: <2 U/ML (ref 0–3)
TTG IGG SER-ACNC: <2 U/ML (ref 0–5)

## 2020-06-03 ENCOUNTER — OFFICE VISIT (OUTPATIENT)
Dept: INTERNAL MEDICINE CLINIC | Facility: CLINIC | Age: 65
End: 2020-06-03
Payer: MEDICARE

## 2020-06-03 VITALS
TEMPERATURE: 97.8 F | SYSTOLIC BLOOD PRESSURE: 128 MMHG | WEIGHT: 167 LBS | HEIGHT: 61 IN | OXYGEN SATURATION: 94 % | DIASTOLIC BLOOD PRESSURE: 72 MMHG | HEART RATE: 93 BPM | BODY MASS INDEX: 31.53 KG/M2

## 2020-06-03 DIAGNOSIS — I10 ESSENTIAL (PRIMARY) HYPERTENSION: ICD-10-CM

## 2020-06-03 DIAGNOSIS — E73.9 LACTOSE INTOLERANCE: ICD-10-CM

## 2020-06-03 DIAGNOSIS — J45.20 MILD INTERMITTENT ASTHMA WITHOUT COMPLICATION: ICD-10-CM

## 2020-06-03 DIAGNOSIS — R79.89 AZOTEMIA: ICD-10-CM

## 2020-06-03 DIAGNOSIS — N30.10 INTERSTITIAL CYSTITIS: Primary | ICD-10-CM

## 2020-06-03 DIAGNOSIS — K90.41 GLUTEN INTOLERANCE: ICD-10-CM

## 2020-06-03 LAB
ALLERGEN COMMENT: NORMAL
ALMOND IGE QN: <0.1 KUA/I
CASHEW NUT IGE QN: <0.1 KUA/I
CODFISH IGE QN: <0.1 KUA/I
EGG WHITE IGE QN: <0.1 KUA/I
GLUTEN IGE QN: <0.1 KUA/I
HAZELNUT IGE QN: <0.1 KUA/L
MILK IGE QN: <0.1 KUA/I
PEANUT IGE QN: <0.1 KUA/I
SALMON IGE QN: <0.1 KUA/I
SCALLOP IGE QN: <0.1 KUA/L
SESAME SEED IGE QN: <0.1 KUA/I
SHRIMP IGE QN: <0.1 KUA/L
SOYBEAN IGE QN: <0.1 KUA/I
TOTAL IGE SMQN RAST: 15.7 KU/L (ref 0–113)
TUNA IGE QN: <0.1 KUA/I
WALNUT IGE QN: <0.1 KUA/I
WHEAT IGE QN: <0.1 KUA/I

## 2020-06-03 PROCEDURE — 3008F BODY MASS INDEX DOCD: CPT | Performed by: PHYSICIAN ASSISTANT

## 2020-06-03 PROCEDURE — 99214 OFFICE O/P EST MOD 30 MIN: CPT | Performed by: PHYSICIAN ASSISTANT

## 2020-06-03 PROCEDURE — 1160F RVW MEDS BY RX/DR IN RCRD: CPT | Performed by: PHYSICIAN ASSISTANT

## 2020-06-03 PROCEDURE — 3074F SYST BP LT 130 MM HG: CPT | Performed by: PHYSICIAN ASSISTANT

## 2020-06-03 PROCEDURE — 3078F DIAST BP <80 MM HG: CPT | Performed by: PHYSICIAN ASSISTANT

## 2020-06-03 PROCEDURE — 1036F TOBACCO NON-USER: CPT | Performed by: PHYSICIAN ASSISTANT

## 2020-06-22 ENCOUNTER — APPOINTMENT (OUTPATIENT)
Dept: LAB | Facility: CLINIC | Age: 65
End: 2020-06-22
Payer: MEDICARE

## 2020-06-22 DIAGNOSIS — R79.89 AZOTEMIA: ICD-10-CM

## 2020-06-22 LAB
ANION GAP SERPL CALCULATED.3IONS-SCNC: 4 MMOL/L (ref 4–13)
BUN SERPL-MCNC: 29 MG/DL (ref 5–25)
CALCIUM SERPL-MCNC: 10 MG/DL (ref 8.3–10.1)
CHLORIDE SERPL-SCNC: 103 MMOL/L (ref 100–108)
CO2 SERPL-SCNC: 32 MMOL/L (ref 21–32)
CREAT SERPL-MCNC: 1.58 MG/DL (ref 0.6–1.3)
GFR SERPL CREATININE-BSD FRML MDRD: 34 ML/MIN/1.73SQ M
GLUCOSE P FAST SERPL-MCNC: 103 MG/DL (ref 65–99)
POTASSIUM SERPL-SCNC: 3.8 MMOL/L (ref 3.5–5.3)
SODIUM SERPL-SCNC: 139 MMOL/L (ref 136–145)

## 2020-06-22 PROCEDURE — 36415 COLL VENOUS BLD VENIPUNCTURE: CPT

## 2020-06-22 PROCEDURE — 80048 BASIC METABOLIC PNL TOTAL CA: CPT

## 2020-06-23 DIAGNOSIS — J30.9 ALLERGIC RHINITIS, UNSPECIFIED SEASONALITY, UNSPECIFIED TRIGGER: ICD-10-CM

## 2020-06-23 DIAGNOSIS — R11.0 NAUSEA: ICD-10-CM

## 2020-06-23 DIAGNOSIS — K21.9 GASTROESOPHAGEAL REFLUX DISEASE, ESOPHAGITIS PRESENCE NOT SPECIFIED: ICD-10-CM

## 2020-06-23 DIAGNOSIS — J45.20 MILD INTERMITTENT ASTHMA WITHOUT COMPLICATION: ICD-10-CM

## 2020-06-23 RX ORDER — PROCHLORPERAZINE MALEATE 10 MG
10 TABLET ORAL 2 TIMES DAILY
Qty: 60 TABLET | Refills: 0 | Status: SHIPPED | OUTPATIENT
Start: 2020-06-23 | End: 2021-01-14 | Stop reason: SDUPTHER

## 2020-06-23 RX ORDER — ONDANSETRON 4 MG/1
4 TABLET, ORALLY DISINTEGRATING ORAL EVERY 8 HOURS PRN
Qty: 90 TABLET | Refills: 1 | Status: SHIPPED | OUTPATIENT
Start: 2020-06-23 | End: 2020-12-04 | Stop reason: SDUPTHER

## 2020-06-23 RX ORDER — FLUTICASONE PROPIONATE 50 MCG
1 SPRAY, SUSPENSION (ML) NASAL DAILY
Qty: 16 G | Refills: 5 | Status: SHIPPED | OUTPATIENT
Start: 2020-06-23 | End: 2021-01-14 | Stop reason: SDUPTHER

## 2020-06-24 DIAGNOSIS — N18.30 CKD (CHRONIC KIDNEY DISEASE) STAGE 3, GFR 30-59 ML/MIN (HCC): Primary | ICD-10-CM

## 2020-06-30 ENCOUNTER — CONSULT (OUTPATIENT)
Dept: OBGYN CLINIC | Facility: CLINIC | Age: 65
End: 2020-06-30
Payer: MEDICARE

## 2020-06-30 ENCOUNTER — APPOINTMENT (OUTPATIENT)
Dept: RADIOLOGY | Facility: CLINIC | Age: 65
End: 2020-06-30
Payer: MEDICARE

## 2020-06-30 VITALS
HEIGHT: 61 IN | DIASTOLIC BLOOD PRESSURE: 65 MMHG | HEART RATE: 85 BPM | SYSTOLIC BLOOD PRESSURE: 113 MMHG | BODY MASS INDEX: 31.08 KG/M2 | WEIGHT: 164.6 LBS | RESPIRATION RATE: 22 BRPM

## 2020-06-30 DIAGNOSIS — M76.52 PATELLAR TENDONITIS OF LEFT KNEE: ICD-10-CM

## 2020-06-30 DIAGNOSIS — M25.562 LEFT KNEE PAIN, UNSPECIFIED CHRONICITY: ICD-10-CM

## 2020-06-30 DIAGNOSIS — M25.562 LEFT KNEE PAIN, UNSPECIFIED CHRONICITY: Primary | ICD-10-CM

## 2020-06-30 PROCEDURE — 3066F NEPHROPATHY DOC TX: CPT | Performed by: ORTHOPAEDIC SURGERY

## 2020-06-30 PROCEDURE — 73562 X-RAY EXAM OF KNEE 3: CPT

## 2020-06-30 PROCEDURE — 3074F SYST BP LT 130 MM HG: CPT | Performed by: ORTHOPAEDIC SURGERY

## 2020-06-30 PROCEDURE — 99213 OFFICE O/P EST LOW 20 MIN: CPT | Performed by: ORTHOPAEDIC SURGERY

## 2020-06-30 PROCEDURE — 1036F TOBACCO NON-USER: CPT | Performed by: ORTHOPAEDIC SURGERY

## 2020-06-30 PROCEDURE — 3078F DIAST BP <80 MM HG: CPT | Performed by: ORTHOPAEDIC SURGERY

## 2020-06-30 PROCEDURE — 1160F RVW MEDS BY RX/DR IN RCRD: CPT | Performed by: ORTHOPAEDIC SURGERY

## 2020-06-30 PROCEDURE — 20610 DRAIN/INJ JOINT/BURSA W/O US: CPT | Performed by: ORTHOPAEDIC SURGERY

## 2020-06-30 RX ADMIN — METHYLPREDNISOLONE ACETATE 2 ML: 40 INJECTION, SUSPENSION INTRA-ARTICULAR; INTRALESIONAL; INTRAMUSCULAR; SOFT TISSUE at 16:06

## 2020-06-30 RX ADMIN — BUPIVACAINE HYDROCHLORIDE 2 ML: 5 INJECTION, SOLUTION EPIDURAL; INTRACAUDAL at 16:06

## 2020-06-30 RX ADMIN — LIDOCAINE HYDROCHLORIDE 2 ML: 10 INJECTION, SOLUTION INFILTRATION; PERINEURAL at 16:06

## 2020-06-30 NOTE — PROGRESS NOTES
HPI:  Patient is a 77y o  year old female  who presents with chief complaint of left anterior knee pain that has been going on intermittently since a fall about 2 years ago  Patient does not remember mechanics of fall but feels her knee was read after the injury  Patient states that when she has pain she also notes swelling in the anterior aspect of her left knee and subsequent bruising  Pt denies catching and locking Patient states that she has increased pain and difficulty with kneeling  Patient states that he is taking Neurontin in oxycodone for her back  Pt is unable to take NSAID, Pt is not able to have MRI          ROS:   General: No fever, no chills, no weight loss, no weight gain  HEENT:  No loss of hearing, no nose bleeds, no sore throat  Eyes:  No eye pain, no red eyes, no visual disturbance  Respiratory:  No cough, no shortness of breath, no wheezing  Cardiovascular:  No chest pain, no palpitations, no edema  GI: No abdominal pain, no nausea, no vomiting  Endocrine: No frequent urination, no excessive thirst  Urinary:  No dysuria, no hematuria, no incontinence  Musculoskeletal: see HPI and PE  Skin:  No rash, no wounds  Neurological:  No dizziness, no headache, no numbness  Psychiatric:  No difficulty concentrating, no depression, no suicide thoughts, no anxiety  Review of all other systems is negative    PMH:  Past Medical History:   Diagnosis Date    Anemia     Anxiety     Asthma     Cataract     Chronic back pain     Chronic pain disorder     Back    Depression     GERD (gastroesophageal reflux disease)     HTN (hypertension)     Hyperlipidemia     Interstitial cystitis     Muscle cramps     Muscle weakness     Obesity     Pneumonia     PONV (postoperative nausea and vomiting)     Radiculopathy, lumbar region     Spondylosis, cervical, with myelopathy     Tinea corporis 9/21/2018    Vulvovaginitis        PSH:  Past Surgical History:   Procedure Laterality Date    APPENDECTOMY      BACK SURGERY      Arthrodeiss lumbar    BACK SURGERY      Spinal stereotaxis of cord    BLADDER SURGERY      Electronic bladder stimulator implantation    CATARACT EXTRACTION      CATARACT EXTRACTION, BILATERAL       SECTION      x3    CHOLECYSTECTOMY      COLON SURGERY      Intestinal stricturoplasty     DECOMPRESSION SPINE LUMBAR POSTERIOR Bilateral 2018    Procedure: DECOMPRESSION SPINE LUMBAR POSTERIOR L1-4, L1-2 DISKECTOMY, POSTERIORLATERAL FUSION L3-4, REMOVAL OF SPINAL CORD STIMULATOR SYSTEM, REMOVAL OF INTERSPINOUS DEVICES;  Surgeon: Jaquan Keyes MD;  [facility];  Service: Neurosurgery    FINGER SURGERY      Extensor tendon repair (mallet finger)    FL MYELOGRAM LUMBAR  2018    HERNIA REPAIR      x3    HYSTERECTOMY      [place] DRAINAGE OF HEMATOMA/FLUID Bilateral 10/9/2018    Procedure: LUMBAR WOUND EXPLORATION/EVACUATION OF SERONMA;  Surgeon: Jaquan Keyes MD;  [facility];  Service: Neurosurgery    NY ESOPHAGOGASTRODUODENOSCOPY TRANSORAL DIAGNOSTIC N/A 2017    Procedure: ESOPHAGOGASTRODUODENOSCOPY (EGD); Surgeon: Jayashree Sylvester MD;  [facility];   Service: Gastroenterology    SALPINGOOPHORECTOMY      B/L       Medications:  Current Outpatient Medications   Medication Sig Dispense Refill    albuterol 2 mg/5 mL syrup Take 5 mL (2 mg total) by mouth 4 (four) times a day 473 mL 3    atorvastatin (LIPITOR) 20 mg tablet Take 1 tablet (20 mg total) by mouth daily 90 tablet 1    cetirizine (ZyrTEC) 5 MG tablet Take 5 mg by mouth daily      cetirizine-pseudoephedrine (ZyrTEC-D Allergy & Congestion) 5-120 MG per tablet Take 1 tablet by mouth 2 (two) times a day 180 tablet 1    fenofibrate (TRIGLIDE) 160 MG tablet Take 1 tablet (160 mg total) by mouth daily 90 tablet 3    fluticasone (FLONASE) 50 mcg/act nasal spray 1 spray into each nostril daily 16 g 5    furosemide (LASIX) 40 mg tablet TAKE 1 TABLET (40 MG) BY MOUTH DAILY 90 tablet 3    gabapentin (NEURONTIN) 800 mg tablet Take 800 mg by mouth 3 (three) times a day      ketoconazole (NIZORAL) 2 % cream APPLY TO AFFECTED AREA EVERY DAY (Patient taking differently: as needed ) 60 g 1    lisinopril (ZESTRIL) 10 mg tablet TAKE ONE TABLET BY MOUTH EVERY DAY 90 tablet 1    ondansetron (ZOFRAN-ODT) 4 mg disintegrating tablet Take 1 tablet (4 mg total) by mouth every 8 (eight) hours as needed for nausea or vomiting 90 tablet 1    oxyCODONE (ROXICODONE) 10 MG TABS Take 10 mg by mouth every 8 (eight) hours as needed       pantoprazole (PROTONIX) 40 mg tablet Take 1 tablet (40 mg total) by mouth 2 (two) times a day before meals 30 minutes before breakfast and dinner 180 tablet 1    pentosan polysulfate (Elmiron) 100 mg capsule Take 1 capsule (100 mg total) by mouth 3 (three) times a day 270 capsule 1    prochlorperazine (COMPAZINE) 10 mg tablet Take 1 tablet (10 mg total) by mouth 2 (two) times a day As needed for nausea 60 tablet 0    SODIUM FLUORIDE, DENTAL GEL, (PreviDent) 1 1 % GEL Apply to teeth      tiZANidine (ZANAFLEX) 4 mg tablet Take 1 tablet (4 mg total) by mouth 3 (three) times a day 270 tablet 1    triamcinolone (KENALOG) 0 5 % cream APPLY TOPICALLY THREE TIMES A DAY 30 g 3    VENTOLIN  (90 Base) MCG/ACT inhaler INHALE 2 PUFFS 3 (THREE) TIMES A DAY AS NEEDED FOR SHORTNESS OF BREATH 18 Inhaler 3    estradiol (ESTRACE) 0 1 mg/g vaginal cream Insert 1 g into the vagina 2 (two) times a week (Patient not taking: Reported on 6/30/2020) 42 5 g 3     No current facility-administered medications for this visit  Allergies: Allergies   Allergen Reactions    Clindamycin Hives     Category: Allergy;     Erythromycin Hives and Rash     Category: Allergy;     Latex Hives    Penicillins Hives and Shortness Of Breath    Morphine GI Intolerance and Vomiting     Category:  Adverse Reaction;     Erythromycin Base Other (See Comments)     vomitting    Other     Penicillin V Seizures     Category: Allergy;     Adhesive [Medical Tape] Rash     Skin irritation    Povidone Iodine Rash     Category: Adverse Reaction; Family History:  Family History   Problem Relation Age of Onset    Coronary artery disease Brother         Patient has 3 brothers with coronary artery disease    Diabetes Mother     Throat cancer Mother     COPD Father     Cancer Father     Bipolar disorder Sister     Drug abuse Sister     Alcohol abuse Sister        Social History:  Social History     Occupational History    Occupation: Retired   Tobacco Use    Smoking status: Never Smoker    Smokeless tobacco: Never Used   Substance and Sexual Activity    Alcohol use: No    Drug use: Yes     Types: Marijuana     Comment: medical marijuana    Sexual activity: Yes     Partners: Male       Physical Exam:  General :  Alert, cooperative, no distress, appears stated age  Blood pressure 113/65, pulse 85, resp  rate 22, height 5' 1" (1 549 m), weight 74 7 kg (164 lb 9 6 oz), not currently breastfeeding  Head:  Normocephalic, without obvious abnormality, atraumatic   Eyes:  Conjunctiva/corneas clear, EOM's intact,   Ears: Both ears normal appearance, no hearing deficits  Nose: Nares normal, septum midline, no drainage    Neck: Supple,  trachea midline, no adenopathy, no tenderness, no mass   Back:   Symmetric, no curvature, ROM normal, no tenderness   Lungs:   Respirations unlabored   Chest Wall:  No tenderness or deformity   Extremities: Extremities normal, atraumatic, no cyanosis or edema      Pulses: 2+ and symmetric   Skin: Skin color, texture, turgor normal, no rashes or lesions      Neurologic: Normal           Left Knee Exam     Muscle Strength   The patient has normal left knee strength  Tenderness   The patient is experiencing tenderness in the medial joint line (tender over the anterior aspect of the latreral joint line, tibial tubercle tenderness)      Range of Motion   The patient has normal left knee ROM  Tests   Varus: negative Valgus: negative  Lachman:  Anterior - negative        Other   Erythema: absent  Swelling: none  Effusion: no effusion present    Comments:  No pain with flexion and extension   5/5 resisted ankle motion on all planes   Negative femoral stretch test               Imaging Studies: The following imaging studies were reviewed in office today  My findings are noted  X-rays of the left knee performed today show no acute osseous abnormalities, no significant degenerative changes    Assessment  Encounter Diagnoses   Name Primary?  Left knee pain, unspecified chronicity Yes    Patellar tendonitis of left knee          Plan:  After reviewing xrays and examining patient she was advised that she may be having pain from a degenerative menicscus tear or patella tendonitis, pt is unable to have MRI for further evaluation  Pt was offered and accepted CSI to help reduce pain  Pt was advised to wear a knee pad when kneeling to reduce pain   Pt will follow up in the office in 3 weeks       Large joint arthrocentesis: L knee  Date/Time: 6/30/2020 4:06 PM  Consent given by: patient  Site marked: site marked  Timeout: Immediately prior to procedure a time out was called to verify the correct patient, procedure, equipment, support staff and site/side marked as required   Supporting Documentation  Indications: pain   Procedure Details  Location: knee - L knee  Preparation: Patient was prepped and draped in the usual sterile fashion  Ultrasound guidance: no  Medications administered: 2 mL bupivacaine (PF) 0 5 %; 2 mL lidocaine 1 %; 2 mL methylPREDNISolone acetate 40 mg/mL    Patient tolerance: patient tolerated the procedure well with no immediate complications  Dressing:  Sterile dressing applied          Scribe Attestation    I,:   Vasquez Cintron am acting as a scribe while in the presence of the attending physician :        I,:   Desmond Cartagena MD personally performed the services described in this documentation    as scribed in my presence :

## 2020-07-05 RX ORDER — BUPIVACAINE HYDROCHLORIDE 5 MG/ML
2 INJECTION, SOLUTION EPIDURAL; INTRACAUDAL
Status: COMPLETED | OUTPATIENT
Start: 2020-06-30 | End: 2020-06-30

## 2020-07-05 RX ORDER — METHYLPREDNISOLONE ACETATE 40 MG/ML
2 INJECTION, SUSPENSION INTRA-ARTICULAR; INTRALESIONAL; INTRAMUSCULAR; SOFT TISSUE
Status: COMPLETED | OUTPATIENT
Start: 2020-06-30 | End: 2020-06-30

## 2020-07-05 RX ORDER — LIDOCAINE HYDROCHLORIDE 10 MG/ML
2 INJECTION, SOLUTION INFILTRATION; PERINEURAL
Status: COMPLETED | OUTPATIENT
Start: 2020-06-30 | End: 2020-06-30

## 2020-07-29 ENCOUNTER — OFFICE VISIT (OUTPATIENT)
Dept: OBGYN CLINIC | Facility: CLINIC | Age: 65
End: 2020-07-29
Payer: MEDICARE

## 2020-07-29 VITALS
TEMPERATURE: 98.1 F | HEIGHT: 61 IN | WEIGHT: 162.4 LBS | HEART RATE: 87 BPM | DIASTOLIC BLOOD PRESSURE: 74 MMHG | SYSTOLIC BLOOD PRESSURE: 109 MMHG | RESPIRATION RATE: 20 BRPM | BODY MASS INDEX: 30.66 KG/M2

## 2020-07-29 DIAGNOSIS — M76.52 PATELLAR TENDONITIS OF LEFT KNEE: Primary | ICD-10-CM

## 2020-07-29 PROCEDURE — 3078F DIAST BP <80 MM HG: CPT | Performed by: ORTHOPAEDIC SURGERY

## 2020-07-29 PROCEDURE — 3066F NEPHROPATHY DOC TX: CPT | Performed by: ORTHOPAEDIC SURGERY

## 2020-07-29 PROCEDURE — 1036F TOBACCO NON-USER: CPT | Performed by: ORTHOPAEDIC SURGERY

## 2020-07-29 PROCEDURE — 3074F SYST BP LT 130 MM HG: CPT | Performed by: ORTHOPAEDIC SURGERY

## 2020-07-29 PROCEDURE — 99213 OFFICE O/P EST LOW 20 MIN: CPT | Performed by: ORTHOPAEDIC SURGERY

## 2020-07-29 PROCEDURE — 1160F RVW MEDS BY RX/DR IN RCRD: CPT | Performed by: ORTHOPAEDIC SURGERY

## 2020-07-29 RX ORDER — ZOLPIDEM TARTRATE 10 MG/1
10 TABLET ORAL
COMMUNITY
Start: 2020-07-07

## 2020-07-29 NOTE — PROGRESS NOTES
Chief Complaint   Patient presents with    Left Knee - Follow-up, Pain, Swelling         Subjective   Patient enters today follow-up left knee  Patient had cortisone injection last time she states with no improvement whatsoever  In fact she states it increased her pain over the anterior aspect of her knee  The pain shot across the patella tendon  Patient also tells me that she is unable to use ice or heat because the sensation of ice and heat causes severe pain  Patient does have a history of lower back pain and had surgery two years ago and has been on chronic narcotics since  Patient has bladder stimulator and is unable to get MRIs  Pain comes and goes  Not severe today        ROS:  Review of systems form reviewed July 29, 2020  General: no fever, no chills  Respiratory:  No coughing, shortness of breath or wheezing  Cardiovascular:  No chest pain, no palpitations  Musculoskeletal: see HPI and PE  SKIN:  No skin rash, no dry skin  Neurological:  No headaches, no confusion  Psychiatric:  No suicide thoughts, no anxiety, no depression  Review of all other systems is negative    Past Medical History:   Diagnosis Date    Anemia     Anxiety     Asthma     Cataract     Chronic back pain     Chronic pain disorder     Back    Depression     GERD (gastroesophageal reflux disease)     HTN (hypertension)     Hyperlipidemia     Interstitial cystitis     Muscle cramps     Muscle weakness     Obesity     Pneumonia     PONV (postoperative nausea and vomiting)     Radiculopathy, lumbar region     Spondylosis, cervical, with myelopathy     Tinea corporis 9/21/2018    Vulvovaginitis        Current Outpatient Medications on File Prior to Visit   Medication Sig Dispense Refill    albuterol 2 mg/5 mL syrup Take 5 mL (2 mg total) by mouth 4 (four) times a day 473 mL 3    atorvastatin (LIPITOR) 20 mg tablet Take 1 tablet (20 mg total) by mouth daily 90 tablet 1    cetirizine (ZyrTEC) 5 MG tablet Take 5 mg by mouth daily      cetirizine-pseudoephedrine (ZyrTEC-D Allergy & Congestion) 5-120 MG per tablet Take 1 tablet by mouth 2 (two) times a day 180 tablet 1    estradiol (ESTRACE) 0 1 mg/g vaginal cream Insert 1 g into the vagina 2 (two) times a week 42 5 g 3    fenofibrate (TRIGLIDE) 160 MG tablet Take 1 tablet (160 mg total) by mouth daily 90 tablet 3    fluticasone (FLONASE) 50 mcg/act nasal spray 1 spray into each nostril daily 16 g 5    furosemide (LASIX) 40 mg tablet TAKE 1 TABLET (40 MG) BY MOUTH DAILY 90 tablet 3    gabapentin (NEURONTIN) 800 mg tablet Take 800 mg by mouth 3 (three) times a day      ketoconazole (NIZORAL) 2 % cream APPLY TO AFFECTED AREA EVERY DAY (Patient taking differently: as needed ) 60 g 1    lisinopril (ZESTRIL) 10 mg tablet TAKE ONE TABLET BY MOUTH EVERY DAY 90 tablet 1    ondansetron (ZOFRAN-ODT) 4 mg disintegrating tablet Take 1 tablet (4 mg total) by mouth every 8 (eight) hours as needed for nausea or vomiting 90 tablet 1    oxyCODONE (ROXICODONE) 10 MG TABS Take 10 mg by mouth every 8 (eight) hours as needed       pantoprazole (PROTONIX) 40 mg tablet Take 1 tablet (40 mg total) by mouth 2 (two) times a day before meals 30 minutes before breakfast and dinner 180 tablet 1    pentosan polysulfate (Elmiron) 100 mg capsule Take 1 capsule (100 mg total) by mouth 3 (three) times a day 270 capsule 1    prochlorperazine (COMPAZINE) 10 mg tablet Take 1 tablet (10 mg total) by mouth 2 (two) times a day As needed for nausea 60 tablet 0    SODIUM FLUORIDE, DENTAL GEL, (PreviDent) 1 1 % GEL Apply to teeth      tiZANidine (ZANAFLEX) 4 mg tablet Take 1 tablet (4 mg total) by mouth 3 (three) times a day 270 tablet 1    triamcinolone (KENALOG) 0 5 % cream APPLY TOPICALLY THREE TIMES A DAY 30 g 3    VENTOLIN  (90 Base) MCG/ACT inhaler INHALE 2 PUFFS 3 (THREE) TIMES A DAY AS NEEDED FOR SHORTNESS OF BREATH 18 Inhaler 3    zolpidem (AMBIEN) 10 mg tablet Take 10 mg by mouth daily at bedtime as needed       No current facility-administered medications on file prior to visit  Allergies   Allergen Reactions    Clindamycin Hives     Category: Allergy;     Erythromycin Hives and Rash     Category: Allergy;     Latex Hives    Penicillins Hives and Shortness Of Breath    Morphine GI Intolerance and Vomiting     Category: Adverse Reaction;     Erythromycin Base Other (See Comments)     vomitting    Other     Penicillin V Seizures     Category: Allergy;     Adhesive [Medical Tape] Rash     Skin irritation    Povidone Iodine Rash     Category: Adverse Reaction;          Physical Exam:    Vitals:    07/29/20 1449   BP: 109/74   Pulse: 87   Resp: 20   Temp: 98 1 °F (36 7 °C)   TempSrc: Temporal   Weight: 73 7 kg (162 lb 6 4 oz)   Height: 5' 1" (1 549 m)       General Appearance:  Alert, cooperative, no distress, appears stated age   Lungs:   respirations unlabored   Heart:  Normal heart rate noted   Abdomen:   Soft, non-tender,  no masses   Extremities: Extremities normal, atraumatic, no cyanosis or edema   Pulses: 2+ and symmetric   Skin: Skin color, texture, turgor normal, no rashes or lesions   Neurologic: Normal         Ortho Exam  Examination left knee skin intact no erythema noted  Patient with full active range of motion with no effusion  Slight pain with palpation over the patella tendon  No pain over the quadriceps tendon  No pain with palpation over medial and lateral joint lines  Knee is stable to varus valgus stress  5/5 knee flexion and extension      ASSESSMENT:    Tammy Davis was seen today for follow-up, pain and swelling  Diagnoses and all orders for this visit:    Patellar tendonitis of left knee  -     diclofenac sodium (VOLTAREN) 1 %; Apply 2 g topically 4 (four) times a day  -     Ambulatory referral to Physical Therapy; Future          PLAN:  Patient with mild degenerative changes on x-ray regarding her left knee    Patient had no response to cortisone injection which indicates the issue is coming from outside the knee joint  Some her pain seems like it could be radicular in type from her lower back  Patient with hypersensitivity to heat and cold  Pain she is having over the patella tendon clinically seems like patellar tendinitis  Discussed with the patient anti-inflammatory and physical therapy  Prescription for Voltaren gel was sent to the pharmacy for the patient she understands this is also sold over-the-counter    She was also given a prescription for physical therapy she will follow up with us as needed    Scribe Attestation    I,:   Lucie Pederson PA-C am acting as a scribe while in the presence of the attending physician :        I,:   Sarah Shaw MD personally performed the services described in this documentation    as scribed in my presence :

## 2020-08-13 DIAGNOSIS — M62.830 MUSCLE SPASM OF BACK: ICD-10-CM

## 2020-08-14 RX ORDER — TIZANIDINE 4 MG/1
TABLET ORAL
Qty: 90 TABLET | Refills: 1 | Status: SHIPPED | OUTPATIENT
Start: 2020-08-14 | End: 2020-10-22

## 2020-08-17 DIAGNOSIS — R82.81 PYURIA: Primary | ICD-10-CM

## 2020-08-17 RX ORDER — SULFAMETHOXAZOLE AND TRIMETHOPRIM 800; 160 MG/1; MG/1
1 TABLET ORAL EVERY 12 HOURS SCHEDULED
Qty: 14 TABLET | Refills: 0 | Status: SHIPPED | OUTPATIENT
Start: 2020-08-17 | End: 2020-08-24

## 2020-08-25 DIAGNOSIS — J45.20 MILD INTERMITTENT ASTHMA WITHOUT COMPLICATION: ICD-10-CM

## 2020-09-06 DIAGNOSIS — K21.9 GASTROESOPHAGEAL REFLUX DISEASE, ESOPHAGITIS PRESENCE NOT SPECIFIED: ICD-10-CM

## 2020-09-07 RX ORDER — PANTOPRAZOLE SODIUM 40 MG/1
TABLET, DELAYED RELEASE ORAL
Qty: 180 TABLET | Refills: 1 | Status: SHIPPED | OUTPATIENT
Start: 2020-09-07 | End: 2021-01-14 | Stop reason: SDUPTHER

## 2020-09-15 ENCOUNTER — TELEPHONE (OUTPATIENT)
Dept: NEPHROLOGY | Facility: CLINIC | Age: 65
End: 2020-09-15

## 2020-09-16 ENCOUNTER — TELEPHONE (OUTPATIENT)
Dept: NEPHROLOGY | Facility: CLINIC | Age: 65
End: 2020-09-16

## 2020-09-16 NOTE — TELEPHONE ENCOUNTER
Patient called to reschedule her appt on 9/16 with Dr Michael Sheehan  Patient is now scheduled for 11/11 with Dr Michael Sheehan and added to the wait list with the preferences of only seeing Dr Michael Sheehan, afternoon appts   [address] [facility]

## 2020-10-05 DIAGNOSIS — J30.9 ALLERGIC RHINITIS, UNSPECIFIED SEASONALITY, UNSPECIFIED TRIGGER: ICD-10-CM

## 2020-10-05 RX ORDER — CETIRIZINE HCL/PSEUDOEPHEDRINE 5 MG-120MG
TABLET, EXTENDED RELEASE 12 HR ORAL
Qty: 180 TABLET | Refills: 1 | Status: SHIPPED | OUTPATIENT
Start: 2020-10-05 | End: 2021-03-30

## 2020-10-06 DIAGNOSIS — J45.20 MILD INTERMITTENT ASTHMA WITHOUT COMPLICATION: ICD-10-CM

## 2020-10-16 DIAGNOSIS — I10 ESSENTIAL (PRIMARY) HYPERTENSION: ICD-10-CM

## 2020-10-16 RX ORDER — LISINOPRIL 10 MG/1
TABLET ORAL
Qty: 90 TABLET | Refills: 1 | Status: SHIPPED | OUTPATIENT
Start: 2020-10-16 | End: 2021-01-26 | Stop reason: SDUPTHER

## 2020-10-22 DIAGNOSIS — M62.830 MUSCLE SPASM OF BACK: ICD-10-CM

## 2020-10-22 DIAGNOSIS — I10 ESSENTIAL (PRIMARY) HYPERTENSION: ICD-10-CM

## 2020-10-22 DIAGNOSIS — J45.20 MILD INTERMITTENT ASTHMA WITHOUT COMPLICATION: ICD-10-CM

## 2020-10-22 RX ORDER — FUROSEMIDE 40 MG/1
TABLET ORAL
Qty: 90 TABLET | Refills: 1 | Status: SHIPPED | OUTPATIENT
Start: 2020-10-22 | End: 2020-12-11

## 2020-10-22 RX ORDER — TIZANIDINE 4 MG/1
TABLET ORAL
Qty: 90 TABLET | Refills: 1 | Status: SHIPPED | OUTPATIENT
Start: 2020-10-22 | End: 2020-12-07

## 2020-11-10 ENCOUNTER — TELEPHONE (OUTPATIENT)
Dept: NEPHROLOGY | Facility: CLINIC | Age: 65
End: 2020-11-10

## 2020-11-11 ENCOUNTER — CONSULT (OUTPATIENT)
Dept: NEPHROLOGY | Facility: CLINIC | Age: 65
End: 2020-11-11
Payer: MEDICARE

## 2020-11-11 VITALS
HEART RATE: 96 BPM | TEMPERATURE: 97.7 F | HEIGHT: 61 IN | BODY MASS INDEX: 30.7 KG/M2 | WEIGHT: 162.6 LBS | RESPIRATION RATE: 18 BRPM | SYSTOLIC BLOOD PRESSURE: 138 MMHG | DIASTOLIC BLOOD PRESSURE: 78 MMHG

## 2020-11-11 DIAGNOSIS — N18.30 CKD (CHRONIC KIDNEY DISEASE) STAGE 3, GFR 30-59 ML/MIN (HCC): ICD-10-CM

## 2020-11-11 PROCEDURE — 99205 OFFICE O/P NEW HI 60 MIN: CPT | Performed by: INTERNAL MEDICINE

## 2020-11-13 ENCOUNTER — LAB (OUTPATIENT)
Dept: LAB | Facility: CLINIC | Age: 65
End: 2020-11-13
Payer: MEDICARE

## 2020-11-13 LAB
25(OH)D3 SERPL-MCNC: 28.4 NG/ML (ref 30–100)
ALBUMIN SERPL BCP-MCNC: 3.9 G/DL (ref 3.5–5)
ALP SERPL-CCNC: 45 U/L (ref 46–116)
ALT SERPL W P-5'-P-CCNC: 25 U/L (ref 12–78)
ANION GAP SERPL CALCULATED.3IONS-SCNC: 5 MMOL/L (ref 4–13)
AST SERPL W P-5'-P-CCNC: 14 U/L (ref 5–45)
BACTERIA UR QL AUTO: ABNORMAL /HPF
BASOPHILS # BLD AUTO: 0.05 THOUSANDS/ΜL (ref 0–0.1)
BASOPHILS NFR BLD AUTO: 1 % (ref 0–1)
BILIRUB SERPL-MCNC: 0.3 MG/DL (ref 0.2–1)
BILIRUB UR QL STRIP: NEGATIVE
BUN SERPL-MCNC: 29 MG/DL (ref 5–25)
CALCIUM SERPL-MCNC: 9.7 MG/DL (ref 8.3–10.1)
CHLORIDE SERPL-SCNC: 107 MMOL/L (ref 100–108)
CLARITY UR: ABNORMAL
CO2 SERPL-SCNC: 30 MMOL/L (ref 21–32)
COLOR UR: YELLOW
CREAT SERPL-MCNC: 1.4 MG/DL (ref 0.6–1.3)
CREAT UR-MCNC: 31.9 MG/DL
EOSINOPHIL # BLD AUTO: 0.13 THOUSAND/ΜL (ref 0–0.61)
EOSINOPHIL NFR BLD AUTO: 3 % (ref 0–6)
ERYTHROCYTE [DISTWIDTH] IN BLOOD BY AUTOMATED COUNT: 14.2 % (ref 11.6–15.1)
GFR SERPL CREATININE-BSD FRML MDRD: 39 ML/MIN/1.73SQ M
GLUCOSE SERPL-MCNC: 90 MG/DL (ref 65–140)
GLUCOSE UR STRIP-MCNC: NEGATIVE MG/DL
HCT VFR BLD AUTO: 38.7 % (ref 34.8–46.1)
HGB BLD-MCNC: 11.8 G/DL (ref 11.5–15.4)
HGB UR QL STRIP.AUTO: NEGATIVE
HYALINE CASTS #/AREA URNS LPF: ABNORMAL /LPF
IMM GRANULOCYTES # BLD AUTO: 0.01 THOUSAND/UL (ref 0–0.2)
IMM GRANULOCYTES NFR BLD AUTO: 0 % (ref 0–2)
KETONES UR STRIP-MCNC: NEGATIVE MG/DL
LEUKOCYTE ESTERASE UR QL STRIP: ABNORMAL
LYMPHOCYTES # BLD AUTO: 1.46 THOUSANDS/ΜL (ref 0.6–4.47)
LYMPHOCYTES NFR BLD AUTO: 28 % (ref 14–44)
MCH RBC QN AUTO: 28.9 PG (ref 26.8–34.3)
MCHC RBC AUTO-ENTMCNC: 30.5 G/DL (ref 31.4–37.4)
MCV RBC AUTO: 95 FL (ref 82–98)
MONOCYTES # BLD AUTO: 0.37 THOUSAND/ΜL (ref 0.17–1.22)
MONOCYTES NFR BLD AUTO: 7 % (ref 4–12)
NEUTROPHILS # BLD AUTO: 3.27 THOUSANDS/ΜL (ref 1.85–7.62)
NEUTS SEG NFR BLD AUTO: 61 % (ref 43–75)
NITRITE UR QL STRIP: NEGATIVE
NON-SQ EPI CELLS URNS QL MICRO: ABNORMAL /HPF
NRBC BLD AUTO-RTO: 0 /100 WBCS
PH UR STRIP.AUTO: 7 [PH]
PHOSPHATE SERPL-MCNC: 3.2 MG/DL (ref 2.3–4.1)
PLATELET # BLD AUTO: 353 THOUSANDS/UL (ref 149–390)
PMV BLD AUTO: 11.2 FL (ref 8.9–12.7)
POTASSIUM SERPL-SCNC: 4.4 MMOL/L (ref 3.5–5.3)
PROT SERPL-MCNC: 7.6 G/DL (ref 6.4–8.2)
PROT UR STRIP-MCNC: NEGATIVE MG/DL
PROT UR-MCNC: <6 MG/DL
PROT/CREAT UR: <0.19 MG/G{CREAT} (ref 0–0.1)
PTH-INTACT SERPL-MCNC: 33.9 PG/ML (ref 18.4–80.1)
RBC # BLD AUTO: 4.08 MILLION/UL (ref 3.81–5.12)
RBC #/AREA URNS AUTO: ABNORMAL /HPF
SODIUM SERPL-SCNC: 142 MMOL/L (ref 136–145)
SP GR UR STRIP.AUTO: 1.01 (ref 1–1.03)
UROBILINOGEN UR QL STRIP.AUTO: 0.2 E.U./DL
WBC # BLD AUTO: 5.29 THOUSAND/UL (ref 4.31–10.16)
WBC #/AREA URNS AUTO: ABNORMAL /HPF

## 2020-11-13 PROCEDURE — 82306 VITAMIN D 25 HYDROXY: CPT | Performed by: INTERNAL MEDICINE

## 2020-11-13 PROCEDURE — 81001 URINALYSIS AUTO W/SCOPE: CPT | Performed by: INTERNAL MEDICINE

## 2020-11-13 PROCEDURE — 80053 COMPREHEN METABOLIC PANEL: CPT | Performed by: INTERNAL MEDICINE

## 2020-11-13 PROCEDURE — 36415 COLL VENOUS BLD VENIPUNCTURE: CPT | Performed by: INTERNAL MEDICINE

## 2020-11-13 PROCEDURE — 82570 ASSAY OF URINE CREATININE: CPT | Performed by: INTERNAL MEDICINE

## 2020-11-13 PROCEDURE — 85025 COMPLETE CBC W/AUTO DIFF WBC: CPT | Performed by: INTERNAL MEDICINE

## 2020-11-13 PROCEDURE — 84156 ASSAY OF PROTEIN URINE: CPT | Performed by: INTERNAL MEDICINE

## 2020-11-13 PROCEDURE — 83970 ASSAY OF PARATHORMONE: CPT | Performed by: INTERNAL MEDICINE

## 2020-11-13 PROCEDURE — 84100 ASSAY OF PHOSPHORUS: CPT | Performed by: INTERNAL MEDICINE

## 2020-11-16 DIAGNOSIS — J45.20 MILD INTERMITTENT ASTHMA WITHOUT COMPLICATION: ICD-10-CM

## 2020-11-21 DIAGNOSIS — N30.10 INTERSTITIAL CYSTITIS: ICD-10-CM

## 2020-11-25 RX ORDER — PENTOSAN POLYSULFATE SODIUM 100 MG/1
CAPSULE, GELATIN COATED ORAL
Qty: 270 CAPSULE | Refills: 1 | Status: SHIPPED | OUTPATIENT
Start: 2020-11-25 | End: 2021-07-09

## 2020-11-30 ENCOUNTER — TRANSCRIBE ORDERS (OUTPATIENT)
Dept: ADMINISTRATIVE | Facility: HOSPITAL | Age: 65
End: 2020-11-30

## 2020-11-30 ENCOUNTER — HOSPITAL ENCOUNTER (OUTPATIENT)
Dept: ULTRASOUND IMAGING | Facility: CLINIC | Age: 65
Discharge: HOME | End: 2020-11-30
Payer: MEDICARE

## 2020-11-30 DIAGNOSIS — N18.30 CKD (CHRONIC KIDNEY DISEASE) STAGE 3, GFR 30-59 ML/MIN (HCC): ICD-10-CM

## 2020-11-30 DIAGNOSIS — N18.9 CHRONIC KIDNEY DISEASE, UNSPECIFIED CKD STAGE: Primary | ICD-10-CM

## 2020-11-30 PROCEDURE — 76775 US EXAM ABDO BACK WALL LIM: CPT

## 2020-12-02 ENCOUNTER — HOSPITAL ENCOUNTER (OUTPATIENT)
Dept: ULTRASOUND IMAGING | Facility: CLINIC | Age: 65
Discharge: HOME | End: 2020-12-02
Payer: MEDICARE

## 2020-12-02 DIAGNOSIS — N18.9 CHRONIC KIDNEY DISEASE, UNSPECIFIED CKD STAGE: ICD-10-CM

## 2020-12-02 PROCEDURE — 51798 US URINE CAPACITY MEASURE: CPT

## 2020-12-04 DIAGNOSIS — K21.9 GASTROESOPHAGEAL REFLUX DISEASE: ICD-10-CM

## 2020-12-04 RX ORDER — ONDANSETRON 4 MG/1
4 TABLET, ORALLY DISINTEGRATING ORAL EVERY 8 HOURS PRN
Qty: 90 TABLET | Refills: 1 | Status: SHIPPED | OUTPATIENT
Start: 2020-12-04 | End: 2021-01-07 | Stop reason: SDUPTHER

## 2020-12-05 DIAGNOSIS — M62.830 MUSCLE SPASM OF BACK: ICD-10-CM

## 2020-12-07 RX ORDER — TIZANIDINE 4 MG/1
TABLET ORAL
Qty: 90 TABLET | Refills: 1 | Status: SHIPPED | OUTPATIENT
Start: 2020-12-07 | End: 2021-01-04

## 2020-12-10 ENCOUNTER — TELEPHONE (OUTPATIENT)
Dept: GASTROENTEROLOGY | Facility: CLINIC | Age: 65
End: 2020-12-10

## 2020-12-11 ENCOUNTER — OFFICE VISIT (OUTPATIENT)
Dept: INTERNAL MEDICINE CLINIC | Facility: CLINIC | Age: 65
End: 2020-12-11
Payer: MEDICARE

## 2020-12-11 VITALS
HEIGHT: 60 IN | RESPIRATION RATE: 18 BRPM | HEART RATE: 84 BPM | OXYGEN SATURATION: 98 % | DIASTOLIC BLOOD PRESSURE: 88 MMHG | WEIGHT: 162 LBS | BODY MASS INDEX: 31.8 KG/M2 | SYSTOLIC BLOOD PRESSURE: 134 MMHG | TEMPERATURE: 97.6 F

## 2020-12-11 DIAGNOSIS — R61 DIAPHORESIS: ICD-10-CM

## 2020-12-11 DIAGNOSIS — N18.30 STAGE 3 CHRONIC KIDNEY DISEASE, UNSPECIFIED WHETHER STAGE 3A OR 3B CKD (HCC): ICD-10-CM

## 2020-12-11 DIAGNOSIS — I10 ESSENTIAL (PRIMARY) HYPERTENSION: ICD-10-CM

## 2020-12-11 DIAGNOSIS — E55.9 VITAMIN D DEFICIENCY: ICD-10-CM

## 2020-12-11 DIAGNOSIS — I95.9 HYPOTENSION, UNSPECIFIED HYPOTENSION TYPE: Primary | ICD-10-CM

## 2020-12-11 DIAGNOSIS — E78.2 MIXED HYPERLIPIDEMIA: ICD-10-CM

## 2020-12-11 DIAGNOSIS — R53.83 FATIGUE, UNSPECIFIED TYPE: ICD-10-CM

## 2020-12-11 PROCEDURE — 99214 OFFICE O/P EST MOD 30 MIN: CPT | Performed by: PHYSICIAN ASSISTANT

## 2020-12-11 RX ORDER — FUROSEMIDE 20 MG/1
10 TABLET ORAL EVERY OTHER DAY
Qty: 90 TABLET | Refills: 1 | Status: SHIPPED | OUTPATIENT
Start: 2020-12-11 | End: 2021-01-02 | Stop reason: SDUPTHER

## 2020-12-12 ENCOUNTER — LAB (OUTPATIENT)
Dept: LAB | Facility: CLINIC | Age: 65
End: 2020-12-12
Payer: MEDICARE

## 2020-12-12 DIAGNOSIS — R61 DIAPHORESIS: ICD-10-CM

## 2020-12-12 DIAGNOSIS — K21.9 GASTROESOPHAGEAL REFLUX DISEASE: ICD-10-CM

## 2020-12-12 DIAGNOSIS — N18.30 CKD (CHRONIC KIDNEY DISEASE) STAGE 3, GFR 30-59 ML/MIN (HCC): ICD-10-CM

## 2020-12-12 DIAGNOSIS — E78.2 MIXED HYPERLIPIDEMIA: ICD-10-CM

## 2020-12-12 DIAGNOSIS — R79.89 AZOTEMIA: ICD-10-CM

## 2020-12-12 DIAGNOSIS — J45.20 MILD INTERMITTENT ASTHMA WITHOUT COMPLICATION: ICD-10-CM

## 2020-12-12 DIAGNOSIS — I95.9 HYPOTENSION, UNSPECIFIED HYPOTENSION TYPE: ICD-10-CM

## 2020-12-12 LAB
25(OH)D3 SERPL-MCNC: 43.3 NG/ML (ref 30–100)
ALBUMIN SERPL BCP-MCNC: 4.1 G/DL (ref 3.5–5)
ALP SERPL-CCNC: 44 U/L (ref 46–116)
ALT SERPL W P-5'-P-CCNC: 26 U/L (ref 12–78)
ANION GAP SERPL CALCULATED.3IONS-SCNC: 7 MMOL/L (ref 4–13)
AST SERPL W P-5'-P-CCNC: 23 U/L (ref 5–45)
BACTERIA UR QL AUTO: ABNORMAL /HPF
BASOPHILS # BLD AUTO: 0.04 THOUSANDS/ΜL (ref 0–0.1)
BASOPHILS NFR BLD AUTO: 1 % (ref 0–1)
BILIRUB SERPL-MCNC: 0.26 MG/DL (ref 0.2–1)
BILIRUB UR QL STRIP: NEGATIVE
BUN SERPL-MCNC: 32 MG/DL (ref 5–25)
CALCIUM SERPL-MCNC: 9.9 MG/DL (ref 8.3–10.1)
CHLORIDE SERPL-SCNC: 104 MMOL/L (ref 100–108)
CHOLEST SERPL-MCNC: 177 MG/DL (ref 50–200)
CLARITY UR: CLEAR
CO2 SERPL-SCNC: 29 MMOL/L (ref 21–32)
COLOR UR: YELLOW
CREAT SERPL-MCNC: 1.51 MG/DL (ref 0.6–1.3)
CREAT UR-MCNC: 70.9 MG/DL
EOSINOPHIL # BLD AUTO: 0.12 THOUSAND/ΜL (ref 0–0.61)
EOSINOPHIL NFR BLD AUTO: 2 % (ref 0–6)
ERYTHROCYTE [DISTWIDTH] IN BLOOD BY AUTOMATED COUNT: 13.5 % (ref 11.6–15.1)
GFR SERPL CREATININE-BSD FRML MDRD: 36 ML/MIN/1.73SQ M
GLUCOSE P FAST SERPL-MCNC: 70 MG/DL (ref 65–99)
GLUCOSE UR STRIP-MCNC: NEGATIVE MG/DL
HCT VFR BLD AUTO: 38.6 % (ref 34.8–46.1)
HDLC SERPL-MCNC: 87 MG/DL
HGB BLD-MCNC: 12.1 G/DL (ref 11.5–15.4)
HGB UR QL STRIP.AUTO: NEGATIVE
HYALINE CASTS #/AREA URNS LPF: ABNORMAL /LPF
IMM GRANULOCYTES # BLD AUTO: 0.02 THOUSAND/UL (ref 0–0.2)
IMM GRANULOCYTES NFR BLD AUTO: 0 % (ref 0–2)
KETONES UR STRIP-MCNC: NEGATIVE MG/DL
LDLC SERPL CALC-MCNC: 67 MG/DL (ref 0–100)
LEUKOCYTE ESTERASE UR QL STRIP: ABNORMAL
LYMPHOCYTES # BLD AUTO: 2.45 THOUSANDS/ΜL (ref 0.6–4.47)
LYMPHOCYTES NFR BLD AUTO: 37 % (ref 14–44)
MCH RBC QN AUTO: 29.1 PG (ref 26.8–34.3)
MCHC RBC AUTO-ENTMCNC: 31.3 G/DL (ref 31.4–37.4)
MCV RBC AUTO: 93 FL (ref 82–98)
MONOCYTES # BLD AUTO: 0.52 THOUSAND/ΜL (ref 0.17–1.22)
MONOCYTES NFR BLD AUTO: 8 % (ref 4–12)
NEUTROPHILS # BLD AUTO: 3.56 THOUSANDS/ΜL (ref 1.85–7.62)
NEUTS SEG NFR BLD AUTO: 52 % (ref 43–75)
NITRITE UR QL STRIP: NEGATIVE
NON-SQ EPI CELLS URNS QL MICRO: ABNORMAL /HPF
NONHDLC SERPL-MCNC: 90 MG/DL
NRBC BLD AUTO-RTO: 0 /100 WBCS
PH UR STRIP.AUTO: 6 [PH]
PHOSPHATE SERPL-MCNC: 3.8 MG/DL (ref 2.3–4.1)
PLATELET # BLD AUTO: 359 THOUSANDS/UL (ref 149–390)
PMV BLD AUTO: 11.1 FL (ref 8.9–12.7)
POTASSIUM SERPL-SCNC: 4 MMOL/L (ref 3.5–5.3)
PROT SERPL-MCNC: 7.9 G/DL (ref 6.4–8.2)
PROT UR STRIP-MCNC: NEGATIVE MG/DL
PROT UR-MCNC: 6 MG/DL
PROT/CREAT UR: 0.08 MG/G{CREAT} (ref 0–0.1)
PTH-INTACT SERPL-MCNC: 21 PG/ML (ref 18.4–80.1)
RBC # BLD AUTO: 4.16 MILLION/UL (ref 3.81–5.12)
RBC #/AREA URNS AUTO: ABNORMAL /HPF
SODIUM SERPL-SCNC: 140 MMOL/L (ref 136–145)
SP GR UR STRIP.AUTO: 1.02 (ref 1–1.03)
T4 FREE SERPL-MCNC: 1.19 NG/DL (ref 0.76–1.46)
TRIGL SERPL-MCNC: 114 MG/DL
TSH SERPL DL<=0.05 MIU/L-ACNC: 2.53 UIU/ML (ref 0.36–3.74)
UROBILINOGEN UR QL STRIP.AUTO: 0.2 E.U./DL
WBC # BLD AUTO: 6.71 THOUSAND/UL (ref 4.31–10.16)
WBC #/AREA URNS AUTO: ABNORMAL /HPF

## 2020-12-12 PROCEDURE — 36415 COLL VENOUS BLD VENIPUNCTURE: CPT

## 2020-12-12 PROCEDURE — 84156 ASSAY OF PROTEIN URINE: CPT

## 2020-12-12 PROCEDURE — 84443 ASSAY THYROID STIM HORMONE: CPT

## 2020-12-12 PROCEDURE — 83970 ASSAY OF PARATHORMONE: CPT

## 2020-12-12 PROCEDURE — 84100 ASSAY OF PHOSPHORUS: CPT

## 2020-12-12 PROCEDURE — 82306 VITAMIN D 25 HYDROXY: CPT

## 2020-12-12 PROCEDURE — 80061 LIPID PANEL: CPT

## 2020-12-12 PROCEDURE — 80053 COMPREHEN METABOLIC PANEL: CPT

## 2020-12-12 PROCEDURE — 84439 ASSAY OF FREE THYROXINE: CPT

## 2020-12-12 PROCEDURE — 82570 ASSAY OF URINE CREATININE: CPT

## 2020-12-12 PROCEDURE — 85025 COMPLETE CBC W/AUTO DIFF WBC: CPT

## 2020-12-12 PROCEDURE — 81001 URINALYSIS AUTO W/SCOPE: CPT

## 2020-12-23 ENCOUNTER — TELEPHONE (OUTPATIENT)
Dept: NEPHROLOGY | Facility: CLINIC | Age: 65
End: 2020-12-23

## 2020-12-28 ENCOUNTER — LAB (OUTPATIENT)
Dept: LAB | Facility: CLINIC | Age: 65
End: 2020-12-28
Payer: MEDICARE

## 2020-12-28 DIAGNOSIS — N18.30 CKD (CHRONIC KIDNEY DISEASE) STAGE 3, GFR 30-59 ML/MIN (HCC): ICD-10-CM

## 2020-12-28 LAB
25(OH)D3 SERPL-MCNC: 32.7 NG/ML (ref 30–100)
ALBUMIN SERPL BCP-MCNC: 4.2 G/DL (ref 3.5–5)
ALP SERPL-CCNC: 38 U/L (ref 46–116)
ALT SERPL W P-5'-P-CCNC: 30 U/L (ref 12–78)
ANION GAP SERPL CALCULATED.3IONS-SCNC: 5 MMOL/L (ref 4–13)
AST SERPL W P-5'-P-CCNC: 22 U/L (ref 5–45)
BACTERIA UR QL AUTO: ABNORMAL /HPF
BASOPHILS # BLD AUTO: 0.05 THOUSANDS/ΜL (ref 0–0.1)
BASOPHILS NFR BLD AUTO: 1 % (ref 0–1)
BILIRUB SERPL-MCNC: 0.43 MG/DL (ref 0.2–1)
BILIRUB UR QL STRIP: NEGATIVE
BUN SERPL-MCNC: 29 MG/DL (ref 5–25)
CALCIUM SERPL-MCNC: 10.1 MG/DL (ref 8.3–10.1)
CHLORIDE SERPL-SCNC: 105 MMOL/L (ref 100–108)
CLARITY UR: CLEAR
CO2 SERPL-SCNC: 32 MMOL/L (ref 21–32)
COLOR UR: YELLOW
CREAT SERPL-MCNC: 1.52 MG/DL (ref 0.6–1.3)
CREAT UR-MCNC: 136 MG/DL
EOSINOPHIL # BLD AUTO: 0.12 THOUSAND/ΜL (ref 0–0.61)
EOSINOPHIL NFR BLD AUTO: 2 % (ref 0–6)
ERYTHROCYTE [DISTWIDTH] IN BLOOD BY AUTOMATED COUNT: 13.7 % (ref 11.6–15.1)
GFR SERPL CREATININE-BSD FRML MDRD: 35 ML/MIN/1.73SQ M
GLUCOSE P FAST SERPL-MCNC: 88 MG/DL (ref 65–99)
GLUCOSE UR STRIP-MCNC: NEGATIVE MG/DL
HCT VFR BLD AUTO: 36.8 % (ref 34.8–46.1)
HGB BLD-MCNC: 11.4 G/DL (ref 11.5–15.4)
HGB UR QL STRIP.AUTO: NEGATIVE
HYALINE CASTS #/AREA URNS LPF: ABNORMAL /LPF
IMM GRANULOCYTES # BLD AUTO: 0.01 THOUSAND/UL (ref 0–0.2)
IMM GRANULOCYTES NFR BLD AUTO: 0 % (ref 0–2)
KETONES UR STRIP-MCNC: NEGATIVE MG/DL
LEUKOCYTE ESTERASE UR QL STRIP: NEGATIVE
LYMPHOCYTES # BLD AUTO: 2.43 THOUSANDS/ΜL (ref 0.6–4.47)
LYMPHOCYTES NFR BLD AUTO: 40 % (ref 14–44)
MCH RBC QN AUTO: 29.2 PG (ref 26.8–34.3)
MCHC RBC AUTO-ENTMCNC: 31 G/DL (ref 31.4–37.4)
MCV RBC AUTO: 94 FL (ref 82–98)
MONOCYTES # BLD AUTO: 0.44 THOUSAND/ΜL (ref 0.17–1.22)
MONOCYTES NFR BLD AUTO: 7 % (ref 4–12)
NEUTROPHILS # BLD AUTO: 3.01 THOUSANDS/ΜL (ref 1.85–7.62)
NEUTS SEG NFR BLD AUTO: 50 % (ref 43–75)
NITRITE UR QL STRIP: NEGATIVE
NON-SQ EPI CELLS URNS QL MICRO: ABNORMAL /HPF
NRBC BLD AUTO-RTO: 0 /100 WBCS
PH UR STRIP.AUTO: 7 [PH]
PHOSPHATE SERPL-MCNC: 3.7 MG/DL (ref 2.3–4.1)
PLATELET # BLD AUTO: 330 THOUSANDS/UL (ref 149–390)
PMV BLD AUTO: 10.9 FL (ref 8.9–12.7)
POTASSIUM SERPL-SCNC: 4.2 MMOL/L (ref 3.5–5.3)
PROT SERPL-MCNC: 7.6 G/DL (ref 6.4–8.2)
PROT UR STRIP-MCNC: NEGATIVE MG/DL
PROT UR-MCNC: 12 MG/DL
PROT/CREAT UR: 0.09 MG/G{CREAT} (ref 0–0.1)
PTH-INTACT SERPL-MCNC: 22.5 PG/ML (ref 18.4–80.1)
RBC # BLD AUTO: 3.91 MILLION/UL (ref 3.81–5.12)
RBC #/AREA URNS AUTO: ABNORMAL /HPF
SODIUM SERPL-SCNC: 142 MMOL/L (ref 136–145)
SP GR UR STRIP.AUTO: 1.02 (ref 1–1.03)
UROBILINOGEN UR QL STRIP.AUTO: 0.2 E.U./DL
WBC # BLD AUTO: 6.06 THOUSAND/UL (ref 4.31–10.16)
WBC #/AREA URNS AUTO: ABNORMAL /HPF

## 2020-12-28 PROCEDURE — 81001 URINALYSIS AUTO W/SCOPE: CPT

## 2020-12-28 PROCEDURE — 36415 COLL VENOUS BLD VENIPUNCTURE: CPT

## 2020-12-28 PROCEDURE — 84156 ASSAY OF PROTEIN URINE: CPT

## 2020-12-28 PROCEDURE — 80053 COMPREHEN METABOLIC PANEL: CPT

## 2020-12-28 PROCEDURE — 84100 ASSAY OF PHOSPHORUS: CPT

## 2020-12-28 PROCEDURE — 82306 VITAMIN D 25 HYDROXY: CPT

## 2020-12-28 PROCEDURE — 85025 COMPLETE CBC W/AUTO DIFF WBC: CPT

## 2020-12-28 PROCEDURE — 83970 ASSAY OF PARATHORMONE: CPT

## 2020-12-28 PROCEDURE — 82570 ASSAY OF URINE CREATININE: CPT

## 2021-01-02 DIAGNOSIS — M62.830 MUSCLE SPASM OF BACK: ICD-10-CM

## 2021-01-02 DIAGNOSIS — I10 ESSENTIAL (PRIMARY) HYPERTENSION: ICD-10-CM

## 2021-01-04 RX ORDER — TIZANIDINE 4 MG/1
TABLET ORAL
Qty: 90 TABLET | Refills: 1 | Status: SHIPPED | OUTPATIENT
Start: 2021-01-04 | End: 2021-01-05

## 2021-01-04 RX ORDER — FUROSEMIDE 20 MG/1
10 TABLET ORAL EVERY OTHER DAY
Qty: 90 TABLET | Refills: 0 | Status: SHIPPED | OUTPATIENT
Start: 2021-01-04 | End: 2021-02-11

## 2021-01-05 DIAGNOSIS — M62.830 MUSCLE SPASM OF BACK: ICD-10-CM

## 2021-01-05 RX ORDER — TIZANIDINE 4 MG/1
TABLET ORAL
Qty: 270 TABLET | Refills: 1 | Status: SHIPPED | OUTPATIENT
Start: 2021-01-05 | End: 2021-02-04

## 2021-01-07 DIAGNOSIS — K31.84 GASTROPARESIS: Primary | ICD-10-CM

## 2021-01-07 DIAGNOSIS — K21.9 GASTROESOPHAGEAL REFLUX DISEASE WITHOUT ESOPHAGITIS: ICD-10-CM

## 2021-01-07 RX ORDER — PROMETHAZINE HYDROCHLORIDE 25 MG/1
25 TABLET ORAL EVERY 6 HOURS PRN
Qty: 30 TABLET | Refills: 2 | Status: SHIPPED | OUTPATIENT
Start: 2021-01-07 | End: 2021-07-21 | Stop reason: ALTCHOICE

## 2021-01-07 RX ORDER — ONDANSETRON 4 MG/1
4 TABLET, ORALLY DISINTEGRATING ORAL EVERY 8 HOURS PRN
Qty: 90 TABLET | Refills: 1 | Status: SHIPPED | OUTPATIENT
Start: 2021-01-07 | End: 2021-04-10 | Stop reason: SDUPTHER

## 2021-01-07 NOTE — TELEPHONE ENCOUNTER
Routing to pa: I spoke to pt about the denial in the previous task and also that [facility] will not cover  Pt was not interested in the new meds and still wants the old one    Thank you

## 2021-01-07 NOTE — TELEPHONE ENCOUNTER
Sean Ferris - patient called please cancel the promethazine prescription   Patient would like to have a refill of the Zofran 4 mg table sent to [facility] at [phone]

## 2021-01-14 ENCOUNTER — OFFICE VISIT (OUTPATIENT)
Dept: INTERNAL MEDICINE CLINIC | Facility: CLINIC | Age: 66
End: 2021-01-14
Payer: MEDICARE

## 2021-01-14 VITALS
BODY MASS INDEX: 31.53 KG/M2 | TEMPERATURE: 98.3 F | OXYGEN SATURATION: 83 % | WEIGHT: 167 LBS | HEIGHT: 61 IN | SYSTOLIC BLOOD PRESSURE: 136 MMHG | HEART RATE: 99 BPM | DIASTOLIC BLOOD PRESSURE: 74 MMHG

## 2021-01-14 DIAGNOSIS — K21.9 GASTROESOPHAGEAL REFLUX DISEASE: ICD-10-CM

## 2021-01-14 DIAGNOSIS — E55.9 VITAMIN D DEFICIENCY: ICD-10-CM

## 2021-01-14 DIAGNOSIS — E78.2 MIXED HYPERLIPIDEMIA: ICD-10-CM

## 2021-01-14 DIAGNOSIS — R11.0 NAUSEA: ICD-10-CM

## 2021-01-14 DIAGNOSIS — L30.1 DYSHYDROSIS: ICD-10-CM

## 2021-01-14 DIAGNOSIS — L30.9 ECZEMA, UNSPECIFIED TYPE: ICD-10-CM

## 2021-01-14 DIAGNOSIS — K21.9 GASTROESOPHAGEAL REFLUX DISEASE, UNSPECIFIED WHETHER ESOPHAGITIS PRESENT: Primary | ICD-10-CM

## 2021-01-14 DIAGNOSIS — I10 ESSENTIAL (PRIMARY) HYPERTENSION: ICD-10-CM

## 2021-01-14 DIAGNOSIS — J30.9 ALLERGIC RHINITIS, UNSPECIFIED SEASONALITY, UNSPECIFIED TRIGGER: ICD-10-CM

## 2021-01-14 DIAGNOSIS — N18.30 STAGE 3 CHRONIC KIDNEY DISEASE, UNSPECIFIED WHETHER STAGE 3A OR 3B CKD (HCC): ICD-10-CM

## 2021-01-14 DIAGNOSIS — J45.20 MILD INTERMITTENT ASTHMA WITHOUT COMPLICATION: ICD-10-CM

## 2021-01-14 PROCEDURE — 99214 OFFICE O/P EST MOD 30 MIN: CPT | Performed by: PHYSICIAN ASSISTANT

## 2021-01-14 RX ORDER — PROCHLORPERAZINE MALEATE 10 MG
10 TABLET ORAL 2 TIMES DAILY
Qty: 60 TABLET | Refills: 0 | Status: SHIPPED | OUTPATIENT
Start: 2021-01-14 | End: 2021-08-02 | Stop reason: SDUPTHER

## 2021-01-14 RX ORDER — TRIAMCINOLONE ACETONIDE 5 MG/G
1 CREAM TOPICAL 3 TIMES DAILY
Qty: 30 G | Refills: 0 | Status: SHIPPED | OUTPATIENT
Start: 2021-01-14 | End: 2021-01-28

## 2021-01-14 RX ORDER — PANTOPRAZOLE SODIUM 40 MG/1
40 TABLET, DELAYED RELEASE ORAL 2 TIMES DAILY
Qty: 180 TABLET | Refills: 0 | Status: SHIPPED | OUTPATIENT
Start: 2021-01-14 | End: 2021-02-19

## 2021-01-14 RX ORDER — FLUTICASONE PROPIONATE 50 MCG
1 SPRAY, SUSPENSION (ML) NASAL DAILY
Qty: 16 G | Refills: 5 | Status: SHIPPED | OUTPATIENT
Start: 2021-01-14 | End: 2021-01-26 | Stop reason: SDUPTHER

## 2021-01-14 NOTE — PROGRESS NOTES
Assessment/Plan:    continue current medications  Have labs done for follow-up visit in 6 months  Follow-up with Nephrology another specialist   Quality Measures: BMI Counseling: Body mass index is 31 81 kg/m²  The BMI is above normal  Nutrition recommendations include decreasing portion sizes, encouraging healthy choices of fruits and vegetables, consuming healthier snacks and moderation in carbohydrate intake  Exercise recommendations include exercising 3-5 times per week  Return in about 6 months (around 7/14/2021) for Regular Visit + Medicare Annual Wellness  Diagnoses and all orders for this visit:    Gastroesophageal reflux disease, unspecified whether esophagitis present    Essential (primary) hypertension  -     Comprehensive metabolic panel; Future  -     Lipid panel; Future    Mild intermittent asthma without complication    Stage 3 chronic kidney disease, unspecified whether stage 3a or 3b CKD    Mixed hyperlipidemia    Vitamin D deficiency          Subjective:      Patient ID: Elenita Arreaga is a 77 y o  female  Follow-up     GERD:  On high-dose pantoprazole  If she tries to cut this down she develops symptoms  Hypertension :  Good control on current medications  Lakesha cp, palp, sob, edema, HA, dizziness, diaphoresis, syncope, visual disturbance  Due to patient's previous back disease and multiple surgeries , she is quite sedate, notes some dyspnea on exertion  Fatigues easily  Asthma:  Has been stable  Uses Ventolin inhaler as needed, and neb meds as needed  CKD 3:  Labs are stable  Has follow-up with Nephrology next month  Trying hard to stay hydrated and avoiding NSAIDs  Note on her med list however is topical Voltaren  Hyperlipidemia:  On atorvastatin  Denies side effects  Lipids done in December showed good control  Continue the atorvastatin  Vitamin-D deficiency:  Most recent vitamin-D has her in acceptable range        ALLERGIES:  Allergies Allergen Reactions    Clindamycin Hives     Category: Allergy;     Erythromycin Hives and Rash     Category: Allergy;     Latex Hives    Penicillins Hives and Shortness Of Breath    Morphine GI Intolerance and Vomiting     Category: Adverse Reaction;     Erythromycin Base Other (See Comments)     vomitting    Other     Penicillin V Seizures     Category: Allergy;     Adhesive [Medical Tape] Rash     Skin irritation    Povidone Iodine Rash     Category:  Adverse Reaction;        CURRENT MEDICATIONS:    Current Outpatient Medications:     albuterol 2 mg/5 mL syrup, TAKE 5ML BY MOUTH FOUR TIMES A DAY, Disp: 473 mL, Rfl: 3    atorvastatin (LIPITOR) 20 mg tablet, Take 1 tablet (20 mg total) by mouth daily, Disp: 90 tablet, Rfl: 1    diclofenac sodium (VOLTAREN) 1 %, Apply 2 g topically 4 (four) times a day, Disp: 1 Tube, Rfl: 1    estradiol (ESTRACE) 0 1 mg/g vaginal cream, Insert 1 g into the vagina 2 (two) times a week, Disp: 42 5 g, Rfl: 3    fenofibrate (TRIGLIDE) 160 MG tablet, Take 1 tablet (160 mg total) by mouth daily, Disp: 90 tablet, Rfl: 3    fluticasone (FLONASE) 50 mcg/act nasal spray, 1 spray into each nostril daily, Disp: 16 g, Rfl: 5    furosemide (LASIX) 20 mg tablet, Take 0 5 tablets (10 mg total) by mouth every other day, Disp: 90 tablet, Rfl: 0    gabapentin (NEURONTIN) 800 mg tablet, Take 800 mg by mouth 3 (three) times a day, Disp: , Rfl:     ketoconazole (NIZORAL) 2 % cream, APPLY TO AFFECTED AREA EVERY DAY (Patient taking differently: as needed ), Disp: 60 g, Rfl: 1    lisinopril (ZESTRIL) 10 mg tablet, TAKE ONE TABLET BY MOUTH EVERY DAY, Disp: 90 tablet, Rfl: 1    ondansetron (ZOFRAN-ODT) 4 mg disintegrating tablet, Take 1 tablet (4 mg total) by mouth every 8 (eight) hours as needed for nausea or vomiting, Disp: 90 tablet, Rfl: 1    oxyCODONE (ROXICODONE) 10 MG TABS, Take 10 mg by mouth every 8 (eight) hours as needed , Disp: , Rfl:     prochlorperazine (COMPAZINE) 10 mg tablet, Take 1 tablet (10 mg total) by mouth 2 (two) times a day As needed for nausea, Disp: 60 tablet, Rfl: 0    promethazine (PHENERGAN) 25 mg tablet, Take 1 tablet (25 mg total) by mouth every 6 (six) hours as needed for nausea or vomiting, Disp: 30 tablet, Rfl: 2    SODIUM FLUORIDE, DENTAL GEL, (PreviDent) 1 1 % GEL, Apply to teeth, Disp: , Rfl:     tiZANidine (ZANAFLEX) 4 mg tablet, TAKE ONE TABLET BY MOUTH THREE TIMES A DAY, Disp: 270 tablet, Rfl: 1    triamcinolone (KENALOG) 0 5 % cream, Apply 1 application topically 3 (three) times a day, Disp: 30 g, Rfl: 0    VENTOLIN  (90 Base) MCG/ACT inhaler, INHALE 2 PUFFS 3 (THREE) TIMES A DAY AS NEEDED FOR SHORTNESS OF BREATH, Disp: 18 Inhaler, Rfl: 3    zolpidem (AMBIEN) 10 mg tablet, Take 10 mg by mouth daily at bedtime as needed, Disp: , Rfl:     ZyrTEC-D Allergy & Congestion 5-120 MG per tablet, TAKE ONE TABLET BY MOUTH TWICE A DAY, Disp: 180 tablet, Rfl: 1    Elmiron 100 MG capsule, TAKE ONE CAPSULE BY MOUTH THREE TIMES A DAY (Patient not taking: Reported on 1/14/2021), Disp: 270 capsule, Rfl: 1    pantoprazole (PROTONIX) 40 mg tablet, Take 1 tablet (40 mg total) by mouth 2 (two) times a day, Disp: 180 tablet, Rfl: 0    ACTIVE PROBLEM LIST:  Patient Active Problem List   Diagnosis    Acute bilateral back pain, intractable    Continuous opioid dependence (HCC)    Vitamin D deficiency    Cataract, bilateral    Cervical radiculopathy, chronic    Delayed gastric emptying    Essential (primary) hypertension    Fatty liver disease, nonalcoholic    GERD (gastroesophageal reflux disease)    Hot flashes, menopausal    Interstitial cystitis    Lumbar post-laminectomy syndrome    Mild intermittent asthma without complication    Lumbosacral spondylosis without myelopathy    Radiculopathy, lumbar region    Thoracic and lumbosacral neuritis    Hyperlipidemia    Lumbar canal stenosis with diffuse disc bulge    Multiple falls    Lumbar disc herniation    Spinal stenosis of lumbar region with neurogenic claudication    Acute bilateral low back pain with bilateral sciatica    Allergic rhinitis    Lactose intolerance    Gluten intolerance    CKD (chronic kidney disease) stage 3, GFR 30-59 ml/min       PAST MEDICAL HISTORY:  Past Medical History:   Diagnosis Date    Anemia     Anxiety     Asthma     Cataract     Chronic back pain     Chronic kidney disease     Chronic pain disorder     Back    Depression     GERD (gastroesophageal reflux disease)     HTN (hypertension)     Hyperlipidemia     Interstitial cystitis     Muscle cramps     Muscle weakness     Obesity     Pneumonia     PONV (postoperative nausea and vomiting)     Radiculopathy, lumbar region     Spondylosis, cervical, with myelopathy     Tinea corporis 2018    Vulvovaginitis        PAST SURGICAL HISTORY:  Past Surgical History:   Procedure Laterality Date    APPENDECTOMY      BACK SURGERY      Arthrodeiss lumbar    BACK SURGERY      Spinal stereotaxis of cord    BLADDER SURGERY      Electronic bladder stimulator implantation    CATARACT EXTRACTION      CATARACT EXTRACTION, BILATERAL       SECTION      x3    CHOLECYSTECTOMY      COLON SURGERY      Intestinal stricturoplasty     DECOMPRESSION SPINE LUMBAR POSTERIOR Bilateral 2018    Procedure: DECOMPRESSION SPINE LUMBAR POSTERIOR L1-4, L1-2 DISKECTOMY, POSTERIORLATERAL FUSION L3-4, REMOVAL OF SPINAL CORD STIMULATOR SYSTEM, REMOVAL OF INTERSPINOUS DEVICES;  Surgeon: Bernard Reyes MD;  Location: [facility];  Service: Neurosurgery    FINGER SURGERY      Extensor tendon repair (mallet finger)    FL MYELOGRAM LUMBAR  2018    HERNIA REPAIR      x3    HYSTERECTOMY      [place] DRAINAGE OF HEMATOMA/FLUID Bilateral 10/9/2018    Procedure: LUMBAR WOUND EXPLORATION/EVACUATION OF SERONMA;  Surgeon: Bernard Reyes MD;  [facility];  Service: Neurosurgery    [place] ESOPHAGOGASTRODUODENOSCOPY TRANSORAL DIAGNOSTIC N/A 8/23/2017    Procedure: ESOPHAGOGASTRODUODENOSCOPY (EGD); Surgeon: Davide Portillo MD;  Location: [facility];   Service: Gastroenterology    SALPINGOOPHORECTOMY      B/L       FAMILY HISTORY:  Family History   Problem Relation Age of Onset    Coronary artery disease Brother         Patient has 3 brothers with coronary artery disease    Diabetes Mother     Throat cancer Mother     COPD Father     Cancer Father     Bipolar disorder Sister        SOCIAL HISTORY:  Social History     Socioeconomic History    Marital status: /Civil Union     Spouse name: Not on file    Number of children: 3    Years of education: Not on file    Highest education level: Not on file   Occupational History    Occupation: Retired   Social Needs    Financial resource strain: Not on file    Food insecurity     Worry: Not on file     Inability: Not on file   [employer] needs     Medical: Not on file     Non-medical: Not on file   Tobacco Use    Smoking status: Never Smoker    Smokeless tobacco: Never Used   Substance and Sexual Activity    Alcohol use: No    Drug use: Yes     Types: Marijuana     Comment: medical marijuana    Sexual activity: Yes     Partners: Male   Lifestyle    Physical activity     Days per week: Not on file     Minutes per session: Not on file    Stress: Not on file   Relationships    Social connections     Talks on phone: Not on file     Gets together: Not on file     Attends Orthodoxy service: Not on file     Active member of club or organization: Not on file     Attends meetings of clubs or organizations: Not on file     Relationship status: Not on file    Intimate partner violence     Fear of current or ex partner: Not on file     Emotionally abused: Not on file     Physically abused: Not on file     Forced sexual activity: Not on file   Other Topics Concern    Not on file   Social History Narrative    Brushes teeth 2x day    Regular dental care    Exercise: walking       Review of Systems   Constitutional: Negative for activity change, chills, fatigue and fever  HENT: Negative for congestion  Eyes: Negative for discharge  Respiratory: Negative for cough, chest tightness and shortness of breath  Cardiovascular: Negative for chest pain, palpitations and leg swelling  Gastrointestinal: Negative for abdominal pain and blood in stool  Endocrine: Negative for polydipsia, polyphagia and polyuria  Genitourinary: Negative for difficulty urinating  Musculoskeletal: Positive for arthralgias, back pain and neck pain  Negative for myalgias  Skin: Negative for rash  Allergic/Immunologic: Negative for immunocompromised state  Neurological: Negative for dizziness, syncope, weakness, light-headedness and headaches  Hematological: Negative for adenopathy  Does not bruise/bleed easily  Psychiatric/Behavioral: Negative for dysphoric mood, sleep disturbance and suicidal ideas  The patient is not nervous/anxious  Objective:  Vitals:    01/14/21 1416   BP: 136/74   BP Location: Left arm   Patient Position: Sitting   Cuff Size: Adult   Pulse: 99   Temp: 98 3 °F (36 8 °C)   TempSrc: Temporal   SpO2: (!) 83%   Weight: 75 8 kg (167 lb)   Height: 5' 0 75" (1 543 m)     Body mass index is 31 81 kg/m²  Physical Exam  Vitals signs and nursing note reviewed  Constitutional:       General: She is not in acute distress  Appearance: She is well-developed  HENT:      Head: Normocephalic and atraumatic  Eyes:      Pupils: Pupils are equal, round, and reactive to light  Neck:      Musculoskeletal: Neck supple  Thyroid: No thyromegaly  Vascular: No carotid bruit or JVD  Cardiovascular:      Rate and Rhythm: Normal rate and regular rhythm  Heart sounds: Normal heart sounds  Pulmonary:      Effort: Pulmonary effort is normal  No respiratory distress  Breath sounds: Normal breath sounds     Musculoskeletal:      Right lower leg: No edema  Left lower leg: No edema  Lymphadenopathy:      Cervical: No cervical adenopathy  Skin:     General: Skin is warm and dry  Findings: No rash  Neurological:      General: No focal deficit present  Mental Status: She is alert and oriented to person, place, and time  Mental status is at baseline     Psychiatric:         Mood and Affect: Mood normal          Behavior: Behavior normal            RESULTS:    Recent Results (from the past 1008 hour(s))   T4, free    Collection Time: 12/12/20  8:32 AM   Result Value Ref Range    Free T4 1 19 0 76 - 1 46 ng/dL   TSH, 3rd generation    Collection Time: 12/12/20  8:32 AM   Result Value Ref Range    TSH 3RD GENERATON 2 530 0 358 - 3 740 uIU/mL   Comprehensive metabolic panel    Collection Time: 12/12/20  8:32 AM   Result Value Ref Range    Sodium 140 136 - 145 mmol/L    Potassium 4 0 3 5 - 5 3 mmol/L    Chloride 104 100 - 108 mmol/L    CO2 29 21 - 32 mmol/L    ANION GAP 7 4 - 13 mmol/L    BUN 32 (H) 5 - 25 mg/dL    Creatinine 1 51 (H) 0 60 - 1 30 mg/dL    Glucose, Fasting 70 65 - 99 mg/dL    Calcium 9 9 8 3 - 10 1 mg/dL    AST 23 5 - 45 U/L    ALT 26 12 - 78 U/L    Alkaline Phosphatase 44 (L) 46 - 116 U/L    Total Protein 7 9 6 4 - 8 2 g/dL    Albumin 4 1 3 5 - 5 0 g/dL    Total Bilirubin 0 26 0 20 - 1 00 mg/dL    eGFR 36 ml/min/1 73sq m   Lipid panel    Collection Time: 12/12/20  8:32 AM   Result Value Ref Range    Cholesterol 177 50 - 200 mg/dL    Triglycerides 114 <=150 mg/dL    HDL, Direct 87 >=40 mg/dL    LDL Calculated 67 0 - 100 mg/dL    Non-HDL-Chol (CHOL-HDL) 90 mg/dl   CBC and differential    Collection Time: 12/12/20  8:32 AM   Result Value Ref Range    WBC 6 71 4 31 - 10 16 Thousand/uL    RBC 4 16 3 81 - 5 12 Million/uL    Hemoglobin 12 1 11 5 - 15 4 g/dL    Hematocrit 38 6 34 8 - 46 1 %    MCV 93 82 - 98 fL    MCH 29 1 26 8 - 34 3 pg    MCHC 31 3 (L) 31 4 - 37 4 g/dL    RDW 13 5 11 6 - 15 1 %    MPV 11 1 8 9 - 12 7 fL    Platelets 764 382 - 390 Thousands/uL    nRBC 0 /100 WBCs    Neutrophils Relative 52 43 - 75 %    Immat GRANS % 0 0 - 2 %    Lymphocytes Relative 37 14 - 44 %    Monocytes Relative 8 4 - 12 %    Eosinophils Relative 2 0 - 6 %    Basophils Relative 1 0 - 1 %    Neutrophils Absolute 3 56 1 85 - 7 62 Thousands/µL    Immature Grans Absolute 0 02 0 00 - 0 20 Thousand/uL    Lymphocytes Absolute 2 45 0 60 - 4 47 Thousands/µL    Monocytes Absolute 0 52 0 17 - 1 22 Thousand/µL    Eosinophils Absolute 0 12 0 00 - 0 61 Thousand/µL    Basophils Absolute 0 04 0 00 - 0 10 Thousands/µL   Phosphorus    Collection Time: 12/12/20  8:32 AM   Result Value Ref Range    Phosphorus 3 8 2 3 - 4 1 mg/dL   Protein / creatinine ratio, urine    Collection Time: 12/12/20  8:32 AM   Result Value Ref Range    Creatinine, Ur 70 9 mg/dL    Protein Urine Random 6 mg/dL    Prot/Creat Ratio, Ur 0 08 0 00 - 0 10   PTH, intact    Collection Time: 12/12/20  8:32 AM   Result Value Ref Range    PTH 21 0 18 4 - 80 1 pg/mL   Urinalysis with microscopic    Collection Time: 12/12/20  8:32 AM   Result Value Ref Range    Clarity, UA Clear     Color, UA Yellow     Specific [place], UA 1 016 1 003 - 1 030    pH, UA 6 0 4 5, 5 0, 5 5, 6 0, 6 5, 7 0, 7 5, 8 0    Glucose, UA Negative Negative mg/dl    Ketones, UA Negative Negative mg/dl    Blood, UA Negative Negative    Protein, UA Negative Negative mg/dl    Nitrite, UA Negative Negative    Bilirubin, UA Negative Negative    Urobilinogen, UA 0 2 0 2, 1 0 E U /dl E U /dl    Leukocytes, UA Small (A) Negative    WBC, UA None Seen None Seen, 2-4 /hpf    RBC, UA None Seen None Seen, 2-4 /hpf    Hyaline Casts, UA None Seen None Seen /lpf    Bacteria, UA None Seen None Seen, Occasional /hpf    Epithelial Cells None Seen None Seen, Occasional /hpf   Vitamin D 25 hydroxy    Collection Time: 12/12/20  8:32 AM   Result Value Ref Range    Vit D, 25-Hydroxy 43 3 30 0 - 100 0 ng/mL   CBC and differential    Collection Time: 12/28/20  7:28 AM Result Value Ref Range    WBC 6 06 4 31 - 10 16 Thousand/uL    RBC 3 91 3 81 - 5 12 Million/uL    Hemoglobin 11 4 (L) 11 5 - 15 4 g/dL    Hematocrit 36 8 34 8 - 46 1 %    MCV 94 82 - 98 fL    MCH 29 2 26 8 - 34 3 pg    MCHC 31 0 (L) 31 4 - 37 4 g/dL    RDW 13 7 11 6 - 15 1 %    MPV 10 9 8 9 - 12 7 fL    Platelets 849 847 - 035 Thousands/uL    nRBC 0 /100 WBCs    Neutrophils Relative 50 43 - 75 %    Immat GRANS % 0 0 - 2 %    Lymphocytes Relative 40 14 - 44 %    Monocytes Relative 7 4 - 12 %    Eosinophils Relative 2 0 - 6 %    Basophils Relative 1 0 - 1 %    Neutrophils Absolute 3 01 1 85 - 7 62 Thousands/µL    Immature Grans Absolute 0 01 0 00 - 0 20 Thousand/uL    Lymphocytes Absolute 2 43 0 60 - 4 47 Thousands/µL    Monocytes Absolute 0 44 0 17 - 1 22 Thousand/µL    Eosinophils Absolute 0 12 0 00 - 0 61 Thousand/µL    Basophils Absolute 0 05 0 00 - 0 10 Thousands/µL   Comprehensive metabolic panel    Collection Time: 12/28/20  7:28 AM   Result Value Ref Range    Sodium 142 136 - 145 mmol/L    Potassium 4 2 3 5 - 5 3 mmol/L    Chloride 105 100 - 108 mmol/L    CO2 32 21 - 32 mmol/L    ANION GAP 5 4 - 13 mmol/L    BUN 29 (H) 5 - 25 mg/dL    Creatinine 1 52 (H) 0 60 - 1 30 mg/dL    Glucose, Fasting 88 65 - 99 mg/dL    Calcium 10 1 8 3 - 10 1 mg/dL    AST 22 5 - 45 U/L    ALT 30 12 - 78 U/L    Alkaline Phosphatase 38 (L) 46 - 116 U/L    Total Protein 7 6 6 4 - 8 2 g/dL    Albumin 4 2 3 5 - 5 0 g/dL    Total Bilirubin 0 43 0 20 - 1 00 mg/dL    eGFR 35 ml/min/1 73sq m   Phosphorus    Collection Time: 12/28/20  7:28 AM   Result Value Ref Range    Phosphorus 3 7 2 3 - 4 1 mg/dL   Protein / creatinine ratio, urine    Collection Time: 12/28/20  7:28 AM   Result Value Ref Range    Creatinine, Ur 136 0 mg/dL    Protein Urine Random 12 mg/dL    Prot/Creat Ratio, Ur 0 09 0 00 - 0 10   PTH, intact    Collection Time: 12/28/20  7:28 AM   Result Value Ref Range    PTH 22 5 18 4 - 80 1 pg/mL   Urinalysis with microscopic Collection Time: 12/28/20  7:28 AM   Result Value Ref Range    Clarity, UA Clear     Color, UA Yellow     Specific [place], UA 1 020 1 003 - 1 030    pH, UA 7 0 4 5, 5 0, 5 5, 6 0, 6 5, 7 0, 7 5, 8 0    Glucose, UA Negative Negative mg/dl    Ketones, UA Negative Negative mg/dl    Blood, UA Negative Negative    Protein, UA Negative Negative mg/dl    Nitrite, UA Negative Negative    Bilirubin, UA Negative Negative    Urobilinogen, UA 0 2 0 2, 1 0 E U /dl E U /dl    Leukocytes, UA Negative Negative    WBC, UA 2-4 None Seen, 2-4 /hpf    RBC, UA None Seen None Seen, 2-4 /hpf    Hyaline Casts, UA 5-10 (A) None Seen /lpf    Bacteria, UA None Seen None Seen, Occasional /hpf    Epithelial Cells Moderate (A) None Seen, Occasional /hpf   Vitamin D 25 hydroxy    Collection Time: 12/28/20  7:28 AM   Result Value Ref Range    Vit D, 25-Hydroxy 32 7 30 0 - 100 0 ng/mL       This note was created with voice recognition software  Phonic, grammatical and spelling errors may be present within the note as a result

## 2021-01-16 DIAGNOSIS — J45.20 MILD INTERMITTENT ASTHMA WITHOUT COMPLICATION: ICD-10-CM

## 2021-01-23 DIAGNOSIS — J45.20 MILD INTERMITTENT ASTHMA WITHOUT COMPLICATION: ICD-10-CM

## 2021-01-26 DIAGNOSIS — E78.1 HYPERTRIGLYCERIDEMIA: ICD-10-CM

## 2021-01-26 DIAGNOSIS — M76.52 PATELLAR TENDONITIS OF LEFT KNEE: ICD-10-CM

## 2021-01-26 DIAGNOSIS — E78.5 HYPERLIPIDEMIA, UNSPECIFIED HYPERLIPIDEMIA TYPE: ICD-10-CM

## 2021-01-26 DIAGNOSIS — I10 ESSENTIAL (PRIMARY) HYPERTENSION: ICD-10-CM

## 2021-01-26 DIAGNOSIS — J30.9 ALLERGIC RHINITIS, UNSPECIFIED SEASONALITY, UNSPECIFIED TRIGGER: ICD-10-CM

## 2021-01-26 DIAGNOSIS — J45.20 MILD INTERMITTENT ASTHMA WITHOUT COMPLICATION: ICD-10-CM

## 2021-01-27 DIAGNOSIS — L30.9 ECZEMA, UNSPECIFIED TYPE: ICD-10-CM

## 2021-01-27 DIAGNOSIS — L30.1 DYSHYDROSIS: ICD-10-CM

## 2021-01-27 RX ORDER — FENOFIBRATE 160 MG/1
160 TABLET ORAL DAILY
Qty: 90 TABLET | Refills: 1 | Status: SHIPPED | OUTPATIENT
Start: 2021-01-27 | End: 2021-02-14

## 2021-01-27 RX ORDER — LISINOPRIL 10 MG/1
10 TABLET ORAL DAILY
Qty: 90 TABLET | Refills: 1 | Status: SHIPPED | OUTPATIENT
Start: 2021-01-27 | End: 2021-02-11

## 2021-01-27 RX ORDER — FLUTICASONE PROPIONATE 50 MCG
1 SPRAY, SUSPENSION (ML) NASAL DAILY
Qty: 16 G | Refills: 3 | Status: SHIPPED | OUTPATIENT
Start: 2021-01-27 | End: 2021-02-08 | Stop reason: SDUPTHER

## 2021-01-27 RX ORDER — ALBUTEROL SULFATE 90 UG/1
2 AEROSOL, METERED RESPIRATORY (INHALATION) EVERY 6 HOURS PRN
Qty: 18 INHALER | Refills: 3 | Status: SHIPPED | OUTPATIENT
Start: 2021-01-27 | End: 2021-02-11 | Stop reason: SDUPTHER

## 2021-01-27 RX ORDER — ATORVASTATIN CALCIUM 20 MG/1
20 TABLET, FILM COATED ORAL DAILY
Qty: 90 TABLET | Refills: 1 | Status: SHIPPED | OUTPATIENT
Start: 2021-01-27 | End: 2021-07-21 | Stop reason: ALTCHOICE

## 2021-01-28 RX ORDER — TRIAMCINOLONE ACETONIDE 5 MG/G
CREAM TOPICAL
Qty: 30 G | Refills: 2 | Status: SHIPPED | OUTPATIENT
Start: 2021-01-28 | End: 2021-07-21 | Stop reason: ALTCHOICE

## 2021-02-04 DIAGNOSIS — M62.830 MUSCLE SPASM OF BACK: ICD-10-CM

## 2021-02-04 RX ORDER — TIZANIDINE 4 MG/1
TABLET ORAL
Qty: 270 TABLET | Refills: 1 | Status: SHIPPED | OUTPATIENT
Start: 2021-02-04 | End: 2021-08-22

## 2021-02-08 DIAGNOSIS — J30.9 ALLERGIC RHINITIS, UNSPECIFIED SEASONALITY, UNSPECIFIED TRIGGER: ICD-10-CM

## 2021-02-09 RX ORDER — FLUTICASONE PROPIONATE 50 MCG
1 SPRAY, SUSPENSION (ML) NASAL DAILY
Qty: 16 G | Refills: 5 | Status: SHIPPED | OUTPATIENT
Start: 2021-02-09 | End: 2021-05-03

## 2021-02-11 ENCOUNTER — OFFICE VISIT (OUTPATIENT)
Dept: NEPHROLOGY | Facility: CLINIC | Age: 66
End: 2021-02-11
Payer: MEDICARE

## 2021-02-11 VITALS
BODY MASS INDEX: 33.02 KG/M2 | TEMPERATURE: 97.5 F | SYSTOLIC BLOOD PRESSURE: 130 MMHG | RESPIRATION RATE: 16 BRPM | WEIGHT: 168.2 LBS | HEIGHT: 60 IN | HEART RATE: 88 BPM | DIASTOLIC BLOOD PRESSURE: 70 MMHG

## 2021-02-11 DIAGNOSIS — J45.20 MILD INTERMITTENT ASTHMA WITHOUT COMPLICATION: ICD-10-CM

## 2021-02-11 DIAGNOSIS — I10 ESSENTIAL HYPERTENSION: Primary | ICD-10-CM

## 2021-02-11 PROCEDURE — 99215 OFFICE O/P EST HI 40 MIN: CPT | Performed by: INTERNAL MEDICINE

## 2021-02-11 RX ORDER — ALBUTEROL SULFATE 90 UG/1
2 AEROSOL, METERED RESPIRATORY (INHALATION) EVERY 6 HOURS PRN
Qty: 18 INHALER | Refills: 3 | Status: SHIPPED | OUTPATIENT
Start: 2021-02-11 | End: 2021-08-02 | Stop reason: SDUPTHER

## 2021-02-11 RX ORDER — LISINOPRIL 5 MG/1
5 TABLET ORAL DAILY
Qty: 180 TABLET | Refills: 3 | Status: SHIPPED | OUTPATIENT
Start: 2021-02-11

## 2021-02-11 RX ORDER — NALOXONE HYDROCHLORIDE 4 MG/.1ML
SPRAY NASAL
COMMUNITY
Start: 2020-12-28

## 2021-02-11 NOTE — PROGRESS NOTES
NEPHROLOGY PROGRESS NOTE    Patient: Rachel Don               Sex: female          DOA: No admission date for patient encounter  YOB: 1955        Age:  77 y o         LOS: [unfilled]  2/11/2021        BACKGROUND     77 F with PMH of interstitial cystitis, chronic UTI, HTN, gastroparesis, asthma, hypertriglyceridemia, CKD III who has been following up since Nov 2020  SUBJECTIVE     Patient presents after 3 months  C/O occasional dizziness  Brings BP readings where most BP readings were < 396 mm Hg systolic and had few BP in 55Q systolic  Denies chest pain  Patient did not lower lasix to every other day regimen until a lot later in December 2020  REVIEW OF SYSTEMS     Review of Systems   Constitutional: Negative  HENT: Negative  Eyes: Negative  Respiratory: Negative  Cardiovascular: Negative  Gastrointestinal: Negative  Endocrine: Negative  Genitourinary: Negative  Musculoskeletal: Negative  Skin: Negative  Allergic/Immunologic: Negative  Neurological: Negative  Hematological: Negative  All other systems reviewed and are negative  OBJECTIVE     Current Weight: Weight - Scale: 76 3 kg (168 lb 3 2 oz)  Vitals:    02/11/21 1421   BP: 130/70   Pulse: 88   Resp: 16   Temp: 97 5 °F (36 4 °C)     Body mass index is 32 85 kg/m²    [unfilled]    CURRENT MEDICATIONS       Current Outpatient Medications:     albuterol 2 mg/5 mL syrup, TAKE 5 ML'S BY MOUTH FOUR TIMES A DAY, Disp: 473 mL, Rfl: 3    atorvastatin (LIPITOR) 20 mg tablet, Take 1 tablet (20 mg total) by mouth daily, Disp: 90 tablet, Rfl: 1    diclofenac sodium (VOLTAREN) 1 %, Apply 2 g topically 4 (four) times a day, Disp: 1 Tube, Rfl: 1    estradiol (ESTRACE) 0 1 mg/g vaginal cream, Insert 1 g into the vagina 2 (two) times a week, Disp: 42 5 g, Rfl: 3    fenofibrate (TRIGLIDE) 160 MG tablet, Take 1 tablet (160 mg total) by mouth daily, Disp: 90 tablet, Rfl: 1    fluticasone (FLONASE) 50 mcg/act nasal spray, 1 spray into each nostril daily, Disp: 16 g, Rfl: 5    gabapentin (NEURONTIN) 800 mg tablet, Take 800 mg by mouth 3 (three) times a day, Disp: , Rfl:     ketoconazole (NIZORAL) 2 % cream, APPLY TO AFFECTED AREA EVERY DAY (Patient taking differently: as needed ), Disp: 60 g, Rfl: 1    Narcan 4 MG/0 1ML nasal spray, As needed, Disp: , Rfl:     ondansetron (ZOFRAN-ODT) 4 mg disintegrating tablet, Take 1 tablet (4 mg total) by mouth every 8 (eight) hours as needed for nausea or vomiting, Disp: 90 tablet, Rfl: 1    oxyCODONE (ROXICODONE) 10 MG TABS, Take 10 mg by mouth every 8 (eight) hours as needed , Disp: , Rfl:     pantoprazole (PROTONIX) 40 mg tablet, Take 1 tablet (40 mg total) by mouth 2 (two) times a day, Disp: 180 tablet, Rfl: 0    prochlorperazine (COMPAZINE) 10 mg tablet, Take 1 tablet (10 mg total) by mouth 2 (two) times a day As needed for nausea, Disp: 60 tablet, Rfl: 0    SODIUM FLUORIDE, DENTAL GEL, (PreviDent) 1 1 % GEL, Apply to teeth, Disp: , Rfl:     tiZANidine (ZANAFLEX) 4 mg tablet, TAKE ONE TABLET BY MOUTH THREE TIMES A DAY, Disp: 270 tablet, Rfl: 1    triamcinolone (KENALOG) 0 5 % cream, APPLY ONE APPLICATION TOPICALLY THREE TIMES A DAY , Disp: 30 g, Rfl: 2    zolpidem (AMBIEN) 10 mg tablet, Take 10 mg by mouth daily at bedtime as needed, Disp: , Rfl:     ZyrTEC-D Allergy & Congestion 5-120 MG per tablet, TAKE ONE TABLET BY MOUTH TWICE A DAY, Disp: 180 tablet, Rfl: 1    albuterol (Ventolin HFA) 90 mcg/act inhaler, Inhale 2 puffs every 6 (six) hours as needed for wheezing, Disp: 18 Inhaler, Rfl: 3    Elmiron 100 MG capsule, TAKE ONE CAPSULE BY MOUTH THREE TIMES A DAY (Patient not taking: Reported on 1/14/2021), Disp: 270 capsule, Rfl: 1    lisinopril (ZESTRIL) 5 mg tablet, Take 1 tablet (5 mg total) by mouth daily, Disp: 180 tablet, Rfl: 3    promethazine (PHENERGAN) 25 mg tablet, Take 1 tablet (25 mg total) by mouth every 6 (six) hours as needed for nausea or vomiting (Patient not taking: Reported on 2/11/2021), Disp: 30 tablet, Rfl: 2      PHYSICAL EXAMINATION     Physical Exam  Constitutional:       Appearance: She is well-developed  HENT:      Head: Normocephalic and atraumatic  Eyes:      Pupils: Pupils are equal, round, and reactive to light  Neck:      Musculoskeletal: Neck supple  Cardiovascular:      Rate and Rhythm: Normal rate and regular rhythm  Heart sounds: Normal heart sounds  Pulmonary:      Effort: Pulmonary effort is normal    Abdominal:      General: Bowel sounds are normal       Palpations: Abdomen is soft  Musculoskeletal: Normal range of motion  Skin:     General: Skin is warm  Neurological:      Mental Status: She is alert and oriented to person, place, and time  LAB RESULTS     Results from last 6 Months   Lab Units 12/28/20  0728 12/12/20  0832 11/13/20  0925   WBC Thousand/uL 6 06 6 71 5 29   HEMOGLOBIN g/dL 11 4* 12 1 11 8   HEMATOCRIT % 36 8 38 6 38 7   PLATELETS Thousands/uL 330 359 353   POTASSIUM mmol/L 4 2 4 0 4 4   CHLORIDE mmol/L 105 104 107   CO2 mmol/L 32 29 30   BUN mg/dL 29* 32* 29*   CREATININE mg/dL 1 52* 1 51* 1 40*   CALCIUM mg/dL 10 1 9 9 9 7   PHOSPHORUS mg/dL 3 7 3 8 3 2             RADIOLOGY RESULTS      No results found for this or any previous visit  Results for orders placed during the hospital encounter of 08/08/17   XR chest pa & lateral    Narrative CHEST     INDICATION:  Dizziness    COMPARISON:  7/10/2017    VIEWS:  Frontal and lateral projections    IMAGES:  2    FINDINGS:         Cardiomediastinal silhouette appears unremarkable  The lungs are clear  No pneumothorax or pleural effusion  Visualized osseous structures appear within normal limits for the patient's age  Neurostimulator leads overlying the thoracolumbar junction  Impression No active pulmonary disease  Workstation performed: [id number]         ASSESSMENT/PLAN     77 y o   F with PMH of CKD III, HTN, asthma who presents to Renal office for f/u     1) Chronic Kidney Disease stage III: Etiology likely episodes of volume depletion over the past few years induced by gastroparesis while on lasix and lisinopril  Baseline Creatinine has been 1 3-1 6  Current Cr in Dec 2020 was 1 5 and within baseline  UA had hyaline casts with elevated specific gravity suggestive of volume depletion  Renal US was within normal limits  2) Dizziness: Occasional dizziness brought possibly by hypotension  Decrease lisinopril to 5 mg daily  D/c lasix    3) Secondary hyperparathyroidism of renal origin: vitamin D levels is within normal limits  Normal calcium  4) Hypertension: Noted multiple BP readings on patient's 's phone  Occasional low readings and as low as 86 mm Hg systolic  D/C lisinopril as stated above    5) Anemia due to cKD: Hb is within acceptable range  F/u in 5 months               Chhaya Delvalle MD  Nephrology  2/11/2021

## 2021-02-11 NOTE — LETTER
February 11, 2021     Aaliyah Mccullough, [facility]    Patient: Lyndsey Dubois   YOB: 1955   Date of Visit: 2/11/2021       Dear Dr Avelina Soler: Thank you for referring Juanita Holguin to me for evaluation  Below are my notes for this consultation  If you have questions, please do not hesitate to call me  I look forward to following your patient along with you  Sincerely,        Christel Mckeon MD        CC: No Recipients  Christel Mckeon MD  2/11/2021  2:58 PM  Incomplete  NEPHROLOGY PROGRESS NOTE    Patient: Lyndsey Dubois               Sex: female          DOA: No admission date for patient encounter  YOB: 1955        Age:  77 y o         LOS: [unfilled]  2/11/2021        BACKGROUND     77 F with PMH of interstitial cystitis, chronic UTI, HTN, gastroparesis, asthma, hypertriglyceridemia, CKD III who has been following up since Nov 2020  SUBJECTIVE     Patient presents after 3 months  C/O occasional dizziness  Brings BP readings where most BP readings were < 519 mm Hg systolic and had few BP in 99P systolic  Denies chest pain  Patient did not lower lasix to every other day regimen until a lot later in December 2020  REVIEW OF SYSTEMS     Review of Systems   Constitutional: Negative  HENT: Negative  Eyes: Negative  Respiratory: Negative  Cardiovascular: Negative  Gastrointestinal: Negative  Endocrine: Negative  Genitourinary: Negative  Musculoskeletal: Negative  Skin: Negative  Allergic/Immunologic: Negative  Neurological: Negative  Hematological: Negative  All other systems reviewed and are negative  OBJECTIVE     Current Weight: Weight - Scale: 76 3 kg (168 lb 3 2 oz)  Vitals:    02/11/21 1421   BP: 130/70   Pulse: 88   Resp: 16   Temp: 97 5 °F (36 4 °C)     Body mass index is 32 85 kg/m²    [unfilled]    CURRENT MEDICATIONS       Current Outpatient Medications:     albuterol 2 mg/5 mL syrup, TAKE 5 ML'S BY MOUTH FOUR TIMES A DAY, Disp: 473 mL, Rfl: 3    atorvastatin (LIPITOR) 20 mg tablet, Take 1 tablet (20 mg total) by mouth daily, Disp: 90 tablet, Rfl: 1    diclofenac sodium (VOLTAREN) 1 %, Apply 2 g topically 4 (four) times a day, Disp: 1 Tube, Rfl: 1    estradiol (ESTRACE) 0 1 mg/g vaginal cream, Insert 1 g into the vagina 2 (two) times a week, Disp: 42 5 g, Rfl: 3    fenofibrate (TRIGLIDE) 160 MG tablet, Take 1 tablet (160 mg total) by mouth daily, Disp: 90 tablet, Rfl: 1    fluticasone (FLONASE) 50 mcg/act nasal spray, 1 spray into each nostril daily, Disp: 16 g, Rfl: 5    gabapentin (NEURONTIN) 800 mg tablet, Take 800 mg by mouth 3 (three) times a day, Disp: , Rfl:     ketoconazole (NIZORAL) 2 % cream, APPLY TO AFFECTED AREA EVERY DAY (Patient taking differently: as needed ), Disp: 60 g, Rfl: 1    Narcan 4 MG/0 1ML nasal spray, As needed, Disp: , Rfl:     ondansetron (ZOFRAN-ODT) 4 mg disintegrating tablet, Take 1 tablet (4 mg total) by mouth every 8 (eight) hours as needed for nausea or vomiting, Disp: 90 tablet, Rfl: 1    oxyCODONE (ROXICODONE) 10 MG TABS, Take 10 mg by mouth every 8 (eight) hours as needed , Disp: , Rfl:     pantoprazole (PROTONIX) 40 mg tablet, Take 1 tablet (40 mg total) by mouth 2 (two) times a day, Disp: 180 tablet, Rfl: 0    prochlorperazine (COMPAZINE) 10 mg tablet, Take 1 tablet (10 mg total) by mouth 2 (two) times a day As needed for nausea, Disp: 60 tablet, Rfl: 0    SODIUM FLUORIDE, DENTAL GEL, (PreviDent) 1 1 % GEL, Apply to teeth, Disp: , Rfl:     tiZANidine (ZANAFLEX) 4 mg tablet, TAKE ONE TABLET BY MOUTH THREE TIMES A DAY, Disp: 270 tablet, Rfl: 1    triamcinolone (KENALOG) 0 5 % cream, APPLY ONE APPLICATION TOPICALLY THREE TIMES A DAY , Disp: 30 g, Rfl: 2    zolpidem (AMBIEN) 10 mg tablet, Take 10 mg by mouth daily at bedtime as needed, Disp: , Rfl:     ZyrTEC-D Allergy & Congestion 5-120 MG per tablet, TAKE ONE TABLET BY MOUTH TWICE A DAY, Disp: 180 tablet, Rfl: 1    albuterol (Ventolin HFA) 90 mcg/act inhaler, Inhale 2 puffs every 6 (six) hours as needed for wheezing, Disp: 18 Inhaler, Rfl: 3    Elmiron 100 MG capsule, TAKE ONE CAPSULE BY MOUTH THREE TIMES A DAY (Patient not taking: Reported on 1/14/2021), Disp: 270 capsule, Rfl: 1    lisinopril (ZESTRIL) 5 mg tablet, Take 1 tablet (5 mg total) by mouth daily, Disp: 180 tablet, Rfl: 3    promethazine (PHENERGAN) 25 mg tablet, Take 1 tablet (25 mg total) by mouth every 6 (six) hours as needed for nausea or vomiting (Patient not taking: Reported on 2/11/2021), Disp: 30 tablet, Rfl: 2      PHYSICAL EXAMINATION     Physical Exam  Constitutional:       Appearance: She is well-developed  HENT:      Head: Normocephalic and atraumatic  Eyes:      Pupils: Pupils are equal, round, and reactive to light  Neck:      Musculoskeletal: Neck supple  Cardiovascular:      Rate and Rhythm: Normal rate and regular rhythm  Heart sounds: Normal heart sounds  Pulmonary:      Effort: Pulmonary effort is normal    Abdominal:      General: Bowel sounds are normal       Palpations: Abdomen is soft  Musculoskeletal: Normal range of motion  Skin:     General: Skin is warm  Neurological:      Mental Status: She is alert and oriented to person, place, and time  LAB RESULTS     Results from last 6 Months   Lab Units 12/28/20  0728 12/12/20  0832 11/13/20  0925   WBC Thousand/uL 6 06 6 71 5 29   HEMOGLOBIN g/dL 11 4* 12 1 11 8   HEMATOCRIT % 36 8 38 6 38 7   PLATELETS Thousands/uL 330 359 353   POTASSIUM mmol/L 4 2 4 0 4 4   CHLORIDE mmol/L 105 104 107   CO2 mmol/L 32 29 30   BUN mg/dL 29* 32* 29*   CREATININE mg/dL 1 52* 1 51* 1 40*   CALCIUM mg/dL 10 1 9 9 9 7   PHOSPHORUS mg/dL 3 7 3 8 3 2             RADIOLOGY RESULTS      No results found for this or any previous visit    Results for orders placed during the hospital encounter of 08/08/17   XR chest pa & lateral    Narrative CHEST     INDICATION: Dizziness    COMPARISON:  7/10/2017    VIEWS:  Frontal and lateral projections    IMAGES:  2    FINDINGS:         Cardiomediastinal silhouette appears unremarkable  The lungs are clear  No pneumothorax or pleural effusion  Visualized osseous structures appear within normal limits for the patient's age  Neurostimulator leads overlying the thoracolumbar junction  Impression No active pulmonary disease  Workstation performed: [id number]         ASSESSMENT/PLAN     77 y o  F with PMH of CKD III, HTN, asthma who presents to Renal office for f/u     1) Chronic Kidney Disease stage III: Etiology likely episodes of volume depletion over the past few years induced by gastroparesis while on lasix and lisinopril  Baseline Creatinine has been 1 3-1 6  Current Cr in Dec 2020 was 1 5 and within baseline  UA had hyaline casts with elevated specific gravity suggestive of volume depletion  Renal US was within normal limits  2) Dizziness: Occasional dizziness brought possibly by hypotension  Decrease lisinopril to 5 mg daily  D/c lasix    3) Secondary hyperparathyroidism of renal origin: vitamin D levels is within normal limits  Normal calcium  4) Hypertension: Noted multiple BP readings on patient's 's phone  Occasional low readings and as low as 86 mm Hg systolic  D/C lisinopril as stated above    5) Anemia due to cKD: Hb is within acceptable range  F/u in 5 months               Aniceto Plata MD  Nephrology  2/11/2021

## 2021-02-12 DIAGNOSIS — J45.20 MILD INTERMITTENT ASTHMA WITHOUT COMPLICATION: ICD-10-CM

## 2021-02-13 DIAGNOSIS — E78.1 HYPERTRIGLYCERIDEMIA: ICD-10-CM

## 2021-02-13 DIAGNOSIS — I10 ESSENTIAL (PRIMARY) HYPERTENSION: ICD-10-CM

## 2021-02-14 RX ORDER — FUROSEMIDE 40 MG/1
TABLET ORAL
Qty: 90 TABLET | Refills: 1 | OUTPATIENT
Start: 2021-02-14

## 2021-02-14 RX ORDER — FENOFIBRATE 160 MG/1
TABLET ORAL
Qty: 90 TABLET | Refills: 3 | Status: SHIPPED | OUTPATIENT
Start: 2021-02-14 | End: 2021-06-23

## 2021-02-16 DIAGNOSIS — I10 ESSENTIAL (PRIMARY) HYPERTENSION: ICD-10-CM

## 2021-02-16 RX ORDER — FUROSEMIDE 40 MG/1
TABLET ORAL
Qty: 90 TABLET | Refills: 1 | OUTPATIENT
Start: 2021-02-16

## 2021-02-19 DIAGNOSIS — K21.9 GASTROESOPHAGEAL REFLUX DISEASE: ICD-10-CM

## 2021-02-19 RX ORDER — PANTOPRAZOLE SODIUM 40 MG/1
TABLET, DELAYED RELEASE ORAL
Qty: 180 TABLET | Refills: 1 | Status: SHIPPED | OUTPATIENT
Start: 2021-02-19 | End: 2021-10-18

## 2021-03-30 DIAGNOSIS — J30.9 ALLERGIC RHINITIS, UNSPECIFIED SEASONALITY, UNSPECIFIED TRIGGER: ICD-10-CM

## 2021-03-30 RX ORDER — CETIRIZINE HCL/PSEUDOEPHEDRINE 5 MG-120MG
TABLET, EXTENDED RELEASE 12 HR ORAL
Qty: 180 TABLET | Refills: 1 | Status: SHIPPED | OUTPATIENT
Start: 2021-03-30 | End: 2021-12-25 | Stop reason: SDUPTHER

## 2021-04-10 DIAGNOSIS — K21.9 GASTROESOPHAGEAL REFLUX DISEASE WITHOUT ESOPHAGITIS: ICD-10-CM

## 2021-04-13 RX ORDER — ONDANSETRON 4 MG/1
4 TABLET, ORALLY DISINTEGRATING ORAL EVERY 8 HOURS PRN
Qty: 90 TABLET | Refills: 0 | Status: SHIPPED | OUTPATIENT
Start: 2021-04-13 | End: 2021-05-11 | Stop reason: SDUPTHER

## 2021-04-22 DIAGNOSIS — J45.20 MILD INTERMITTENT ASTHMA WITHOUT COMPLICATION: ICD-10-CM

## 2021-05-02 DIAGNOSIS — J30.9 ALLERGIC RHINITIS, UNSPECIFIED SEASONALITY, UNSPECIFIED TRIGGER: ICD-10-CM

## 2021-05-03 RX ORDER — FLUTICASONE PROPIONATE 50 MCG
SPRAY, SUSPENSION (ML) NASAL
Qty: 16 G | Refills: 5 | Status: SHIPPED | OUTPATIENT
Start: 2021-05-03 | End: 2021-05-05

## 2021-05-05 DIAGNOSIS — J30.9 ALLERGIC RHINITIS, UNSPECIFIED SEASONALITY, UNSPECIFIED TRIGGER: ICD-10-CM

## 2021-05-05 RX ORDER — FLUTICASONE PROPIONATE 50 MCG
SPRAY, SUSPENSION (ML) NASAL
Qty: 16 G | Refills: 3 | Status: SHIPPED | OUTPATIENT
Start: 2021-05-05 | End: 2021-06-23

## 2021-05-07 DIAGNOSIS — I10 ESSENTIAL (PRIMARY) HYPERTENSION: ICD-10-CM

## 2021-05-07 RX ORDER — FUROSEMIDE 40 MG/1
TABLET ORAL
Qty: 90 TABLET | Refills: 1 | OUTPATIENT
Start: 2021-05-07

## 2021-05-11 ENCOUNTER — TELEPHONE (OUTPATIENT)
Dept: GASTROENTEROLOGY | Facility: CLINIC | Age: 66
End: 2021-05-11

## 2021-05-11 DIAGNOSIS — K31.84 GASTROPARESIS: ICD-10-CM

## 2021-05-11 DIAGNOSIS — K21.9 GASTROESOPHAGEAL REFLUX DISEASE WITHOUT ESOPHAGITIS: Primary | ICD-10-CM

## 2021-05-11 NOTE — TELEPHONE ENCOUNTER
April patient - Need a new script for med  ondansetron (ZOFRAN-ODT) 4 mg disintegrating tablet [[id number]    [facility] FAX is [phone]   Thx

## 2021-05-12 ENCOUNTER — TELEPHONE (OUTPATIENT)
Dept: GASTROENTEROLOGY | Facility: CLINIC | Age: 66
End: 2021-05-12

## 2021-05-12 RX ORDER — ONDANSETRON 4 MG/1
4 TABLET, ORALLY DISINTEGRATING ORAL EVERY 8 HOURS PRN
Qty: 90 TABLET | Refills: 0 | Status: SHIPPED | OUTPATIENT
Start: 2021-05-12 | End: 2021-06-04

## 2021-05-12 NOTE — TELEPHONE ENCOUNTER
April - [facility] called - patient's ondansetron 4 mg tablet needs PA thru [facility] [phone]  ID [id number]   Please call [facility] if you have any questions at [phone] ty

## 2021-05-12 NOTE — TELEPHONE ENCOUNTER
Was denied before from [facility]  Resending to [address] prior auth again for ondansetron and sent clinicals

## 2021-05-20 DIAGNOSIS — I10 ESSENTIAL (PRIMARY) HYPERTENSION: ICD-10-CM

## 2021-05-20 RX ORDER — FUROSEMIDE 40 MG/1
TABLET ORAL
Qty: 90 TABLET | Refills: 1 | OUTPATIENT
Start: 2021-05-20

## 2021-05-24 ENCOUNTER — TELEPHONE (OUTPATIENT)
Dept: NEPHROLOGY | Facility: CLINIC | Age: 66
End: 2021-05-24

## 2021-05-24 ENCOUNTER — TELEPHONE (OUTPATIENT)
Dept: GASTROENTEROLOGY | Facility: CLINIC | Age: 66
End: 2021-05-24

## 2021-05-24 NOTE — TELEPHONE ENCOUNTER
I called and spoke to the patient and reschedule her appointment from 10/4/2021 to 7/22/2021 for a sooner day and time   Jim Molina,

## 2021-05-24 NOTE — TELEPHONE ENCOUNTER
I called and spoke to the patient about rescheduling her appointment for 7/15/2021 nephrology follow up to 10/4/2021 because of Dr Nicole Garcias on hospital call/rounding  The patient  stated that the patient had her blood work done back in December 2020 and now that her appointment is reschedule again to October, they would like to have more blood work done to see if her kidney condition is getting worse or better and if she can wait to be seen in October  Please call patient at [phone] to advise her about her appointment and about her blood work   Lupe Georges,

## 2021-06-02 ENCOUNTER — TRANSCRIBE ORDERS (OUTPATIENT)
Dept: ADMINISTRATIVE | Facility: HOSPITAL | Age: 66
End: 2021-06-02

## 2021-06-02 ENCOUNTER — APPOINTMENT (OUTPATIENT)
Dept: RADIOLOGY | Facility: CLINIC | Age: 66
End: 2021-06-02
Payer: MEDICARE

## 2021-06-02 DIAGNOSIS — R52 PAIN: ICD-10-CM

## 2021-06-02 DIAGNOSIS — R52 PAIN: Primary | ICD-10-CM

## 2021-06-02 PROCEDURE — 73630 X-RAY EXAM OF FOOT: CPT

## 2021-06-04 DIAGNOSIS — K21.9 GASTROESOPHAGEAL REFLUX DISEASE WITHOUT ESOPHAGITIS: ICD-10-CM

## 2021-06-04 RX ORDER — ONDANSETRON 4 MG/1
TABLET, ORALLY DISINTEGRATING ORAL
Qty: 90 TABLET | Refills: 1 | Status: SHIPPED | OUTPATIENT
Start: 2021-06-04 | End: 2021-07-21 | Stop reason: SDUPTHER

## 2021-06-04 RX ORDER — ONDANSETRON 4 MG/1
TABLET, ORALLY DISINTEGRATING ORAL
Qty: 90 TABLET | Refills: 1 | Status: SHIPPED | OUTPATIENT
Start: 2021-06-04 | End: 2021-08-02 | Stop reason: SDUPTHER

## 2021-06-23 DIAGNOSIS — J30.9 ALLERGIC RHINITIS, UNSPECIFIED SEASONALITY, UNSPECIFIED TRIGGER: ICD-10-CM

## 2021-06-23 RX ORDER — FLUTICASONE PROPIONATE 50 MCG
SPRAY, SUSPENSION (ML) NASAL
Qty: 16 G | Refills: 5 | Status: SHIPPED | OUTPATIENT
Start: 2021-06-23 | End: 2021-08-02 | Stop reason: SDUPTHER

## 2021-07-09 DIAGNOSIS — N30.10 INTERSTITIAL CYSTITIS: ICD-10-CM

## 2021-07-09 RX ORDER — PENTOSAN POLYSULFATE SODIUM 100 MG/1
CAPSULE, GELATIN COATED ORAL
Qty: 270 CAPSULE | Refills: 1 | Status: SHIPPED | OUTPATIENT
Start: 2021-07-09 | End: 2021-07-19 | Stop reason: SDUPTHER

## 2021-07-15 ENCOUNTER — TELEPHONE (OUTPATIENT)
Dept: NEPHROLOGY | Facility: CLINIC | Age: 66
End: 2021-07-15

## 2021-07-15 NOTE — TELEPHONE ENCOUNTER
Called pt to remind her to [facility] labs prior to 07/22 appt   Was unable to leave a message the # was busy

## 2021-07-19 DIAGNOSIS — N30.10 INTERSTITIAL CYSTITIS: ICD-10-CM

## 2021-07-19 RX ORDER — PENTOSAN POLYSULFATE SODIUM 100 MG/1
100 CAPSULE, GELATIN COATED ORAL 3 TIMES DAILY
Qty: 30 CAPSULE | Refills: 5 | Status: SHIPPED | OUTPATIENT
Start: 2021-07-19

## 2021-07-19 NOTE — TELEPHONE ENCOUNTER
Called again to inform pt regarding her 07/21 appt and labs , was unable to leave a message the # was busy

## 2021-07-20 ENCOUNTER — APPOINTMENT (OUTPATIENT)
Dept: LAB | Facility: CLINIC | Age: 66
End: 2021-07-20
Payer: MEDICARE

## 2021-07-20 DIAGNOSIS — N18.30 CKD (CHRONIC KIDNEY DISEASE) STAGE 3, GFR 30-59 ML/MIN (HCC): ICD-10-CM

## 2021-07-20 DIAGNOSIS — I10 ESSENTIAL (PRIMARY) HYPERTENSION: ICD-10-CM

## 2021-07-20 LAB
CHOLEST SERPL-MCNC: 258 MG/DL (ref 50–200)
HDLC SERPL-MCNC: 69 MG/DL
LDLC SERPL CALC-MCNC: 159 MG/DL (ref 0–100)
NONHDLC SERPL-MCNC: 189 MG/DL
PHOSPHATE SERPL-MCNC: 3.7 MG/DL (ref 2.3–4.1)
TRIGL SERPL-MCNC: 150 MG/DL

## 2021-07-20 PROCEDURE — 80061 LIPID PANEL: CPT

## 2021-07-20 PROCEDURE — 84100 ASSAY OF PHOSPHORUS: CPT

## 2021-07-21 ENCOUNTER — OFFICE VISIT (OUTPATIENT)
Dept: INTERNAL MEDICINE CLINIC | Facility: CLINIC | Age: 66
End: 2021-07-21
Payer: MEDICARE

## 2021-07-21 ENCOUNTER — TELEPHONE (OUTPATIENT)
Dept: NEPHROLOGY | Facility: CLINIC | Age: 66
End: 2021-07-21

## 2021-07-21 VITALS
HEART RATE: 98 BPM | TEMPERATURE: 98.6 F | OXYGEN SATURATION: 96 % | SYSTOLIC BLOOD PRESSURE: 126 MMHG | BODY MASS INDEX: 33.2 KG/M2 | DIASTOLIC BLOOD PRESSURE: 78 MMHG | WEIGHT: 170 LBS | RESPIRATION RATE: 16 BRPM

## 2021-07-21 DIAGNOSIS — J45.20 MILD INTERMITTENT ASTHMA WITHOUT COMPLICATION: ICD-10-CM

## 2021-07-21 DIAGNOSIS — M54.42 ACUTE BILATERAL LOW BACK PAIN WITH BILATERAL SCIATICA: ICD-10-CM

## 2021-07-21 DIAGNOSIS — Z78.0 POST-MENOPAUSAL: ICD-10-CM

## 2021-07-21 DIAGNOSIS — I10 ESSENTIAL (PRIMARY) HYPERTENSION: ICD-10-CM

## 2021-07-21 DIAGNOSIS — K30 DELAYED GASTRIC EMPTYING: ICD-10-CM

## 2021-07-21 DIAGNOSIS — R73.01 IMPAIRED FASTING GLUCOSE: ICD-10-CM

## 2021-07-21 DIAGNOSIS — K21.9 GASTROESOPHAGEAL REFLUX DISEASE, UNSPECIFIED WHETHER ESOPHAGITIS PRESENT: ICD-10-CM

## 2021-07-21 DIAGNOSIS — N18.30 STAGE 3 CHRONIC KIDNEY DISEASE, UNSPECIFIED WHETHER STAGE 3A OR 3B CKD (HCC): ICD-10-CM

## 2021-07-21 DIAGNOSIS — M54.41 ACUTE BILATERAL LOW BACK PAIN WITH BILATERAL SCIATICA: ICD-10-CM

## 2021-07-21 DIAGNOSIS — Z00.00 ENCOUNTER FOR MEDICARE ANNUAL WELLNESS EXAM: Primary | ICD-10-CM

## 2021-07-21 DIAGNOSIS — Z12.31 ENCOUNTER FOR SCREENING MAMMOGRAM FOR MALIGNANT NEOPLASM OF BREAST: ICD-10-CM

## 2021-07-21 DIAGNOSIS — E78.2 MIXED HYPERLIPIDEMIA: ICD-10-CM

## 2021-07-21 DIAGNOSIS — M48.062 SPINAL STENOSIS OF LUMBAR REGION WITH NEUROGENIC CLAUDICATION: ICD-10-CM

## 2021-07-21 DIAGNOSIS — E55.9 VITAMIN D DEFICIENCY: ICD-10-CM

## 2021-07-21 PROBLEM — H26.9 CATARACT, BILATERAL: Status: RESOLVED | Noted: 2017-01-25 | Resolved: 2021-07-21

## 2021-07-21 PROCEDURE — G0439 PPPS, SUBSEQ VISIT: HCPCS | Performed by: PHYSICIAN ASSISTANT

## 2021-07-21 PROCEDURE — 1123F ACP DISCUSS/DSCN MKR DOCD: CPT | Performed by: PHYSICIAN ASSISTANT

## 2021-07-21 PROCEDURE — 99214 OFFICE O/P EST MOD 30 MIN: CPT | Performed by: PHYSICIAN ASSISTANT

## 2021-07-21 RX ORDER — ROSUVASTATIN CALCIUM 10 MG/1
10 TABLET, COATED ORAL DAILY
Qty: 30 TABLET | Refills: 5 | Status: SHIPPED | OUTPATIENT
Start: 2021-07-21

## 2021-07-21 NOTE — PATIENT INSTRUCTIONS
Change Atorvastatin to Rosuvastatin  Stop fenofibrate  Continue other medications  Follow up in 6 months with repeat labs for that visit  Medicare Preventive Visit Patient Instructions  Thank you for completing your Welcome to Medicare Visit or Medicare Annual Wellness Visit today  Your next wellness visit will be due in one year (7/22/2022)  The screening/preventive services that you may require over the next 5-10 years are detailed below  Some tests may not apply to you based off risk factors and/or age  Screening tests ordered at today's visit but not completed yet may show as past due  Also, please note that scanned in results may not display below  Preventive Screenings:  Service Recommendations Previous Testing/Comments   Colorectal Cancer Screening  * Colonoscopy    * Fecal Occult Blood Test (FOBT)/Fecal Immunochemical Test (FIT)  * Fecal DNA/Cologuard Test  * Flexible Sigmoidoscopy Age: 54-65 years old   Colonoscopy: every 10 years (may be performed more frequently if at higher risk)  OR  FOBT/FIT: every 1 year  OR  Cologuard: every 3 years  OR  Sigmoidoscopy: every 5 years  Screening may be recommended earlier than age 48 if at higher risk for colorectal cancer  Also, an individualized decision between you and your healthcare provider will decide whether screening between the ages of 74-80 would be appropriate  Colonoscopy: Not on file  FOBT/FIT: Not on file  Cologuard: Not on file  Sigmoidoscopy: Not on file          Breast Cancer Screening Age: 36 years old  Frequency: every 1-2 years  Not required if history of left and right mastectomy Mammogram: 05/19/2017        Cervical Cancer Screening Between the ages of 21-29, pap smear recommended once every 3 years  Between the ages of 33-67, can perform pap smear with HPV co-testing every 5 years     Recommendations may differ for women with a history of total hysterectomy, cervical cancer, or abnormal pap smears in past  Pap Smear: Not on file    Screening Not Indicated   Hepatitis C Screening Once for adults born between 1945 and 1965  More frequently in patients at high risk for Hepatitis C Hep C Antibody: 05/22/2019    Screening Current   Diabetes Screening 1-2 times per year if you're at risk for diabetes or have pre-diabetes Fasting glucose: 109 mg/dL   A1C: 5 4 %    Screening Current   Cholesterol Screening Once every 5 years if you don't have a lipid disorder  May order more often based on risk factors  Lipid panel: 07/20/2021    Screening Not Indicated  History Lipid Disorder     Other Preventive Screenings Covered by Medicare:  1  Abdominal Aortic Aneurysm (AAA) Screening: covered once if your at risk  You're considered to be at risk if you have a family history of AAA  2  Lung Cancer Screening: covers low dose CT scan once per year if you meet all of the following conditions: (1) Age 50-69; (2) No signs or symptoms of lung cancer; (3) Current smoker or have quit smoking within the last 15 years; (4) You have a tobacco smoking history of at least 30 pack years (packs per day multiplied by number of years you smoked); (5) You get a written order from a healthcare provider  3  Glaucoma Screening: covered annually if you're considered high risk: (1) You have diabetes OR (2) Family history of glaucoma OR (3)  aged 48 and older OR (3)  American aged 72 and older  3  Osteoporosis Screening: covered every 2 years if you meet one of the following conditions: (1) You're estrogen deficient and at risk for osteoporosis based off medical history and other findings; (2) Have a vertebral abnormality; (3) On glucocorticoid therapy for more than 3 months; (4) Have primary hyperparathyroidism; (5) On osteoporosis medications and need to assess response to drug therapy  · Last bone density test (DXA Scan): Not on file  5  HIV Screening: covered annually if you're between the age of 12-76   Also covered annually if you are younger than 15 and older than 72 with risk factors for HIV infection  For pregnant patients, it is covered up to 3 times per pregnancy  Immunizations:  Immunization Recommendations   Influenza Vaccine Annual influenza vaccination during flu season is recommended for all persons aged >= 6 months who do not have contraindications   Pneumococcal Vaccine (Prevnar and Pneumovax)  * Prevnar = PCV13  * Pneumovax = PPSV23   Adults 25-60 years old: 1-3 doses may be recommended based on certain risk factors  Adults 72 years old: Prevnar (PCV13) vaccine recommended followed by Pneumovax (PPSV23) vaccine  If already received PPSV23 since turning 65, then PCV13 recommended at least one year after PPSV23 dose  Hepatitis B Vaccine 3 dose series if at intermediate or high risk (ex: diabetes, end stage renal disease, liver disease)   Tetanus (Td) Vaccine - COST NOT COVERED BY MEDICARE PART B Following completion of primary series, a booster dose should be given every 10 years to maintain immunity against tetanus  Td may also be given as tetanus wound prophylaxis  Tdap Vaccine - COST NOT COVERED BY MEDICARE PART B Recommended at least once for all adults  For pregnant patients, recommended with each pregnancy  Shingles Vaccine (Shingrix) - COST NOT COVERED BY MEDICARE PART B  2 shot series recommended in those aged 48 and above     Health Maintenance Due:      Topic Date Due    Colorectal Cancer Screening  Never done    Breast Cancer Screening: Mammogram  05/19/2018    Hepatitis C Screening  Completed     Immunizations Due:      Topic Date Due    Pneumococcal Vaccine: 65+ Years (1 of 2 - PPSV23) Never done    COVID-19 Vaccine (1) Never done    DTaP,Tdap,and Td Vaccines (1 - Tdap) Never done    Influenza Vaccine (1) 09/01/2021     Advance Directives   What are advance directives? Advance directives are legal documents that state your wishes and plans for medical care   These plans are made ahead of time in case you lose your ability to make decisions for yourself  Advance directives can apply to any medical decision, such as the treatments you want, and if you want to donate organs  What are the types of advance directives? There are many types of advance directives, and each state has rules about how to use them  You may choose a combination of any of the following:  · Living will: This is a written record of the treatment you want  You can also choose which treatments you do not want, which to limit, and which to stop at a certain time  This includes surgery, medicine, IV fluid, and tube feedings  · Durable power of  for healthcare [facility]): This is a written record that states who you want to make healthcare choices for you when you are unable to make them for yourself  This person, called a proxy, is usually a family member or a friend  You may choose more than 1 proxy  · Do not resuscitate (DNR) order:  A DNR order is used in case your heart stops beating or you stop breathing  It is a request not to have certain forms of treatment, such as CPR  A DNR order may be included in other types of advance directives  · Medical directive: This covers the care that you want if you are in a coma, near death, or unable to make decisions for yourself  You can list the treatments you want for each condition  Treatment may include pain medicine, surgery, blood transfusions, dialysis, IV or tube feedings, and a ventilator (breathing machine)  · Values history: This document has questions about your views, beliefs, and how you feel and think about life  This information can help others choose the care that you would choose  Why are advance directives important? An advance directive helps you control your care  Although spoken wishes may be used, it is better to have your wishes written down  Spoken wishes can be misunderstood, or not followed  Treatments may be given even if you do not want them   An advance directive may make it easier for your family to make difficult choices about your care  Urinary Incontinence   Urinary incontinence (UI)  is when you lose control of your bladder  UI develops because your bladder cannot store or empty urine properly  The 3 most common types of UI are stress incontinence, urge incontinence, or both  Medicines:   · May be given to help strengthen your bladder control  Report any side effects of medication to your healthcare provider  Do pelvic muscle exercises often:  Your pelvic muscles help you stop urinating  Squeeze these muscles tight for 5 seconds, then relax for 5 seconds  Gradually work up to squeezing for 10 seconds  Do 3 sets of 15 repetitions a day, or as directed  This will help strengthen your pelvic muscles and improve bladder control  Train your bladder:  Go to the bathroom at set times, such as every 2 hours, even if you do not feel the urge to go  You can also try to hold your urine when you feel the urge to go  For example, hold your urine for 5 minutes when you feel the urge to go  As that becomes easier, hold your urine for 10 minutes  Self-care:   · Keep a UI record  Write down how often you leak urine and how much you leak  Make a note of what you were doing when you leaked urine  · Drink liquids as directed  You may need to limit the amount of liquid you drink to help control your urine leakage  Do not drink any liquid right before you go to bed  Limit or do not have drinks that contain caffeine or alcohol  · Prevent constipation  Eat a variety of high-fiber foods  Good examples are high-fiber cereals, beans, vegetables, and whole-grain breads  Walking is the best way to trigger your intestines to have a bowel movement  · Exercise regularly and maintain a healthy weight  Weight loss and exercise will decrease pressure on your bladder and help you control your leakage  · Use a catheter as directed  to help empty your bladder   A catheter is a tiny, plastic tube that is put into your bladder to drain your urine  · Go to behavior therapy as directed  Behavior therapy may be used to help you learn to control your urge to urinate  Weight Management   Why it is important to manage your weight:  Being overweight increases your risk of health conditions such as heart disease, high blood pressure, type 2 diabetes, and certain types of cancer  It can also increase your risk for osteoarthritis, sleep apnea, and other respiratory problems  Aim for a slow, steady weight loss  Even a small amount of weight loss can lower your risk of health problems  How to lose weight safely:  A safe and healthy way to lose weight is to eat fewer calories and get regular exercise  You can lose up about 1 pound a week by decreasing the number of calories you eat by 500 calories each day  Healthy meal plan for weight management:  A healthy meal plan includes a variety of foods, contains fewer calories, and helps you stay healthy  A healthy meal plan includes the following:  · Eat whole-grain foods more often  A healthy meal plan should contain fiber  Fiber is the part of grains, fruits, and vegetables that is not broken down by your body  Whole-grain foods are healthy and provide extra fiber in your diet  Some examples of whole-grain foods are whole-wheat breads and pastas, oatmeal, brown rice, and bulgur  · Eat a variety of vegetables every day  Include dark, leafy greens such as spinach, kale, kota greens, and mustard greens  Eat yellow and orange vegetables such as carrots, sweet potatoes, and winter squash  · Eat a variety of fruits every day  Choose fresh or canned fruit (canned in its own juice or light syrup) instead of juice  Fruit juice has very little or no fiber  · Eat low-fat dairy foods  Drink fat-free (skim) milk or 1% milk  Eat fat-free yogurt and low-fat cottage cheese  Try low-fat cheeses such as mozzarella and other reduced-fat cheeses    · Choose meat and other protein foods that are low in fat  Choose beans or other legumes such as split peas or lentils  Choose fish, skinless poultry (chicken or turkey), or lean cuts of red meat (beef or pork)  Before you cook meat or poultry, cut off any visible fat  · Use less fat and oil  Try baking foods instead of frying them  Add less fat, such as margarine, sour cream, regular salad dressing and mayonnaise to foods  Eat fewer high-fat foods  Some examples of high-fat foods include french fries, doughnuts, ice cream, and cakes  · Eat fewer sweets  Limit foods and drinks that are high in sugar  This includes candy, cookies, regular soda, and sweetened drinks  Exercise:  Exercise at least 30 minutes per day on most days of the week  Some examples of exercise include walking, biking, dancing, and swimming  You can also fit in more physical activity by taking the stairs instead of the elevator or parking farther away from stores  Ask your healthcare provider about the best exercise plan for you  © Copyright [address] 2018 Information is for End User's use only and may not be sold, redistributed or otherwise used for commercial purposes   All illustrations and images included in CareNotes® are the copyrighted property of [employer]  or [address] [place] [address]

## 2021-07-21 NOTE — PROGRESS NOTES
Assessment/Plan:    Change Atorvastatin to Rosuvastatin  Stop fenofibrate  Continue other medications  Follow up in 6 months with repeat labs for that visit  I have asked patient to get in touch with nephrology as she has an appointment tomorrow  I would like her to either make the appointment or be seen virtually  She has had significant improvement in her renal functions since last visit  She is hydrating better, avoiding NSAIDs, and is off diuretics  Quality Measures:       Return in about 6 months (around 1/21/2022) for Next scheduled follow up  Diagnoses and all orders for this visit:    Encounter for Medicare annual wellness exam    Delayed gastric emptying    Gastroesophageal reflux disease, unspecified whether esophagitis present    Mild intermittent asthma without complication  -     CBC and differential; Future    Essential (primary) hypertension    Acute bilateral low back pain with bilateral sciatica    Stage 3 chronic kidney disease, unspecified whether stage 3a or 3b CKD ([facility] [address] )  -     Comprehensive metabolic panel; Future    Spinal stenosis of lumbar region with neurogenic claudication    Mixed hyperlipidemia  -     rosuvastatin (CRESTOR) 10 MG tablet; Take 1 tablet (10 mg total) by mouth daily  -     Lipid panel; Future    Vitamin D deficiency    Impaired fasting glucose  -     Hemoglobin A1C; Future    Post-menopausal  -     DXA bone density spine hip and pelvis; Future    Encounter for screening mammogram for malignant neoplasm of breast  -     Mammo screening bilateral w 3d & cad; Future          Subjective:      Patient ID: Yusra Stephens is a 74 y o  female  Follow-up    Patient present with her   Lashell Hopewell has significant difficulty getting around secondary to her chronic low back disease and chronic pain from such  She follows regularly with pain management  She is on chronic opioids from them  Patient does have Narcan available      CKD 3:  Improvement in renal functions with her last labs  Off all diuretics  Avoiding nephrotoxic substances  Hydrating better  Vitamin-D level normal, PTH normal, phosphorus normal   Patient has scheduled nephrology follow-up tomorrow  She has discussed with me that she has such difficulty getting around  and getting to appointments, she is asking since she is doing better good we follow her unless she decompensates  I have asked her to discuss this with Nephrology tomorrow  I am agreeable  GI concerns:  Delayed gastric emptying, GERD  Did not tolerate Botox injections  GI has stated that she would need stimulator which she is not willing to undergo  Mild intermittent asthma:  Does well on her inhalers  No acute concerns  No complaint of wheezing or shortness of breath  Hyperlipidemia:  Had stopped her atorvastatin and fenofibrate secondary to muscle pain  Not much improvement was seen stopping the medications  She is in agreement to start back on lipid lowering agent, but would like a "different 1"  Labs have shown that her total and LDL cholesterols have gone up  Triglycerides are acceptable  Since last visit she and her  have modified there diet  Chronic back pain with spinal stenosis of lumbar spine with neurogenic claudication  This is what bothers patient most     Impaired fasting glucose on labs:  Blood sugar was 109, A1c checked in the office was 5 4  She was instructed to continue diet of look concentrated sweets and decreased portion sizes of carbohydrates  ALLERGIES:  Allergies   Allergen Reactions    Clindamycin Hives     Category: Allergy;     Erythromycin Hives and Rash     Category: Allergy;     Latex Hives    Penicillins Hives and Shortness Of Breath    Morphine GI Intolerance and Vomiting     Category: Adverse Reaction;     Erythromycin Base Other (See Comments)     vomitting    Other     Penicillin V Seizures     Category:  Allergy;     Wheat Extract - Food Allergy Dizziness  Adhesive [Medical Tape] Rash     Skin irritation    Povidone Iodine Rash     Category:  Adverse Reaction;        CURRENT MEDICATIONS:    Current Outpatient Medications:     albuterol (Ventolin HFA) 90 mcg/act inhaler, Inhale 2 puffs every 6 (six) hours as needed for wheezing, Disp: 18 Inhaler, Rfl: 3    albuterol 2 mg/5 mL syrup, TAKE 5 ML'S ( 2 MG TOTAL ) BY MOUTH 4 ( FOUR ) TIMES A DAY , Disp: 473 mL, Rfl: 3    diclofenac sodium (VOLTAREN) 1 %, Apply 2 g topically 4 (four) times a day, Disp: 1 Tube, Rfl: 1    estradiol (ESTRACE) 0 1 mg/g vaginal cream, Insert 1 g into the vagina 2 (two) times a week, Disp: 42 5 g, Rfl: 3    fluticasone (FLONASE) 50 mcg/act nasal spray, USE 1 SPRAY INTO EACH NOSTRIL DAILY, Disp: 16 g, Rfl: 5    gabapentin (NEURONTIN) 800 mg tablet, Take 800 mg by mouth 3 (three) times a day, Disp: , Rfl:     ketoconazole (NIZORAL) 2 % cream, APPLY TO AFFECTED AREA EVERY DAY (Patient taking differently: as needed ), Disp: 60 g, Rfl: 1    lisinopril (ZESTRIL) 5 mg tablet, Take 1 tablet (5 mg total) by mouth daily, Disp: 180 tablet, Rfl: 3    Narcan 4 MG/0 1ML nasal spray, As needed, Disp: , Rfl:     ondansetron (ZOFRAN-ODT) 4 mg disintegrating tablet, DISSOLVE ONE TABLET BY MOUTH EVERY 8 HOURS AS NEEDED NAUSEA OR VOMITING , Disp: 90 tablet, Rfl: 1    oxyCODONE (ROXICODONE) 10 MG TABS, Take 10 mg by mouth every 8 (eight) hours as needed , Disp: , Rfl:     pantoprazole (PROTONIX) 40 mg tablet, TAKE ONE TABLET BY MOUTH TWICE A DAY 30 MINTUES BEFORE BREAKFAST AND DINNER , Disp: 180 tablet, Rfl: 1    pentosan polysulfate (Elmiron) 100 mg capsule, Take 1 capsule (100 mg total) by mouth 3 (three) times a day, Disp: 30 capsule, Rfl: 5    prochlorperazine (COMPAZINE) 10 mg tablet, Take 1 tablet (10 mg total) by mouth 2 (two) times a day As needed for nausea, Disp: 60 tablet, Rfl: 0    SODIUM FLUORIDE, DENTAL GEL, (PreviDent) 1 1 % GEL, Apply to teeth, Disp: , Rfl:     tiZANidine (ZANAFLEX) 4 mg tablet, TAKE ONE TABLET BY MOUTH THREE TIMES A DAY, Disp: 270 tablet, Rfl: 1    zolpidem (AMBIEN) 10 mg tablet, Take 10 mg by mouth daily at bedtime as needed, Disp: , Rfl:     ZyrTEC-D Allergy & Congestion 5-120 MG per tablet, TAKE ONE TABLET BY MOUTH TWICE A DAY, Disp: 180 tablet, Rfl: 1    rosuvastatin (CRESTOR) 10 MG tablet, Take 1 tablet (10 mg total) by mouth daily, Disp: 30 tablet, Rfl: 5    ACTIVE PROBLEM LIST:  Patient Active Problem List   Diagnosis    Acute bilateral back pain, intractable    Continuous opioid dependence (HCC)    Vitamin D deficiency    Cervical radiculopathy, chronic    Delayed gastric emptying    Essential (primary) hypertension    Fatty liver disease, nonalcoholic    GERD (gastroesophageal reflux disease)    Hot flashes, menopausal    Interstitial cystitis    Lumbar post-laminectomy syndrome    Mild intermittent asthma without complication    Lumbosacral spondylosis without myelopathy    Radiculopathy, lumbar region    Thoracic and lumbosacral neuritis    Hyperlipidemia    Lumbar canal stenosis with diffuse disc bulge    Multiple falls    Lumbar disc herniation    Spinal stenosis of lumbar region with neurogenic claudication    Acute bilateral low back pain with bilateral sciatica    Allergic rhinitis    Lactose intolerance    Gluten intolerance    CKD (chronic kidney disease) stage 3, GFR 30-59 ml/min ([facility])       PAST MEDICAL HISTORY:  Past Medical History:   Diagnosis Date    Anemia     Anxiety     Asthma     Cataract     Chronic back pain     Chronic kidney disease     Chronic pain disorder     Back    Depression     GERD (gastroesophageal reflux disease)     HTN (hypertension)     Hyperlipidemia     Interstitial cystitis     Muscle cramps     Muscle weakness     Obesity     Pneumonia     PONV (postoperative nausea and vomiting)     Radiculopathy, lumbar region     Spondylosis, cervical, with myelopathy     Tinea corporis 2018    Vulvovaginitis        PAST SURGICAL HISTORY:  Past Surgical History:   Procedure Laterality Date    APPENDECTOMY      BACK SURGERY      Arthrodeiss lumbar    BACK SURGERY      Spinal stereotaxis of cord    BLADDER SURGERY      Electronic bladder stimulator implantation    CATARACT EXTRACTION      CATARACT EXTRACTION, BILATERAL       SECTION      x3    CHOLECYSTECTOMY      COLON SURGERY      Intestinal stricturoplasty     DECOMPRESSION SPINE LUMBAR POSTERIOR Bilateral 2018    Procedure: DECOMPRESSION SPINE LUMBAR POSTERIOR L1-4, L1-2 DISKECTOMY, POSTERIORLATERAL FUSION L3-4, REMOVAL OF SPINAL CORD STIMULATOR SYSTEM, REMOVAL OF INTERSPINOUS DEVICES;  Surgeon: Fara Silverman MD;  Location: [facility];  Service: Neurosurgery    FINGER SURGERY      Extensor tendon repair (mallet finger)    FL MYELOGRAM LUMBAR  2018    HERNIA REPAIR      x3    HYSTERECTOMY      KS DRAINAGE OF HEMATOMA/FLUID Bilateral 10/9/2018    Procedure: LUMBAR WOUND EXPLORATION/EVACUATION OF SERONMA;  Surgeon: Fara Silverman MD;  [facility];  Service: Neurosurgery    KS ESOPHAGOGASTRODUODENOSCOPY TRANSORAL DIAGNOSTIC N/A 2017    Procedure: ESOPHAGOGASTRODUODENOSCOPY (EGD); Surgeon: Caridad De León MD;  Location: [facility];   Service: Gastroenterology    SALPINGOOPHORECTOMY      B/L       FAMILY HISTORY:  Family History   Problem Relation Age of Onset    Coronary artery disease Brother         Patient has 3 brothers with coronary artery disease    Diabetes Mother     Throat cancer Mother     COPD Father     Cancer Father     Bipolar disorder Sister        SOCIAL HISTORY:  Social History     Socioeconomic History    Marital status: /Civil Union     Spouse name: Not on file    Number of children: 3    Years of education: Not on file    Highest education level: Not on file   Occupational History    Occupation: Retired   Tobacco Use    Smoking status: Never Smoker    Smokeless tobacco: Never Used   Vaping Use    Vaping Use: Every day    Substances: CBD   Substance and Sexual Activity    Alcohol use: No    Drug use: Yes     Types: Marijuana     Comment: medical marijuana    Sexual activity: Yes     Partners: Male   Other Topics Concern    Not on file   Social History Narrative    Brushes teeth 2x day    Regular dental care    Exercise: walking     Social Determinants of Health     Financial Resource Strain:     Difficulty of Paying Living Expenses:    Food Insecurity:     Worried About Running Out of Food in the Last Year:     Ran Out of Food in the Last Year:    Transportation Needs:     Lack of Transportation (Medical):  Lack of Transportation (Non-Medical):    Physical Activity:     Days of Exercise per Week:     Minutes of Exercise per Session:    Stress:     Feeling of Stress :    Social Connections:     Frequency of Communication with Friends and Family:     Frequency of Social Gatherings with Friends and Family:     Attends Baptism Services:     Active Member of Clubs or Organizations:     Attends Club or Organization Meetings:     Marital Status:    Intimate Partner Violence:     Fear of Current or Ex-Partner:     Emotionally Abused:     Physically Abused:     Sexually Abused:        Review of Systems   Constitutional: Positive for fatigue  Negative for activity change, chills and fever  HENT: Negative for congestion  Eyes: Positive for visual disturbance (  Wears glasses)  Negative for discharge  Respiratory: Negative for cough, chest tightness and shortness of breath  Cardiovascular: Negative for chest pain, palpitations and leg swelling  Gastrointestinal: Negative for abdominal pain, blood in stool, constipation, diarrhea, nausea and vomiting  Endocrine: Negative for polydipsia, polyphagia and polyuria  Genitourinary: Negative for difficulty urinating  Musculoskeletal: Positive for back pain, gait problem and myalgias  Negative for arthralgias  Skin: Negative for rash  Allergic/Immunologic: Negative for immunocompromised state  Neurological: Negative for dizziness, syncope, weakness, light-headedness and headaches  Hematological: Negative for adenopathy  Does not bruise/bleed easily  Psychiatric/Behavioral: Positive for sleep disturbance  Negative for dysphoric mood and suicidal ideas  The patient is not nervous/anxious  Objective:  Vitals:    07/21/21 1353 07/21/21 1419   BP: 150/80 126/78   BP Location:  Left arm   Patient Position:  Sitting   Pulse: 98    Resp: 16    Temp: 98 6 °F (37 °C)    SpO2: 96%    Weight: 77 1 kg (170 lb)      Body mass index is 33 2 kg/m²  Physical Exam  Vitals and nursing note reviewed  Constitutional:       General: She is not in acute distress  Appearance: She is well-developed  She is obese  She is not ill-appearing  HENT:      Head: Normocephalic and atraumatic  Eyes:      Extraocular Movements: Extraocular movements intact  Pupils: Pupils are equal, round, and reactive to light  Neck:      Thyroid: No thyromegaly  Vascular: No carotid bruit or JVD  Cardiovascular:      Rate and Rhythm: Normal rate and regular rhythm  Heart sounds: Normal heart sounds  Pulmonary:      Effort: Pulmonary effort is normal  No respiratory distress  Breath sounds: Normal breath sounds  Musculoskeletal:      Cervical back: Neck supple  Right lower leg: No edema  Left lower leg: No edema  Comments:  Patient does display difficulty moving about the exam room   Lymphadenopathy:      Cervical: No cervical adenopathy  Skin:     General: Skin is warm and dry  Findings: No rash  Neurological:      General: No focal deficit present  Mental Status: She is alert and oriented to person, place, and time  Mental status is at baseline     Psychiatric:         Mood and Affect: Mood normal          Behavior: Behavior normal  RESULTS:    Recent Results (from the past 1008 hour(s))   Protein / creatinine ratio, urine    Collection Time: 07/20/21  7:35 AM   Result Value Ref Range    Creatinine, Ur 40 9 mg/dL    Protein Urine Random <6 mg/dL    Prot/Creat Ratio, Ur <0 15 (H) 0 00 - 0 10   Urinalysis with microscopic    Collection Time: 07/20/21  7:35 AM   Result Value Ref Range    Clarity, UA Clear     Color, UA Yellow     Specific [place], UA 1 012 1 003 - 1 030    pH, UA 6 5 4 5, 5 0, 5 5, 6 0, 6 5, 7 0, 7 5, 8 0    Glucose, UA Negative Negative mg/dl    Ketones, UA Negative Negative mg/dl    Blood, UA Negative Negative    Protein, UA Negative Negative mg/dl    Nitrite, UA Negative Negative    Bilirubin, UA Negative Negative    Urobilinogen, UA 0 2 0 2, 1 0 E U /dl E U /dl    Leukocytes, UA Trace (A) Negative    WBC, UA 2-4 None Seen, 2-4, 5-60 /hpf    RBC, UA None Seen None Seen, 2-4 /hpf    Hyaline Casts, UA None Seen None Seen /lpf    Bacteria, UA None Seen None Seen, Occasional /hpf    Epithelial Cells Occasional None Seen, Occasional /hpf   CBC and differential    Collection Time: 07/20/21  7:35 AM   Result Value Ref Range    WBC 6 30 4 31 - 10 16 Thousand/uL    RBC 4 40 3 81 - 5 12 Million/uL    Hemoglobin 12 9 11 5 - 15 4 g/dL    Hematocrit 39 9 34 8 - 46 1 %    MCV 91 82 - 98 fL    MCH 29 3 26 8 - 34 3 pg    MCHC 32 3 31 4 - 37 4 g/dL    RDW 14 2 11 6 - 15 1 %    MPV 11 0 8 9 - 12 7 fL    Platelets 923 713 - 214 Thousands/uL    nRBC 0 /100 WBCs    Neutrophils Relative 58 43 - 75 %    Immat GRANS % 1 0 - 2 %    Lymphocytes Relative 29 14 - 44 %    Monocytes Relative 7 4 - 12 %    Eosinophils Relative 4 0 - 6 %    Basophils Relative 1 0 - 1 %    Neutrophils Absolute 3 72 1 85 - 7 62 Thousands/µL    Immature Grans Absolute 0 03 0 00 - 0 20 Thousand/uL    Lymphocytes Absolute 1 85 0 60 - 4 47 Thousands/µL    Monocytes Absolute 0 42 0 17 - 1 22 Thousand/µL    Eosinophils Absolute 0 23 0 00 - 0 61 Thousand/µL    Basophils Absolute 0  05 0 00 - 0 10 Thousands/µL   Comprehensive metabolic panel    Collection Time: 07/20/21  7:35 AM   Result Value Ref Range    Sodium 139 136 - 145 mmol/L    Potassium 4 3 3 5 - 5 3 mmol/L    Chloride 104 100 - 108 mmol/L    CO2 27 21 - 32 mmol/L    ANION GAP 8 4 - 13 mmol/L    BUN 21 5 - 25 mg/dL    Creatinine 0 95 0 60 - 1 30 mg/dL    Glucose, Fasting 109 (H) 65 - 99 mg/dL    Calcium 9 9 8 3 - 10 1 mg/dL    AST 19 5 - 45 U/L    ALT 33 12 - 78 U/L    Alkaline Phosphatase 58 46 - 116 U/L    Total Protein 7 9 6 4 - 8 2 g/dL    Albumin 3 5 3 5 - 5 0 g/dL    Total Bilirubin 0 49 0 20 - 1 00 mg/dL    eGFR 62 ml/min/1 73sq m   PTH, intact    Collection Time: 07/20/21  7:35 AM   Result Value Ref Range    PTH 22 4 18 4 - 80 1 pg/mL   Vitamin D 25 hydroxy    Collection Time: 07/20/21  7:35 AM   Result Value Ref Range    Vit D, 25-Hydroxy 50 0 30 0 - 100 0 ng/mL   Phosphorus    Collection Time: 07/20/21  7:35 AM   Result Value Ref Range    Phosphorus 3 7 2 3 - 4 1 mg/dL   Lipid panel    Collection Time: 07/20/21  7:35 AM   Result Value Ref Range    Cholesterol 258 (H) 50 - 200 mg/dL    Triglycerides 150 <=150 mg/dL    HDL, Direct 69 >=40 mg/dL    LDL Calculated 159 (H) 0 - 100 mg/dL    Non-HDL-Chol (CHOL-HDL) 189 mg/dl       This note was created with voice recognition software  Phonic, grammatical and spelling errors may be present within the note as a result

## 2021-07-21 NOTE — PROGRESS NOTES
Assessment and Plan:   Change Atorvastatin to Rosuvastatin  Stop fenofibrate  Continue other medications  Follow up in 6 months with repeat labs for that visit  Problem List Items Addressed This Visit        Digestive    Delayed gastric emptying    GERD (gastroesophageal reflux disease)       Respiratory    Mild intermittent asthma without complication    Relevant Orders    CBC and differential       Cardiovascular and Mediastinum    Essential (primary) hypertension       Nervous and Auditory    Acute bilateral low back pain with bilateral sciatica       Genitourinary    CKD (chronic kidney disease) stage 3, GFR 30-59 ml/min ([facility])    Relevant Orders    Comprehensive metabolic panel       Other    Vitamin D deficiency    Hyperlipidemia    Relevant Medications    rosuvastatin (CRESTOR) 10 MG tablet    Other Relevant Orders    Lipid panel    Spinal stenosis of lumbar region with neurogenic claudication      Other Visit Diagnoses     Encounter for Medicare annual wellness exam    -  Primary    Impaired fasting glucose        Relevant Orders    Hemoglobin A1C    Post-menopausal        Relevant Orders    DXA bone density spine hip and pelvis           Preventive health issues were discussed with patient, and age appropriate screening tests were ordered as noted in patient's After Visit Summary  Personalized health advice and appropriate referrals for health education or preventive services given if needed, as noted in patient's After Visit Summary  History of Present Illness:     Patient presents for Medicare Annual Wellness visit  Questionnaire has been reviewed, clarified, and updated with patient today      Patient Care Team:  Rin Mcnair MD as PCP - General  Eugenie Brand PA-C as Physician Assistant (Gastroenterology)     Problem List:     Patient Active Problem List   Diagnosis    Acute bilateral back pain, intractable    Continuous opioid dependence ([facility] [address] )    Vitamin D deficiency    Cervical radiculopathy, chronic    Delayed gastric emptying    Essential (primary) hypertension    Fatty liver disease, nonalcoholic    GERD (gastroesophageal reflux disease)    Hot flashes, menopausal    Interstitial cystitis    Lumbar post-laminectomy syndrome    Mild intermittent asthma without complication    Lumbosacral spondylosis without myelopathy    Radiculopathy, lumbar region    Thoracic and lumbosacral neuritis    Hyperlipidemia    Lumbar canal stenosis with diffuse disc bulge    Multiple falls    Lumbar disc herniation    Spinal stenosis of lumbar region with neurogenic claudication    Acute bilateral low back pain with bilateral sciatica    Allergic rhinitis    Lactose intolerance    Gluten intolerance    CKD (chronic kidney disease) stage 3, GFR 30-59 ml/min ([facility])      Past Medical and Surgical History:     Past Medical History:   Diagnosis Date    Anemia     Anxiety     Asthma     Cataract     Chronic back pain     Chronic kidney disease     Chronic pain disorder     Back    Depression     GERD (gastroesophageal reflux disease)     HTN (hypertension)     Hyperlipidemia     Interstitial cystitis     Muscle cramps     Muscle weakness     Obesity     Pneumonia     PONV (postoperative nausea and vomiting)     Radiculopathy, lumbar region     Spondylosis, cervical, with myelopathy     Tinea corporis 2018    Vulvovaginitis      Past Surgical History:   Procedure Laterality Date    APPENDECTOMY      BACK SURGERY      Arthrodeiss lumbar    BACK SURGERY      Spinal stereotaxis of cord    BLADDER SURGERY      Electronic bladder stimulator implantation    CATARACT EXTRACTION      CATARACT EXTRACTION, BILATERAL       SECTION      x3    CHOLECYSTECTOMY      COLON SURGERY      Intestinal stricturoplasty     DECOMPRESSION SPINE LUMBAR POSTERIOR Bilateral 2018    Procedure: DECOMPRESSION SPINE LUMBAR POSTERIOR L1-4, L1-2 DISKECTOMY, POSTERIORLATERAL FUSION L3-4, REMOVAL OF SPINAL CORD STIMULATOR SYSTEM, REMOVAL OF INTERSPINOUS DEVICES;  Surgeon: Fara Silverman MD;  [facility];  Service: Neurosurgery    FINGER SURGERY      Extensor tendon repair (mallet finger)    FL MYELOGRAM LUMBAR  9/7/2018    HERNIA REPAIR      x3    HYSTERECTOMY      HI DRAINAGE OF HEMATOMA/FLUID Bilateral 10/9/2018    Procedure: LUMBAR WOUND EXPLORATION/EVACUATION OF SERONMA;  Surgeon: Fara Silverman MD;  [facility];  Service: Neurosurgery    HI ESOPHAGOGASTRODUODENOSCOPY TRANSORAL DIAGNOSTIC N/A 8/23/2017    Procedure: ESOPHAGOGASTRODUODENOSCOPY (EGD); Surgeon: Caridad De León MD;  Location: [facility];   Service: Gastroenterology    SALPINGOOPHORECTOMY      B/L      Family History:     Family History   Problem Relation Age of Onset    Coronary artery disease Brother         Patient has 3 brothers with coronary artery disease    Diabetes Mother     Throat cancer Mother     COPD Father     Cancer Father     Bipolar disorder Sister       Social History:     Social History     Socioeconomic History    Marital status: /Civil Union     Spouse name: None    Number of children: 3    Years of education: None    Highest education level: None   Occupational History    Occupation: Retired   Tobacco Use    Smoking status: Never Smoker    Smokeless tobacco: Never Used   Vaping Use    Vaping Use: Every day    Substances: CBD   Substance and Sexual Activity    Alcohol use: No    Drug use: Yes     Types: Marijuana     Comment: medical marijuana    Sexual activity: Yes     Partners: Male   Other Topics Concern    None   Social History Narrative    Brushes teeth 2x day    Regular dental care    Exercise: walking     Social Determinants of Health     Financial Resource Strain:     Difficulty of Paying Living Expenses:    Food Insecurity:     Worried About Running Out of Food in the Last Year:     Chandra of Food in the Last Year:    Transportation Needs:  Lack of Transportation (Medical):  Lack of Transportation (Non-Medical):    Physical Activity:     Days of Exercise per Week:     Minutes of Exercise per Session:    Stress:     Feeling of Stress :    Social Connections:     Frequency of Communication with Friends and Family:     Frequency of Social Gatherings with Friends and Family:     Attends Yazidism Services:     Active Member of Clubs or Organizations:     Attends Club or Organization Meetings:     Marital Status:    Intimate Partner Violence:     Fear of Current or Ex-Partner:     Emotionally Abused:     Physically Abused:     Sexually Abused:       Medications and Allergies:     Current Outpatient Medications   Medication Sig Dispense Refill    albuterol (Ventolin HFA) 90 mcg/act inhaler Inhale 2 puffs every 6 (six) hours as needed for wheezing 18 Inhaler 3    albuterol 2 mg/5 mL syrup TAKE 5 ML'S ( 2 MG TOTAL ) BY MOUTH 4 ( FOUR ) TIMES A DAY  473 mL 3    diclofenac sodium (VOLTAREN) 1 % Apply 2 g topically 4 (four) times a day 1 Tube 1    estradiol (ESTRACE) 0 1 mg/g vaginal cream Insert 1 g into the vagina 2 (two) times a week 42 5 g 3    fluticasone (FLONASE) 50 mcg/act nasal spray USE 1 SPRAY INTO EACH NOSTRIL DAILY 16 g 5    gabapentin (NEURONTIN) 800 mg tablet Take 800 mg by mouth 3 (three) times a day      ketoconazole (NIZORAL) 2 % cream APPLY TO AFFECTED AREA EVERY DAY (Patient taking differently: as needed ) 60 g 1    lisinopril (ZESTRIL) 5 mg tablet Take 1 tablet (5 mg total) by mouth daily 180 tablet 3    Narcan 4 MG/0 1ML nasal spray As needed      ondansetron (ZOFRAN-ODT) 4 mg disintegrating tablet DISSOLVE ONE TABLET BY MOUTH EVERY 8 HOURS AS NEEDED NAUSEA OR VOMITING   90 tablet 1    oxyCODONE (ROXICODONE) 10 MG TABS Take 10 mg by mouth every 8 (eight) hours as needed       pantoprazole (PROTONIX) 40 mg tablet TAKE ONE TABLET BY MOUTH TWICE A DAY 30 MINTUES BEFORE BREAKFAST AND DINNER  180 tablet 1    pentosan polysulfate (Elmiron) 100 mg capsule Take 1 capsule (100 mg total) by mouth 3 (three) times a day 30 capsule 5    prochlorperazine (COMPAZINE) 10 mg tablet Take 1 tablet (10 mg total) by mouth 2 (two) times a day As needed for nausea 60 tablet 0    SODIUM FLUORIDE, DENTAL GEL, (PreviDent) 1 1 % GEL Apply to teeth      tiZANidine (ZANAFLEX) 4 mg tablet TAKE ONE TABLET BY MOUTH THREE TIMES A  tablet 1    zolpidem (AMBIEN) 10 mg tablet Take 10 mg by mouth daily at bedtime as needed      ZyrTEC-D Allergy & Congestion 5-120 MG per tablet TAKE ONE TABLET BY MOUTH TWICE A  tablet 1    rosuvastatin (CRESTOR) 10 MG tablet Take 1 tablet (10 mg total) by mouth daily 30 tablet 5     No current facility-administered medications for this visit  Allergies   Allergen Reactions    Clindamycin Hives     Category: Allergy;     Erythromycin Hives and Rash     Category: Allergy;     Latex Hives    Penicillins Hives and Shortness Of Breath    Morphine GI Intolerance and Vomiting     Category: Adverse Reaction;     Erythromycin Base Other (See Comments)     vomitting    Other     Penicillin V Seizures     Category: Allergy;     Wheat Extract - Food Allergy Dizziness    Adhesive [Medical Tape] Rash     Skin irritation    Povidone Iodine Rash     Category: Adverse Reaction;       Immunizations: There is no immunization history on file for this patient     Health Maintenance:         Topic Date Due    Colorectal Cancer Screening  Never done    Breast Cancer Screening: Mammogram  05/19/2018    Hepatitis C Screening  Completed         Topic Date Due    Pneumococcal Vaccine: 65+ Years (1 of 2 - PPSV23) Never done    COVID-19 Vaccine (1) Never done    DTaP,Tdap,and Td Vaccines (1 - Tdap) Never done    Influenza Vaccine (1) 09/01/2021      Medicare Health Risk Assessment:     /78 (BP Location: Left arm, Patient Position: Sitting)   Pulse 98   Temp 98 6 °F (37 °C)   Resp 16   Wt 77 1 kg (170 lb)   LMP  (LMP Unknown)   SpO2 96%   BMI 33 20 kg/m²      Kofi Care is here for her Initial Wellness visit  Last Medicare Wellness visit information reviewed, patient interviewed and updates made to the record today  Health Risk Assessment:   Patient rates overall health as good  Patient feels that their physical health rating is same  Patient is satisfied with their life  Eyesight was rated as same  Hearing was rated as same  Patient feels that their emotional and mental health rating is same  Patients states they are never, rarely angry  Patient states they are never, rarely unusually tired/fatigued  Pain experienced in the last 7 days has been none  Patient states that she has experienced no weight loss or gain in last 6 months  Always in pain secondary to low back disease and multiple surgeries  Depression Screening:   PHQ-2 Score: 0  PHQ-9 Score: 0      Fall Risk Screening: In the past year, patient has experienced: no history of falling in past year      Urinary Incontinence Screening:   Patient has leaked urine accidently in the last six months  Urgency    Home Safety:  Patient does not have trouble with stairs inside or outside of their home  Patient has working smoke alarms and has working carbon monoxide detector  Home safety hazards include: none  Nutrition:   Current diet is Regular  Medications:   Patient is currently taking over-the-counter supplements  OTC medications include: see medication list  Patient is able to manage medications  Activities of Daily Living (ADLs)/Instrumental Activities of Daily Living (IADLs):   Walk and transfer into and out of bed and chair?: Yes  Dress and groom yourself?: Yes    Bathe or shower yourself?: Yes    Feed yourself?  Yes  Do your laundry/housekeeping?: Yes  Manage your money, pay your bills and track your expenses?: Yes  Make your own meals?: Yes    Do your own shopping?: Yes    Previous Hospitalizations:   Any hospitalizations or ED visits within the last 12 months?: No      Advance Care Planning:   Living will: No    Durable POA for healthcare: No    Advanced directive: No    Advanced directive counseling given: No    Five wishes given: Yes    Patient declined ACP directive: No      Cognitive Screening:   Provider or family/friend/caregiver concerned regarding cognition?: No    PREVENTIVE SCREENINGS      Cardiovascular Screening:    General: Screening Not Indicated, History Lipid Disorder and Screening Current      Diabetes Screening:     General: Screening Current      Colorectal Cancer Screening:     General: Screening Current      Breast Cancer Screening:       Due for: Mammogram        Cervical Cancer Screening:    General: Screening Not Indicated      Osteoporosis Screening:      Due for: Bone Density Ultrasound      Abdominal Aortic Aneurysm (AAA) Screening:        General: Screening Not Indicated      Lung Cancer Screening:     General: Screening Not Indicated      Hepatitis C Screening:    General: Screening Current    Screening, Brief Intervention, and Referral to Treatment (SBIRT)    Screening  Typical number of drinks in a day: 0  Typical number of drinks in a week: 0  Interpretation: Low risk drinking behavior  Single Item Drug Screening:  How often have you used an illegal drug (including marijuana) or a prescription medication for non-medical reasons in the past year? never    Single Item Drug Screen Score: 0  Interpretation: Negative screen for possible drug use disorder    Brief Intervention  Alcohol & drug use screenings were reviewed  No concerns regarding substance use disorder identified         Lonnie Bray PA-C

## 2021-08-02 DIAGNOSIS — J45.20 MILD INTERMITTENT ASTHMA WITHOUT COMPLICATION: ICD-10-CM

## 2021-08-02 DIAGNOSIS — K21.9 GASTROESOPHAGEAL REFLUX DISEASE WITHOUT ESOPHAGITIS: ICD-10-CM

## 2021-08-02 DIAGNOSIS — J30.9 ALLERGIC RHINITIS, UNSPECIFIED SEASONALITY, UNSPECIFIED TRIGGER: ICD-10-CM

## 2021-08-02 DIAGNOSIS — R11.0 NAUSEA: ICD-10-CM

## 2021-08-02 RX ORDER — FLUTICASONE PROPIONATE 50 MCG
1 SPRAY, SUSPENSION (ML) NASAL DAILY
Qty: 16 G | Refills: 0 | Status: SHIPPED | OUTPATIENT
Start: 2021-08-02 | End: 2021-08-22

## 2021-08-02 RX ORDER — ONDANSETRON 4 MG/1
4 TABLET, ORALLY DISINTEGRATING ORAL EVERY 6 HOURS SCHEDULED
Qty: 90 TABLET | Refills: 0 | Status: SHIPPED | OUTPATIENT
Start: 2021-08-02 | End: 2021-10-11

## 2021-08-02 RX ORDER — ALBUTEROL SULFATE 90 UG/1
2 AEROSOL, METERED RESPIRATORY (INHALATION) EVERY 6 HOURS PRN
Qty: 8.5 G | Refills: 2 | Status: SHIPPED | OUTPATIENT
Start: 2021-08-02

## 2021-08-02 RX ORDER — PROCHLORPERAZINE MALEATE 10 MG
10 TABLET ORAL 2 TIMES DAILY
Qty: 60 TABLET | Refills: 0 | Status: SHIPPED | OUTPATIENT
Start: 2021-08-02 | End: 2022-01-12

## 2021-08-20 DIAGNOSIS — J45.20 MILD INTERMITTENT ASTHMA WITHOUT COMPLICATION: ICD-10-CM

## 2021-08-20 DIAGNOSIS — L30.9 DERMATITIS: Primary | ICD-10-CM

## 2021-08-21 DIAGNOSIS — M62.830 MUSCLE SPASM OF BACK: ICD-10-CM

## 2021-08-21 DIAGNOSIS — J30.9 ALLERGIC RHINITIS, UNSPECIFIED SEASONALITY, UNSPECIFIED TRIGGER: ICD-10-CM

## 2021-08-22 RX ORDER — TIZANIDINE 4 MG/1
TABLET ORAL
Qty: 270 TABLET | Refills: 1 | Status: SHIPPED | OUTPATIENT
Start: 2021-08-22 | End: 2021-10-20

## 2021-08-22 RX ORDER — TRIAMCINOLONE ACETONIDE 5 MG/G
CREAM TOPICAL
Qty: 30 G | Refills: 3 | Status: SHIPPED | OUTPATIENT
Start: 2021-08-22

## 2021-08-22 RX ORDER — FLUTICASONE PROPIONATE 50 MCG
SPRAY, SUSPENSION (ML) NASAL
Qty: 16 G | Refills: 0 | Status: SHIPPED | OUTPATIENT
Start: 2021-08-22 | End: 2021-12-16

## 2021-10-10 DIAGNOSIS — K21.9 GASTROESOPHAGEAL REFLUX DISEASE WITHOUT ESOPHAGITIS: ICD-10-CM

## 2021-10-10 RX ORDER — ONDANSETRON 4 MG/1
4 TABLET, ORALLY DISINTEGRATING ORAL EVERY 6 HOURS SCHEDULED
Qty: 90 TABLET | Refills: 0 | Status: CANCELLED | OUTPATIENT
Start: 2021-10-10

## 2021-10-11 RX ORDER — ONDANSETRON 4 MG/1
TABLET, ORALLY DISINTEGRATING ORAL
Qty: 90 TABLET | Refills: 0 | Status: SHIPPED | OUTPATIENT
Start: 2021-10-11 | End: 2021-11-28 | Stop reason: SDUPTHER

## 2021-10-18 DIAGNOSIS — K21.9 GASTROESOPHAGEAL REFLUX DISEASE: ICD-10-CM

## 2021-10-18 RX ORDER — PANTOPRAZOLE SODIUM 40 MG/1
TABLET, DELAYED RELEASE ORAL
Qty: 180 TABLET | Refills: 1 | Status: SHIPPED | OUTPATIENT
Start: 2021-10-18

## 2021-10-26 DIAGNOSIS — Z12.11 SCREEN FOR COLON CANCER: Primary | ICD-10-CM

## 2021-11-03 ENCOUNTER — TELEPHONE (OUTPATIENT)
Dept: INTERNAL MEDICINE CLINIC | Facility: CLINIC | Age: 66
End: 2021-11-03

## 2021-11-28 DIAGNOSIS — K21.9 GASTROESOPHAGEAL REFLUX DISEASE WITHOUT ESOPHAGITIS: ICD-10-CM

## 2021-11-29 RX ORDER — ONDANSETRON 4 MG/1
4 TABLET, ORALLY DISINTEGRATING ORAL EVERY 6 HOURS PRN
Qty: 90 TABLET | Refills: 0 | Status: SHIPPED | OUTPATIENT
Start: 2021-11-29 | End: 2022-01-12

## 2021-12-16 DIAGNOSIS — J30.9 ALLERGIC RHINITIS, UNSPECIFIED SEASONALITY, UNSPECIFIED TRIGGER: ICD-10-CM

## 2021-12-16 RX ORDER — FLUTICASONE PROPIONATE 50 MCG
SPRAY, SUSPENSION (ML) NASAL
Qty: 16 G | Refills: 0 | Status: SHIPPED | OUTPATIENT
Start: 2021-12-16 | End: 2022-01-12

## 2021-12-20 DIAGNOSIS — J45.20 MILD INTERMITTENT ASTHMA WITHOUT COMPLICATION: ICD-10-CM

## 2021-12-25 DIAGNOSIS — J30.9 ALLERGIC RHINITIS, UNSPECIFIED SEASONALITY, UNSPECIFIED TRIGGER: ICD-10-CM

## 2021-12-27 RX ORDER — CETIRIZINE HCL/PSEUDOEPHEDRINE 5 MG-120MG
1 TABLET, EXTENDED RELEASE 12 HR ORAL 2 TIMES DAILY
Qty: 180 TABLET | Refills: 0 | Status: SHIPPED | OUTPATIENT
Start: 2021-12-27 | End: 2022-04-26 | Stop reason: SDUPTHER

## 2022-01-11 DIAGNOSIS — J30.9 ALLERGIC RHINITIS, UNSPECIFIED SEASONALITY, UNSPECIFIED TRIGGER: ICD-10-CM

## 2022-01-11 DIAGNOSIS — K21.9 GASTROESOPHAGEAL REFLUX DISEASE WITHOUT ESOPHAGITIS: ICD-10-CM

## 2022-01-11 DIAGNOSIS — R11.0 NAUSEA: ICD-10-CM

## 2022-01-12 RX ORDER — FLUTICASONE PROPIONATE 50 MCG
SPRAY, SUSPENSION (ML) NASAL
Qty: 16 G | Refills: 0 | Status: SHIPPED | OUTPATIENT
Start: 2022-01-12 | End: 2022-06-16

## 2022-01-12 RX ORDER — PROCHLORPERAZINE MALEATE 10 MG
TABLET ORAL
Qty: 60 TABLET | Refills: 0 | Status: SHIPPED | OUTPATIENT
Start: 2022-01-12

## 2022-01-12 RX ORDER — ONDANSETRON 4 MG/1
TABLET, ORALLY DISINTEGRATING ORAL
Qty: 90 TABLET | Refills: 0 | Status: SHIPPED | OUTPATIENT
Start: 2022-01-12

## 2022-01-14 NOTE — TELEPHONE ENCOUNTER
The original prescription was reordered on 1/12/2022 by Wolfgang Marquez PA-C  Renewing this prescription may not be appropriate      Medication:  omperazole

## 2022-01-15 RX ORDER — ONDANSETRON 4 MG/1
4 TABLET, ORALLY DISINTEGRATING ORAL EVERY 6 HOURS PRN
Qty: 90 TABLET | Refills: 0 | Status: SHIPPED | OUTPATIENT
Start: 2022-01-15

## 2022-02-02 DIAGNOSIS — J45.20 MILD INTERMITTENT ASTHMA WITHOUT COMPLICATION: ICD-10-CM

## 2022-02-14 DIAGNOSIS — J45.20 MILD INTERMITTENT ASTHMA WITHOUT COMPLICATION: ICD-10-CM

## 2022-02-24 DIAGNOSIS — J45.20 MILD INTERMITTENT ASTHMA WITHOUT COMPLICATION: ICD-10-CM

## 2022-03-02 DIAGNOSIS — L30.9 DERMATITIS: ICD-10-CM

## 2022-03-30 DIAGNOSIS — J45.20 MILD INTERMITTENT ASTHMA WITHOUT COMPLICATION: ICD-10-CM

## 2022-04-26 DIAGNOSIS — J30.9 ALLERGIC RHINITIS, UNSPECIFIED SEASONALITY, UNSPECIFIED TRIGGER: ICD-10-CM

## 2022-04-26 RX ORDER — CETIRIZINE HCL/PSEUDOEPHEDRINE 5 MG-120MG
1 TABLET, EXTENDED RELEASE 12 HR ORAL 2 TIMES DAILY
Qty: 180 TABLET | Refills: 0 | Status: SHIPPED | OUTPATIENT
Start: 2022-04-26

## 2022-04-29 DIAGNOSIS — J45.20 MILD INTERMITTENT ASTHMA WITHOUT COMPLICATION: ICD-10-CM

## 2022-06-16 DIAGNOSIS — M62.830 MUSCLE SPASM OF BACK: ICD-10-CM

## 2022-06-16 DIAGNOSIS — J30.9 ALLERGIC RHINITIS, UNSPECIFIED SEASONALITY, UNSPECIFIED TRIGGER: ICD-10-CM

## 2022-06-16 RX ORDER — FLUTICASONE PROPIONATE 50 MCG
SPRAY, SUSPENSION (ML) NASAL
Qty: 16 G | Refills: 5 | Status: SHIPPED | OUTPATIENT
Start: 2022-06-16 | End: 2022-07-25

## 2022-06-16 RX ORDER — TIZANIDINE 4 MG/1
TABLET ORAL
Qty: 270 TABLET | Refills: 1 | Status: SHIPPED | OUTPATIENT
Start: 2022-06-16

## 2022-07-24 DIAGNOSIS — J30.9 ALLERGIC RHINITIS, UNSPECIFIED SEASONALITY, UNSPECIFIED TRIGGER: ICD-10-CM

## 2022-07-25 DIAGNOSIS — J45.20 MILD INTERMITTENT ASTHMA WITHOUT COMPLICATION: ICD-10-CM

## 2022-07-25 RX ORDER — FLUTICASONE PROPIONATE 50 MCG
SPRAY, SUSPENSION (ML) NASAL
Qty: 16 G | Refills: 5 | Status: SHIPPED | OUTPATIENT
Start: 2022-07-25 | End: 2022-08-15

## 2022-08-14 DIAGNOSIS — J30.9 ALLERGIC RHINITIS, UNSPECIFIED SEASONALITY, UNSPECIFIED TRIGGER: ICD-10-CM

## 2022-08-15 RX ORDER — FLUTICASONE PROPIONATE 50 MCG
SPRAY, SUSPENSION (ML) NASAL
Qty: 16 G | Refills: 5 | Status: SHIPPED | OUTPATIENT
Start: 2022-08-15

## 2023-01-11 ENCOUNTER — TELEPHONE (OUTPATIENT)
Dept: NEUROSURGERY | Facility: CLINIC | Age: 68
End: 2023-01-11

## 2023-01-11 NOTE — TELEPHONE ENCOUNTER
Called the patient to discuss her recent MyChart message  She reports that over the last few months she has noticed worsening lower back pain that radiates around to her abdominal region and down the side and back or right leg  She describes this a severe and throbbing in nature  She reports a mechanical fall about 1 month ago which seemed to result in worsening symptoms  She did not present to ED at that time  Denies loss of sensation in perineal region  She also reports leg cramps that seem to have started about 6 weeks ago and are worse in cold weather  Has not contacted PCP regarding this  Suggested she do that, there could be vascular component  Assisted to schedule OV with provider for additional assessment and determine if additional imaging is needed  History of bladder stim unable to have MRI, she mentioned planned removal and questions ir bladder stim can be removed at the time of surgery if any  Advised that unfortunately I was not aware of this being an option

## 2023-01-11 NOTE — TELEPHONE ENCOUNTER
----- Message from [place]  Tisha Else sent at 1/11/2023  9:37 AM EST -----  Regarding: increasing back pain  Contact: [phone]  I need to make an appointment to determine if I need more surgery on my back  my pain doctor is sending me for a cat scan  I am unable to have a MRI because of the bladder stimulator in my lower right side of my back  If I need more surgery on my back I would like to have the stimulator  removed      thank you  KOBE POP   [phone]  [id number]

## 2023-02-14 ENCOUNTER — TELEPHONE (OUTPATIENT)
Dept: NEUROSURGERY | Facility: CLINIC | Age: 68
End: 2023-02-14

## 2023-02-14 ENCOUNTER — OFFICE VISIT (OUTPATIENT)
Dept: NEUROSURGERY | Facility: CLINIC | Age: 68
End: 2023-02-14

## 2023-02-14 VITALS
TEMPERATURE: 97.8 F | SYSTOLIC BLOOD PRESSURE: 131 MMHG | BODY MASS INDEX: 31.63 KG/M2 | OXYGEN SATURATION: 98 % | HEIGHT: 61 IN | DIASTOLIC BLOOD PRESSURE: 81 MMHG | HEART RATE: 86 BPM | WEIGHT: 167.55 LBS | RESPIRATION RATE: 18 BRPM

## 2023-02-14 DIAGNOSIS — M54.16 RADICULOPATHY, LUMBAR REGION: Primary | ICD-10-CM

## 2023-02-14 NOTE — PROGRESS NOTES
Neurosurgery Office Note  Merline Lomeli 69 y o  female MRN: [id number]      Assessment/Plan     Radiculopathy, lumbar region  · Multiple prior spinal surgeries  · Presents for evaluation of her low back pain and R legs radicular complaints  · [facility]     Imaging:   · No recent imaging to review  CTscan from 2020 reviewed  Plan:   · Recommend updated imaging  Patient has very complicated past history  · H/o spinous process spacers, SCS, lumbar decompression, L3-4 fusion and washout wound revision  · Patient is unable to have MRI secondary to bladder stimulator  · CT myelogram ordered of the lumbar spine  · Patient needs imaging more detailed than non-contrast CT for complete evaluation given prior surgery and concern for nerve root compression  · Follow up with Surgeon to discuss results and potential for further surgical intervention  Diagnoses and all orders for this visit:    Radiculopathy, lumbar region  -     CT lumbar spine without contrast; Future  -     FL myelogram lumbar; Future          I spent 40 minutes with the patient today in which >50% of the time was spent counseling/coordination of care regarding diagnosis, imaging review, symptoms and treatment plan  CHIEF COMPLAINT    Chief Complaint   Patient presents with   • Back Pain       HISTORY    History of Present Illness     69y o  year old female who presents to the neurosurgical office for evaluation of her low back pain and right leg symptoms  Patient has a significant past surgical history for spinal surgery including spinous process spacers, spinal cord stimulator insertion, spinal cord stimulator removal, multilevel lumbar decompression, L3-4 posterior fixation fusion  Patient's most recent surgery was 2018  She had a wound complication that did require a washout and repeat closure also in 2018    She has a significant medical history for hypertension, chronic pain, opioid dependence, postlaminectomy syndrome  Patient states some of her symptoms and some of her pains have been present since prior to her first surgery in 2018 with [facility]  Some of her pains have been exacerbated and worsening more recently  She does complain of progressive symptoms since a fall several years ago  She continues to follow with [facility] who also manages her medication regimen  Patients pain primarily radiates across the patients low back and sacrum radiating into the right buttock  It wraps around into the groin as well as her anterior and medial thigh stopping at the knee  It does not typically continue into the calf or foot  She has limited pain at this time on the left  She is unable to sit or stand for a prolonged period of time  She can only lay flat on her back at night, she can no longer sleep on her side  She has limited ambulatory ability but refuses to use a cane or walker  She has a bladder stimulator in place that is not MRI compatible  She has planning to follow up with a urologist/gynecologist regarding this, but does not have any scheduled at this time  She previously inquired about surgery for removal, but never went through as she wanted to avoid surgery when possible  She asked that if her bladder stimulator could be related to her symptoms but I believe it is not  See Discussion    REVIEW OF SYSTEMS    Review of Systems   Constitutional: Negative  HENT: Negative  Eyes: Negative  Respiratory: Negative  Cardiovascular: Negative  Gastrointestinal: Negative  Endocrine: Negative  Genitourinary: Negative  Has bladder stimulator     Musculoskeletal: Positive for back pain (b/l Lbp radiatea to right hip, groin and stops at the knee  Difficulty bending, standing, walking (<10min), Lifting things ( laundry basket)  She describes her pain a constant, sharp throbbing pain and she rates it an 8/10 pain today ) and myalgias           [facility] Chey Gaytan)   Dr Johnnie STROUD x 2 - No relief  PT- NONE   Meds: Gabapentin, Oxycodone provide 25% relief  S/p L1-4 Fusion and SCS  removal in 9/2018 -DR Racquel Hinson  NO MRI's due to bladder stimulator   Skin: Negative  Allergic/Immunologic: Negative  Neurological: Positive for weakness (right leg gives out) and numbness (and tinglingon b/l legs)  Hematological: Negative  Psychiatric/Behavioral: Negative  All other systems reviewed and are negative  ROS obtained by MA  Reviewed  See HPI       Meds/Allergies     Current Outpatient Medications   Medication Sig Dispense Refill   • estradiol (ESTRACE) 0 1 mg/g vaginal cream Insert 1 g into the vagina 2 (two) times a week 42 5 g 3   • fluticasone (FLONASE) 50 mcg/act nasal spray USE 1 SPRAY INTO EACH NOSTRIL DAILY 16 g 5   • gabapentin (NEURONTIN) 800 mg tablet Take 800 mg by mouth 3 (three) times a day     • hydrocortisone 2 5 % cream APPLY TO AFFECTED AREA(S) TWO TIMES A DAY 30 g 5   • oxyCODONE (ROXICODONE) 10 MG TABS Take 10 mg by mouth every 8 (eight) hours as needed      • pantoprazole (PROTONIX) 40 mg tablet TAKE ONE TABLET BY MOUTH TWICE A DAY 30 MINUTES BEFORE BREAKFAST AND DINNER 180 tablet 1   • prochlorperazine (COMPAZINE) 10 mg tablet TAKE ONE TABLET BY MOUTH TWICE A DAY AS NEEDED FOR NAUSEA 60 tablet 0   • zolpidem (AMBIEN) 10 mg tablet Take 10 mg by mouth daily at bedtime as needed     • ZyrTEC-D Allergy & Congestion 5-120 MG per tablet Take 1 tablet by mouth 2 (two) times a day 180 tablet 0   • albuterol (Ventolin HFA) 90 mcg/act inhaler Inhale 2 puffs every 6 (six) hours as needed for wheezing (Patient not taking: Reported on 2/14/2023) 8 5 g 2   • albuterol 2 mg/5 mL syrup TAKE ONE TEASPOONFUL BY MOUTH FOUR TIMES A DAY (Patient not taking: Reported on 2/14/2023) 473 mL 1   • diclofenac sodium (VOLTAREN) 1 % Apply 2 g topically 4 (four) times a day (Patient not taking: Reported on 2/14/2023) 1 Tube 1   • ketoconazole (NIZORAL) 2 % cream APPLY TO AFFECTED AREA EVERY DAY (Patient not taking: Reported on 2/14/2023) 60 g 1   • lisinopril (ZESTRIL) 5 mg tablet Take 1 tablet (5 mg total) by mouth daily (Patient not taking: Reported on 2/14/2023) 180 tablet 3   • lisinopril (ZESTRIL) 5 mg tablet Take 1 tablet (5 mg total) by mouth daily (Patient not taking: Reported on 2/14/2023) 90 tablet 3   • Narcan 4 MG/0 1ML nasal spray As needed (Patient not taking: Reported on 2/14/2023)     • ondansetron (ZOFRAN-ODT) 4 mg disintegrating tablet Take 1 tablet (4 mg total) by mouth every 6 (six) hours as needed for nausea or vomiting (Patient not taking: Reported on 2/14/2023) 90 tablet 0   • ondansetron (ZOFRAN-ODT) 4 mg disintegrating tablet DISSOLVE ONE TABLET BY MOUTH EVERY 6 HOURS AS NEEDED NAUSEA OR VOMITING  (Patient not taking: Reported on 2/14/2023) 90 tablet 0   • pentosan polysulfate (Elmiron) 100 mg capsule Take 1 capsule (100 mg total) by mouth 3 (three) times a day (Patient not taking: Reported on 2/14/2023) 30 capsule 5   • rosuvastatin (CRESTOR) 10 MG tablet Take 1 tablet (10 mg total) by mouth daily (Patient not taking: Reported on 2/14/2023) 30 tablet 5   • SODIUM FLUORIDE, DENTAL GEL, 1 1 % GEL Apply to teeth (Patient not taking: Reported on 2/14/2023)     • tiZANidine (ZANAFLEX) 4 mg tablet TAKE ONE TABLET BY MOUTH THREE TIMES A DAY (Patient not taking: Reported on 2/14/2023) 270 tablet 1   • triamcinolone (KENALOG) 0 5 % cream APPLY TOPICALLY THREE TIMES A DAY (Patient not taking: Reported on 2/14/2023) 30 g 3     No current facility-administered medications for this visit  Allergies   Allergen Reactions   • Clindamycin Hives     Category: Allergy;    • Erythromycin Hives and Rash     Category: Allergy;    • Latex Hives   • Penicillins Hives and Shortness Of Breath   • Morphine GI Intolerance and Vomiting     Category:  Adverse Reaction;    • Erythromycin Base Other (See Comments)     vomitting   • Other    • Penicillin V Seizures     Category: Allergy;    • Wheat Extract - Food Allergy Dizziness   • Adhesive [Medical Tape] Rash     Skin irritation   • Povidone Iodine Rash     Category: Adverse Reaction;        PAST HISTORY    Past Medical History:   Diagnosis Date   • Anemia    • Anxiety    • Asthma    • Cataract    • Chronic back pain    • Chronic kidney disease    • Chronic pain disorder     Back   • Depression    • GERD (gastroesophageal reflux disease)    • HTN (hypertension)    • Hyperlipidemia    • Interstitial cystitis    • Muscle cramps    • Muscle weakness    • Obesity    • Pneumonia    • PONV (postoperative nausea and vomiting)    • Radiculopathy, lumbar region    • Spondylosis, cervical, with myelopathy    • Tinea corporis 2018   • Vulvovaginitis        Past Surgical History:   Procedure Laterality Date   • APPENDECTOMY     • BACK SURGERY      Arthrodeiss lumbar   • BACK SURGERY      Spinal stereotaxis of cord   • BLADDER SURGERY      Electronic bladder stimulator implantation   • CATARACT EXTRACTION     • CATARACT EXTRACTION, BILATERAL     •  SECTION      x3   • CHOLECYSTECTOMY     • COLON SURGERY      Intestinal stricturoplasty    • DECOMPRESSION SPINE LUMBAR POSTERIOR Bilateral 2018    Procedure: DECOMPRESSION SPINE LUMBAR POSTERIOR L1-4, L1-2 DISKECTOMY, POSTERIORLATERAL FUSION L3-4, REMOVAL OF SPINAL CORD STIMULATOR SYSTEM, REMOVAL OF INTERSPINOUS DEVICES;  Surgeon: Venkatesh Pompa MD;  Location:  MAIN OR;  Service: Neurosurgery   • FINGER SURGERY      Extensor tendon repair (mallet finger)   • FL MYELOGRAM LUMBAR  2018   • HERNIA REPAIR      x3   • HYSTERECTOMY     • NV ESOPHAGOGASTRODUODENOSCOPY TRANSORAL DIAGNOSTIC N/A 2017    Procedure: ESOPHAGOGASTRODUODENOSCOPY (EGD); Surgeon: Shahla Cho MD;  Location: [facility];   Service: Gastroenterology   • NV I&D HEMATOMA SEROMA/FLUID COLLECTION Bilateral 10/9/2018    Procedure: LUMBAR WOUND EXPLORATION/EVACUATION OF SERONMA;  Surgeon: Marylin Hill MD;  [facility];  Service: Neurosurgery   • SALPINGOOPHORECTOMY      B/L       Social History     Tobacco Use   • Smoking status: Never   • Smokeless tobacco: Never   Vaping Use   • Vaping Use: Every day   • Substances: CBD   Substance Use Topics   • Alcohol use: No   • Drug use: Yes     Types: Marijuana     Comment: medical marijuana       Family History   Problem Relation Age of Onset   • Coronary artery disease Brother         Patient has 3 brothers with coronary artery disease   • Diabetes Mother    • Throat cancer Mother    • COPD Father    • Cancer Father    • Bipolar disorder Sister          Above history personally reviewed  EXAM    Vitals:Blood pressure 131/81, pulse 86, temperature 97 8 °F (36 6 °C), temperature source Temporal, resp  rate 18, height 5' 1" (1 549 m), weight 76 kg (167 lb 8 8 oz), SpO2 98 %, not currently breastfeeding  ,Body mass index is 31 66 kg/m²  Physical Exam  Constitutional:       Appearance: She is well-developed  HENT:      Head: Normocephalic and atraumatic  Eyes:      Extraocular Movements: Extraocular movements intact  Pupils: Pupils are equal, round, and reactive to light  Neck:      Vascular: No JVD  Trachea: No tracheal deviation  Cardiovascular:      Rate and Rhythm: Normal rate  Pulmonary:      Effort: Pulmonary effort is normal  No respiratory distress  Musculoskeletal:         General: Tenderness present  No deformity  Normal range of motion  Cervical back: Normal range of motion and neck supple  Skin:     General: Skin is warm and dry  Neurological:      Mental Status: She is alert and oriented to person, place, and time  Cranial Nerves: No cranial nerve deficit  Gait: Gait is intact  Deep Tendon Reflexes: Reflexes are normal and symmetric  Psychiatric:         Speech: Speech normal          Behavior: Behavior normal          Thought Content:  Thought content normal          Neurologic Exam Mental Status   Oriented to person, place, and time  Attention: normal  Concentration: normal    Speech: speech is normal   Level of consciousness: alert  Knowledge: good  Normal comprehension  Cranial Nerves     CN III, IV, VI   Pupils are equal, round, and reactive to light  CN VII   Facial expression full, symmetric  CN VIII   CN VIII normal    Hearing: intact    Motor Exam Grossly intact  Able to  room independently  Walking without difficulty  No focal weakness noted  Pain inhibition      Gait, Coordination, and Reflexes     Gait  Gait: normal    Tremor   Resting tremor: absent  Action tremor: absent        MEDICAL DECISION MAKING    Imaging Studies:     No results found  I have personally reviewed pertinent reports     and I have personally reviewed pertinent films in PACS

## 2023-02-14 NOTE — ASSESSMENT & PLAN NOTE
· Multiple prior spinal surgeries  · Presents for evaluation of her low back pain and R legs radicular complaints  · [facility]     Imaging:   · No recent imaging to review  CTscan from 2020 reviewed  Plan:   · Recommend updated imaging  Patient has very complicated past history  · H/o spinous process spacers, SCS, lumbar decompression, L3-4 fusion and washout wound revision  · Patient is unable to have MRI secondary to bladder stimulator  · CT myelogram ordered of the lumbar spine  · Patient needs imaging more detailed than non-contrast CT for complete evaluation given prior surgery and concern for nerve root compression  · Follow up with Surgeon to discuss results and potential for further surgical intervention

## 2023-02-14 NOTE — TELEPHONE ENCOUNTER
2/14/23  L/M FOR PT  ADVISED OF CT L SPINE WITH MYELOGRAM [facility] 3/8/23 @ 1 PM   ASKED PT TO CALL BACK TO SCHEDULE F/U WITH DR HOOKER

## 2023-02-16 NOTE — NURSING NOTE
Called patient to complete consult and go over instructions, patient is scheduled on 3/8/23 for diagnostic lumbar  Myelogram  Spoke with patient and her  Zoë Clark  Verified allergies and no current anticoagulant medication present but not required to stop per periprocedural management of coagulation status and hemostasis risk in percutaneous image guided procedure guidelines  Patient has had this procedure in the  past, asked if patient had any questions or concerns patient  Per patient when she had this done in the past it was painful when "I got the dye put in my back  I want to know if I can get sedated for this " Explained to the patient that here at the radiology area we do not perform this procedure with sedation  Recommended if patient was anxious she should speak to her primary MD and get anxiety medications  Per patient " I don't like how I feel with that type of medication "  Diet, medication instructions (taking own meds prior to test), also went over with patient that as of today with hold any ASA or ASA products till the date of the procedure and need for  was explained  Reminded patient of location, time and date of procedure, of location and time ambulatory procedure unit  Patient made aware that she may call if any other questions that may arise to the myself, gave them my contact information  Information was also sent via e-mail to verified address, see message below  Good Afternoon Mrs Joseph Funes are scheduled on  3/8/23 @ 1 pm for diagnostic  myelogram       You are to come @ 11:30 am to [address] at [facility], [address] 1st floor and present to [place]  They will get you changed for your procedure, update medical information, and draw blood work  A platelet and clotting time are needed the day of the procedure to assure your safety and healing post procedure   If you have a spinal, brain or any other type of stimulator please bring with you the controller and the  (or have the controller fully charge ) for your study  You may eat a light breakfast, drink fluids and take all your medications that are prescribed to you  Please do not take any Aspirin and also be cautious of any over the counter medication please check if Aspirin is one of the ingredients (Tylenol, Motrin and Aleve are fine to take)  If not on fluid restrictions, please increase your fluid intake 3 days prior to the procedure  For the procedure you will be on your stomach, we will use an Xray to assist in accessing your cerebral spinal fluid once the area in your lower back is cleaned and you will get a local anesthetic  We will be inserting IV contrast to your CSF, in order to get the pictures that are needed for your ordering provider you will be inverted towards your head so the contrast can move upward to the needed location  Once the contrast is at the location needed for ideal pictures you will be placed back on to your stretcher and taken over to CT to get the scans needed  Upon completion on the CT you will be sent back to surgical services where you will be monitored for the allotted time usually 1 hour  Upon discharge you will need a ride home so please bring a  for this study  The night of or the day following you may experience soreness at your lower back and a headache, these are normal and expected  Your discharge instructions will be to rest, hydrate with fluids (caffeine helps also) and take over the counter medication such as Tylenol, Motrin or Aleve  Please feel free to call if any other questions or concerns should arise  [address] [place] [address]  Radiology RN  [address] [facility]  [phone] (Office)  [phone] (Fax)  [email or web address]

## 2023-02-17 NOTE — TELEPHONE ENCOUNTER
YON () CALLED BACK TO SCHEDULE FU WITH DR Rachel Abdalla  I SCHEDULED HER APPT ON 3/13/23 AT 1:30PM WITH DR Rachel Abdalla IN [facility] LOCATION INFO

## 2023-03-07 ENCOUNTER — TELEPHONE (OUTPATIENT)
Dept: NEUROSURGERY | Facility: CLINIC | Age: 68
End: 2023-03-07

## 2023-03-07 NOTE — TELEPHONE ENCOUNTER
I reached out to patient and spoke with her   He said that she is afraid to have a contrasted study because the last time she had dye it affected her kidney function  I did discuss having her reach out to her PCP or nephrologist (if she has one) to discuss the risks and see if they have any recommendations for her prior to receiving the dye  He said she is also scared to have another surgery which is also holding her back  He also said she is considering having the bladder stim battery removed first so she can undergo an MRI instead of a CT myelogram   He said she doesn't follow up with PCP anymore because she felt pressured to get the covid vaccine so she is working on finding someone new possibly to go to   is going to have a discussion with wife regarding her goals and what treatment plan she would like to go with  I did recommend getting the CT myelogram and following up with a neurosurgeon, however  They will discuss and call back if they are interested in scheduling

## 2023-03-08 ENCOUNTER — HOSPITAL ENCOUNTER (OUTPATIENT)
Dept: RADIOLOGY | Facility: HOSPITAL | Age: 68
Discharge: HOME | End: 2023-03-08

## 2023-04-07 ENCOUNTER — HOSPITAL ENCOUNTER (OUTPATIENT)
Dept: CT IMAGING | Facility: CLINIC | Age: 68
Discharge: HOME | End: 2023-04-07

## 2023-04-07 DIAGNOSIS — M54.16 RADICULOPATHY, LUMBAR REGION: ICD-10-CM

## 2024-03-07 ENCOUNTER — HOSPITAL ENCOUNTER (OUTPATIENT)
Dept: CT IMAGING | Facility: CLINIC | Age: 69
Discharge: HOME | End: 2024-03-07
Payer: MEDICARE

## 2024-03-07 DIAGNOSIS — M16.0 PRIMARY OSTEOARTHRITIS OF BOTH HIPS: ICD-10-CM

## 2024-03-07 PROCEDURE — 73700 CT LOWER EXTREMITY W/O DYE: CPT

## 2024-11-20 ENCOUNTER — HOSPITAL ENCOUNTER (OUTPATIENT)
Dept: CT IMAGING | Facility: CLINIC | Age: 69
Discharge: HOME | End: 2024-11-20
Payer: MEDICARE

## 2024-11-20 DIAGNOSIS — M47.817 LUMBOSACRAL SPONDYLOSIS WITHOUT MYELOPATHY: ICD-10-CM

## 2024-11-20 DIAGNOSIS — M54.17 LUMBOSACRAL RADICULITIS: ICD-10-CM

## 2024-11-20 PROCEDURE — 72131 CT LUMBAR SPINE W/O DYE: CPT
